# Patient Record
Sex: FEMALE | Race: WHITE | NOT HISPANIC OR LATINO | Employment: OTHER | URBAN - METROPOLITAN AREA
[De-identification: names, ages, dates, MRNs, and addresses within clinical notes are randomized per-mention and may not be internally consistent; named-entity substitution may affect disease eponyms.]

---

## 2022-07-08 ENCOUNTER — TRANSCRIBE ORDERS (OUTPATIENT)
Dept: ADMINISTRATIVE | Facility: HOSPITAL | Age: 87
End: 2022-07-08

## 2022-07-08 DIAGNOSIS — M25.559 CHRONIC HIP PAIN AFTER TOTAL REPLACEMENT OF HIP JOINT: Primary | ICD-10-CM

## 2022-07-08 DIAGNOSIS — Z96.649 CHRONIC HIP PAIN AFTER TOTAL REPLACEMENT OF HIP JOINT: Primary | ICD-10-CM

## 2022-07-08 DIAGNOSIS — G89.29 CHRONIC HIP PAIN AFTER TOTAL REPLACEMENT OF HIP JOINT: Primary | ICD-10-CM

## 2022-07-16 ENCOUNTER — HOSPITAL ENCOUNTER (OUTPATIENT)
Dept: BONE DENSITY | Facility: HOSPITAL | Age: 87
Discharge: HOME | End: 2022-07-16
Admitting: ORTHOPAEDIC SURGERY

## 2022-07-16 DIAGNOSIS — Z96.649 CHRONIC HIP PAIN AFTER TOTAL REPLACEMENT OF HIP JOINT: ICD-10-CM

## 2022-07-16 DIAGNOSIS — G89.29 CHRONIC HIP PAIN AFTER TOTAL REPLACEMENT OF HIP JOINT: ICD-10-CM

## 2022-07-16 DIAGNOSIS — M25.559 CHRONIC HIP PAIN AFTER TOTAL REPLACEMENT OF HIP JOINT: ICD-10-CM

## 2022-07-16 PROCEDURE — 77080 DXA BONE DENSITY AXIAL: CPT

## 2022-07-18 ENCOUNTER — TRANSCRIBE ORDERS (OUTPATIENT)
Dept: ADMINISTRATIVE | Facility: HOSPITAL | Age: 87
End: 2022-07-18

## 2022-07-18 ENCOUNTER — LAB (OUTPATIENT)
Dept: LAB | Facility: HOSPITAL | Age: 87
End: 2022-07-18

## 2022-07-18 DIAGNOSIS — Z78.0 POSTMENOPAUSAL STATUS (AGE-RELATED) (NATURAL): ICD-10-CM

## 2022-07-18 DIAGNOSIS — Z78.0 POSTMENOPAUSAL STATUS (AGE-RELATED) (NATURAL): Primary | ICD-10-CM

## 2022-07-18 DIAGNOSIS — E55.9 AVITAMINOSIS D: ICD-10-CM

## 2022-07-18 LAB
25(OH)D3 SERPL-MCNC: 36.6 NG/ML (ref 30–100)
ALBUMIN SERPL-MCNC: 4.6 G/DL (ref 3.5–5.2)
ALBUMIN/GLOB SERPL: 1.6 G/DL
ALP SERPL-CCNC: 74 U/L (ref 39–117)
ALT SERPL W P-5'-P-CCNC: 14 U/L (ref 1–33)
ANION GAP SERPL CALCULATED.3IONS-SCNC: 9.8 MMOL/L (ref 5–15)
AST SERPL-CCNC: 17 U/L (ref 1–32)
BILIRUB SERPL-MCNC: 0.3 MG/DL (ref 0–1.2)
BUN SERPL-MCNC: 21 MG/DL (ref 8–23)
BUN/CREAT SERPL: 26.3 (ref 7–25)
CALCIUM SPEC-SCNC: 9.5 MG/DL (ref 8.6–10.5)
CHLORIDE SERPL-SCNC: 97 MMOL/L (ref 98–107)
CO2 SERPL-SCNC: 29.2 MMOL/L (ref 22–29)
CREAT SERPL-MCNC: 0.8 MG/DL (ref 0.57–1)
EGFRCR SERPLBLD CKD-EPI 2021: 71 ML/MIN/1.73
GLOBULIN UR ELPH-MCNC: 2.9 GM/DL
GLUCOSE SERPL-MCNC: 58 MG/DL (ref 65–99)
POTASSIUM SERPL-SCNC: 4.2 MMOL/L (ref 3.5–5.2)
PROT SERPL-MCNC: 7.5 G/DL (ref 6–8.5)
SODIUM SERPL-SCNC: 136 MMOL/L (ref 136–145)

## 2022-07-18 PROCEDURE — 82306 VITAMIN D 25 HYDROXY: CPT

## 2022-07-18 PROCEDURE — 80053 COMPREHEN METABOLIC PANEL: CPT

## 2022-07-18 PROCEDURE — 36415 COLL VENOUS BLD VENIPUNCTURE: CPT

## 2022-08-17 ENCOUNTER — TRANSCRIBE ORDERS (OUTPATIENT)
Dept: ADMINISTRATIVE | Facility: HOSPITAL | Age: 87
End: 2022-08-17

## 2022-08-17 DIAGNOSIS — M81.0 SENILE OSTEOPOROSIS: Primary | ICD-10-CM

## 2022-08-24 ENCOUNTER — APPOINTMENT (OUTPATIENT)
Dept: ONCOLOGY | Facility: HOSPITAL | Age: 87
End: 2022-08-24

## 2022-08-29 PROBLEM — M81.0 OSTEOPOROSIS: Status: ACTIVE | Noted: 2022-08-29

## 2022-09-01 ENCOUNTER — INFUSION (OUTPATIENT)
Dept: ONCOLOGY | Facility: HOSPITAL | Age: 87
End: 2022-09-01

## 2022-09-01 ENCOUNTER — LAB (OUTPATIENT)
Dept: OTHER | Facility: HOSPITAL | Age: 87
End: 2022-09-01

## 2022-09-01 VITALS
RESPIRATION RATE: 18 BRPM | HEART RATE: 70 BPM | OXYGEN SATURATION: 100 % | BODY MASS INDEX: 19.47 KG/M2 | WEIGHT: 105.8 LBS | HEIGHT: 62 IN | TEMPERATURE: 96.6 F | DIASTOLIC BLOOD PRESSURE: 68 MMHG | SYSTOLIC BLOOD PRESSURE: 134 MMHG

## 2022-09-01 DIAGNOSIS — M81.0 OSTEOPOROSIS, UNSPECIFIED OSTEOPOROSIS TYPE, UNSPECIFIED PATHOLOGICAL FRACTURE PRESENCE: ICD-10-CM

## 2022-09-01 DIAGNOSIS — M81.0 OSTEOPOROSIS, UNSPECIFIED OSTEOPOROSIS TYPE, UNSPECIFIED PATHOLOGICAL FRACTURE PRESENCE: Primary | ICD-10-CM

## 2022-09-01 LAB
25(OH)D3 SERPL-MCNC: 35.2 NG/ML (ref 30–100)
ALBUMIN SERPL-MCNC: 4.1 G/DL (ref 3.5–5.2)
ALBUMIN/GLOB SERPL: 1.3 G/DL
ALP SERPL-CCNC: 69 U/L (ref 39–117)
ALT SERPL W P-5'-P-CCNC: 6 U/L (ref 1–33)
ANION GAP SERPL CALCULATED.3IONS-SCNC: 8.9 MMOL/L (ref 5–15)
AST SERPL-CCNC: 14 U/L (ref 1–32)
BILIRUB SERPL-MCNC: 0.2 MG/DL (ref 0–1.2)
BUN SERPL-MCNC: 18 MG/DL (ref 8–23)
BUN/CREAT SERPL: 19.4 (ref 7–25)
CALCIUM SPEC-SCNC: 9.6 MG/DL (ref 8.6–10.5)
CHLORIDE SERPL-SCNC: 101 MMOL/L (ref 98–107)
CO2 SERPL-SCNC: 29.1 MMOL/L (ref 22–29)
CREAT SERPL-MCNC: 0.93 MG/DL (ref 0.57–1)
EGFRCR SERPLBLD CKD-EPI 2021: 58.9 ML/MIN/1.73
GLOBULIN UR ELPH-MCNC: 3.2 GM/DL
GLUCOSE SERPL-MCNC: 81 MG/DL (ref 65–99)
POTASSIUM SERPL-SCNC: 4.2 MMOL/L (ref 3.5–5.2)
PROT SERPL-MCNC: 7.3 G/DL (ref 6–8.5)
SODIUM SERPL-SCNC: 139 MMOL/L (ref 136–145)

## 2022-09-01 PROCEDURE — 96372 THER/PROPH/DIAG INJ SC/IM: CPT

## 2022-09-01 PROCEDURE — 80053 COMPREHEN METABOLIC PANEL: CPT | Performed by: ORTHOPAEDIC SURGERY

## 2022-09-01 PROCEDURE — 82306 VITAMIN D 25 HYDROXY: CPT | Performed by: ORTHOPAEDIC SURGERY

## 2022-09-01 PROCEDURE — 25010000002 DENOSUMAB 60 MG/ML SOLUTION PREFILLED SYRINGE: Performed by: ORTHOPAEDIC SURGERY

## 2022-09-01 RX ADMIN — DENOSUMAB 60 MG: 60 INJECTION SUBCUTANEOUS at 15:11

## 2022-09-01 NOTE — NURSING NOTE
Arrived  for prolia injection. Indication and side effects reviewed. Denies recent dental work. Labs and medications verified. Prolia administered in right arm without incidence. Instructed to call prescribing MD for any concerns or questions and instructed on how to schedule future appts.  Pt vu and discharged in stable condition.

## 2022-09-12 ENCOUNTER — HOSPITAL ENCOUNTER (INPATIENT)
Facility: HOSPITAL | Age: 87
LOS: 5 days | Discharge: HOME HEALTH | End: 2022-09-18
Attending: EMERGENCY MEDICINE | Admitting: INTERNAL MEDICINE

## 2022-09-12 ENCOUNTER — APPOINTMENT (OUTPATIENT)
Dept: GENERAL RADIOLOGY | Facility: HOSPITAL | Age: 87
End: 2022-09-12

## 2022-09-12 DIAGNOSIS — I95.9 HYPOTENSION, UNSPECIFIED HYPOTENSION TYPE: ICD-10-CM

## 2022-09-12 DIAGNOSIS — E87.20 LACTIC ACIDOSIS: ICD-10-CM

## 2022-09-12 DIAGNOSIS — Z86.79 HISTORY OF HYPERTENSION: ICD-10-CM

## 2022-09-12 DIAGNOSIS — E44.0 MODERATE MALNUTRITION: ICD-10-CM

## 2022-09-12 DIAGNOSIS — R53.1 GENERALIZED WEAKNESS: ICD-10-CM

## 2022-09-12 DIAGNOSIS — J96.01 ACUTE RESPIRATORY FAILURE WITH HYPOXIA: Primary | ICD-10-CM

## 2022-09-12 DIAGNOSIS — T17.908A ASPIRATION INTO AIRWAY, INITIAL ENCOUNTER: ICD-10-CM

## 2022-09-12 LAB
BASOPHILS # BLD AUTO: 0.08 10*3/MM3 (ref 0–0.2)
BASOPHILS NFR BLD AUTO: 0.6 % (ref 0–1.5)
DEPRECATED RDW RBC AUTO: 43.4 FL (ref 37–54)
EOSINOPHIL # BLD AUTO: 0.24 10*3/MM3 (ref 0–0.4)
EOSINOPHIL NFR BLD AUTO: 1.8 % (ref 0.3–6.2)
ERYTHROCYTE [DISTWIDTH] IN BLOOD BY AUTOMATED COUNT: 13.3 % (ref 12.3–15.4)
HCT VFR BLD AUTO: 38.4 % (ref 34–46.6)
HGB BLD-MCNC: 12.3 G/DL (ref 12–15.9)
IMM GRANULOCYTES # BLD AUTO: 0.1 10*3/MM3 (ref 0–0.05)
IMM GRANULOCYTES NFR BLD AUTO: 0.8 % (ref 0–0.5)
LYMPHOCYTES # BLD AUTO: 4.35 10*3/MM3 (ref 0.7–3.1)
LYMPHOCYTES NFR BLD AUTO: 32.8 % (ref 19.6–45.3)
MCH RBC QN AUTO: 28 PG (ref 26.6–33)
MCHC RBC AUTO-ENTMCNC: 32 G/DL (ref 31.5–35.7)
MCV RBC AUTO: 87.3 FL (ref 79–97)
MONOCYTES # BLD AUTO: 1.71 10*3/MM3 (ref 0.1–0.9)
MONOCYTES NFR BLD AUTO: 12.9 % (ref 5–12)
NEUTROPHILS NFR BLD AUTO: 51.1 % (ref 42.7–76)
NEUTROPHILS NFR BLD AUTO: 6.77 10*3/MM3 (ref 1.7–7)
NRBC BLD AUTO-RTO: 0 /100 WBC (ref 0–0.2)
PLATELET # BLD AUTO: 362 10*3/MM3 (ref 140–450)
PMV BLD AUTO: 10 FL (ref 6–12)
RBC # BLD AUTO: 4.4 10*6/MM3 (ref 3.77–5.28)
WBC NRBC COR # BLD: 13.25 10*3/MM3 (ref 3.4–10.8)

## 2022-09-12 PROCEDURE — 0BH17EZ INSERTION OF ENDOTRACHEAL AIRWAY INTO TRACHEA, VIA NATURAL OR ARTIFICIAL OPENING: ICD-10-PCS | Performed by: EMERGENCY MEDICINE

## 2022-09-12 PROCEDURE — 99291 CRITICAL CARE FIRST HOUR: CPT

## 2022-09-12 PROCEDURE — 83735 ASSAY OF MAGNESIUM: CPT | Performed by: EMERGENCY MEDICINE

## 2022-09-12 PROCEDURE — 5A1935Z RESPIRATORY VENTILATION, LESS THAN 24 CONSECUTIVE HOURS: ICD-10-PCS | Performed by: INTERNAL MEDICINE

## 2022-09-12 PROCEDURE — 0202U NFCT DS 22 TRGT SARS-COV-2: CPT | Performed by: EMERGENCY MEDICINE

## 2022-09-12 PROCEDURE — 84484 ASSAY OF TROPONIN QUANT: CPT | Performed by: EMERGENCY MEDICINE

## 2022-09-12 PROCEDURE — 71045 X-RAY EXAM CHEST 1 VIEW: CPT

## 2022-09-12 PROCEDURE — 87040 BLOOD CULTURE FOR BACTERIA: CPT | Performed by: EMERGENCY MEDICINE

## 2022-09-12 PROCEDURE — 83605 ASSAY OF LACTIC ACID: CPT | Performed by: EMERGENCY MEDICINE

## 2022-09-12 PROCEDURE — 84145 PROCALCITONIN (PCT): CPT | Performed by: EMERGENCY MEDICINE

## 2022-09-12 PROCEDURE — 85025 COMPLETE CBC W/AUTO DIFF WBC: CPT | Performed by: EMERGENCY MEDICINE

## 2022-09-12 PROCEDURE — 80053 COMPREHEN METABOLIC PANEL: CPT | Performed by: EMERGENCY MEDICINE

## 2022-09-12 PROCEDURE — 31500 INSERT EMERGENCY AIRWAY: CPT

## 2022-09-12 RX ORDER — NOREPINEPHRINE BIT/0.9 % NACL 8 MG/250ML
INFUSION BOTTLE (ML) INTRAVENOUS
Status: COMPLETED
Start: 2022-09-12 | End: 2022-09-13

## 2022-09-12 RX ADMIN — SODIUM CHLORIDE 1000 ML: 9 INJECTION, SOLUTION INTRAVENOUS at 23:28

## 2022-09-13 ENCOUNTER — APPOINTMENT (OUTPATIENT)
Dept: CARDIOLOGY | Facility: HOSPITAL | Age: 87
End: 2022-09-13

## 2022-09-13 ENCOUNTER — APPOINTMENT (OUTPATIENT)
Dept: CT IMAGING | Facility: HOSPITAL | Age: 87
End: 2022-09-13

## 2022-09-13 ENCOUNTER — APPOINTMENT (OUTPATIENT)
Dept: GENERAL RADIOLOGY | Facility: HOSPITAL | Age: 87
End: 2022-09-13

## 2022-09-13 PROBLEM — J96.01 ACUTE RESPIRATORY FAILURE WITH HYPOXIA: Status: ACTIVE | Noted: 2022-09-13

## 2022-09-13 LAB
ALBUMIN SERPL-MCNC: 4.1 G/DL (ref 3.5–5.2)
ALBUMIN/GLOB SERPL: 1.4 G/DL
ALP SERPL-CCNC: 92 U/L (ref 39–117)
ALT SERPL W P-5'-P-CCNC: 9 U/L (ref 1–33)
ANION GAP SERPL CALCULATED.3IONS-SCNC: 18.1 MMOL/L (ref 5–15)
ARTERIAL PATENCY WRIST A: POSITIVE
ARTERIAL PATENCY WRIST A: POSITIVE
ASCENDING AORTA: 3 CM
AST SERPL-CCNC: 21 U/L (ref 1–32)
ATMOSPHERIC PRESS: 745.2 MMHG
ATMOSPHERIC PRESS: 750.9 MMHG
B PARAPERT DNA SPEC QL NAA+PROBE: NOT DETECTED
B PERT DNA SPEC QL NAA+PROBE: NOT DETECTED
BASE EXCESS BLDA CALC-SCNC: -1.1 MMOL/L (ref 0–2)
BASE EXCESS BLDA CALC-SCNC: -9.5 MMOL/L (ref 0–2)
BDY SITE: ABNORMAL
BDY SITE: ABNORMAL
BH CV ECHO MEAS - ACS: 1.23 CM
BH CV ECHO MEAS - AO MAX PG: 5.3 MMHG
BH CV ECHO MEAS - AO MEAN PG: 2.9 MMHG
BH CV ECHO MEAS - AO ROOT DIAM: 3.1 CM
BH CV ECHO MEAS - AO V2 MAX: 115.3 CM/SEC
BH CV ECHO MEAS - AO V2 VTI: 20.8 CM
BH CV ECHO MEAS - AVA(I,D): 1.77 CM2
BH CV ECHO MEAS - EDV(CUBED): 95.1 ML
BH CV ECHO MEAS - EDV(MOD-SP2): 47 ML
BH CV ECHO MEAS - EDV(MOD-SP4): 47 ML
BH CV ECHO MEAS - EF(MOD-BP): 55.1 %
BH CV ECHO MEAS - EF(MOD-SP2): 57.4 %
BH CV ECHO MEAS - EF(MOD-SP4): 55.3 %
BH CV ECHO MEAS - ESV(CUBED): 17.9 ML
BH CV ECHO MEAS - ESV(MOD-SP2): 20 ML
BH CV ECHO MEAS - ESV(MOD-SP4): 21 ML
BH CV ECHO MEAS - FS: 42.7 %
BH CV ECHO MEAS - IVS/LVPW: 1.02 CM
BH CV ECHO MEAS - IVSD: 0.9 CM
BH CV ECHO MEAS - LAT PEAK E' VEL: 8.6 CM/SEC
BH CV ECHO MEAS - LV DIASTOLIC VOL/BSA (35-75): 32.5 CM2
BH CV ECHO MEAS - LV MASS(C)D: 133.9 GRAMS
BH CV ECHO MEAS - LV MAX PG: 1.87 MMHG
BH CV ECHO MEAS - LV MEAN PG: 0.87 MMHG
BH CV ECHO MEAS - LV SYSTOLIC VOL/BSA (12-30): 14.5 CM2
BH CV ECHO MEAS - LV V1 MAX: 68.4 CM/SEC
BH CV ECHO MEAS - LV V1 VTI: 12.5 CM
BH CV ECHO MEAS - LVIDD: 4.6 CM
BH CV ECHO MEAS - LVIDS: 2.6 CM
BH CV ECHO MEAS - LVOT AREA: 2.9 CM2
BH CV ECHO MEAS - LVOT DIAM: 1.94 CM
BH CV ECHO MEAS - LVPWD: 0.88 CM
BH CV ECHO MEAS - MED PEAK E' VEL: 7.3 CM/SEC
BH CV ECHO MEAS - MV A DUR: 0.1 SEC
BH CV ECHO MEAS - MV A MAX VEL: 70 CM/SEC
BH CV ECHO MEAS - MV DEC TIME: 0.19 MSEC
BH CV ECHO MEAS - MV E MAX VEL: 104 CM/SEC
BH CV ECHO MEAS - MV E/A: 1.49
BH CV ECHO MEAS - PA ACC TIME: 0.12 SEC
BH CV ECHO MEAS - PA PR(ACCEL): 23.1 MMHG
BH CV ECHO MEAS - PA V2 MAX: 84.5 CM/SEC
BH CV ECHO MEAS - RAP SYSTOLE: 3 MMHG
BH CV ECHO MEAS - RV MAX PG: 1.18 MMHG
BH CV ECHO MEAS - RV V1 MAX: 54.4 CM/SEC
BH CV ECHO MEAS - RV V1 VTI: 10.5 CM
BH CV ECHO MEAS - RVSP: 64 MMHG
BH CV ECHO MEAS - SI(MOD-SP2): 18.6 ML/M2
BH CV ECHO MEAS - SI(MOD-SP4): 18 ML/M2
BH CV ECHO MEAS - SV(LVOT): 36.7 ML
BH CV ECHO MEAS - SV(MOD-SP2): 27 ML
BH CV ECHO MEAS - SV(MOD-SP4): 26 ML
BH CV ECHO MEAS - TAPSE (>1.6): 2.6 CM
BH CV ECHO MEAS - TR MAX PG: 61.1 MMHG
BH CV ECHO MEAS - TR MAX VEL: 390.9 CM/SEC
BH CV ECHO MEASUREMENTS AVERAGE E/E' RATIO: 13.08
BH CV XLRA - RV BASE: 2.9 CM
BH CV XLRA - RV LENGTH: 6.2 CM
BH CV XLRA - RV MID: 2.5 CM
BH CV XLRA - TDI S': 14.5 CM/SEC
BILIRUB SERPL-MCNC: 0.2 MG/DL (ref 0–1.2)
BUN SERPL-MCNC: 18 MG/DL (ref 8–23)
BUN/CREAT SERPL: 19.4 (ref 7–25)
C PNEUM DNA NPH QL NAA+NON-PROBE: NOT DETECTED
CALCIUM SPEC-SCNC: 8.9 MG/DL (ref 8.6–10.5)
CHLORIDE SERPL-SCNC: 104 MMOL/L (ref 98–107)
CO2 SERPL-SCNC: 16.9 MMOL/L (ref 22–29)
CREAT SERPL-MCNC: 0.93 MG/DL (ref 0.57–1)
D-LACTATE SERPL-SCNC: 1.6 MMOL/L (ref 0.5–2)
D-LACTATE SERPL-SCNC: 2.3 MMOL/L (ref 0.5–2)
D-LACTATE SERPL-SCNC: 2.6 MMOL/L (ref 0.5–2)
D-LACTATE SERPL-SCNC: 4.1 MMOL/L (ref 0.5–2)
D-LACTATE SERPL-SCNC: 7.5 MMOL/L (ref 0.5–2)
EGFRCR SERPLBLD CKD-EPI 2021: 58.9 ML/MIN/1.73
FLUAV SUBTYP SPEC NAA+PROBE: NOT DETECTED
FLUBV RNA ISLT QL NAA+PROBE: NOT DETECTED
GLOBULIN UR ELPH-MCNC: 3 GM/DL
GLUCOSE BLDC GLUCOMTR-MCNC: 101 MG/DL (ref 70–130)
GLUCOSE BLDC GLUCOMTR-MCNC: 106 MG/DL (ref 70–130)
GLUCOSE BLDC GLUCOMTR-MCNC: 123 MG/DL (ref 70–130)
GLUCOSE BLDC GLUCOMTR-MCNC: 86 MG/DL (ref 70–130)
GLUCOSE BLDC GLUCOMTR-MCNC: 92 MG/DL (ref 70–130)
GLUCOSE SERPL-MCNC: 261 MG/DL (ref 65–99)
HADV DNA SPEC NAA+PROBE: NOT DETECTED
HCO3 BLDA-SCNC: 18.3 MMOL/L (ref 22–28)
HCO3 BLDA-SCNC: 21.8 MMOL/L (ref 22–28)
HCOV 229E RNA SPEC QL NAA+PROBE: NOT DETECTED
HCOV HKU1 RNA SPEC QL NAA+PROBE: NOT DETECTED
HCOV NL63 RNA SPEC QL NAA+PROBE: NOT DETECTED
HCOV OC43 RNA SPEC QL NAA+PROBE: NOT DETECTED
HMPV RNA NPH QL NAA+NON-PROBE: NOT DETECTED
HPIV1 RNA ISLT QL NAA+PROBE: NOT DETECTED
HPIV2 RNA SPEC QL NAA+PROBE: NOT DETECTED
HPIV3 RNA NPH QL NAA+PROBE: NOT DETECTED
HPIV4 P GENE NPH QL NAA+PROBE: NOT DETECTED
INHALED O2 CONCENTRATION: 100 %
INHALED O2 CONCENTRATION: 100 %
LEFT ATRIUM VOLUME INDEX: 47.9 ML/M2
M PNEUMO IGG SER IA-ACNC: NOT DETECTED
MAGNESIUM SERPL-MCNC: 2.2 MG/DL (ref 1.6–2.4)
MAXIMAL PREDICTED HEART RATE: 131 BPM
MODALITY: ABNORMAL
MODALITY: ABNORMAL
O2 A-A PPRESDIFF RESPIRATORY: 0.1 MMHG
O2 A-A PPRESDIFF RESPIRATORY: 0.6 MMHG
PCO2 BLDA: 29.9 MM HG (ref 35–45)
PCO2 BLDA: 46.3 MM HG (ref 35–45)
PEEP RESPIRATORY: 5 CM[H2O]
PEEP RESPIRATORY: 5 CM[H2O]
PH BLDA: 7.21 PH UNITS (ref 7.35–7.45)
PH BLDA: 7.47 PH UNITS (ref 7.35–7.45)
PO2 BLDA: 450.4 MM HG (ref 80–100)
PO2 BLDA: 70.6 MM HG (ref 80–100)
POTASSIUM SERPL-SCNC: 4.9 MMOL/L (ref 3.5–5.2)
PROCALCITONIN SERPL-MCNC: 0.03 NG/ML (ref 0–0.25)
PROT SERPL-MCNC: 7.1 G/DL (ref 6–8.5)
QT INTERVAL: 461 MS
QT INTERVAL: 467 MS
RHINOVIRUS RNA SPEC NAA+PROBE: NOT DETECTED
RSV RNA NPH QL NAA+NON-PROBE: NOT DETECTED
SAO2 % BLDCOA: 100 % (ref 92–99)
SAO2 % BLDCOA: 89.8 % (ref 92–99)
SARS-COV-2 RNA NPH QL NAA+NON-PROBE: NOT DETECTED
SET MECH RESP RATE: 20
SET MECH RESP RATE: 24
SINUS: 2.8 CM
SODIUM SERPL-SCNC: 139 MMOL/L (ref 136–145)
STJ: 2.9 CM
STRESS TARGET HR: 111 BPM
TOTAL RATE: 20 BREATHS/MINUTE
TOTAL RATE: 24 BREATHS/MINUTE
TROPONIN T SERPL-MCNC: 0.03 NG/ML (ref 0–0.03)
TROPONIN T SERPL-MCNC: <0.01 NG/ML (ref 0–0.03)
VENTILATOR MODE: AC
VENTILATOR MODE: AC
VT ON VENT VENT: 389 ML
VT ON VENT VENT: 500 ML

## 2022-09-13 PROCEDURE — 94799 UNLISTED PULMONARY SVC/PX: CPT

## 2022-09-13 PROCEDURE — 93010 ELECTROCARDIOGRAM REPORT: CPT | Performed by: INTERNAL MEDICINE

## 2022-09-13 PROCEDURE — 94760 N-INVAS EAR/PLS OXIMETRY 1: CPT

## 2022-09-13 PROCEDURE — 93005 ELECTROCARDIOGRAM TRACING: CPT | Performed by: EMERGENCY MEDICINE

## 2022-09-13 PROCEDURE — 25010000002 PROPOFOL 10 MG/ML EMULSION

## 2022-09-13 PROCEDURE — 94002 VENT MGMT INPAT INIT DAY: CPT

## 2022-09-13 PROCEDURE — 25010000002 PIPERACILLIN SOD-TAZOBACTAM PER 1 G: Performed by: EMERGENCY MEDICINE

## 2022-09-13 PROCEDURE — 25010000002 PROPOFOL 10 MG/ML EMULSION: Performed by: EMERGENCY MEDICINE

## 2022-09-13 PROCEDURE — 82803 BLOOD GASES ANY COMBINATION: CPT

## 2022-09-13 PROCEDURE — 82962 GLUCOSE BLOOD TEST: CPT

## 2022-09-13 PROCEDURE — 71045 X-RAY EXAM CHEST 1 VIEW: CPT

## 2022-09-13 PROCEDURE — 25010000002 HEPARIN (PORCINE) PER 1000 UNITS: Performed by: INTERNAL MEDICINE

## 2022-09-13 PROCEDURE — 93306 TTE W/DOPPLER COMPLETE: CPT

## 2022-09-13 PROCEDURE — 94761 N-INVAS EAR/PLS OXIMETRY MLT: CPT

## 2022-09-13 PROCEDURE — 36600 WITHDRAWAL OF ARTERIAL BLOOD: CPT

## 2022-09-13 PROCEDURE — 84484 ASSAY OF TROPONIN QUANT: CPT | Performed by: INTERNAL MEDICINE

## 2022-09-13 PROCEDURE — 25010000002 FENTANYL CITRATE (PF) 50 MCG/ML SOLUTION

## 2022-09-13 PROCEDURE — 93306 TTE W/DOPPLER COMPLETE: CPT | Performed by: INTERNAL MEDICINE

## 2022-09-13 PROCEDURE — 92610 EVALUATE SWALLOWING FUNCTION: CPT

## 2022-09-13 PROCEDURE — 83605 ASSAY OF LACTIC ACID: CPT | Performed by: EMERGENCY MEDICINE

## 2022-09-13 PROCEDURE — 31500 INSERT EMERGENCY AIRWAY: CPT

## 2022-09-13 PROCEDURE — 25010000002 PIPERACILLIN SOD-TAZOBACTAM PER 1 G: Performed by: INTERNAL MEDICINE

## 2022-09-13 PROCEDURE — 99222 1ST HOSP IP/OBS MODERATE 55: CPT | Performed by: INTERNAL MEDICINE

## 2022-09-13 PROCEDURE — 74176 CT ABD & PELVIS W/O CONTRAST: CPT

## 2022-09-13 PROCEDURE — 93005 ELECTROCARDIOGRAM TRACING: CPT | Performed by: INTERNAL MEDICINE

## 2022-09-13 PROCEDURE — 25010000002 FENTANYL CITRATE (PF) 50 MCG/ML SOLUTION: Performed by: EMERGENCY MEDICINE

## 2022-09-13 RX ORDER — FENTANYL CITRATE 50 UG/ML
50 INJECTION, SOLUTION INTRAMUSCULAR; INTRAVENOUS ONCE
Status: COMPLETED | OUTPATIENT
Start: 2022-09-13 | End: 2022-09-13

## 2022-09-13 RX ORDER — SODIUM CHLORIDE 0.9 % (FLUSH) 0.9 %
10 SYRINGE (ML) INJECTION AS NEEDED
Status: DISCONTINUED | OUTPATIENT
Start: 2022-09-13 | End: 2022-09-18 | Stop reason: HOSPADM

## 2022-09-13 RX ORDER — IPRATROPIUM BROMIDE 42 UG/1
2 SPRAY, METERED NASAL 4 TIMES DAILY
COMMUNITY
End: 2022-10-07 | Stop reason: ALTCHOICE

## 2022-09-13 RX ORDER — ALUMINA, MAGNESIA, AND SIMETHICONE 2400; 2400; 240 MG/30ML; MG/30ML; MG/30ML
15 SUSPENSION ORAL EVERY 6 HOURS PRN
Status: DISCONTINUED | OUTPATIENT
Start: 2022-09-13 | End: 2022-09-18 | Stop reason: HOSPADM

## 2022-09-13 RX ORDER — HEPARIN SODIUM 5000 [USP'U]/ML
5000 INJECTION, SOLUTION INTRAVENOUS; SUBCUTANEOUS EVERY 8 HOURS SCHEDULED
Status: DISCONTINUED | OUTPATIENT
Start: 2022-09-13 | End: 2022-09-16

## 2022-09-13 RX ORDER — NICOTINE POLACRILEX 4 MG
15 LOZENGE BUCCAL
Status: DISCONTINUED | OUTPATIENT
Start: 2022-09-13 | End: 2022-09-18 | Stop reason: HOSPADM

## 2022-09-13 RX ORDER — PRAVASTATIN SODIUM 40 MG
80 TABLET ORAL DAILY
COMMUNITY
End: 2022-09-18 | Stop reason: HOSPADM

## 2022-09-13 RX ORDER — BUDESONIDE 3 MG/1
6 CAPSULE, COATED PELLETS ORAL EVERY MORNING
COMMUNITY
End: 2022-10-07 | Stop reason: ALTCHOICE

## 2022-09-13 RX ORDER — ONDANSETRON 4 MG/1
4 TABLET, FILM COATED ORAL EVERY 6 HOURS PRN
Status: DISCONTINUED | OUTPATIENT
Start: 2022-09-13 | End: 2022-09-18 | Stop reason: HOSPADM

## 2022-09-13 RX ORDER — ONDANSETRON 2 MG/ML
4 INJECTION INTRAMUSCULAR; INTRAVENOUS EVERY 6 HOURS PRN
Status: DISCONTINUED | OUTPATIENT
Start: 2022-09-13 | End: 2022-09-18 | Stop reason: HOSPADM

## 2022-09-13 RX ORDER — PROPOFOL 10 MG/ML
VIAL (ML) INTRAVENOUS
Status: COMPLETED | OUTPATIENT
Start: 2022-09-13 | End: 2022-09-13

## 2022-09-13 RX ORDER — LISINOPRIL 5 MG/1
5 TABLET ORAL DAILY
COMMUNITY
End: 2022-09-18 | Stop reason: HOSPADM

## 2022-09-13 RX ORDER — ACETAMINOPHEN 650 MG/1
650 SUPPOSITORY RECTAL EVERY 4 HOURS PRN
Status: DISCONTINUED | OUTPATIENT
Start: 2022-09-13 | End: 2022-09-18 | Stop reason: HOSPADM

## 2022-09-13 RX ORDER — NOREPINEPHRINE BIT/0.9 % NACL 8 MG/250ML
.02-.3 INFUSION BOTTLE (ML) INTRAVENOUS
Status: DISCONTINUED | OUTPATIENT
Start: 2022-09-13 | End: 2022-09-17

## 2022-09-13 RX ORDER — ALPRAZOLAM 0.5 MG/1
0.5 TABLET ORAL NIGHTLY PRN
Status: DISCONTINUED | OUTPATIENT
Start: 2022-09-13 | End: 2022-09-18 | Stop reason: HOSPADM

## 2022-09-13 RX ORDER — LOPERAMIDE HYDROCHLORIDE 2 MG/1
2 CAPSULE ORAL 4 TIMES DAILY PRN
COMMUNITY
End: 2022-10-07 | Stop reason: ALTCHOICE

## 2022-09-13 RX ORDER — DEXTROSE MONOHYDRATE 25 G/50ML
25 INJECTION, SOLUTION INTRAVENOUS
Status: DISCONTINUED | OUTPATIENT
Start: 2022-09-13 | End: 2022-09-18 | Stop reason: HOSPADM

## 2022-09-13 RX ORDER — SODIUM CHLORIDE 0.9 % (FLUSH) 0.9 %
10 SYRINGE (ML) INJECTION EVERY 12 HOURS SCHEDULED
Status: DISCONTINUED | OUTPATIENT
Start: 2022-09-13 | End: 2022-09-18 | Stop reason: HOSPADM

## 2022-09-13 RX ORDER — FAMOTIDINE 10 MG/ML
20 INJECTION, SOLUTION INTRAVENOUS DAILY
Status: DISCONTINUED | OUTPATIENT
Start: 2022-09-13 | End: 2022-09-14

## 2022-09-13 RX ORDER — PROPOFOL 10 MG/ML
VIAL (ML) INTRAVENOUS
Status: COMPLETED
Start: 2022-09-13 | End: 2022-09-13

## 2022-09-13 RX ORDER — IPRATROPIUM BROMIDE 21 UG/1
2 SPRAY, METERED NASAL 4 TIMES DAILY
Status: DISCONTINUED | OUTPATIENT
Start: 2022-09-13 | End: 2022-09-18 | Stop reason: HOSPADM

## 2022-09-13 RX ORDER — FENTANYL CITRATE 50 UG/ML
INJECTION, SOLUTION INTRAMUSCULAR; INTRAVENOUS
Status: COMPLETED
Start: 2022-09-13 | End: 2022-09-13

## 2022-09-13 RX ORDER — ESTRADIOL 0.1 MG/D
1 FILM, EXTENDED RELEASE TRANSDERMAL 2 TIMES WEEKLY
COMMUNITY
End: 2022-10-07 | Stop reason: ALTCHOICE

## 2022-09-13 RX ORDER — ETOMIDATE 2 MG/ML
INJECTION INTRAVENOUS
Status: COMPLETED | OUTPATIENT
Start: 2022-09-13 | End: 2022-09-13

## 2022-09-13 RX ORDER — SODIUM BICARBONATE IN D5W 150/1000ML
150 PLASTIC BAG, INJECTION (ML) INTRAVENOUS CONTINUOUS
Status: DISCONTINUED | OUTPATIENT
Start: 2022-09-13 | End: 2022-09-17

## 2022-09-13 RX ORDER — TRIAMCINOLONE ACETONIDE 1 MG/G
1 CREAM TOPICAL 2 TIMES DAILY
COMMUNITY
End: 2022-10-07 | Stop reason: ALTCHOICE

## 2022-09-13 RX ORDER — CYCLOSPORINE 0.5 MG/ML
1 EMULSION OPHTHALMIC 2 TIMES DAILY
COMMUNITY
End: 2022-10-07 | Stop reason: ALTCHOICE

## 2022-09-13 RX ORDER — ACETAMINOPHEN 325 MG/1
650 TABLET ORAL EVERY 4 HOURS PRN
Status: DISCONTINUED | OUTPATIENT
Start: 2022-09-13 | End: 2022-09-18 | Stop reason: HOSPADM

## 2022-09-13 RX ORDER — PANTOPRAZOLE SODIUM 40 MG/1
40 TABLET, DELAYED RELEASE ORAL DAILY
COMMUNITY
End: 2022-10-12

## 2022-09-13 RX ADMIN — FENTANYL CITRATE 50 MCG: 50 INJECTION, SOLUTION INTRAMUSCULAR; INTRAVENOUS at 00:37

## 2022-09-13 RX ADMIN — Medication 0.1 MCG/KG/MIN: at 00:20

## 2022-09-13 RX ADMIN — FENTANYL CITRATE 50 MCG: 50 INJECTION, SOLUTION INTRAMUSCULAR; INTRAVENOUS at 02:29

## 2022-09-13 RX ADMIN — Medication 10 ML: at 04:27

## 2022-09-13 RX ADMIN — PROPOFOL 5 MCG/KG/MIN: 10 INJECTION, EMULSION INTRAVENOUS at 00:23

## 2022-09-13 RX ADMIN — HEPARIN SODIUM 5000 UNITS: 5000 INJECTION INTRAVENOUS; SUBCUTANEOUS at 14:04

## 2022-09-13 RX ADMIN — HEPARIN SODIUM 5000 UNITS: 5000 INJECTION INTRAVENOUS; SUBCUTANEOUS at 06:47

## 2022-09-13 RX ADMIN — TAZOBACTAM SODIUM AND PIPERACILLIN SODIUM 3.38 G: 375; 3 INJECTION, SOLUTION INTRAVENOUS at 14:05

## 2022-09-13 RX ADMIN — Medication 10 ML: at 21:15

## 2022-09-13 RX ADMIN — HEPARIN SODIUM 5000 UNITS: 5000 INJECTION INTRAVENOUS; SUBCUTANEOUS at 21:15

## 2022-09-13 RX ADMIN — TAZOBACTAM SODIUM AND PIPERACILLIN SODIUM 3.38 G: 375; 3 INJECTION, SOLUTION INTRAVENOUS at 06:59

## 2022-09-13 RX ADMIN — Medication 10 ML: at 08:45

## 2022-09-13 RX ADMIN — ETOMIDATE 20 MG: 2 INJECTION, SOLUTION INTRAVENOUS at 00:09

## 2022-09-13 RX ADMIN — PROPOFOL 20 MG: 10 INJECTION, EMULSION INTRAVENOUS at 00:15

## 2022-09-13 RX ADMIN — IPRATROPIUM BROMIDE 2 SPRAY: 21 SPRAY, METERED NASAL at 18:29

## 2022-09-13 RX ADMIN — TAZOBACTAM SODIUM AND PIPERACILLIN SODIUM 3.38 G: 375; 3 INJECTION, SOLUTION INTRAVENOUS at 01:10

## 2022-09-13 RX ADMIN — ALPRAZOLAM 0.5 MG: 0.5 TABLET ORAL at 21:15

## 2022-09-13 RX ADMIN — FAMOTIDINE 20 MG: 10 INJECTION INTRAVENOUS at 08:45

## 2022-09-13 RX ADMIN — FENTANYL CITRATE 50 MCG: 50 INJECTION INTRAMUSCULAR; INTRAVENOUS at 00:37

## 2022-09-14 ENCOUNTER — APPOINTMENT (OUTPATIENT)
Dept: GENERAL RADIOLOGY | Facility: HOSPITAL | Age: 87
End: 2022-09-14

## 2022-09-14 LAB
ALBUMIN SERPL-MCNC: 3 G/DL (ref 3.5–5.2)
ALBUMIN/GLOB SERPL: 1.2 G/DL
ALP SERPL-CCNC: 57 U/L (ref 39–117)
ALT SERPL W P-5'-P-CCNC: 8 U/L (ref 1–33)
ANION GAP SERPL CALCULATED.3IONS-SCNC: 7.1 MMOL/L (ref 5–15)
AST SERPL-CCNC: 11 U/L (ref 1–32)
BASOPHILS # BLD AUTO: 0.03 10*3/MM3 (ref 0–0.2)
BASOPHILS NFR BLD AUTO: 0.4 % (ref 0–1.5)
BILIRUB SERPL-MCNC: 0.5 MG/DL (ref 0–1.2)
BUN SERPL-MCNC: 13 MG/DL (ref 8–23)
BUN/CREAT SERPL: 21.7 (ref 7–25)
CALCIUM SPEC-SCNC: 7.5 MG/DL (ref 8.6–10.5)
CHLORIDE SERPL-SCNC: 101 MMOL/L (ref 98–107)
CO2 SERPL-SCNC: 25.9 MMOL/L (ref 22–29)
CREAT SERPL-MCNC: 0.6 MG/DL (ref 0.57–1)
DEPRECATED RDW RBC AUTO: 45.1 FL (ref 37–54)
EGFRCR SERPLBLD CKD-EPI 2021: 85.9 ML/MIN/1.73
EOSINOPHIL # BLD AUTO: 0.06 10*3/MM3 (ref 0–0.4)
EOSINOPHIL NFR BLD AUTO: 0.8 % (ref 0.3–6.2)
ERYTHROCYTE [DISTWIDTH] IN BLOOD BY AUTOMATED COUNT: 13.9 % (ref 12.3–15.4)
GLOBULIN UR ELPH-MCNC: 2.5 GM/DL
GLUCOSE BLDC GLUCOMTR-MCNC: 107 MG/DL (ref 70–130)
GLUCOSE BLDC GLUCOMTR-MCNC: 109 MG/DL (ref 70–130)
GLUCOSE BLDC GLUCOMTR-MCNC: 116 MG/DL (ref 70–130)
GLUCOSE BLDC GLUCOMTR-MCNC: 91 MG/DL (ref 70–130)
GLUCOSE BLDC GLUCOMTR-MCNC: 95 MG/DL (ref 70–130)
GLUCOSE SERPL-MCNC: 97 MG/DL (ref 65–99)
HCT VFR BLD AUTO: 30.5 % (ref 34–46.6)
HGB BLD-MCNC: 9.8 G/DL (ref 12–15.9)
IMM GRANULOCYTES # BLD AUTO: 0.03 10*3/MM3 (ref 0–0.05)
IMM GRANULOCYTES NFR BLD AUTO: 0.4 % (ref 0–0.5)
LYMPHOCYTES # BLD AUTO: 1.13 10*3/MM3 (ref 0.7–3.1)
LYMPHOCYTES NFR BLD AUTO: 15.2 % (ref 19.6–45.3)
MCH RBC QN AUTO: 28.5 PG (ref 26.6–33)
MCHC RBC AUTO-ENTMCNC: 32.1 G/DL (ref 31.5–35.7)
MCV RBC AUTO: 88.7 FL (ref 79–97)
MONOCYTES # BLD AUTO: 0.76 10*3/MM3 (ref 0.1–0.9)
MONOCYTES NFR BLD AUTO: 10.2 % (ref 5–12)
NEUTROPHILS NFR BLD AUTO: 5.44 10*3/MM3 (ref 1.7–7)
NEUTROPHILS NFR BLD AUTO: 73 % (ref 42.7–76)
NRBC BLD AUTO-RTO: 0 /100 WBC (ref 0–0.2)
PLATELET # BLD AUTO: 212 10*3/MM3 (ref 140–450)
PMV BLD AUTO: 9.6 FL (ref 6–12)
POTASSIUM SERPL-SCNC: 3.5 MMOL/L (ref 3.5–5.2)
PROT SERPL-MCNC: 5.5 G/DL (ref 6–8.5)
QT INTERVAL: 313 MS
RBC # BLD AUTO: 3.44 10*6/MM3 (ref 3.77–5.28)
SODIUM SERPL-SCNC: 134 MMOL/L (ref 136–145)
WBC NRBC COR # BLD: 7.45 10*3/MM3 (ref 3.4–10.8)

## 2022-09-14 PROCEDURE — 80053 COMPREHEN METABOLIC PANEL: CPT | Performed by: INTERNAL MEDICINE

## 2022-09-14 PROCEDURE — 25010000002 HEPARIN (PORCINE) PER 1000 UNITS: Performed by: INTERNAL MEDICINE

## 2022-09-14 PROCEDURE — 99222 1ST HOSP IP/OBS MODERATE 55: CPT | Performed by: INTERNAL MEDICINE

## 2022-09-14 PROCEDURE — 25010000002 ONDANSETRON PER 1 MG: Performed by: INTERNAL MEDICINE

## 2022-09-14 PROCEDURE — 25010000002 PIPERACILLIN SOD-TAZOBACTAM PER 1 G: Performed by: INTERNAL MEDICINE

## 2022-09-14 PROCEDURE — 93010 ELECTROCARDIOGRAM REPORT: CPT | Performed by: INTERNAL MEDICINE

## 2022-09-14 PROCEDURE — 25010000002 DIGOXIN PER 500 MCG: Performed by: INTERNAL MEDICINE

## 2022-09-14 PROCEDURE — 93005 ELECTROCARDIOGRAM TRACING: CPT | Performed by: INTERNAL MEDICINE

## 2022-09-14 PROCEDURE — 74018 RADEX ABDOMEN 1 VIEW: CPT

## 2022-09-14 PROCEDURE — 85025 COMPLETE CBC W/AUTO DIFF WBC: CPT | Performed by: INTERNAL MEDICINE

## 2022-09-14 PROCEDURE — 82962 GLUCOSE BLOOD TEST: CPT

## 2022-09-14 PROCEDURE — 74230 X-RAY XM SWLNG FUNCJ C+: CPT

## 2022-09-14 PROCEDURE — 92611 MOTION FLUOROSCOPY/SWALLOW: CPT | Performed by: SPEECH-LANGUAGE PATHOLOGIST

## 2022-09-14 PROCEDURE — 74220 X-RAY XM ESOPHAGUS 1CNTRST: CPT

## 2022-09-14 PROCEDURE — 71045 X-RAY EXAM CHEST 1 VIEW: CPT

## 2022-09-14 PROCEDURE — 99232 SBSQ HOSP IP/OBS MODERATE 35: CPT | Performed by: INTERNAL MEDICINE

## 2022-09-14 RX ORDER — DIGOXIN 0.25 MG/ML
250 INJECTION INTRAMUSCULAR; INTRAVENOUS EVERY 6 HOURS
Status: COMPLETED | OUTPATIENT
Start: 2022-09-14 | End: 2022-09-14

## 2022-09-14 RX ORDER — PANTOPRAZOLE SODIUM 40 MG/10ML
40 INJECTION, POWDER, LYOPHILIZED, FOR SOLUTION INTRAVENOUS EVERY 12 HOURS SCHEDULED
Status: DISCONTINUED | OUTPATIENT
Start: 2022-09-14 | End: 2022-09-18 | Stop reason: HOSPADM

## 2022-09-14 RX ORDER — LEVOTHYROXINE SODIUM 0.07 MG/1
75 TABLET ORAL
Status: DISCONTINUED | OUTPATIENT
Start: 2022-09-15 | End: 2022-09-18 | Stop reason: HOSPADM

## 2022-09-14 RX ADMIN — ALPRAZOLAM 0.5 MG: 0.5 TABLET ORAL at 20:45

## 2022-09-14 RX ADMIN — BARIUM SULFATE 55 ML: 0.81 POWDER, FOR SUSPENSION ORAL at 15:58

## 2022-09-14 RX ADMIN — IPRATROPIUM BROMIDE 2 SPRAY: 21 SPRAY, METERED NASAL at 20:46

## 2022-09-14 RX ADMIN — BARIUM SULFATE 1 TEASPOON(S): 0.6 CREAM ORAL at 15:59

## 2022-09-14 RX ADMIN — PANTOPRAZOLE SODIUM 40 MG: 40 INJECTION, POWDER, FOR SOLUTION INTRAVENOUS at 14:31

## 2022-09-14 RX ADMIN — TAZOBACTAM SODIUM AND PIPERACILLIN SODIUM 3.38 G: 375; 3 INJECTION, SOLUTION INTRAVENOUS at 06:29

## 2022-09-14 RX ADMIN — BARIUM SULFATE 4 ML: 980 POWDER, FOR SUSPENSION ORAL at 15:59

## 2022-09-14 RX ADMIN — Medication 10 ML: at 20:47

## 2022-09-14 RX ADMIN — HEPARIN SODIUM 5000 UNITS: 5000 INJECTION INTRAVENOUS; SUBCUTANEOUS at 06:29

## 2022-09-14 RX ADMIN — BARIUM SULFATE 183 ML: 960 POWDER, FOR SUSPENSION ORAL at 16:13

## 2022-09-14 RX ADMIN — FAMOTIDINE 20 MG: 10 INJECTION INTRAVENOUS at 08:42

## 2022-09-14 RX ADMIN — HEPARIN SODIUM 5000 UNITS: 5000 INJECTION INTRAVENOUS; SUBCUTANEOUS at 23:33

## 2022-09-14 RX ADMIN — ONDANSETRON 4 MG: 2 INJECTION INTRAMUSCULAR; INTRAVENOUS at 20:45

## 2022-09-14 RX ADMIN — DIGOXIN 250 MCG: 0.25 INJECTION INTRAMUSCULAR; INTRAVENOUS at 23:33

## 2022-09-14 RX ADMIN — DIGOXIN 250 MCG: 0.25 INJECTION INTRAMUSCULAR; INTRAVENOUS at 17:16

## 2022-09-14 RX ADMIN — HEPARIN SODIUM 5000 UNITS: 5000 INJECTION INTRAVENOUS; SUBCUTANEOUS at 14:31

## 2022-09-14 RX ADMIN — Medication 10 ML: at 08:43

## 2022-09-14 RX ADMIN — TAZOBACTAM SODIUM AND PIPERACILLIN SODIUM 3.38 G: 375; 3 INJECTION, SOLUTION INTRAVENOUS at 00:54

## 2022-09-14 RX ADMIN — TAZOBACTAM SODIUM AND PIPERACILLIN SODIUM 3.38 G: 375; 3 INJECTION, SOLUTION INTRAVENOUS at 16:26

## 2022-09-15 ENCOUNTER — APPOINTMENT (OUTPATIENT)
Dept: GENERAL RADIOLOGY | Facility: HOSPITAL | Age: 87
End: 2022-09-15

## 2022-09-15 LAB
ALBUMIN SERPL-MCNC: 3.2 G/DL (ref 3.5–5.2)
ALBUMIN/GLOB SERPL: 1.2 G/DL
ALP SERPL-CCNC: 59 U/L (ref 39–117)
ALT SERPL W P-5'-P-CCNC: 8 U/L (ref 1–33)
ANION GAP SERPL CALCULATED.3IONS-SCNC: 7.8 MMOL/L (ref 5–15)
AST SERPL-CCNC: 6 U/L (ref 1–32)
BASOPHILS # BLD AUTO: 0.03 10*3/MM3 (ref 0–0.2)
BASOPHILS NFR BLD AUTO: 0.5 % (ref 0–1.5)
BILIRUB SERPL-MCNC: 0.4 MG/DL (ref 0–1.2)
BUN SERPL-MCNC: 11 MG/DL (ref 8–23)
BUN/CREAT SERPL: 17.2 (ref 7–25)
CALCIUM SPEC-SCNC: 8.6 MG/DL (ref 8.6–10.5)
CHLORIDE SERPL-SCNC: 104 MMOL/L (ref 98–107)
CO2 SERPL-SCNC: 27.2 MMOL/L (ref 22–29)
CREAT SERPL-MCNC: 0.64 MG/DL (ref 0.57–1)
DEPRECATED RDW RBC AUTO: 43.1 FL (ref 37–54)
EGFRCR SERPLBLD CKD-EPI 2021: 84.6 ML/MIN/1.73
EOSINOPHIL # BLD AUTO: 0.14 10*3/MM3 (ref 0–0.4)
EOSINOPHIL NFR BLD AUTO: 2.4 % (ref 0.3–6.2)
ERYTHROCYTE [DISTWIDTH] IN BLOOD BY AUTOMATED COUNT: 13.6 % (ref 12.3–15.4)
GLOBULIN UR ELPH-MCNC: 2.7 GM/DL
GLUCOSE BLDC GLUCOMTR-MCNC: 86 MG/DL (ref 70–130)
GLUCOSE BLDC GLUCOMTR-MCNC: 89 MG/DL (ref 70–130)
GLUCOSE BLDC GLUCOMTR-MCNC: 96 MG/DL (ref 70–130)
GLUCOSE BLDC GLUCOMTR-MCNC: 98 MG/DL (ref 70–130)
GLUCOSE SERPL-MCNC: 95 MG/DL (ref 65–99)
HCT VFR BLD AUTO: 30.4 % (ref 34–46.6)
HGB BLD-MCNC: 9.8 G/DL (ref 12–15.9)
IMM GRANULOCYTES # BLD AUTO: 0.04 10*3/MM3 (ref 0–0.05)
IMM GRANULOCYTES NFR BLD AUTO: 0.7 % (ref 0–0.5)
LYMPHOCYTES # BLD AUTO: 1 10*3/MM3 (ref 0.7–3.1)
LYMPHOCYTES NFR BLD AUTO: 16.9 % (ref 19.6–45.3)
MCH RBC QN AUTO: 28.3 PG (ref 26.6–33)
MCHC RBC AUTO-ENTMCNC: 32.2 G/DL (ref 31.5–35.7)
MCV RBC AUTO: 87.9 FL (ref 79–97)
MONOCYTES # BLD AUTO: 0.87 10*3/MM3 (ref 0.1–0.9)
MONOCYTES NFR BLD AUTO: 14.7 % (ref 5–12)
NEUTROPHILS NFR BLD AUTO: 3.83 10*3/MM3 (ref 1.7–7)
NEUTROPHILS NFR BLD AUTO: 64.8 % (ref 42.7–76)
NRBC BLD AUTO-RTO: 0 /100 WBC (ref 0–0.2)
PLATELET # BLD AUTO: 217 10*3/MM3 (ref 140–450)
PMV BLD AUTO: 9.5 FL (ref 6–12)
POTASSIUM SERPL-SCNC: 3.8 MMOL/L (ref 3.5–5.2)
PROT SERPL-MCNC: 5.9 G/DL (ref 6–8.5)
RBC # BLD AUTO: 3.46 10*6/MM3 (ref 3.77–5.28)
SODIUM SERPL-SCNC: 139 MMOL/L (ref 136–145)
WBC NRBC COR # BLD: 5.91 10*3/MM3 (ref 3.4–10.8)

## 2022-09-15 PROCEDURE — 25010000002 ONDANSETRON PER 1 MG: Performed by: INTERNAL MEDICINE

## 2022-09-15 PROCEDURE — 25010000002 DIGOXIN PER 500 MCG: Performed by: NURSE PRACTITIONER

## 2022-09-15 PROCEDURE — 71045 X-RAY EXAM CHEST 1 VIEW: CPT

## 2022-09-15 PROCEDURE — 99232 SBSQ HOSP IP/OBS MODERATE 35: CPT | Performed by: NURSE PRACTITIONER

## 2022-09-15 PROCEDURE — 82962 GLUCOSE BLOOD TEST: CPT

## 2022-09-15 PROCEDURE — 97110 THERAPEUTIC EXERCISES: CPT

## 2022-09-15 PROCEDURE — 80053 COMPREHEN METABOLIC PANEL: CPT | Performed by: INTERNAL MEDICINE

## 2022-09-15 PROCEDURE — 36415 COLL VENOUS BLD VENIPUNCTURE: CPT | Performed by: INTERNAL MEDICINE

## 2022-09-15 PROCEDURE — 25010000002 HEPARIN (PORCINE) PER 1000 UNITS: Performed by: INTERNAL MEDICINE

## 2022-09-15 PROCEDURE — 97161 PT EVAL LOW COMPLEX 20 MIN: CPT

## 2022-09-15 PROCEDURE — 85025 COMPLETE CBC W/AUTO DIFF WBC: CPT | Performed by: INTERNAL MEDICINE

## 2022-09-15 PROCEDURE — 25010000002 PIPERACILLIN SOD-TAZOBACTAM PER 1 G: Performed by: INTERNAL MEDICINE

## 2022-09-15 RX ORDER — CEFDINIR 300 MG/1
300 CAPSULE ORAL EVERY 12 HOURS SCHEDULED
Status: DISCONTINUED | OUTPATIENT
Start: 2022-09-15 | End: 2022-09-18 | Stop reason: HOSPADM

## 2022-09-15 RX ORDER — TORSEMIDE 10 MG/1
10 TABLET ORAL DAILY
Status: DISCONTINUED | OUTPATIENT
Start: 2022-09-15 | End: 2022-09-18 | Stop reason: HOSPADM

## 2022-09-15 RX ORDER — SUCRALFATE 1 G/1
1 TABLET ORAL
Status: DISCONTINUED | OUTPATIENT
Start: 2022-09-15 | End: 2022-09-18 | Stop reason: HOSPADM

## 2022-09-15 RX ORDER — DIGOXIN 0.25 MG/ML
250 INJECTION INTRAMUSCULAR; INTRAVENOUS ONCE
Status: COMPLETED | OUTPATIENT
Start: 2022-09-15 | End: 2022-09-15

## 2022-09-15 RX ADMIN — PANTOPRAZOLE SODIUM 40 MG: 40 INJECTION, POWDER, FOR SOLUTION INTRAVENOUS at 10:40

## 2022-09-15 RX ADMIN — CEFDINIR 300 MG: 300 CAPSULE ORAL at 21:08

## 2022-09-15 RX ADMIN — ONDANSETRON 4 MG: 2 INJECTION INTRAMUSCULAR; INTRAVENOUS at 05:56

## 2022-09-15 RX ADMIN — ALPRAZOLAM 0.5 MG: 0.5 TABLET ORAL at 23:42

## 2022-09-15 RX ADMIN — DIGOXIN 250 MCG: 0.25 INJECTION INTRAMUSCULAR; INTRAVENOUS at 14:09

## 2022-09-15 RX ADMIN — HEPARIN SODIUM 5000 UNITS: 5000 INJECTION INTRAVENOUS; SUBCUTANEOUS at 05:55

## 2022-09-15 RX ADMIN — LEVOTHYROXINE SODIUM 75 MCG: 0.07 TABLET ORAL at 05:55

## 2022-09-15 RX ADMIN — TAZOBACTAM SODIUM AND PIPERACILLIN SODIUM 3.38 G: 375; 3 INJECTION, SOLUTION INTRAVENOUS at 10:40

## 2022-09-15 RX ADMIN — TORSEMIDE 10 MG: 10 TABLET ORAL at 14:09

## 2022-09-15 RX ADMIN — DILTIAZEM HYDROCHLORIDE 30 MG: 30 TABLET, FILM COATED ORAL at 21:08

## 2022-09-15 RX ADMIN — Medication 10 ML: at 10:40

## 2022-09-15 RX ADMIN — SUCRALFATE 1 G: 1 TABLET ORAL at 17:29

## 2022-09-15 RX ADMIN — Medication 10 ML: at 21:08

## 2022-09-15 RX ADMIN — HEPARIN SODIUM 5000 UNITS: 5000 INJECTION INTRAVENOUS; SUBCUTANEOUS at 21:08

## 2022-09-15 RX ADMIN — PANTOPRAZOLE SODIUM 40 MG: 40 INJECTION, POWDER, FOR SOLUTION INTRAVENOUS at 21:08

## 2022-09-15 RX ADMIN — HEPARIN SODIUM 5000 UNITS: 5000 INJECTION INTRAVENOUS; SUBCUTANEOUS at 14:09

## 2022-09-15 RX ADMIN — DILTIAZEM HYDROCHLORIDE 30 MG: 30 TABLET, FILM COATED ORAL at 14:12

## 2022-09-15 RX ADMIN — TAZOBACTAM SODIUM AND PIPERACILLIN SODIUM 3.38 G: 375; 3 INJECTION, SOLUTION INTRAVENOUS at 02:01

## 2022-09-16 ENCOUNTER — APPOINTMENT (OUTPATIENT)
Dept: GENERAL RADIOLOGY | Facility: HOSPITAL | Age: 87
End: 2022-09-16

## 2022-09-16 LAB
ALBUMIN SERPL-MCNC: 3.8 G/DL (ref 3.5–5.2)
ALBUMIN/GLOB SERPL: 1.3 G/DL
ALP SERPL-CCNC: 64 U/L (ref 39–117)
ALT SERPL W P-5'-P-CCNC: 8 U/L (ref 1–33)
ANION GAP SERPL CALCULATED.3IONS-SCNC: 9.2 MMOL/L (ref 5–15)
AST SERPL-CCNC: 13 U/L (ref 1–32)
BASOPHILS # BLD AUTO: 0.05 10*3/MM3 (ref 0–0.2)
BASOPHILS NFR BLD AUTO: 0.8 % (ref 0–1.5)
BILIRUB SERPL-MCNC: 0.4 MG/DL (ref 0–1.2)
BUN SERPL-MCNC: 11 MG/DL (ref 8–23)
BUN/CREAT SERPL: 13.6 (ref 7–25)
CALCIUM SPEC-SCNC: 8.5 MG/DL (ref 8.6–10.5)
CHLORIDE SERPL-SCNC: 96 MMOL/L (ref 98–107)
CO2 SERPL-SCNC: 30.8 MMOL/L (ref 22–29)
CREAT SERPL-MCNC: 0.81 MG/DL (ref 0.57–1)
DEPRECATED RDW RBC AUTO: 41.8 FL (ref 37–54)
EGFRCR SERPLBLD CKD-EPI 2021: 69.5 ML/MIN/1.73
EOSINOPHIL # BLD AUTO: 0.21 10*3/MM3 (ref 0–0.4)
EOSINOPHIL NFR BLD AUTO: 3.6 % (ref 0.3–6.2)
ERYTHROCYTE [DISTWIDTH] IN BLOOD BY AUTOMATED COUNT: 13.4 % (ref 12.3–15.4)
GLOBULIN UR ELPH-MCNC: 3 GM/DL
GLUCOSE BLDC GLUCOMTR-MCNC: 117 MG/DL (ref 70–130)
GLUCOSE BLDC GLUCOMTR-MCNC: 88 MG/DL (ref 70–130)
GLUCOSE BLDC GLUCOMTR-MCNC: 89 MG/DL (ref 70–130)
GLUCOSE BLDC GLUCOMTR-MCNC: 95 MG/DL (ref 70–130)
GLUCOSE SERPL-MCNC: 88 MG/DL (ref 65–99)
HCT VFR BLD AUTO: 33.6 % (ref 34–46.6)
HGB BLD-MCNC: 11 G/DL (ref 12–15.9)
IMM GRANULOCYTES # BLD AUTO: 0.04 10*3/MM3 (ref 0–0.05)
IMM GRANULOCYTES NFR BLD AUTO: 0.7 % (ref 0–0.5)
LYMPHOCYTES # BLD AUTO: 1.17 10*3/MM3 (ref 0.7–3.1)
LYMPHOCYTES NFR BLD AUTO: 19.8 % (ref 19.6–45.3)
MCH RBC QN AUTO: 27.8 PG (ref 26.6–33)
MCHC RBC AUTO-ENTMCNC: 32.7 G/DL (ref 31.5–35.7)
MCV RBC AUTO: 84.8 FL (ref 79–97)
MONOCYTES # BLD AUTO: 0.84 10*3/MM3 (ref 0.1–0.9)
MONOCYTES NFR BLD AUTO: 14.2 % (ref 5–12)
NEUTROPHILS NFR BLD AUTO: 3.59 10*3/MM3 (ref 1.7–7)
NEUTROPHILS NFR BLD AUTO: 60.9 % (ref 42.7–76)
NRBC BLD AUTO-RTO: 0 /100 WBC (ref 0–0.2)
PLATELET # BLD AUTO: 301 10*3/MM3 (ref 140–450)
PMV BLD AUTO: 10 FL (ref 6–12)
POTASSIUM SERPL-SCNC: 3.1 MMOL/L (ref 3.5–5.2)
PROT SERPL-MCNC: 6.8 G/DL (ref 6–8.5)
RBC # BLD AUTO: 3.96 10*6/MM3 (ref 3.77–5.28)
SODIUM SERPL-SCNC: 136 MMOL/L (ref 136–145)
WBC NRBC COR # BLD: 5.9 10*3/MM3 (ref 3.4–10.8)

## 2022-09-16 PROCEDURE — 85025 COMPLETE CBC W/AUTO DIFF WBC: CPT | Performed by: INTERNAL MEDICINE

## 2022-09-16 PROCEDURE — 99232 SBSQ HOSP IP/OBS MODERATE 35: CPT | Performed by: NURSE PRACTITIONER

## 2022-09-16 PROCEDURE — 25010000002 HEPARIN (PORCINE) PER 1000 UNITS: Performed by: INTERNAL MEDICINE

## 2022-09-16 PROCEDURE — 99232 SBSQ HOSP IP/OBS MODERATE 35: CPT

## 2022-09-16 PROCEDURE — 80053 COMPREHEN METABOLIC PANEL: CPT | Performed by: INTERNAL MEDICINE

## 2022-09-16 PROCEDURE — 82962 GLUCOSE BLOOD TEST: CPT

## 2022-09-16 PROCEDURE — 71045 X-RAY EXAM CHEST 1 VIEW: CPT

## 2022-09-16 PROCEDURE — 25010000002 ONDANSETRON PER 1 MG: Performed by: INTERNAL MEDICINE

## 2022-09-16 RX ORDER — BISACODYL 10 MG
10 SUPPOSITORY, RECTAL RECTAL ONCE
Status: DISCONTINUED | OUTPATIENT
Start: 2022-09-16 | End: 2022-09-18 | Stop reason: HOSPADM

## 2022-09-16 RX ORDER — DIGOXIN 125 MCG
125 TABLET ORAL
Status: DISCONTINUED | OUTPATIENT
Start: 2022-09-16 | End: 2022-09-18 | Stop reason: HOSPADM

## 2022-09-16 RX ORDER — POTASSIUM CHLORIDE 750 MG/1
20 TABLET, FILM COATED, EXTENDED RELEASE ORAL DAILY
Status: DISCONTINUED | OUTPATIENT
Start: 2022-09-16 | End: 2022-09-18 | Stop reason: HOSPADM

## 2022-09-16 RX ADMIN — DILTIAZEM HYDROCHLORIDE 30 MG: 30 TABLET, FILM COATED ORAL at 06:34

## 2022-09-16 RX ADMIN — CEFDINIR 300 MG: 300 CAPSULE ORAL at 09:53

## 2022-09-16 RX ADMIN — MAGNESIUM HYDROXIDE 10 ML: 2400 SUSPENSION ORAL at 13:06

## 2022-09-16 RX ADMIN — APIXABAN 2.5 MG: 2.5 TABLET, FILM COATED ORAL at 12:07

## 2022-09-16 RX ADMIN — IPRATROPIUM BROMIDE 2 SPRAY: 21 SPRAY, METERED NASAL at 21:49

## 2022-09-16 RX ADMIN — DILTIAZEM HYDROCHLORIDE 30 MG: 30 TABLET, FILM COATED ORAL at 17:22

## 2022-09-16 RX ADMIN — PANTOPRAZOLE SODIUM 40 MG: 40 INJECTION, POWDER, FOR SOLUTION INTRAVENOUS at 20:09

## 2022-09-16 RX ADMIN — DIGOXIN 125 MCG: 125 TABLET ORAL at 13:06

## 2022-09-16 RX ADMIN — ONDANSETRON 4 MG: 2 INJECTION INTRAMUSCULAR; INTRAVENOUS at 10:30

## 2022-09-16 RX ADMIN — POTASSIUM CHLORIDE 20 MEQ: 750 TABLET, EXTENDED RELEASE ORAL at 12:07

## 2022-09-16 RX ADMIN — TORSEMIDE 10 MG: 10 TABLET ORAL at 09:53

## 2022-09-16 RX ADMIN — PANTOPRAZOLE SODIUM 40 MG: 40 INJECTION, POWDER, FOR SOLUTION INTRAVENOUS at 09:54

## 2022-09-16 RX ADMIN — IPRATROPIUM BROMIDE 2 SPRAY: 21 SPRAY, METERED NASAL at 13:06

## 2022-09-16 RX ADMIN — HEPARIN SODIUM 5000 UNITS: 5000 INJECTION INTRAVENOUS; SUBCUTANEOUS at 06:34

## 2022-09-16 RX ADMIN — Medication 10 ML: at 09:54

## 2022-09-16 RX ADMIN — APIXABAN 2.5 MG: 2.5 TABLET, FILM COATED ORAL at 20:10

## 2022-09-16 RX ADMIN — SUCRALFATE 1 G: 1 TABLET ORAL at 06:34

## 2022-09-16 RX ADMIN — CEFDINIR 300 MG: 300 CAPSULE ORAL at 20:10

## 2022-09-16 RX ADMIN — LEVOTHYROXINE SODIUM 75 MCG: 0.07 TABLET ORAL at 06:34

## 2022-09-16 RX ADMIN — Medication 10 ML: at 20:10

## 2022-09-16 RX ADMIN — IPRATROPIUM BROMIDE 2 SPRAY: 21 SPRAY, METERED NASAL at 17:43

## 2022-09-16 RX ADMIN — ALPRAZOLAM 0.5 MG: 0.5 TABLET ORAL at 23:20

## 2022-09-16 RX ADMIN — SERTRALINE HYDROCHLORIDE 50 MG: 50 TABLET, FILM COATED ORAL at 20:10

## 2022-09-16 RX ADMIN — SUCRALFATE 1 G: 1 TABLET ORAL at 17:22

## 2022-09-16 RX ADMIN — DILTIAZEM HYDROCHLORIDE 30 MG: 30 TABLET, FILM COATED ORAL at 12:07

## 2022-09-17 ENCOUNTER — APPOINTMENT (OUTPATIENT)
Dept: GENERAL RADIOLOGY | Facility: HOSPITAL | Age: 87
End: 2022-09-17

## 2022-09-17 PROBLEM — E44.0 MODERATE MALNUTRITION: Status: ACTIVE | Noted: 2022-09-17

## 2022-09-17 LAB
ALBUMIN SERPL-MCNC: 3.4 G/DL (ref 3.5–5.2)
ALBUMIN/GLOB SERPL: 1.5 G/DL
ALP SERPL-CCNC: 57 U/L (ref 39–117)
ALT SERPL W P-5'-P-CCNC: 7 U/L (ref 1–33)
ANION GAP SERPL CALCULATED.3IONS-SCNC: 8 MMOL/L (ref 5–15)
AST SERPL-CCNC: 14 U/L (ref 1–32)
BACTERIA SPEC AEROBE CULT: NORMAL
BACTERIA SPEC AEROBE CULT: NORMAL
BASOPHILS # BLD AUTO: 0.03 10*3/MM3 (ref 0–0.2)
BASOPHILS NFR BLD AUTO: 0.6 % (ref 0–1.5)
BILIRUB SERPL-MCNC: 0.3 MG/DL (ref 0–1.2)
BUN SERPL-MCNC: 9 MG/DL (ref 8–23)
BUN/CREAT SERPL: 10.8 (ref 7–25)
CALCIUM SPEC-SCNC: 8.3 MG/DL (ref 8.6–10.5)
CHLORIDE SERPL-SCNC: 97 MMOL/L (ref 98–107)
CO2 SERPL-SCNC: 29 MMOL/L (ref 22–29)
CREAT SERPL-MCNC: 0.83 MG/DL (ref 0.57–1)
DEPRECATED RDW RBC AUTO: 42.6 FL (ref 37–54)
EGFRCR SERPLBLD CKD-EPI 2021: 67.5 ML/MIN/1.73
EOSINOPHIL # BLD AUTO: 0.11 10*3/MM3 (ref 0–0.4)
EOSINOPHIL NFR BLD AUTO: 2.2 % (ref 0.3–6.2)
ERYTHROCYTE [DISTWIDTH] IN BLOOD BY AUTOMATED COUNT: 13.6 % (ref 12.3–15.4)
GLOBULIN UR ELPH-MCNC: 2.3 GM/DL
GLUCOSE BLDC GLUCOMTR-MCNC: 105 MG/DL (ref 70–130)
GLUCOSE BLDC GLUCOMTR-MCNC: 84 MG/DL (ref 70–130)
GLUCOSE SERPL-MCNC: 92 MG/DL (ref 65–99)
HCT VFR BLD AUTO: 32.6 % (ref 34–46.6)
HGB BLD-MCNC: 10.6 G/DL (ref 12–15.9)
IMM GRANULOCYTES # BLD AUTO: 0.04 10*3/MM3 (ref 0–0.05)
IMM GRANULOCYTES NFR BLD AUTO: 0.8 % (ref 0–0.5)
LYMPHOCYTES # BLD AUTO: 0.77 10*3/MM3 (ref 0.7–3.1)
LYMPHOCYTES NFR BLD AUTO: 15.2 % (ref 19.6–45.3)
MCH RBC QN AUTO: 28.3 PG (ref 26.6–33)
MCHC RBC AUTO-ENTMCNC: 32.5 G/DL (ref 31.5–35.7)
MCV RBC AUTO: 87.2 FL (ref 79–97)
MONOCYTES # BLD AUTO: 0.7 10*3/MM3 (ref 0.1–0.9)
MONOCYTES NFR BLD AUTO: 13.8 % (ref 5–12)
NEUTROPHILS NFR BLD AUTO: 3.41 10*3/MM3 (ref 1.7–7)
NEUTROPHILS NFR BLD AUTO: 67.4 % (ref 42.7–76)
NRBC BLD AUTO-RTO: 0 /100 WBC (ref 0–0.2)
PLATELET # BLD AUTO: 276 10*3/MM3 (ref 140–450)
PMV BLD AUTO: 9.7 FL (ref 6–12)
POTASSIUM SERPL-SCNC: 4 MMOL/L (ref 3.5–5.2)
PROT SERPL-MCNC: 5.7 G/DL (ref 6–8.5)
RBC # BLD AUTO: 3.74 10*6/MM3 (ref 3.77–5.28)
SODIUM SERPL-SCNC: 134 MMOL/L (ref 136–145)
WBC NRBC COR # BLD: 5.06 10*3/MM3 (ref 3.4–10.8)

## 2022-09-17 PROCEDURE — 80053 COMPREHEN METABOLIC PANEL: CPT | Performed by: INTERNAL MEDICINE

## 2022-09-17 PROCEDURE — 85025 COMPLETE CBC W/AUTO DIFF WBC: CPT | Performed by: INTERNAL MEDICINE

## 2022-09-17 PROCEDURE — 99232 SBSQ HOSP IP/OBS MODERATE 35: CPT | Performed by: INTERNAL MEDICINE

## 2022-09-17 PROCEDURE — 36415 COLL VENOUS BLD VENIPUNCTURE: CPT | Performed by: INTERNAL MEDICINE

## 2022-09-17 PROCEDURE — 71045 X-RAY EXAM CHEST 1 VIEW: CPT

## 2022-09-17 PROCEDURE — 25010000002 ONDANSETRON PER 1 MG: Performed by: INTERNAL MEDICINE

## 2022-09-17 PROCEDURE — 99233 SBSQ HOSP IP/OBS HIGH 50: CPT | Performed by: NURSE PRACTITIONER

## 2022-09-17 PROCEDURE — 25010000002 DIGOXIN PER 500 MCG: Performed by: NURSE PRACTITIONER

## 2022-09-17 PROCEDURE — 82962 GLUCOSE BLOOD TEST: CPT

## 2022-09-17 RX ORDER — DILTIAZEM HYDROCHLORIDE 120 MG/1
120 CAPSULE, COATED, EXTENDED RELEASE ORAL
Status: DISCONTINUED | OUTPATIENT
Start: 2022-09-17 | End: 2022-09-18 | Stop reason: HOSPADM

## 2022-09-17 RX ORDER — DIGOXIN 0.25 MG/ML
500 INJECTION INTRAMUSCULAR; INTRAVENOUS ONCE
Status: COMPLETED | OUTPATIENT
Start: 2022-09-17 | End: 2022-09-17

## 2022-09-17 RX ADMIN — METOPROLOL TARTRATE 25 MG: 25 TABLET, FILM COATED ORAL at 20:10

## 2022-09-17 RX ADMIN — PANTOPRAZOLE SODIUM 40 MG: 40 INJECTION, POWDER, FOR SOLUTION INTRAVENOUS at 09:39

## 2022-09-17 RX ADMIN — SUCRALFATE 1 G: 1 TABLET ORAL at 06:24

## 2022-09-17 RX ADMIN — IPRATROPIUM BROMIDE 2 SPRAY: 21 SPRAY, METERED NASAL at 12:00

## 2022-09-17 RX ADMIN — Medication 10 ML: at 20:10

## 2022-09-17 RX ADMIN — CEFDINIR 300 MG: 300 CAPSULE ORAL at 09:39

## 2022-09-17 RX ADMIN — IPRATROPIUM BROMIDE 2 SPRAY: 21 SPRAY, METERED NASAL at 17:57

## 2022-09-17 RX ADMIN — Medication 10 ML: at 09:40

## 2022-09-17 RX ADMIN — TORSEMIDE 10 MG: 10 TABLET ORAL at 09:39

## 2022-09-17 RX ADMIN — CEFDINIR 300 MG: 300 CAPSULE ORAL at 20:14

## 2022-09-17 RX ADMIN — METOPROLOL TARTRATE 5 MG: 1 INJECTION, SOLUTION INTRAVENOUS at 17:25

## 2022-09-17 RX ADMIN — IPRATROPIUM BROMIDE 2 SPRAY: 21 SPRAY, METERED NASAL at 08:00

## 2022-09-17 RX ADMIN — LEVOTHYROXINE SODIUM 75 MCG: 0.07 TABLET ORAL at 06:24

## 2022-09-17 RX ADMIN — IPRATROPIUM BROMIDE 2 SPRAY: 21 SPRAY, METERED NASAL at 20:12

## 2022-09-17 RX ADMIN — SUCRALFATE 1 G: 1 TABLET ORAL at 17:25

## 2022-09-17 RX ADMIN — ONDANSETRON 4 MG: 2 INJECTION INTRAMUSCULAR; INTRAVENOUS at 15:28

## 2022-09-17 RX ADMIN — ALPRAZOLAM 0.5 MG: 0.5 TABLET ORAL at 23:00

## 2022-09-17 RX ADMIN — DILTIAZEM HYDROCHLORIDE 30 MG: 30 TABLET, FILM COATED ORAL at 00:11

## 2022-09-17 RX ADMIN — DILTIAZEM HYDROCHLORIDE 120 MG: 120 CAPSULE, COATED, EXTENDED RELEASE ORAL at 13:01

## 2022-09-17 RX ADMIN — PANTOPRAZOLE SODIUM 40 MG: 40 INJECTION, POWDER, FOR SOLUTION INTRAVENOUS at 20:10

## 2022-09-17 RX ADMIN — DILTIAZEM HYDROCHLORIDE 30 MG: 30 TABLET, FILM COATED ORAL at 06:24

## 2022-09-17 RX ADMIN — DIGOXIN 500 MCG: 0.25 INJECTION INTRAMUSCULAR; INTRAVENOUS at 13:01

## 2022-09-17 RX ADMIN — POTASSIUM CHLORIDE 20 MEQ: 750 TABLET, EXTENDED RELEASE ORAL at 09:39

## 2022-09-17 RX ADMIN — APIXABAN 2.5 MG: 2.5 TABLET, FILM COATED ORAL at 20:09

## 2022-09-17 RX ADMIN — ALUMINA, MAGNESIA, AND SIMETHICONE 15 ML: 2400; 2400; 240 SUSPENSION ORAL at 15:39

## 2022-09-17 RX ADMIN — APIXABAN 2.5 MG: 2.5 TABLET, FILM COATED ORAL at 09:39

## 2022-09-18 ENCOUNTER — READMISSION MANAGEMENT (OUTPATIENT)
Dept: CALL CENTER | Facility: HOSPITAL | Age: 87
End: 2022-09-18

## 2022-09-18 ENCOUNTER — NURSE TRIAGE (OUTPATIENT)
Dept: CALL CENTER | Facility: HOSPITAL | Age: 87
End: 2022-09-18

## 2022-09-18 ENCOUNTER — APPOINTMENT (OUTPATIENT)
Dept: GENERAL RADIOLOGY | Facility: HOSPITAL | Age: 87
End: 2022-09-18

## 2022-09-18 VITALS
RESPIRATION RATE: 16 BRPM | SYSTOLIC BLOOD PRESSURE: 103 MMHG | DIASTOLIC BLOOD PRESSURE: 66 MMHG | OXYGEN SATURATION: 93 % | WEIGHT: 105 LBS | TEMPERATURE: 98.6 F | BODY MASS INDEX: 19.83 KG/M2 | HEIGHT: 61 IN | HEART RATE: 78 BPM

## 2022-09-18 LAB
ALBUMIN SERPL-MCNC: 3.3 G/DL (ref 3.5–5.2)
ALBUMIN/GLOB SERPL: 1.3 G/DL
ALP SERPL-CCNC: 60 U/L (ref 39–117)
ALT SERPL W P-5'-P-CCNC: 9 U/L (ref 1–33)
ANION GAP SERPL CALCULATED.3IONS-SCNC: 10 MMOL/L (ref 5–15)
AST SERPL-CCNC: 18 U/L (ref 1–32)
BASOPHILS # BLD AUTO: 0.04 10*3/MM3 (ref 0–0.2)
BASOPHILS NFR BLD AUTO: 0.8 % (ref 0–1.5)
BILIRUB SERPL-MCNC: 0.2 MG/DL (ref 0–1.2)
BUN SERPL-MCNC: 10 MG/DL (ref 8–23)
BUN/CREAT SERPL: 11.2 (ref 7–25)
CALCIUM SPEC-SCNC: 8.7 MG/DL (ref 8.6–10.5)
CHLORIDE SERPL-SCNC: 97 MMOL/L (ref 98–107)
CO2 SERPL-SCNC: 26 MMOL/L (ref 22–29)
CREAT SERPL-MCNC: 0.89 MG/DL (ref 0.57–1)
DEPRECATED RDW RBC AUTO: 42.1 FL (ref 37–54)
DIGOXIN SERPL-MCNC: 1.7 NG/ML (ref 0.6–1.2)
EGFRCR SERPLBLD CKD-EPI 2021: 62.1 ML/MIN/1.73
EOSINOPHIL # BLD AUTO: 0.2 10*3/MM3 (ref 0–0.4)
EOSINOPHIL NFR BLD AUTO: 4.1 % (ref 0.3–6.2)
ERYTHROCYTE [DISTWIDTH] IN BLOOD BY AUTOMATED COUNT: 13.3 % (ref 12.3–15.4)
GLOBULIN UR ELPH-MCNC: 2.6 GM/DL
GLUCOSE SERPL-MCNC: 97 MG/DL (ref 65–99)
HCT VFR BLD AUTO: 33.6 % (ref 34–46.6)
HGB BLD-MCNC: 11 G/DL (ref 12–15.9)
IMM GRANULOCYTES # BLD AUTO: 0.03 10*3/MM3 (ref 0–0.05)
IMM GRANULOCYTES NFR BLD AUTO: 0.6 % (ref 0–0.5)
LYMPHOCYTES # BLD AUTO: 0.95 10*3/MM3 (ref 0.7–3.1)
LYMPHOCYTES NFR BLD AUTO: 19.3 % (ref 19.6–45.3)
MCH RBC QN AUTO: 27.8 PG (ref 26.6–33)
MCHC RBC AUTO-ENTMCNC: 32.7 G/DL (ref 31.5–35.7)
MCV RBC AUTO: 84.8 FL (ref 79–97)
MONOCYTES # BLD AUTO: 0.87 10*3/MM3 (ref 0.1–0.9)
MONOCYTES NFR BLD AUTO: 17.6 % (ref 5–12)
NEUTROPHILS NFR BLD AUTO: 2.84 10*3/MM3 (ref 1.7–7)
NEUTROPHILS NFR BLD AUTO: 57.6 % (ref 42.7–76)
NRBC BLD AUTO-RTO: 0 /100 WBC (ref 0–0.2)
PLATELET # BLD AUTO: 263 10*3/MM3 (ref 140–450)
PMV BLD AUTO: 9.6 FL (ref 6–12)
POTASSIUM SERPL-SCNC: 4.8 MMOL/L (ref 3.5–5.2)
PROT SERPL-MCNC: 5.9 G/DL (ref 6–8.5)
RBC # BLD AUTO: 3.96 10*6/MM3 (ref 3.77–5.28)
SODIUM SERPL-SCNC: 133 MMOL/L (ref 136–145)
WBC NRBC COR # BLD: 4.93 10*3/MM3 (ref 3.4–10.8)

## 2022-09-18 PROCEDURE — 71045 X-RAY EXAM CHEST 1 VIEW: CPT

## 2022-09-18 PROCEDURE — 99232 SBSQ HOSP IP/OBS MODERATE 35: CPT | Performed by: INTERNAL MEDICINE

## 2022-09-18 PROCEDURE — 99232 SBSQ HOSP IP/OBS MODERATE 35: CPT | Performed by: NURSE PRACTITIONER

## 2022-09-18 PROCEDURE — 85025 COMPLETE CBC W/AUTO DIFF WBC: CPT | Performed by: INTERNAL MEDICINE

## 2022-09-18 PROCEDURE — 80162 ASSAY OF DIGOXIN TOTAL: CPT | Performed by: NURSE PRACTITIONER

## 2022-09-18 PROCEDURE — 80053 COMPREHEN METABOLIC PANEL: CPT | Performed by: INTERNAL MEDICINE

## 2022-09-18 RX ORDER — TORSEMIDE 10 MG/1
10 TABLET ORAL DAILY
Qty: 30 TABLET | Refills: 0 | Status: SHIPPED | OUTPATIENT
Start: 2022-09-19 | End: 2022-09-28

## 2022-09-18 RX ORDER — CEFDINIR 300 MG/1
300 CAPSULE ORAL EVERY 12 HOURS SCHEDULED
Qty: 4 CAPSULE | Refills: 0 | Status: SHIPPED | OUTPATIENT
Start: 2022-09-18 | End: 2022-09-20

## 2022-09-18 RX ORDER — POTASSIUM CHLORIDE 20 MEQ/1
20 TABLET, EXTENDED RELEASE ORAL DAILY
Qty: 30 TABLET | Refills: 0 | Status: SHIPPED | OUTPATIENT
Start: 2022-09-19 | End: 2022-09-28

## 2022-09-18 RX ORDER — SUCRALFATE 1 G/1
1 TABLET ORAL
Qty: 60 TABLET | Refills: 0 | Status: SHIPPED | OUTPATIENT
Start: 2022-09-18 | End: 2022-10-12

## 2022-09-18 RX ORDER — DIGOXIN 125 MCG
125 TABLET ORAL 3 TIMES WEEKLY
Qty: 14 TABLET | Refills: 0 | Status: SHIPPED | OUTPATIENT
Start: 2022-09-19 | End: 2022-10-06 | Stop reason: SDUPTHER

## 2022-09-18 RX ORDER — DILTIAZEM HYDROCHLORIDE 120 MG/1
120 CAPSULE, EXTENDED RELEASE ORAL DAILY
Qty: 30 CAPSULE | Refills: 0 | Status: SHIPPED | OUTPATIENT
Start: 2022-09-18 | End: 2022-10-06 | Stop reason: SDUPTHER

## 2022-09-18 RX ADMIN — APIXABAN 2.5 MG: 2.5 TABLET, FILM COATED ORAL at 09:06

## 2022-09-18 RX ADMIN — LEVOTHYROXINE SODIUM 75 MCG: 0.07 TABLET ORAL at 06:14

## 2022-09-18 RX ADMIN — Medication 10 ML: at 09:06

## 2022-09-18 RX ADMIN — IPRATROPIUM BROMIDE 2 SPRAY: 21 SPRAY, METERED NASAL at 11:06

## 2022-09-18 RX ADMIN — IPRATROPIUM BROMIDE 2 SPRAY: 21 SPRAY, METERED NASAL at 09:06

## 2022-09-18 RX ADMIN — METOPROLOL TARTRATE 25 MG: 25 TABLET, FILM COATED ORAL at 09:06

## 2022-09-18 RX ADMIN — PANTOPRAZOLE SODIUM 40 MG: 40 INJECTION, POWDER, FOR SOLUTION INTRAVENOUS at 09:06

## 2022-09-18 RX ADMIN — TORSEMIDE 10 MG: 10 TABLET ORAL at 09:06

## 2022-09-18 RX ADMIN — POTASSIUM CHLORIDE 20 MEQ: 750 TABLET, EXTENDED RELEASE ORAL at 09:06

## 2022-09-18 RX ADMIN — DILTIAZEM HYDROCHLORIDE 120 MG: 120 CAPSULE, COATED, EXTENDED RELEASE ORAL at 09:06

## 2022-09-18 RX ADMIN — CEFDINIR 300 MG: 300 CAPSULE ORAL at 09:06

## 2022-09-18 RX ADMIN — SUCRALFATE 1 G: 1 TABLET ORAL at 06:14

## 2022-09-18 NOTE — OUTREACH NOTE
Prep Survey    Flowsheet Row Responses   [facility] facility patient discharged from? [facility]   Is LACE score < 7 ? No   Emergency Room discharge w/ pulse ox? No   Eligibility [facility]   Date of Admission 09/12/22   Date of Discharge 09/18/22   Discharge Disposition Home-Health Care Svc   Discharge diagnosis Acute hypoxic resp failure, Aspiration PNA, septic shock, A-fib RVR, hyperglycemia   Does the patient have one of the following disease processes/diagnoses(primary or secondary)? Sepsis   Does the patient have Home health ordered? Yes   What is the [facility] health agency?  [facility]    Is there a DME ordered? No   Prep survey completed? Yes          MATT HIGGINBOTHAM - Registered Nurse

## 2022-09-18 NOTE — TELEPHONE ENCOUNTER
Reason for Disposition  • Caller has medicine question, adult has minor symptoms, caller declines triage, AND triager answers question    Additional Information  • Negative: [1] Intentional drug overdose AND [2] suicidal thoughts or ideas  • Negative: Drug overdose and triager unable to answer question  • Negative: Caller requesting information unrelated to medicine  • Negative: Caller requesting information about COVID-19 Vaccine  • Negative: Caller requesting information about Emergency Contraception  • Negative: Caller requesting information about Combined Birth Control Pills  • Negative: Caller requesting information about Progestin Birth Control Pills  • Negative: Caller requesting information about Post-Op pain or medicines  • Negative: Caller requesting a prescription antibiotic (such as Penicillin) for Strep throat and has a positive culture result  • Negative: Caller requesting a prescription anti-viral med (such as Tamiflu) and has influenza (flu)  symptoms  • Negative: Immunization reaction suspected  • Negative: Rash while taking a medicine or within 3 days of stopping it  • Negative: [1] Asthma and [2] having symptoms of asthma (cough, wheezing, etc.)  • Negative: [1] Symptom of illness (e.g., headache, abdominal pain, earache, vomiting) AND [2] more than mild  • Negative: Breastfeeding questions about mother's medicines and diet  • Negative: MORE THAN A DOUBLE DOSE of a prescription or over-the-counter (OTC) drug  • Negative: [1] DOUBLE DOSE (an extra dose or lesser amount) of prescription drug AND [2] any symptoms (e.g., dizziness, nausea, pain, sleepiness)  • Negative: [1] DOUBLE DOSE (an extra dose or lesser amount) of over-the-counter (OTC) drug AND [2] any symptoms (e.g., dizziness, nausea, pain, sleepiness)  • Negative: Took another person's prescription drug  • Negative: [1] DOUBLE DOSE (an extra dose or lesser amount) of prescription drug AND [2] NO symptoms (Exception: a double dose of  antibiotics)  • Negative: Diabetes drug error or overdose (e.g., took wrong type of insulin or took extra dose)  • Negative: [1] Prescription refill request for ESSENTIAL medicine (i.e., likelihood of harm to patient if not taken) AND [2] triager unable to refill per department policy  • Negative: [1] Prescription not at pharmacy AND [2] was prescribed by PCP recently (Exception: triager has access to EMR and prescription is recorded there. Go to Home Care and confirm for pharmacy.)  • Negative: [1] Pharmacy calling with prescription question AND [2] triager unable to answer question  • Negative: [1] Caller has URGENT medicine question about med that PCP or specialist prescribed AND [2] triager unable to answer question  • Negative: Medicine patch causing local rash or itching  • Negative: [1] Caller has medicine question about med NOT prescribed by PCP AND [2] triager unable to answer question (e.g., compatibility with other med, storage)  • Negative: Prescription request for new medicine (not a refill)  • Negative: Prescription refill request for a CONTROLLED substance (e.g., narcotics, ADHD medicines)  • Negative: [1] Prescription refill request for NON-ESSENTIAL medicine (i.e., no harm to patient if med not taken) AND [2] triager unable to refill per department policy  • Negative: [1] Caller has NON-URGENT medicine question about med that PCP prescribed AND [2] triager unable to answer question  • Negative: Caller wants to use a complementary or alternative medicine  • Negative: [1] Prescription prescribed recently is not at pharmacy AND [2] triager has access to patient's EMR AND [3] prescription is recorded in the EMR  • Negative: [1] DOUBLE DOSE (an extra dose or lesser amount) of over-the-counter (OTC) drug AND [2] NO symptoms  • Negative: [1] DOUBLE DOSE (an extra dose or lesser amount) of antibiotic drug AND [2] NO symptoms  • Negative: Caller has medicine question only, adult not sick, AND triager answers  "question    Answer Assessment - Initial Assessment Questions  1. NAME of MEDICATION: \"What medicine are you calling about?\"      Daughter has many questions regarding discharge medications.    2. QUESTION: \"What is your question?\" (e.g., medication refill, side effect)      On [facility] it states ask your doctor or nurse on how to obtain this medication.    3. PRESCRIBING HCP: \"Who prescribed it?\" Reason: if prescribed by specialist, call should be referred to that group.      Unknown    4. SYMPTOMS: \"Do you have any symptoms?\"      No   5. SEVERITY: If symptoms are present, ask \"Are they mild, moderate or severe?\"      No   6. PREGNANCY:  \"Is there any chance that you are pregnant?\" \"When was your last menstrual period?\"      No.    Protocols used: MEDICATION QUESTION CALL-ADULT-      "

## 2022-09-19 ENCOUNTER — TRANSITIONAL CARE MANAGEMENT TELEPHONE ENCOUNTER (OUTPATIENT)
Dept: CALL CENTER | Facility: HOSPITAL | Age: 87
End: 2022-09-19

## 2022-09-19 ENCOUNTER — TELEPHONE (OUTPATIENT)
Dept: CARDIOLOGY | Facility: CLINIC | Age: 87
End: 2022-09-19

## 2022-09-19 ENCOUNTER — TELEPHONE (OUTPATIENT)
Dept: FAMILY MEDICINE CLINIC | Facility: CLINIC | Age: 87
End: 2022-09-19

## 2022-09-19 NOTE — TELEPHONE ENCOUNTER
Patient's daughter called to inquire about Diltiazem 120 mg daily.  Patient was given a dose at 9:20 am when discharged from the hospital yesterday morning.  Daughter Nettie was confused and gave patient one tablet at 9:00 pm.  Upon realizing patient was not due for the next dose until 9:00 (am) today.  Nettie became very anxious and called some emergency nurse.  She said it was someone in our group she spoke with. This nurse informed her to give the next dose at noon today and then start back with (AM) dosing.   She wanted to be certain this is correct.  I inquired with Dr. Da Silva and she stated that would be correct or if she preferred she could give 9:00 am dose tonight and just give in (pm) thereafter.       Patient's BP and HR today at 3:00 pm was 136/69 and HR 94.      Patient's daughter asks if you will please call her tomorrow morning.  [phone]./ [facility]

## 2022-09-19 NOTE — TELEPHONE ENCOUNTER
Caller: MARY     [facility]    Best call back number: [phone]    What orders are you requesting (i.e. lab or imaging): HOME PHYSICAL THERAPY AND NURSING     In what timeframe would the patient need to come in: AS SOON AS POSSIBLE     Where will you receive your lab/imaging services:  AT HOME     Additional notes: NEEDING VERBAL OR WRITTEN ORDERS FOR PATIENT.

## 2022-09-19 NOTE — OUTREACH NOTE
Call Center TCM Note    Flowsheet Row Responses   [facility] patient discharged from? [place]   Does the patient have one of the following disease processes/diagnoses(primary or secondary)? Sepsis   TCM attempt successful? Yes   Call start time 1248   Call end time 1316   Discharge diagnosis Acute hypoxic resp failure, Aspiration PNA, septic shock, A-fib RVR, hyperglycemia   Meds reviewed with patient/caregiver? Yes   Is the patient having any side effects they believe may be caused by any medication additions or changes? No   Does the patient have all medications related to this admission filled (includes all antibiotics, inhalers, nebulizers,steroids,etc.) Yes   Is the patient taking all medications as directed (includes completed medication regime)? No   What is preventing the patient from taking all medications as directed? Other, Desires to consult PCP first   Nursing Interventions Notified provider   Medication comments States they have her off pravastatin per AVS and on atorvastatin which she was taken off.  Will message PCP office regarding this.  She states they are some medication listed that she doesn't take anymore but will need in the future and advised to discuss at follow up appt.  She gave diltiazem XR too early and contacted cardiology and stated it was ok and to give her the next dose at noon but she is concerned since it has not been 24 hours.  Advised to contact cardiology to confirm due to her concerns.     Comments Appt made with Nuria Aparicio for 9/26 @ 2:45.    What is the Home health agency?  [facility]   Has home health visited the patient within 72 hours of discharge? No   Home health interventions Called Home Health agency   Home health comments States they have her in the system and should be contacting her today or tomorrow per Giselle.  If she has not heard by late afternoon tomorrow, she can contact them at [phone].    Psychosocial issues? No   Did the patient  "receive a copy of their discharge instructions? Yes   Nursing interventions Reviewed instructions with patient   What is the patient's perception of their health status since discharge? Improving   Nursing interventions Nurse provided patient education   Nursing interventions Nurse provided reassurance to patient   Is patient/caregiver able to teach back steps to recovery at home? Set small, achievable goals for return to baseline health, Rest and regain strength, Eat a balanced diet, Make a list of questions for PCP appoinment   Is the patient/caregiver able to teach back signs and symptoms of worsening condition: Fever, Rapid heart rate (>90), Altered mental status(confusion/coma)   If the patient is a current smoker, are they able to teach back resources for cessation? Not a smoker   Is the patient/caregiver able to teach back the hierarchy of who to call/visit for symptoms/problems? PCP, Specialist, Home health nurse, Urgent Care, ED, 911 Yes   Additional teach back comments States she has been wanting to sleep a lot and appetite has not been good.  She is encouraged boost.  She monitors her bp, oxygen levels and hr.  All have been \"good\".     TCM call completed? Yes   Wrap up additional comments Daughter to make sure to bring all medications to go over with PCP at office visit.  Will message office regarding pt was not taking atorvastatin and pravastatin was discontinued and this is the med she was taking.          Danielle Bazan LPN    9/19/2022, 13:26 EDT      "

## 2022-09-20 NOTE — TELEPHONE ENCOUNTER
Pt notified of results and recommendations, and verbalized understanding.     Dora De La Cruz RN  [facility] Triage Department

## 2022-09-20 NOTE — TELEPHONE ENCOUNTER
She spoke with FELECIA Earl who was on call. Meliza and I actually discussed this in person yesterday. I agree with plan. Vitals stable so that is good.     Triage- please inform daughter for me. Let her know as long as SBP is 105-140 and pulse is most times 60-90s we are ok.

## 2022-09-21 NOTE — PROGRESS NOTES
Called daughter, states that she discussed with [facility] how her medications and that medications will be reviewed at office visit on 9/26.

## 2022-09-23 ENCOUNTER — TELEPHONE (OUTPATIENT)
Dept: FAMILY MEDICINE CLINIC | Facility: CLINIC | Age: 87
End: 2022-09-23

## 2022-09-23 NOTE — TELEPHONE ENCOUNTER
Caller: BIANCA ESPARZA     [facility] call back number: [phone]    What orders are you requesting (i.e. lab or imaging): PT 1 TIME 1  WEEK 2 TIMES A WEEK FOR 3 WEEKS AND THEN  ONE TIME A WEEK FOR 4 WEEKS     In what timeframe would the patient need to come in: ASAP

## 2022-09-26 ENCOUNTER — OFFICE VISIT (OUTPATIENT)
Dept: FAMILY MEDICINE CLINIC | Facility: CLINIC | Age: 87
End: 2022-09-26

## 2022-09-26 VITALS
WEIGHT: 101 LBS | BODY MASS INDEX: 19.07 KG/M2 | TEMPERATURE: 97.9 F | SYSTOLIC BLOOD PRESSURE: 112 MMHG | DIASTOLIC BLOOD PRESSURE: 68 MMHG | HEIGHT: 61 IN

## 2022-09-26 DIAGNOSIS — Z09 HOSPITAL DISCHARGE FOLLOW-UP: Primary | ICD-10-CM

## 2022-09-26 DIAGNOSIS — E44.0 MODERATE MALNUTRITION: ICD-10-CM

## 2022-09-26 DIAGNOSIS — J96.01 ACUTE RESPIRATORY FAILURE WITH HYPOXIA: ICD-10-CM

## 2022-09-26 PROCEDURE — 99495 TRANSJ CARE MGMT MOD F2F 14D: CPT | Performed by: NURSE PRACTITIONER

## 2022-09-26 PROCEDURE — 1111F DSCHRG MED/CURRENT MED MERGE: CPT | Performed by: NURSE PRACTITIONER

## 2022-09-26 NOTE — PROGRESS NOTES
Transitional Care Follow Up Visit  Subjective     Demetrajackeline Rush is a 89 y.o. female who presents for a transitional care management visit.    Within 48 business hours after discharge our office contacted her via telephone to coordinate her care and needs.      I reviewed and discussed the details of that call along with the discharge summary, hospital problems, inpatient lab results, inpatient diagnostic studies, and consultation reports with Demetra.     Current outpatient and discharge medications have been reconciled for the patient.  Reviewed by: FELECIA Marie      Date of TCM Phone Call 9/18/2022   [facility]   Date of Admission 9/12/2022   Date of Discharge 9/18/2022   Discharge Disposition [facility] [place]     Risk for Readmission (LACE) Score: 7 (9/18/2022  6:00 AM)      History of Present Illness   Course During Hospital Stay:  Patient is a 89-year-old female who presented to the hospital with acute hypoxemic respiratory failure secondary to aspiration pneumonia from ingesting pickle juice and choking. Respiratory failure required mechanical ventilation due to severe mitral valve regurgitation and esophageal dysmotility, to which GI and cardiology were consulted. Patient started on torsemide which she tolerated and was sent home on. She will follow-up with gastroenterology and cardiology in the next few weeks.  She was discharged to the care of her daughter who she has been living with. Since being discharged from the hospital, she has been eating chicken broth and beef broth, tolerating well.      The following portions of the patient's history were reviewed and updated as appropriate: allergies, current medications, past family history, past medical history, past social history, past surgical history and problem list.    Review of Systems   Constitutional: Negative for fatigue and fever.   HENT: Negative for sore throat.    Respiratory: Negative for cough, shortness of breath and  wheezing.    Cardiovascular: Negative for chest pain, palpitations and leg swelling.   Gastrointestinal: Negative for abdominal pain, constipation, diarrhea, nausea and vomiting.   Musculoskeletal: Negative for arthralgias, back pain, myalgias and neck pain.   Neurological: Negative for weakness.       Objective   Physical Exam  Vitals reviewed.   Constitutional:       General: She is awake. She is not in acute distress.     Appearance: Normal appearance.   Neck:      Thyroid: No thyroid mass or thyroid tenderness.      Vascular: No carotid bruit or JVD.   Cardiovascular:      Rate and Rhythm: Normal rate and regular rhythm.      Pulses: Normal pulses.           Radial pulses are 2+ on the right side and 2+ on the left side.        Dorsalis pedis pulses are 2+ on the right side and 2+ on the left side.        Posterior tibial pulses are 2+ on the right side and 2+ on the left side.      Heart sounds: Normal heart sounds.   Pulmonary:      Effort: Pulmonary effort is normal.      Breath sounds: Normal breath sounds.   Lymphadenopathy:      Cervical: No cervical adenopathy.   Skin:     General: Skin is warm and dry.      Capillary Refill: Capillary refill takes less than 2 seconds.   Neurological:      General: No focal deficit present.      Mental Status: She is alert.   Psychiatric:         Attention and Perception: Attention normal.         Mood and Affect: Mood normal.         Speech: Speech normal.         Behavior: Behavior is cooperative.         Assessment & Plan   Problems Addressed this Visit        Endocrine and Metabolic    Moderate malnutrition (HCC)    Relevant Orders    CBC & Differential    Comprehensive Metabolic Panel       Pulmonary and Pneumonias    Acute respiratory failure with hypoxia (HCC)    Relevant Orders    CBC & Differential    Comprehensive Metabolic Panel      Other Visit Diagnoses     Hospital discharge follow-up    -  Primary    Relevant Orders    CBC & Differential    Comprehensive  Metabolic Panel      Diagnoses       Codes Comments    Hospital discharge follow-up    -  Primary ICD-10-CM: Z09  ICD-9-CM: V67.59     Acute respiratory failure with hypoxia (HCC)     ICD-10-CM: J96.01  ICD-9-CM: 518.81     Moderate malnutrition (HCC)     ICD-10-CM: E44.0  ICD-9-CM: 263.0         Labs ordered to check for on status of respiratory failure and malnutrition.  Will notify patient of results.  Discussed with patient that she needs to attend follow-up appointments with cardiology and gastroenterology for further evaluation and treatment from hospital discharge.

## 2022-09-27 LAB
ALBUMIN SERPL-MCNC: 4.2 G/DL (ref 3.5–5.2)
ALBUMIN/GLOB SERPL: 1.9 G/DL
ALP SERPL-CCNC: 72 U/L (ref 39–117)
ALT SERPL-CCNC: 11 U/L (ref 1–33)
AST SERPL-CCNC: 18 U/L (ref 1–32)
BASOPHILS # BLD AUTO: 0.04 10*3/MM3 (ref 0–0.2)
BASOPHILS NFR BLD AUTO: 1.1 % (ref 0–1.5)
BILIRUB SERPL-MCNC: <0.2 MG/DL (ref 0–1.2)
BUN SERPL-MCNC: 19 MG/DL (ref 8–23)
BUN/CREAT SERPL: 10.9 (ref 7–25)
CALCIUM SERPL-MCNC: 8.7 MG/DL (ref 8.6–10.5)
CHLORIDE SERPL-SCNC: 101 MMOL/L (ref 98–107)
CO2 SERPL-SCNC: 28.2 MMOL/L (ref 22–29)
CREAT SERPL-MCNC: 1.75 MG/DL (ref 0.57–1)
EGFRCR SERPLBLD CKD-EPI 2021: 27.6 ML/MIN/1.73
EOSINOPHIL # BLD AUTO: 0.15 10*3/MM3 (ref 0–0.4)
EOSINOPHIL NFR BLD AUTO: 4 % (ref 0.3–6.2)
ERYTHROCYTE [DISTWIDTH] IN BLOOD BY AUTOMATED COUNT: 13.2 % (ref 12.3–15.4)
GLOBULIN SER CALC-MCNC: 2.2 GM/DL
GLUCOSE SERPL-MCNC: 77 MG/DL (ref 65–99)
HCT VFR BLD AUTO: 34.8 % (ref 34–46.6)
HGB BLD-MCNC: 11.2 G/DL (ref 12–15.9)
IMM GRANULOCYTES # BLD AUTO: 0.02 10*3/MM3 (ref 0–0.05)
IMM GRANULOCYTES NFR BLD AUTO: 0.5 % (ref 0–0.5)
LYMPHOCYTES # BLD AUTO: 0.99 10*3/MM3 (ref 0.7–3.1)
LYMPHOCYTES NFR BLD AUTO: 26.7 % (ref 19.6–45.3)
MCH RBC QN AUTO: 27.6 PG (ref 26.6–33)
MCHC RBC AUTO-ENTMCNC: 32.2 G/DL (ref 31.5–35.7)
MCV RBC AUTO: 85.7 FL (ref 79–97)
MONOCYTES # BLD AUTO: 0.58 10*3/MM3 (ref 0.1–0.9)
MONOCYTES NFR BLD AUTO: 15.6 % (ref 5–12)
NEUTROPHILS # BLD AUTO: 1.93 10*3/MM3 (ref 1.7–7)
NEUTROPHILS NFR BLD AUTO: 52.1 % (ref 42.7–76)
NRBC BLD AUTO-RTO: 0 /100 WBC (ref 0–0.2)
PLATELET # BLD AUTO: 299 10*3/MM3 (ref 140–450)
POTASSIUM SERPL-SCNC: 4.9 MMOL/L (ref 3.5–5.2)
PROT SERPL-MCNC: 6.4 G/DL (ref 6–8.5)
RBC # BLD AUTO: 4.06 10*6/MM3 (ref 3.77–5.28)
SODIUM SERPL-SCNC: 140 MMOL/L (ref 136–145)
WBC # BLD AUTO: 3.71 10*3/MM3 (ref 3.4–10.8)

## 2022-09-28 ENCOUNTER — TELEPHONE (OUTPATIENT)
Dept: FAMILY MEDICINE CLINIC | Facility: CLINIC | Age: 87
End: 2022-09-28

## 2022-09-28 ENCOUNTER — OFFICE VISIT (OUTPATIENT)
Dept: CARDIOLOGY | Facility: CLINIC | Age: 87
End: 2022-09-28

## 2022-09-28 VITALS
HEART RATE: 117 BPM | SYSTOLIC BLOOD PRESSURE: 112 MMHG | DIASTOLIC BLOOD PRESSURE: 68 MMHG | BODY MASS INDEX: 19.6 KG/M2 | HEIGHT: 61 IN | OXYGEN SATURATION: 71 % | WEIGHT: 103.8 LBS

## 2022-09-28 DIAGNOSIS — I27.20 PULMONARY HYPERTENSION: ICD-10-CM

## 2022-09-28 DIAGNOSIS — I48.19 ATRIAL FIBRILLATION, PERSISTENT: Primary | ICD-10-CM

## 2022-09-28 DIAGNOSIS — I34.0 NONRHEUMATIC MITRAL VALVE REGURGITATION: ICD-10-CM

## 2022-09-28 DIAGNOSIS — I70.0 AORTIC CALCIFICATION: ICD-10-CM

## 2022-09-28 PROCEDURE — 99214 OFFICE O/P EST MOD 30 MIN: CPT | Performed by: NURSE PRACTITIONER

## 2022-09-28 NOTE — PROGRESS NOTES
"Date of Office Visit: 22  Encounter Provider: FELECIA Cooley  Place of Service: [facility] CARDIOLOGY  Patient Name: Demetra Rush  :1933    Chief Complaint   Patient presents with   • Follow-up   :     HPI: Demetra Rush is a 89 y.o. female  with persistent atrial fibrillation, diastolic congestive heart failure, mitral regurgitation, pulmonary hypertension, esophageal dysmotility      She was seen inpatient by Dr. Ji. I will visit with her in follow up today.     In 2022 she presented with hypoxic respiratory failure secondary to aspiration pneumonia.  She required mechanical ventilation in the setting of severe mitral regurgitation and esophageal dysmotility.  Cardiology and gastroenterology followed.  It was hard to rate control patient so digoxin was added.  She was started on low-dose torsemide due to history of allergy to furosemide.  Gastroenterology recommended calcium channel blocker for esophageal dysmotility as well as follow-up in their office.  Her digoxin level after IV doses as well as oral digoxin was elevated at discharge so digoxin was decreased to 3 times a week.  She now presents for reassessment.  She had CBC and renal function checked yesterday with her PCP and her creatinine has declined.    Allergies   Allergen Reactions   • Metoprolol Dizziness     Very dizzy   • Amlodipine Swelling     In feet and legs.   • Azithromycin Hives   • Codeine Itching   • Furosemide Hives   • Paroxetine Diarrhea and Dizziness   • Sulfa Antibiotics Hives           Family and social history reviewed.     ROS  All other systems were reviewed and are negative          Objective:     Vitals:    22 1130   BP: 112/68   BP Location: Left arm   Patient Position: Sitting   Cuff Size: Pediatric   Pulse: 117   SpO2: (!) 71%   Weight: 47.1 kg (103 lb 12.8 oz)   Height: 154.9 cm (61\")     Body mass index is 19.61 kg/m².    PHYSICAL EXAM:  Pulmonary:      " Breath sounds: Examination of the right-lower field reveals decreased breath sounds. Decreased breath sounds present.   Cardiovascular:      Tachycardia present. Irregularly irregular rhythm.      Murmurs: There is a systolic murmur.         Procedures      Current Outpatient Medications   Medication Sig Dispense Refill   • ALPRAZolam (XANAX) 1 MG tablet Take 1 mg by mouth. One to two tablets at night     • apixaban (ELIQUIS) 2.5 MG tablet tablet Take 1 tablet by mouth Every 12 (Twelve) Hours. Indications: Atrial Fibrillation 60 tablet 0   • ascorbic acid (VITAMIN C) 1000 MG tablet Take 1,000 mg by mouth Daily.     • aspirin 81 MG tablet Take  by mouth Daily.     • atorvastatin (LIPITOR) 40 MG tablet 40 mg Every Night.     • B Complex Vitamins (VITAMIN B COMPLEX 100 IJ) Take 1 mL by mouth.     • Budesonide (ENTOCORT EC) 3 MG 24 hr capsule Take 6 mg by mouth Every Morning.     • Calcium Carbonate-Vitamin D3 600-400 MG-UNIT tablet Take  by mouth Daily.     • cycloSPORINE (RESTASIS) 0.05 % ophthalmic emulsion 1 drop 2 (Two) Times a Day.     • digoxin (LANOXIN) 125 MCG tablet Take 1 tablet by mouth 3 (Three) Times a Week - On Monday, Wednesday and friday night only 14 tablet 0   • dilTIAZem XR (DILACOR XR) 120 MG 24 hr capsule Take 1 capsule by mouth Daily. 30 capsule 0   • DOCOSAHEXAENOIC ACID PO Take  by mouth.     • estradiol (ESTRACE) 0.1 MG/GM vaginal cream estradiol 0.01% (0.1 mg/gram) vaginal cream   Insert by vaginal route.     • estradiol (VIVELLE-DOT) 0.1 MG/24HR patch Place 1 patch on the skin as directed by provider 2 (Two) Times a Week.     • hyoscyamine (LEVSIN) 0.125 MG SL tablet Place 1 tablet under the tongue Every 4 (Four) Hours As Needed (esophageal dysmotility). 100 tablet 0   • ipratropium (ATROVENT) 0.06 % nasal spray 2 sprays into the nostril(s) as directed by provider 4 (Four) Times a Day.     • levothyroxine (SYNTHROID, LEVOTHROID) 75 MCG tablet levothyroxine 75 mcg tablet     • loperamide  (IMODIUM) 2 MG capsule Take 2 mg by mouth 4 (Four) Times a Day As Needed for Diarrhea.     • metoprolol tartrate (LOPRESSOR) 25 MG tablet Take 1 tablet by mouth 2 (Two) Times a Day. 60 tablet 0   • pantoprazole (PROTONIX) 40 MG EC tablet Take 40 mg by mouth Daily.     • psyllium (METAMUCIL) 58.6 % packet Take 1 packet by mouth Daily.     • raNITIdine (ZANTAC) 150 MG tablet ranitidine 150 mg tablet     • sucralfate (CARAFATE) 1 g tablet Take 1 tablet by mouth 2 (Two) Times a Day Before Meals. 60 tablet 0   • tobramycin-dexamethasone (TOBRADEX) 0.3-0.1 % ophthalmic ointment Apply  to eye(s) as directed by provider.     • triamcinolone (KENALOG) 0.1 % cream Apply 1 application topically to the appropriate area as directed 2 (Two) Times a Day.       No current facility-administered medications for this visit.     Assessment:       Diagnosis Plan   1. Atrial fibrillation, persistent (HCC)     2. Nonrheumatic mitral valve regurgitation     3. Aortic calcification (HCC)          No orders of the defined types were placed in this encounter.        Plan:       1. 89 year old female with persistent atrial fibrillation -persistent atrial fibrillation.  Her heart rate is not ideally controlled with  oral digoxin 3 x weekly , long-acting diltiazem 120 mg daily and hort acting metoprolol tartrate 25 mg twice daily.  Her renal function 2 days ago had declined.  At this time she will continue digoxin, diltiazem and metoprolol as prescribed and I will stop torsemide and potassium.  I have advised that daily weights are performed at home and that I am call with 3 pound weight gain overnight otherwise I will plan to see patient back next week.  I have requested that they move digoxin to the nighttime and next week I will plan to obtain digoxin level as well as repeat BMP in the office     2.  Diastolic congestive heart failure secondary to atrial fibrillation and valvular heart disease.  As above her renal function has declined so I  will hold her torsemide and potassium for the next week     3. Moderate to severe mitral valve regurgitation - conservative therapy.      4. Pulmonary hypertension-breathing has overall improved since treatment of pneumonia  5. esophageal dysmotility -continue calcium channel blocker and recommendations per GI.  She has upcoming follow-up  6.  Aortic valve sclerosis  7. Hypothyroidism on replacement therapy     I called and discussed plan with her daughter Nettie.  We may need to investigate cardioversion  Call with questions or concerns.               It has been a pleasure to participate in this patient's care.      Thank you,  FELECIA Cooley      **I used Dragon to dictate this note:**

## 2022-09-28 NOTE — TELEPHONE ENCOUNTER
Caller: REG     Relationship:     [facility] call back number: [phone]    What orders are you requesting (i.e. lab or imaging): OT EVACUATION EXTENSION     In what timeframe would the patient need to come in: TODAY     Additional notes: PATIENT NURSE CALLED ON HER BEHALF TO REQUEST NEW ORDER IN TO CONTINUE OT EVALUATION FOR 1 TIME A WEEK ADDITIONAL 4 WEEKS

## 2022-09-29 DIAGNOSIS — R79.89 ELEVATED SERUM CREATININE: Primary | ICD-10-CM

## 2022-09-29 DIAGNOSIS — R94.4 DECREASED GFR: ICD-10-CM

## 2022-09-30 ENCOUNTER — TELEPHONE (OUTPATIENT)
Dept: FAMILY MEDICINE CLINIC | Facility: CLINIC | Age: 87
End: 2022-09-30

## 2022-09-30 NOTE — TELEPHONE ENCOUNTER
OK FOR HUB TO READ         Blood count has normalized, still slightly anemic, should take multivitamin with iron. Kidney function has changed since last being checked. Will recheck level in about 2 weeks. Make lab only appt for 2 weeks please.

## 2022-10-03 ENCOUNTER — TELEPHONE (OUTPATIENT)
Dept: CARDIOLOGY | Facility: CLINIC | Age: 87
End: 2022-10-03

## 2022-10-03 NOTE — TELEPHONE ENCOUNTER
Jyoti with [facility] called to report patient's BP is 90/53 prior to medication.  HR running 110 to 130's.  She asks if patient's medication should be adjusted. / [facility] Montes can be reached at [phone].

## 2022-10-03 NOTE — TELEPHONE ENCOUNTER
Called Nettie and reviewed Michelle's recommendations for Demetra Rush.  Nettie verbalized understanding with repeat back of medication instructions.    Thank you,  Yael Orourke RN  Triage Nurse [facility]

## 2022-10-03 NOTE — TELEPHONE ENCOUNTER
Called and spoke with Jyoti.  Relayed Michelle's instructions.  She verbalized understanding./ LITO

## 2022-10-03 NOTE — TELEPHONE ENCOUNTER
Patient's daughter called to report pt had a three lb weight gain overnight.  Patient is taking Digoxin 125 mcg 3 times weekly.  Please advise.       Nettie can be reached at [phone]

## 2022-10-03 NOTE — TELEPHONE ENCOUNTER
Triage- let Silva know I want patient to have one torsemide today. Do not take potassium with it today. I will need to recheck her kidney function this week when I see her

## 2022-10-06 RX ORDER — DIGOXIN 125 MCG
125 TABLET ORAL 3 TIMES WEEKLY
Qty: 30 TABLET | Refills: 1 | Status: SHIPPED | OUTPATIENT
Start: 2022-10-07 | End: 2022-10-14 | Stop reason: HOSPADM

## 2022-10-06 RX ORDER — TORSEMIDE 10 MG/1
10 TABLET ORAL DAILY
COMMUNITY
End: 2022-10-06 | Stop reason: SDUPTHER

## 2022-10-06 RX ORDER — DILTIAZEM HYDROCHLORIDE 120 MG/1
120 CAPSULE, EXTENDED RELEASE ORAL DAILY
Qty: 30 CAPSULE | Refills: 3 | Status: SHIPPED | OUTPATIENT
Start: 2022-10-06 | End: 2022-10-07 | Stop reason: ALTCHOICE

## 2022-10-06 RX ORDER — TORSEMIDE 10 MG/1
10 TABLET ORAL DAILY PRN
Qty: 30 TABLET | Refills: 1 | Status: SHIPPED | OUTPATIENT
Start: 2022-10-06 | End: 2022-10-14 | Stop reason: HOSPADM

## 2022-10-06 NOTE — TELEPHONE ENCOUNTER
Reyna, has questions regarding the Torsemide.  Does patient take it now or later.  Patient is scheduled for lab work and she would like to know if Torsemide will have any effect.  Patient has an UTI again. Needs refill on meds.  I will forward the Refills to you. / LITO Orellana can be reached at [phone]

## 2022-10-06 NOTE — TELEPHONE ENCOUNTER
Patient's daughter (Reyna) called again to report patient had a three pound weight gain overnight. Please advise./ LITO Sheffield can be reached at [phone]

## 2022-10-07 ENCOUNTER — OFFICE VISIT (OUTPATIENT)
Dept: CARDIOLOGY | Facility: CLINIC | Age: 87
End: 2022-10-07

## 2022-10-07 ENCOUNTER — HOSPITAL ENCOUNTER (OUTPATIENT)
Dept: CARDIOLOGY | Facility: HOSPITAL | Age: 87
Discharge: HOME | End: 2022-10-07
Admitting: NURSE PRACTITIONER

## 2022-10-07 VITALS
WEIGHT: 113 LBS | HEIGHT: 61 IN | DIASTOLIC BLOOD PRESSURE: 90 MMHG | OXYGEN SATURATION: 99 % | HEART RATE: 122 BPM | BODY MASS INDEX: 21.34 KG/M2 | SYSTOLIC BLOOD PRESSURE: 118 MMHG

## 2022-10-07 DIAGNOSIS — I48.91 UNCONTROLLED ATRIAL FIBRILLATION: ICD-10-CM

## 2022-10-07 DIAGNOSIS — R06.09 DOE (DYSPNEA ON EXERTION): ICD-10-CM

## 2022-10-07 DIAGNOSIS — I48.19 ATRIAL FIBRILLATION, PERSISTENT: Primary | ICD-10-CM

## 2022-10-07 DIAGNOSIS — I50.31 ACUTE DIASTOLIC CHF (CONGESTIVE HEART FAILURE): ICD-10-CM

## 2022-10-07 LAB
ANION GAP SERPL CALCULATED.3IONS-SCNC: 7.4 MMOL/L (ref 5–15)
BUN SERPL-MCNC: 11 MG/DL (ref 8–23)
BUN/CREAT SERPL: 14.3 (ref 7–25)
CALCIUM SPEC-SCNC: 8.1 MG/DL (ref 8.6–10.5)
CHLORIDE SERPL-SCNC: 104 MMOL/L (ref 98–107)
CO2 SERPL-SCNC: 25.6 MMOL/L (ref 22–29)
CREAT SERPL-MCNC: 0.77 MG/DL (ref 0.57–1)
DIGOXIN SERPL-MCNC: 0.7 NG/ML (ref 0.6–1.2)
EGFRCR SERPLBLD CKD-EPI 2021: 73.8 ML/MIN/1.73
GLUCOSE SERPL-MCNC: 89 MG/DL (ref 65–99)
NT-PROBNP SERPL-MCNC: 2760 PG/ML (ref 0–1800)
POTASSIUM SERPL-SCNC: 4.5 MMOL/L (ref 3.5–5.2)
SODIUM SERPL-SCNC: 137 MMOL/L (ref 136–145)

## 2022-10-07 PROCEDURE — 83880 ASSAY OF NATRIURETIC PEPTIDE: CPT | Performed by: NURSE PRACTITIONER

## 2022-10-07 PROCEDURE — 80048 BASIC METABOLIC PNL TOTAL CA: CPT | Performed by: NURSE PRACTITIONER

## 2022-10-07 PROCEDURE — 80162 ASSAY OF DIGOXIN TOTAL: CPT | Performed by: NURSE PRACTITIONER

## 2022-10-07 PROCEDURE — 99214 OFFICE O/P EST MOD 30 MIN: CPT | Performed by: NURSE PRACTITIONER

## 2022-10-07 PROCEDURE — 36415 COLL VENOUS BLD VENIPUNCTURE: CPT

## 2022-10-07 RX ORDER — BUMETANIDE 0.25 MG/ML
2 INJECTION INTRAMUSCULAR; INTRAVENOUS ONCE
Status: COMPLETED | OUTPATIENT
Start: 2022-10-07 | End: 2022-10-07

## 2022-10-07 RX ORDER — AMIODARONE HYDROCHLORIDE 200 MG/1
TABLET ORAL
Qty: 90 TABLET | Refills: 0 | Status: SHIPPED | OUTPATIENT
Start: 2022-10-07 | End: 2022-10-21 | Stop reason: HOSPADM

## 2022-10-07 RX ORDER — DILTIAZEM HYDROCHLORIDE 120 MG/1
120 TABLET, FILM COATED ORAL DAILY
COMMUNITY
End: 2022-10-14 | Stop reason: HOSPADM

## 2022-10-07 RX ADMIN — BUMETANIDE 2 MG: 0.25 INJECTION, SOLUTION INTRAMUSCULAR; INTRAVENOUS at 13:23

## 2022-10-07 NOTE — TELEPHONE ENCOUNTER
I saw her today. Gave IV bumex in clinic and wrote out the plan.     She said today her UTI symptoms improved with drinking cranberry juice. So no antibiotic was given

## 2022-10-07 NOTE — PROGRESS NOTES
Date of Office Visit: 10/07/22  Encounter Provider: FELECIA Cooley  Place of Service: [facility] CARDIOLOGY  Patient Name: Demetra Rush  :1933    Chief Complaint   Patient presents with   • Follow-up   :     HPI: Demetra Rush is a 89 y.o. female  with persistent atrial fibrillation, diastolic congestive heart failure, mitral regurgitation, pulmonary hypertension, esophageal dysmotility        She was seen inpatient by Dr. Ji. I will visit with her in follow up today.      In 2022 she presented with hypoxic respiratory failure secondary to aspiration pneumonia.  She required mechanical ventilation in the setting of severe mitral regurgitation and esophageal dysmotility.  Cardiology and gastroenterology followed.  It was hard to rate control patient so digoxin was added.  She was started on low-dose torsemide due to history of allergy to furosemide.  Gastroenterology recommended calcium channel blocker for esophageal dysmotility as well as follow-up in their office.  Her digoxin level after IV doses as well as oral digoxin was elevated at discharge so digoxin was decreased to 3 times a week.  After 1 week her renal function declined so I stopped torsemide and potassium.      She presents today for reassessment.  Since her last visit she was advised to take 1 additional torsemide dose.  The message from yesterday never got to her so she did not take that dose.  Heart rate has remained above goal anywhere between 100-130 and blood pressure has been soft upper 80s-110s systolic.  She has some nausea and was having some UTI symptoms.  She drank some cranberry juice and now her UTI symptoms seem better today.  Still having some intermittent nausea.  She has increased shortness of breath on exertion and she has unfortunately developed some lower extremity edema.  She continues to deny chest pain.  She is accompanied by her daughter today.        Allergies  "  Allergen Reactions   • Metoprolol Dizziness     Very dizzy   • Amlodipine Swelling     In feet and legs.   • Azithromycin Hives   • Codeine Itching   • Furosemide Hives   • Paroxetine Diarrhea and Dizziness   • Sulfa Antibiotics Hives           Family and social history reviewed.     ROS  All other systems were reviewed and are negative          Objective:     Vitals:    10/07/22 1135   BP: 118/90   Pulse: (!) 122   SpO2: 99%   Weight: 51.3 kg (113 lb)   Height: 154.9 cm (61\")     Body mass index is 21.35 kg/m².    PHYSICAL EXAM:  Pulmonary:      Breath sounds: Examination of the right-lower field reveals decreased breath sounds. Examination of the left-lower field reveals decreased breath sounds. Decreased breath sounds present.   Cardiovascular:      Tachycardia present. Irregularly irregular rhythm.   Edema:     Peripheral edema present.     Pretibial: bilateral 1+ edema of the pretibial area.     Ankle: bilateral 1+ edema of the ankle.        Procedures      Current Outpatient Medications   Medication Sig Dispense Refill   • ALPRAZolam (XANAX) 1 MG tablet Take 0.5 mg by mouth. One to two tablets at night     • apixaban (ELIQUIS) 2.5 MG tablet tablet Take 1 tablet by mouth Every 12 (Twelve) Hours. Indications: Atrial Fibrillation 60 tablet 5   • ascorbic acid (VITAMIN C) 1000 MG tablet Take 1,000 mg by mouth Daily.     • aspirin 81 MG tablet Take  by mouth Daily.     • Calcium Carbonate-Vitamin D3 600-400 MG-UNIT tablet Take  by mouth Daily.     • digoxin (LANOXIN) 125 MCG tablet Take 1 tablet by mouth 3 (Three) Times a Week - On Monday, Wednesday and friday night only 30 tablet 1   • dilTIAZem (CARDIZEM) 120 MG tablet Take 120 mg by mouth Daily.     • levothyroxine (SYNTHROID, LEVOTHROID) 75 MCG tablet levothyroxine 75 mcg tablet     • metoprolol tartrate (LOPRESSOR) 25 MG tablet Take 1 tablet by mouth 2 (Two) Times a Day. 60 tablet 3   • pantoprazole (PROTONIX) 40 MG EC tablet Take 40 mg by mouth Daily.     • " sucralfate (CARAFATE) 1 g tablet Take 1 tablet by mouth 2 (Two) Times a Day Before Meals. 60 tablet 0   • amiodarone (PACERONE) 200 MG tablet Take one tablet every 12 hours for 6 doses and then just one tablet daily 90 tablet 0   • torsemide (DEMADEX) 10 MG tablet Take 1 tablet by mouth Daily As Needed (swelling or weight gain of 3 lbs overnight or 5 lb in a week). 30 tablet 1     No current facility-administered medications for this visit.     Assessment:       Diagnosis Plan   1. Atrial fibrillation, persistent (HCC)     2. Acute diastolic CHF (congestive heart failure) (HCC)  bumetanide (BUMEX) injection 2 mg    Cardioversion External in Cardiology Department    Basic Metabolic Panel    proBNP   3. KIRK (dyspnea on exertion)  bumetanide (BUMEX) injection 2 mg    Basic Metabolic Panel    proBNP   4. Uncontrolled atrial fibrillation (HCC)  Cardioversion External in Cardiology Department    Digoxin Level        Orders Placed This Encounter   Procedures   • Basic Metabolic Panel     Standing Status:   Future     Number of Occurrences:   1     Standing Expiration Date:   10/7/2023     Order Specific Question:   Release to patient     Answer:   Routine Release   • proBNP     Order Specific Question:   Release to patient     Answer:   Routine Release   • Digoxin Level     Standing Status:   Future     Number of Occurrences:   1     Standing Expiration Date:   10/7/2023     Order Specific Question:   Release to patient     Answer:   Routine Release   • Cardioversion External in Cardiology Department     Standing Status:   Future     Standing Expiration Date:   10/7/2023     Order Specific Question:   What type of sedation does the patient require?     Answer:   Moderate Sedation     Order Specific Question:   At which hospital location will this be performed?     Answer:   [place]     Order Specific Question:   Reason for Exam:     Answer:   uncontrolled a fib, low BP     Order Specific Question:   Release to patient      Answer:   Routine Release         Plan:       1. 89 year old female with persistent atrial fibrillation -persistent atrial fibrillation.  Her heart rate is not ideally controlled with  oral digoxin 3 x weekly , long-acting diltiazem 120 mg daily and short acting metoprolol tartrate 25 mg twice daily.    Her renal function is better.  I gave 2 mg dose of IV Bumex today.  She will get back on torsemide 10 mg every other day with potassium replacement.  Digoxin level today is normal.  I will continue current rate control regimen as listed above.  She has not missed any doses of Eliquis.  I will start amiodarone 200 mg twice daily x3 days and decrease to 200 mg daily.  I placed an order for external cardioversion and will try to get this arranged next week with Dr. Ji      2.  Diastolic congestive heart failure secondary to atrial fibrillation and valvular heart disease.  Since her renal function has improved I will put her back on torsemide but every other day at this time to be cautious     3. Moderate to severe mitral valve regurgitation - conservative therapy is desired by patient.      4. Pulmonary hypertension-breathing initially improved but now little worse which I believe is just fluid  5. esophageal dysmotility -continue calcium channel blocker and recommendations per GI.    She now has some nausea which could be vascular congestion from increase fluid retention.  She has upcoming follow-up with GI  6.  Aortic valve sclerosis                  It has been a pleasure to participate in this patient's care.      Thank you,  FELECIA Cooley      **I used Dragon to dictate this note:**

## 2022-10-11 ENCOUNTER — TELEPHONE (OUTPATIENT)
Dept: CARDIOLOGY | Facility: CLINIC | Age: 87
End: 2022-10-11

## 2022-10-11 NOTE — TELEPHONE ENCOUNTER
Thank you. Have her take a 20 mg dose of torsemide ( two 10 mg tablets now)  today with one potassium. Then make torsemide 10 mg daily as opposed to every other day.

## 2022-10-11 NOTE — TELEPHONE ENCOUNTER
Called and spoke with daughter, Reyna.  Upon further conversation, the patient did actually take a torsemide and potassium this AM.  I ran this by AL in person in office.  She wants the patient to take an extra torsemide now, and then tomorrow she will begin daily dosing for torsemide 10 mg.  She has cardioversion scheduled for Friday.  Daughter Reyna verbalizes understanding and agrees with the plan.    Johana Gonzales RN  [facility] Triage  10/11/22 13:28 EDT

## 2022-10-11 NOTE — TELEPHONE ENCOUNTER
Al you saw this patient on last Friday,    Zulma  nurse is calling on behalf of the patient.  She is with the patient for a visit today.  She is calling to inform us the patients HR is irregular and elevated at .  Patient is also having an increase of SOA with activity.  She notes bilateral 2+ edema in feet/ankles and some into the calf, which is worse that last Friday when she saw the patient.  Daughter is very involved and helpful.  Weight yesterday 106.8, and today 110.9.   She was having dizziness and lightheadedness over the weekend.    VS: 108/60, HR , O2 97%    Patient is on amiodarone 200 mg daily (per your instructions took BID x 3 days, but today started daily dosing), torsemide 10 mg QOD (last dose last night), digoxin, metoprolol, and diltiazem, eliquis.    Looks like she is scheduled for cardioversion on Friday.    Patient received IV bumex Friday which she reports helped.  Looks like there have been difficulties controlling her HR per your last office note AL, and that may not be helping with CHF and fluid retention.    Do you have recommendations for the patient's HR and weight gain/SOA?    Thanks!    Johana Gonzales RN  [facility] Triage  10/11/22 12:51 EDT     DaughterReyna # [phone]

## 2022-10-12 RX ORDER — LEVOTHYROXINE SODIUM 0.07 MG/1
TABLET ORAL
Qty: 30 TABLET | Refills: 0 | Status: SHIPPED | OUTPATIENT
Start: 2022-10-12 | End: 2022-11-02 | Stop reason: SDUPTHER

## 2022-10-12 RX ORDER — SUCRALFATE 1 G/1
TABLET ORAL
Qty: 60 TABLET | Refills: 0 | Status: SHIPPED | OUTPATIENT
Start: 2022-10-12 | End: 2022-10-31 | Stop reason: SDUPTHER

## 2022-10-12 RX ORDER — PANTOPRAZOLE SODIUM 40 MG/1
TABLET, DELAYED RELEASE ORAL
Qty: 30 TABLET | Refills: 0 | Status: SHIPPED | OUTPATIENT
Start: 2022-10-12 | End: 2022-11-28

## 2022-10-14 ENCOUNTER — TELEPHONE (OUTPATIENT)
Dept: CARDIOLOGY | Facility: CLINIC | Age: 87
End: 2022-10-14

## 2022-10-14 ENCOUNTER — APPOINTMENT (OUTPATIENT)
Dept: POSTOP/PACU | Facility: HOSPITAL | Age: 87
End: 2022-10-14

## 2022-10-14 ENCOUNTER — HOSPITAL ENCOUNTER (OUTPATIENT)
Dept: POSTOP/PACU | Facility: HOSPITAL | Age: 87
Discharge: HOME | End: 2022-10-14

## 2022-10-14 ENCOUNTER — ANESTHESIA EVENT (OUTPATIENT)
Dept: POSTOP/PACU | Facility: HOSPITAL | Age: 87
End: 2022-10-14

## 2022-10-14 ENCOUNTER — ANESTHESIA (OUTPATIENT)
Dept: POSTOP/PACU | Facility: HOSPITAL | Age: 87
End: 2022-10-14

## 2022-10-14 VITALS — OXYGEN SATURATION: 100 % | SYSTOLIC BLOOD PRESSURE: 91 MMHG | HEART RATE: 78 BPM | DIASTOLIC BLOOD PRESSURE: 58 MMHG

## 2022-10-14 VITALS
TEMPERATURE: 97.7 F | DIASTOLIC BLOOD PRESSURE: 67 MMHG | RESPIRATION RATE: 16 BRPM | HEART RATE: 72 BPM | OXYGEN SATURATION: 93 % | SYSTOLIC BLOOD PRESSURE: 121 MMHG

## 2022-10-14 DIAGNOSIS — I50.31 ACUTE DIASTOLIC CHF (CONGESTIVE HEART FAILURE): ICD-10-CM

## 2022-10-14 DIAGNOSIS — I48.91 UNCONTROLLED ATRIAL FIBRILLATION: ICD-10-CM

## 2022-10-14 LAB
ANION GAP SERPL CALCULATED.3IONS-SCNC: 10.4 MMOL/L (ref 5–15)
BUN SERPL-MCNC: 15 MG/DL (ref 8–23)
BUN/CREAT SERPL: 16.3 (ref 7–25)
CALCIUM SPEC-SCNC: 8.3 MG/DL (ref 8.6–10.5)
CHLORIDE SERPL-SCNC: 102 MMOL/L (ref 98–107)
CO2 SERPL-SCNC: 26.6 MMOL/L (ref 22–29)
CREAT SERPL-MCNC: 0.92 MG/DL (ref 0.57–1)
EGFRCR SERPLBLD CKD-EPI 2021: 59.6 ML/MIN/1.73
GLUCOSE SERPL-MCNC: 82 MG/DL (ref 65–99)
POTASSIUM SERPL-SCNC: 4.3 MMOL/L (ref 3.5–5.2)
QT INTERVAL: 381 MS
QT INTERVAL: 400 MS
SODIUM SERPL-SCNC: 139 MMOL/L (ref 136–145)

## 2022-10-14 PROCEDURE — 25010000002 PHENYLEPHRINE 10 MG/ML SOLUTION

## 2022-10-14 PROCEDURE — 92960 CARDIOVERSION ELECTRIC EXT: CPT

## 2022-10-14 PROCEDURE — 25010000002 PROPOFOL 10 MG/ML EMULSION

## 2022-10-14 PROCEDURE — 94640 AIRWAY INHALATION TREATMENT: CPT

## 2022-10-14 PROCEDURE — 93005 ELECTROCARDIOGRAM TRACING: CPT | Performed by: INTERNAL MEDICINE

## 2022-10-14 PROCEDURE — 80048 BASIC METABOLIC PNL TOTAL CA: CPT | Performed by: INTERNAL MEDICINE

## 2022-10-14 PROCEDURE — 92960 CARDIOVERSION ELECTRIC EXT: CPT | Performed by: INTERNAL MEDICINE

## 2022-10-14 RX ORDER — FLUMAZENIL 0.1 MG/ML
0.2 INJECTION INTRAVENOUS AS NEEDED
Status: DISCONTINUED | OUTPATIENT
Start: 2022-10-14 | End: 2022-10-15 | Stop reason: HOSPADM

## 2022-10-14 RX ORDER — HYDRALAZINE HYDROCHLORIDE 20 MG/ML
5 INJECTION INTRAMUSCULAR; INTRAVENOUS
Status: DISCONTINUED | OUTPATIENT
Start: 2022-10-14 | End: 2022-10-15 | Stop reason: HOSPADM

## 2022-10-14 RX ORDER — LIDOCAINE HYDROCHLORIDE 20 MG/ML
INJECTION, SOLUTION EPIDURAL; INFILTRATION; INTRACAUDAL; PERINEURAL AS NEEDED
Status: DISCONTINUED | OUTPATIENT
Start: 2022-10-14 | End: 2022-10-14 | Stop reason: SURG

## 2022-10-14 RX ORDER — EPHEDRINE SULFATE 50 MG/ML
5 INJECTION, SOLUTION INTRAVENOUS ONCE AS NEEDED
Status: DISCONTINUED | OUTPATIENT
Start: 2022-10-14 | End: 2022-10-15 | Stop reason: HOSPADM

## 2022-10-14 RX ORDER — ONDANSETRON 2 MG/ML
4 INJECTION INTRAMUSCULAR; INTRAVENOUS ONCE AS NEEDED
Status: DISCONTINUED | OUTPATIENT
Start: 2022-10-14 | End: 2022-10-15 | Stop reason: HOSPADM

## 2022-10-14 RX ORDER — PROPOFOL 10 MG/ML
VIAL (ML) INTRAVENOUS AS NEEDED
Status: DISCONTINUED | OUTPATIENT
Start: 2022-10-14 | End: 2022-10-14 | Stop reason: SURG

## 2022-10-14 RX ORDER — PROMETHAZINE HYDROCHLORIDE 25 MG/1
25 TABLET ORAL ONCE AS NEEDED
Status: DISCONTINUED | OUTPATIENT
Start: 2022-10-14 | End: 2022-10-15 | Stop reason: HOSPADM

## 2022-10-14 RX ORDER — ALBUTEROL SULFATE 2.5 MG/3ML
2.5 SOLUTION RESPIRATORY (INHALATION) ONCE
Status: COMPLETED | OUTPATIENT
Start: 2022-10-14 | End: 2022-10-14

## 2022-10-14 RX ORDER — SODIUM CHLORIDE, SODIUM LACTATE, POTASSIUM CHLORIDE, CALCIUM CHLORIDE 600; 310; 30; 20 MG/100ML; MG/100ML; MG/100ML; MG/100ML
INJECTION, SOLUTION INTRAVENOUS CONTINUOUS PRN
Status: DISCONTINUED | OUTPATIENT
Start: 2022-10-14 | End: 2022-10-14 | Stop reason: SURG

## 2022-10-14 RX ORDER — PROMETHAZINE HYDROCHLORIDE 25 MG/1
25 SUPPOSITORY RECTAL ONCE AS NEEDED
Status: DISCONTINUED | OUTPATIENT
Start: 2022-10-14 | End: 2022-10-15 | Stop reason: HOSPADM

## 2022-10-14 RX ORDER — DIPHENHYDRAMINE HCL 25 MG
25 CAPSULE ORAL
Status: DISCONTINUED | OUTPATIENT
Start: 2022-10-14 | End: 2022-10-15 | Stop reason: HOSPADM

## 2022-10-14 RX ORDER — NALOXONE HCL 0.4 MG/ML
0.2 VIAL (ML) INJECTION AS NEEDED
Status: DISCONTINUED | OUTPATIENT
Start: 2022-10-14 | End: 2022-10-15 | Stop reason: HOSPADM

## 2022-10-14 RX ORDER — LABETALOL HYDROCHLORIDE 5 MG/ML
5 INJECTION, SOLUTION INTRAVENOUS
Status: DISCONTINUED | OUTPATIENT
Start: 2022-10-14 | End: 2022-10-15 | Stop reason: HOSPADM

## 2022-10-14 RX ORDER — PHENYLEPHRINE HYDROCHLORIDE 10 MG/ML
INJECTION INTRAVENOUS AS NEEDED
Status: DISCONTINUED | OUTPATIENT
Start: 2022-10-14 | End: 2022-10-14 | Stop reason: SURG

## 2022-10-14 RX ORDER — DIPHENHYDRAMINE HYDROCHLORIDE 50 MG/ML
12.5 INJECTION INTRAMUSCULAR; INTRAVENOUS
Status: DISCONTINUED | OUTPATIENT
Start: 2022-10-14 | End: 2022-10-15 | Stop reason: HOSPADM

## 2022-10-14 RX ADMIN — PHENYLEPHRINE HYDROCHLORIDE 200 MCG: 10 INJECTION, SOLUTION INTRAVENOUS at 07:41

## 2022-10-14 RX ADMIN — LIDOCAINE HYDROCHLORIDE 60 MG: 20 INJECTION, SOLUTION EPIDURAL; INFILTRATION; INTRACAUDAL; PERINEURAL at 07:31

## 2022-10-14 RX ADMIN — PHENYLEPHRINE HYDROCHLORIDE 100 MCG: 10 INJECTION, SOLUTION INTRAVENOUS at 07:57

## 2022-10-14 RX ADMIN — PHENYLEPHRINE HYDROCHLORIDE 200 MCG: 10 INJECTION, SOLUTION INTRAVENOUS at 07:36

## 2022-10-14 RX ADMIN — PROPOFOL 40 MG: 10 INJECTION, EMULSION INTRAVENOUS at 07:32

## 2022-10-14 RX ADMIN — PHENYLEPHRINE HYDROCHLORIDE 100 MCG: 10 INJECTION, SOLUTION INTRAVENOUS at 07:44

## 2022-10-14 RX ADMIN — PHENYLEPHRINE HYDROCHLORIDE 200 MCG: 10 INJECTION, SOLUTION INTRAVENOUS at 07:38

## 2022-10-14 RX ADMIN — SODIUM CHLORIDE, POTASSIUM CHLORIDE, SODIUM LACTATE AND CALCIUM CHLORIDE: 600; 310; 30; 20 INJECTION, SOLUTION INTRAVENOUS at 07:27

## 2022-10-14 RX ADMIN — ALBUTEROL SULFATE 2.5 MG: 2.5 SOLUTION RESPIRATORY (INHALATION) at 09:58

## 2022-10-14 RX ADMIN — PHENYLEPHRINE HYDROCHLORIDE 100 MCG: 10 INJECTION, SOLUTION INTRAVENOUS at 08:00

## 2022-10-14 RX ADMIN — PROPOFOL 50 MG: 10 INJECTION, EMULSION INTRAVENOUS at 07:31

## 2022-10-14 RX ADMIN — PHENYLEPHRINE HYDROCHLORIDE 100 MCG: 10 INJECTION, SOLUTION INTRAVENOUS at 07:47

## 2022-10-14 NOTE — ANESTHESIA PREPROCEDURE EVALUATION
Anesthesia Evaluation     Patient summary reviewed and Nursing notes reviewed   NPO Solid Status: > 8 hours  NPO Liquid Status: > 2 hours           Airway   Mallampati: II  TM distance: >3 FB  Neck ROM: full  Dental - normal exam     Pulmonary - negative pulmonary ROS and normal exam    breath sounds clear to auscultation  Cardiovascular - normal exam    Rhythm: regular  Rate: normal    (+) hypertension, hyperlipidemia,   (-) angina, orthopnea, PND, KIRK      Neuro/Psych- negative ROS  GI/Hepatic/Renal/Endo    (+)  GERD,  thyroid problem     Musculoskeletal (-) negative ROS    Abdominal    Substance History - negative use     OB/GYN negative ob/gyn ROS         Other - negative ROS                       Anesthesia Plan    ASA 2     MAC       Anesthetic plan, risks, benefits, and alternatives have been provided, discussed and informed consent has been obtained with: patient.        CODE STATUS:

## 2022-10-14 NOTE — ANESTHESIA POSTPROCEDURE EVALUATION
Patient: Demetra Rush    Procedure Summary     Date: 10/14/22 Room / Location: [facility] PACU    Anesthesia Start: 0727 Anesthesia Stop: 0808    Procedure: CARDIOVERSION EXTERNAL IN CARDIOLOGY DEPARTMENT Diagnosis:       Acute diastolic CHF (congestive heart failure) (HCC)      Uncontrolled atrial fibrillation (HCC)      (uncontrolled a fib, low BP)    Scheduled Providers: Eduardo Kirkland MD Provider: Rashad Herman MD    Anesthesia Type: MAC ASA Status: 2          Anesthesia Type: MAC    Vitals  Vitals Value Taken Time   /65 10/14/22 0851   Temp 36.5 °C (97.7 °F) 10/14/22 0835   Pulse 74 10/14/22 0904   Resp 16 10/14/22 0850   SpO2 95 % 10/14/22 0904   Vitals shown include unvalidated device data.        Post Anesthesia Care and Evaluation    Patient location during evaluation: bedside  Patient participation: complete - patient participated  Level of consciousness: awake  Pain management: adequate    Airway patency: patent  Anesthetic complications: No anesthetic complications    Cardiovascular status: acceptable  Respiratory status: acceptable  Hydration status: acceptable

## 2022-10-14 NOTE — PERIOPERATIVE NURSING NOTE
Pt with some residual hypotension post cardioversion. Asymptomatic. Monitored in PACU for a little longer. BP's with consistent map > 65. Last  73. Discussed with Dr. Herman and ok to move pt back to Phase II

## 2022-10-14 NOTE — TELEPHONE ENCOUNTER
I spoke with Nettie, Patient was instructed to stop Torsemide therefore she should not take Potassium per Michelle's instructions.  Nettie verbalized understanding./ LITO

## 2022-10-14 NOTE — TELEPHONE ENCOUNTER
It appears Dr. mcintosh stopped torsemide after cardioversion today so if daughter confirms that then she should NOT take potassium either

## 2022-10-14 NOTE — TELEPHONE ENCOUNTER
Patient's daughter Reyna called to ask patient should be taking Potassium. She states all other medications were addressed as take or stop except for Potassium.  Please advise.  / LITO Orellana can be reached at ([phone]

## 2022-10-14 NOTE — TELEPHONE ENCOUNTER
EVAN d/c nurse called and needed a 1 week follow up with AL for post cardioversion. Pt. Was scheduled on a same day slot at 3:00 on 10/17/22. Please advise if that appointment is ok. 11/7/22 will be the next available.    ThanksCindy

## 2022-10-14 NOTE — PERIOPERATIVE NURSING NOTE
Patient was admitted to room 24 and blood pressure was 82/62 with a MAP of 70.  Pt states she feels her breathing changed on the way over to phase 2.  Notified Reyna RN nurse manager and stated patient needs to return back to PACU.

## 2022-10-14 NOTE — DISCHARGE INSTRUCTIONS
Electrical Cardioversion  Electrical cardioversion is the delivery of a jolt of electricity to restore a normal rhythm to the heart. A rhythm that is too fast or is not regular keeps the heart from pumping well. In this procedure, sticky patches or metal paddles are placed on the chest to deliver electricity to the heart from a device.    What can I expect after the procedure?  Your blood pressure, heart rate, breathing rate, and blood oxygen level will be monitored until you leave the hospital or clinic.  Your heart rhythm will be watched to make sure it does not change.  You may have some redness on the skin where the shocks were given.  Follow these instructions at home:  Do not drive for 24 hours if you were given a sedative during your procedure.  Take over-the-counter and prescription medicines only as told by your health care provider.  Ask your health care provider how to check your pulse. Check it often.  Rest for 48 hours after the procedure or as told by your health care provider.  Avoid or limit your caffeine use as told by your health care provider.  Keep all follow-up visits as told by your health care provider. This is important.  Contact a health care provider if:  You feel like your heart is beating too quickly or your pulse is not regular.  You have a serious muscle cramp that does not go away.  Get help right away if:  You have discomfort in your chest.  You are dizzy or you feel faint.  You have trouble breathing or you are short of breath.  Your speech is slurred.  You have trouble moving an arm or leg on one side of your body.  Your fingers or toes turn cold or blue.  Summary  Electrical cardioversion is the delivery of a jolt of electricity to restore a normal rhythm to the heart.  This procedure may be done right away in an emergency or may be a scheduled procedure if the condition is not an emergency.  Generally, this is a safe procedure.  After the procedure, check your pulse often as told  by your health care provider.

## 2022-10-17 ENCOUNTER — HOSPITAL ENCOUNTER (OUTPATIENT)
Dept: CARDIOLOGY | Facility: HOSPITAL | Age: 87
Discharge: HOME | End: 2022-10-17
Admitting: NURSE PRACTITIONER

## 2022-10-17 ENCOUNTER — OFFICE VISIT (OUTPATIENT)
Dept: CARDIOLOGY | Facility: CLINIC | Age: 87
End: 2022-10-17

## 2022-10-17 VITALS
DIASTOLIC BLOOD PRESSURE: 80 MMHG | WEIGHT: 121.2 LBS | SYSTOLIC BLOOD PRESSURE: 130 MMHG | BODY MASS INDEX: 22.88 KG/M2 | HEART RATE: 108 BPM | HEIGHT: 61 IN

## 2022-10-17 DIAGNOSIS — I50.31 ACUTE DIASTOLIC CHF (CONGESTIVE HEART FAILURE): Primary | ICD-10-CM

## 2022-10-17 DIAGNOSIS — I48.91 UNCONTROLLED ATRIAL FIBRILLATION: ICD-10-CM

## 2022-10-17 PROCEDURE — 99215 OFFICE O/P EST HI 40 MIN: CPT | Performed by: NURSE PRACTITIONER

## 2022-10-17 PROCEDURE — 96374 THER/PROPH/DIAG INJ IV PUSH: CPT

## 2022-10-17 PROCEDURE — 36415 COLL VENOUS BLD VENIPUNCTURE: CPT

## 2022-10-17 PROCEDURE — 93000 ELECTROCARDIOGRAM COMPLETE: CPT | Performed by: NURSE PRACTITIONER

## 2022-10-17 RX ORDER — BUMETANIDE 0.25 MG/ML
2 INJECTION INTRAMUSCULAR; INTRAVENOUS ONCE
Status: DISCONTINUED | OUTPATIENT
Start: 2022-10-17 | End: 2022-10-17 | Stop reason: HOSPADM

## 2022-10-17 RX ADMIN — BUMETANIDE 2 MG: 0.25 INJECTION, SOLUTION INTRAMUSCULAR; INTRAVENOUS at 16:09

## 2022-10-17 NOTE — PROGRESS NOTES
Date of Office Visit: 10/17/22  Encounter Provider: FELECIA Cooley  Place of Service: [facility] CARDIOLOGY  Patient Name: Demetra Rush  :1933    Chief Complaint   Patient presents with   • Shortness of Breath   • Edema   • Dizziness   • Follow-up   :     HPI: Demetra Rush is a 89 y.o. female  with persistent atrial fibrillation, diastolic congestive heart failure, mitral regurgitation, pulmonary hypertension, esophageal dysmotility        She was seen inpatient by Dr. Ji. I will visit with her in follow up today.      In 2022 she presented with hypoxic respiratory failure secondary to aspiration pneumonia.  She required mechanical ventilation in the setting of severe mitral regurgitation and esophageal dysmotility.  Cardiology and gastroenterology followed.  It was hard to rate control patient so digoxin was added.  She was started on low-dose torsemide due to history of allergy to furosemide.  Gastroenterology recommended calcium channel blocker for esophageal dysmotility as well as follow-up in their office.  Her digoxin level after IV doses as well as oral digoxin was elevated at discharge so digoxin was decreased to 3 times a week.  After 1 week her renal function declined so I stopped torsemide and potassium.    Her renal function improved so she was placed back on torsemide.  She was started on amiodarone and had cardioversion this past Friday which was successful however over the weekend she developed increased shortness of breath, increased lower extremity swelling and weight gain.  She presents today accompanied by her daughter.  She is wearing compression stockings.  At night, she feels smothered and it takes a while to get comfortable the last 2 days.  Her vital signs have been stable.  After the cardioversion 10/14/2022 torsemide, potassium, digoxin and diltiazem were stopped.                 Allergies   Allergen Reactions   • Amlodipine  "Swelling     In feet and legs.   • Codeine Itching   • Sulfa Antibiotics Hives           Family and social history reviewed.     ROS  All other systems were reviewed and are negative          Objective:     Vitals:    10/17/22 1507   BP: 130/80   BP Location: Right arm   Patient Position: Sitting   Cuff Size: Adult   Pulse: 108   Weight: 55 kg (121 lb 3.2 oz)   Height: 154.9 cm (61\")     Body mass index is 22.9 kg/m².    PHYSICAL EXAM:  Pulmonary:      Breath sounds: Examination of the right-middle field reveals decreased breath sounds. Examination of the left-middle field reveals decreased breath sounds. Examination of the right-lower field reveals decreased breath sounds. Examination of the left-lower field reveals decreased breath sounds. Decreased breath sounds present.   Cardiovascular:      Tachycardia present. Irregularly irregular rhythm.   Edema:     Pretibial: bilateral 1+ edema of the pretibial area.     Ankle: bilateral 1+ edema of the ankle.          ECG 12 Lead    Date/Time: 10/17/2022 4:22 PM  Performed by: Michelle Pitts APRN  Authorized by: Michelle Pitts APRN   Comparison: compared with previous ECG   Similar to previous ECG  Rhythm: atrial fibrillation  Rate: tachycardic    Clinical impression: abnormal EKG  Comments: A fib is back. Qt mildly long              Current Outpatient Medications   Medication Sig Dispense Refill   • ALPRAZolam (XANAX) 1 MG tablet Take 0.5 mg by mouth. One to two tablets at night     • amiodarone (PACERONE) 200 MG tablet Take one tablet every 12 hours for 6 doses and then just one tablet daily 90 tablet 0   • apixaban (ELIQUIS) 2.5 MG tablet tablet Take 1 tablet by mouth Every 12 (Twelve) Hours. Indications: Atrial Fibrillation 60 tablet 5   • levothyroxine (SYNTHROID, LEVOTHROID) 75 MCG tablet TAKE ONE TABLET DAILY EXCEPT SUNDAY 30 tablet 0   • metoprolol tartrate (LOPRESSOR) 25 MG tablet Take 1 tablet by mouth 2 (Two) Times a Day. 60 tablet 3   • pantoprazole " (PROTONIX) 40 MG EC tablet TAKE ONE TABLET DAILY 30 tablet 0   • sucralfate (CARAFATE) 1 g tablet TAKE ONE TABLET TWICE A DAY MORNING AND EVENING 60 tablet 0     No current facility-administered medications for this visit.     Assessment:       Diagnosis Plan   1. Acute diastolic CHF (congestive heart failure) (HCC)  bumetanide (BUMEX) injection 2 mg      2. Uncontrolled atrial fibrillation (HCC)  bumetanide (BUMEX) injection 2 mg           Orders Placed This Encounter   Procedures   • ECG 12 Lead     This order was created via procedure documentation     Order Specific Question:   Release to patient     Answer:   Routine Release         Plan:       1. 89 year old female with persistent atrial fibrillation -persistent atrial fibrillation.  Her heart rate has been difficult to control.  Prior to cardioversion she was on digoxin 3 times a week, long-acting diltiazem and metoprolol.    -Despite cardioversion just 3 days ago she is back in atrial fibrillation.  She has gained 8 pounds in the last 10 days as well as increased shortness of breath and increased lower extremity edema.  I will give her 1 dose of 2 mg IV Bumex in clinic today.  I discussed with Dr. Ji who agrees to plan direct admission.  Patient will benefit from IV diureses around-the-clock, follow-up renal function, and EP consultation for AV node ablation with pacemaker.  She has decreased lung sounds bilateral concerning for pleural effusion.  After she is admitted, she can have chest x-ray inpatient. She was advised to continue home medications as listed.   - message has been sent to hospital schedulers to request a bed   2.  Diastolic congestive heart failure secondary to atrial fibrillation and valvular heart disease.  -As above     3. Moderate to severe mitral valve regurgitation - conservative therapy is desired by patient.  After we control her atrial fibrillation we will see if she continues to have volume issues.  She would be a candidate  for possible mitral clip     4. Pulmonary hypertension-breathing initially improved but now worse, as above  5. esophageal dysmotility -continue calcium channel blocker and recommendations per GI.    She has intermittent nausea which could be vascular congestion from increase fluid retention.  She has upcoming follow-up with GI  6.  Aortic valve sclerosis                     It has been a pleasure to participate in this patient's care.      Thank you,  FELECIA Cooley      **I used Dragon to dictate this note:**

## 2022-10-18 ENCOUNTER — TELEPHONE (OUTPATIENT)
Dept: GASTROENTEROLOGY | Facility: CLINIC | Age: 87
End: 2022-10-18

## 2022-10-18 ENCOUNTER — HOSPITAL ENCOUNTER (OUTPATIENT)
Facility: HOSPITAL | Age: 87
Discharge: HOME | End: 2022-10-21
Attending: INTERNAL MEDICINE | Admitting: INTERNAL MEDICINE

## 2022-10-18 ENCOUNTER — APPOINTMENT (OUTPATIENT)
Dept: GENERAL RADIOLOGY | Facility: HOSPITAL | Age: 87
End: 2022-10-18

## 2022-10-18 DIAGNOSIS — I48.19 PERSISTENT ATRIAL FIBRILLATION: Primary | ICD-10-CM

## 2022-10-18 DIAGNOSIS — I48.91 UNCONTROLLED ATRIAL FIBRILLATION: ICD-10-CM

## 2022-10-18 DIAGNOSIS — I50.31 ACUTE DIASTOLIC CHF (CONGESTIVE HEART FAILURE): ICD-10-CM

## 2022-10-18 LAB
ANION GAP SERPL CALCULATED.3IONS-SCNC: 8.2 MMOL/L (ref 5–15)
BASOPHILS # BLD AUTO: 0.03 10*3/MM3 (ref 0–0.2)
BASOPHILS NFR BLD AUTO: 0.6 % (ref 0–1.5)
BUN SERPL-MCNC: 15 MG/DL (ref 8–23)
BUN/CREAT SERPL: 15.5 (ref 7–25)
CALCIUM SPEC-SCNC: 8.7 MG/DL (ref 8.6–10.5)
CHLORIDE SERPL-SCNC: 101 MMOL/L (ref 98–107)
CO2 SERPL-SCNC: 27.8 MMOL/L (ref 22–29)
CREAT SERPL-MCNC: 0.97 MG/DL (ref 0.57–1)
DEPRECATED RDW RBC AUTO: 41.4 FL (ref 37–54)
EGFRCR SERPLBLD CKD-EPI 2021: 56 ML/MIN/1.73
EOSINOPHIL # BLD AUTO: 0.1 10*3/MM3 (ref 0–0.4)
EOSINOPHIL NFR BLD AUTO: 2.1 % (ref 0.3–6.2)
ERYTHROCYTE [DISTWIDTH] IN BLOOD BY AUTOMATED COUNT: 13.3 % (ref 12.3–15.4)
GLUCOSE SERPL-MCNC: 87 MG/DL (ref 65–99)
HCT VFR BLD AUTO: 34.4 % (ref 34–46.6)
HGB BLD-MCNC: 10.6 G/DL (ref 12–15.9)
IMM GRANULOCYTES # BLD AUTO: 0.02 10*3/MM3 (ref 0–0.05)
IMM GRANULOCYTES NFR BLD AUTO: 0.4 % (ref 0–0.5)
LYMPHOCYTES # BLD AUTO: 0.86 10*3/MM3 (ref 0.7–3.1)
LYMPHOCYTES NFR BLD AUTO: 18.3 % (ref 19.6–45.3)
MAGNESIUM SERPL-MCNC: 2.1 MG/DL (ref 1.6–2.4)
MCH RBC QN AUTO: 26.5 PG (ref 26.6–33)
MCHC RBC AUTO-ENTMCNC: 30.8 G/DL (ref 31.5–35.7)
MCV RBC AUTO: 86 FL (ref 79–97)
MONOCYTES # BLD AUTO: 0.59 10*3/MM3 (ref 0.1–0.9)
MONOCYTES NFR BLD AUTO: 12.6 % (ref 5–12)
NEUTROPHILS NFR BLD AUTO: 3.1 10*3/MM3 (ref 1.7–7)
NEUTROPHILS NFR BLD AUTO: 66 % (ref 42.7–76)
NRBC BLD AUTO-RTO: 0 /100 WBC (ref 0–0.2)
PLATELET # BLD AUTO: 240 10*3/MM3 (ref 140–450)
PMV BLD AUTO: 9.1 FL (ref 6–12)
POTASSIUM SERPL-SCNC: 4 MMOL/L (ref 3.5–5.2)
RBC # BLD AUTO: 4 10*6/MM3 (ref 3.77–5.28)
SODIUM SERPL-SCNC: 137 MMOL/L (ref 136–145)
WBC NRBC COR # BLD: 4.7 10*3/MM3 (ref 3.4–10.8)

## 2022-10-18 PROCEDURE — 83735 ASSAY OF MAGNESIUM: CPT | Performed by: INTERNAL MEDICINE

## 2022-10-18 PROCEDURE — 71046 X-RAY EXAM CHEST 2 VIEWS: CPT

## 2022-10-18 PROCEDURE — 96374 THER/PROPH/DIAG INJ IV PUSH: CPT

## 2022-10-18 PROCEDURE — G0378 HOSPITAL OBSERVATION PER HR: HCPCS

## 2022-10-18 PROCEDURE — 80048 BASIC METABOLIC PNL TOTAL CA: CPT | Performed by: INTERNAL MEDICINE

## 2022-10-18 PROCEDURE — 85025 COMPLETE CBC W/AUTO DIFF WBC: CPT | Performed by: INTERNAL MEDICINE

## 2022-10-18 RX ORDER — SODIUM CHLORIDE 0.9 % (FLUSH) 0.9 %
10 SYRINGE (ML) INJECTION EVERY 12 HOURS SCHEDULED
Status: DISCONTINUED | OUTPATIENT
Start: 2022-10-18 | End: 2022-10-21 | Stop reason: HOSPADM

## 2022-10-18 RX ORDER — NITROGLYCERIN 0.4 MG/1
0.4 TABLET SUBLINGUAL
Status: DISCONTINUED | OUTPATIENT
Start: 2022-10-18 | End: 2022-10-21 | Stop reason: HOSPADM

## 2022-10-18 RX ORDER — ALPRAZOLAM 0.5 MG/1
0.5 TABLET ORAL NIGHTLY PRN
Status: DISCONTINUED | OUTPATIENT
Start: 2022-10-18 | End: 2022-10-21 | Stop reason: HOSPADM

## 2022-10-18 RX ORDER — PANTOPRAZOLE SODIUM 40 MG/1
40 TABLET, DELAYED RELEASE ORAL DAILY
Status: DISCONTINUED | OUTPATIENT
Start: 2022-10-18 | End: 2022-10-20

## 2022-10-18 RX ORDER — BUMETANIDE 0.25 MG/ML
2 INJECTION INTRAMUSCULAR; INTRAVENOUS EVERY 12 HOURS
Status: DISCONTINUED | OUTPATIENT
Start: 2022-10-18 | End: 2022-10-20

## 2022-10-18 RX ORDER — LEVOTHYROXINE SODIUM 0.07 MG/1
75 TABLET ORAL
Status: DISCONTINUED | OUTPATIENT
Start: 2022-10-19 | End: 2022-10-21 | Stop reason: HOSPADM

## 2022-10-18 RX ORDER — SODIUM CHLORIDE 0.9 % (FLUSH) 0.9 %
10 SYRINGE (ML) INJECTION AS NEEDED
Status: DISCONTINUED | OUTPATIENT
Start: 2022-10-18 | End: 2022-10-21 | Stop reason: HOSPADM

## 2022-10-18 RX ORDER — HYDROCODONE BITARTRATE AND ACETAMINOPHEN 5; 325 MG/1; MG/1
1 TABLET ORAL EVERY 4 HOURS PRN
Status: DISCONTINUED | OUTPATIENT
Start: 2022-10-18 | End: 2022-10-21 | Stop reason: HOSPADM

## 2022-10-18 RX ORDER — AMIODARONE HYDROCHLORIDE 200 MG/1
200 TABLET ORAL
Status: DISCONTINUED | OUTPATIENT
Start: 2022-10-18 | End: 2022-10-19

## 2022-10-18 RX ORDER — SUCRALFATE 1 G/1
1 TABLET ORAL 2 TIMES DAILY
Status: DISCONTINUED | OUTPATIENT
Start: 2022-10-18 | End: 2022-10-21 | Stop reason: HOSPADM

## 2022-10-18 RX ADMIN — ALPRAZOLAM 0.5 MG: 0.5 TABLET ORAL at 22:45

## 2022-10-18 RX ADMIN — BUMETANIDE 2 MG: 0.25 INJECTION INTRAMUSCULAR; INTRAVENOUS at 17:28

## 2022-10-18 RX ADMIN — METOPROLOL TARTRATE 25 MG: 25 TABLET, FILM COATED ORAL at 21:23

## 2022-10-18 RX ADMIN — APIXABAN 2.5 MG: 2.5 TABLET, FILM COATED ORAL at 21:24

## 2022-10-18 RX ADMIN — Medication 10 ML: at 21:24

## 2022-10-18 RX ADMIN — HYDROCODONE BITARTRATE AND ACETAMINOPHEN 1 TABLET: 5; 325 TABLET ORAL at 23:46

## 2022-10-18 RX ADMIN — SUCRALFATE 1 G: 1 TABLET ORAL at 21:23

## 2022-10-18 RX ADMIN — PANTOPRAZOLE SODIUM 40 MG: 40 TABLET, DELAYED RELEASE ORAL at 17:29

## 2022-10-18 NOTE — TELEPHONE ENCOUNTER
Caller: KRIS VASQUEZ    Relationship: Emergency Contact    Best call back number: [phone]    What is the best time to reach you: ANYTIME    Who are you requesting to speak with (clinical staff, provider,  specific staff member): CLINICAL STAFF       What was the call regarding: PT IS HAVING STILL HAVING GASTRO ISSUES BUT IS GOING TO BE ADMITTED FOR A PACE MAKER. PT HAD TO CANCEL APPT TOMORROW 10/19 BUT PT WANTS TO KNOW IF  WILL SEE HER WHEN HES DOING ROUNDS.

## 2022-10-19 PROBLEM — I48.91 ATRIAL FIBRILLATION: Status: ACTIVE | Noted: 2022-10-19

## 2022-10-19 PROBLEM — I27.20 PULMONARY HYPERTENSION: Status: ACTIVE | Noted: 2022-10-19

## 2022-10-19 PROBLEM — J96.01 ACUTE RESPIRATORY FAILURE WITH HYPOXIA: Status: RESOLVED | Noted: 2022-09-13 | Resolved: 2022-10-19

## 2022-10-19 PROBLEM — I10 HYPERTENSION: Status: ACTIVE | Noted: 2022-10-19

## 2022-10-19 PROBLEM — E44.0 MODERATE MALNUTRITION: Status: RESOLVED | Noted: 2022-09-17 | Resolved: 2022-10-19

## 2022-10-19 PROBLEM — I34.0 MITRAL REGURGITATION: Status: ACTIVE | Noted: 2022-10-19

## 2022-10-19 LAB
ANION GAP SERPL CALCULATED.3IONS-SCNC: 7.5 MMOL/L (ref 5–15)
BASOPHILS # BLD AUTO: 0.05 10*3/MM3 (ref 0–0.2)
BASOPHILS NFR BLD AUTO: 1 % (ref 0–1.5)
BUN SERPL-MCNC: 17 MG/DL (ref 8–23)
BUN/CREAT SERPL: 19.3 (ref 7–25)
CALCIUM SPEC-SCNC: 8.5 MG/DL (ref 8.6–10.5)
CHLORIDE SERPL-SCNC: 98 MMOL/L (ref 98–107)
CO2 SERPL-SCNC: 32.5 MMOL/L (ref 22–29)
CREAT SERPL-MCNC: 0.88 MG/DL (ref 0.57–1)
DEPRECATED RDW RBC AUTO: 42.4 FL (ref 37–54)
EGFRCR SERPLBLD CKD-EPI 2021: 62.9 ML/MIN/1.73
EOSINOPHIL # BLD AUTO: 0.18 10*3/MM3 (ref 0–0.4)
EOSINOPHIL NFR BLD AUTO: 3.5 % (ref 0.3–6.2)
ERYTHROCYTE [DISTWIDTH] IN BLOOD BY AUTOMATED COUNT: 13.6 % (ref 12.3–15.4)
GLUCOSE SERPL-MCNC: 86 MG/DL (ref 65–99)
HCT VFR BLD AUTO: 32.3 % (ref 34–46.6)
HGB BLD-MCNC: 10.3 G/DL (ref 12–15.9)
IMM GRANULOCYTES # BLD AUTO: 0.03 10*3/MM3 (ref 0–0.05)
IMM GRANULOCYTES NFR BLD AUTO: 0.6 % (ref 0–0.5)
LYMPHOCYTES # BLD AUTO: 1.5 10*3/MM3 (ref 0.7–3.1)
LYMPHOCYTES NFR BLD AUTO: 29.4 % (ref 19.6–45.3)
MAGNESIUM SERPL-MCNC: 2 MG/DL (ref 1.6–2.4)
MCH RBC QN AUTO: 27.2 PG (ref 26.6–33)
MCHC RBC AUTO-ENTMCNC: 31.9 G/DL (ref 31.5–35.7)
MCV RBC AUTO: 85.4 FL (ref 79–97)
MONOCYTES # BLD AUTO: 0.74 10*3/MM3 (ref 0.1–0.9)
MONOCYTES NFR BLD AUTO: 14.5 % (ref 5–12)
NEUTROPHILS NFR BLD AUTO: 2.6 10*3/MM3 (ref 1.7–7)
NEUTROPHILS NFR BLD AUTO: 51 % (ref 42.7–76)
NRBC BLD AUTO-RTO: 0 /100 WBC (ref 0–0.2)
PLATELET # BLD AUTO: 219 10*3/MM3 (ref 140–450)
PMV BLD AUTO: 9.2 FL (ref 6–12)
POTASSIUM SERPL-SCNC: 4.1 MMOL/L (ref 3.5–5.2)
RBC # BLD AUTO: 3.78 10*6/MM3 (ref 3.77–5.28)
SODIUM SERPL-SCNC: 138 MMOL/L (ref 136–145)
WBC NRBC COR # BLD: 5.1 10*3/MM3 (ref 3.4–10.8)

## 2022-10-19 PROCEDURE — 85025 COMPLETE CBC W/AUTO DIFF WBC: CPT | Performed by: INTERNAL MEDICINE

## 2022-10-19 PROCEDURE — 83735 ASSAY OF MAGNESIUM: CPT | Performed by: INTERNAL MEDICINE

## 2022-10-19 PROCEDURE — 96376 TX/PRO/DX INJ SAME DRUG ADON: CPT

## 2022-10-19 PROCEDURE — 99220 PR INITIAL OBSERVATION CARE/DAY 70 MINUTES: CPT | Performed by: INTERNAL MEDICINE

## 2022-10-19 PROCEDURE — 97162 PT EVAL MOD COMPLEX 30 MIN: CPT

## 2022-10-19 PROCEDURE — G0378 HOSPITAL OBSERVATION PER HR: HCPCS

## 2022-10-19 PROCEDURE — 99215 OFFICE O/P EST HI 40 MIN: CPT | Performed by: NURSE PRACTITIONER

## 2022-10-19 PROCEDURE — 97110 THERAPEUTIC EXERCISES: CPT

## 2022-10-19 PROCEDURE — 80048 BASIC METABOLIC PNL TOTAL CA: CPT | Performed by: INTERNAL MEDICINE

## 2022-10-19 RX ADMIN — Medication 10 ML: at 21:15

## 2022-10-19 RX ADMIN — SUCRALFATE 1 G: 1 TABLET ORAL at 09:55

## 2022-10-19 RX ADMIN — DILTIAZEM HYDROCHLORIDE 30 MG: 30 TABLET, FILM COATED ORAL at 23:02

## 2022-10-19 RX ADMIN — LEVOTHYROXINE SODIUM 75 MCG: 0.07 TABLET ORAL at 07:02

## 2022-10-19 RX ADMIN — DILTIAZEM HYDROCHLORIDE 30 MG: 30 TABLET, FILM COATED ORAL at 13:24

## 2022-10-19 RX ADMIN — BUMETANIDE 2 MG: 0.25 INJECTION INTRAMUSCULAR; INTRAVENOUS at 04:33

## 2022-10-19 RX ADMIN — METOPROLOL TARTRATE 25 MG: 25 TABLET, FILM COATED ORAL at 21:12

## 2022-10-19 RX ADMIN — AMIODARONE HYDROCHLORIDE 200 MG: 200 TABLET ORAL at 09:55

## 2022-10-19 RX ADMIN — Medication 10 ML: at 09:56

## 2022-10-19 RX ADMIN — BUMETANIDE 2 MG: 0.25 INJECTION INTRAMUSCULAR; INTRAVENOUS at 18:18

## 2022-10-19 RX ADMIN — PANTOPRAZOLE SODIUM 40 MG: 40 TABLET, DELAYED RELEASE ORAL at 09:55

## 2022-10-19 RX ADMIN — APIXABAN 2.5 MG: 2.5 TABLET, FILM COATED ORAL at 09:55

## 2022-10-19 RX ADMIN — SUCRALFATE 1 G: 1 TABLET ORAL at 23:10

## 2022-10-19 RX ADMIN — METOPROLOL TARTRATE 25 MG: 25 TABLET, FILM COATED ORAL at 09:55

## 2022-10-19 RX ADMIN — ALPRAZOLAM 0.5 MG: 0.5 TABLET ORAL at 23:07

## 2022-10-19 NOTE — PLAN OF CARE
Goal Outcome Evaluation:  Plan of Care Reviewed With: patient, daughter  Diuretic in Use: bumex given IV  Response to Diuretics (Output greater than intake): good output  Daily Weight (up or down): just admitted as direct admit  O2 Requirements: pt on room air-sats upper 90's  Functional Status (Activity level, tolerance and respiratory symptoms): up to BSC  Discharge Plans:  plan to go back home after discharge

## 2022-10-19 NOTE — H&P
Date of Hospital Visit: 10/19/22  Encounter Provider: Brennan Nelson MD  Place of Service: [facility] CARDIOLOGY  Patient Name: Demetra Rush  :1933  Referral Provider: Albino Ji Jr., MD    Chief complaint CHF    History of Pesent Illness    Ms Demetra Rush is a 89 year old woman who follows with Dr Ji.    In 2022, she presented with hypoxic respiratory failure secondary to aspiration pneumonia (from esophageal dysmotility). She required mechanical ventilation and was in atrial fibrillation. Her rate was difficult to control and she developed heart failure. She was diuresed. An echo revealed normal LVSF, severe LA dilation, severe MR, and severe PHTN.     She was on torsemide, metoprolol, digoxin, and diltiazem (the CCB was more for her esophageal issues than cardiac issues). She became dig-toxic so the dose was decreased to 3x/week. She then developed NATE so digoxin was stopped and her diuretic was decreased.    She was started on amiodarone, and underwent CV on . It was initially successful. However, she was markedly hypotensive post-prodedure, so torsemide and diltiazem were discontinued. She was seen in the office 3 days later and was back in AF with a rapid rate, and was reporting weight gain, dyspnea, orthopnea, and abdominal distention. Direct admission was planned and she got a bed last night. She has received 2 doses of IV bumetanide (2mg each) with excellent diuresis. She does feel better although not at baseline. She is on room air.    Dr Ji has desired an EP evaluation for consideration of AVJ ablation/PPM.     Past Medical History:   Diagnosis Date   • Atrial fibrillation (HCC)    • Chronic diastolic (congestive) heart failure (HCC)    • GERD (gastroesophageal reflux disease)    • Hyperlipidemia    • Hypertension    • Hypothyroidism    • Mitral regurgitation    • Osteoporosis 2022   • Pulmonary hypertension (HCC)    •  Stroke (HCC)        Past Surgical History:   Procedure Laterality Date   • ADENOIDECTOMY     • BLADDER SURGERY     • CHOLECYSTECTOMY     • HYSTERECTOMY         Prior to Admission medications    Medication Sig Start Date End Date Taking? Authorizing Provider   ALPRAZolam (XANAX) 1 MG tablet Take 0.5 mg by mouth. One to two tablets at night   Yes Emergency, Nurse Blaise, RN   amiodarone (PACERONE) 200 MG tablet Take one tablet every 12 hours for 6 doses and then just one tablet daily  Patient taking differently: Take 1 tablet by mouth Daily. 10/7/22  Yes Michelle Pitts APRN   apixaban (ELIQUIS) 2.5 MG tablet tablet Take 1 tablet by mouth Every 12 (Twelve) Hours. Indications: Atrial Fibrillation 10/6/22  Yes Michelle Pitts APRN   levothyroxine (SYNTHROID, LEVOTHROID) 75 MCG tablet TAKE ONE TABLET DAILY EXCEPT ELTON 10/12/22  Yes Melissa Bolivar MD   pantoprazole (PROTONIX) 40 MG EC tablet TAKE ONE TABLET DAILY 10/12/22  Yes Melissa Bolivar MD   sucralfate (CARAFATE) 1 g tablet TAKE ONE TABLET TWICE A DAY MORNING AND EVENING 10/12/22  Yes Melissa Bolivar MD   metoprolol tartrate (LOPRESSOR) 25 MG tablet Take 1 tablet by mouth 2 (Two) Times a Day. 10/15/22   Eduardo Kirkland MD       Social History     Socioeconomic History   • Marital status:    Tobacco Use   • Smoking status: Never   • Smokeless tobacco: Never   Vaping Use   • Vaping Use: Never used   Substance and Sexual Activity   • Alcohol use: Yes     Alcohol/week: 1.0 standard drink     Types: 1 Glasses of wine per week     Comment: glass maybe once a month   • Drug use: Never       Family History   Problem Relation Age of Onset   • Heart disease Mother    • Other Mother         fluid in lungs   • Heart disease Father    • Heart attack Father    • Cancer Sister    • Tongue cancer Sister        Review of Systems   Constitutional: Positive for appetite change and fatigue.   Respiratory: Positive for shortness of breath.    Cardiovascular:  "Positive for leg swelling.   Gastrointestinal: Positive for abdominal distention and nausea.   All other systems reviewed and are negative.       Objective:     Vitals:    10/18/22 1953 10/18/22 2320 10/19/22 0433 10/19/22 0822   BP: 120/77 121/96 119/68 131/87   BP Location: Right arm Right arm  Right arm   Patient Position: Lying Lying  Lying   Pulse: 96 92 110 106   Resp: 18 17  20   Temp: 97.6 °F (36.4 °C) 98 °F (36.7 °C)  98.2 °F (36.8 °C)   TempSrc: Oral Oral  Oral   SpO2: 95% 96%  93%   Weight:       Height:         Body mass index is 21.48 kg/m².  Flowsheet Rows    Flowsheet Row First Filed Value   Admission Height 154.9 cm (60.98\") Documented at 10/18/2022 1540   Admission Weight 55 kg (121 lb 4.1 oz) Documented at 10/18/2022 1540          Physical Exam  Vitals reviewed.   Constitutional:       Appearance: She is well-developed.      Comments: Frail appearing but awake/alert, NAD   HENT:      Head: Normocephalic.      Nose: Nose normal.   Eyes:      Conjunctiva/sclera: Conjunctivae normal.   Neck:      Vascular: JVD present.   Cardiovascular:      Rate and Rhythm: Tachycardia present. Rhythm irregular.      Pulses: Normal pulses.      Heart sounds: Murmur heard.    Systolic murmur is present with a grade of 2/6.  Pulmonary:      Effort: Pulmonary effort is normal.      Breath sounds: Examination of the right-lower field reveals decreased breath sounds. Examination of the left-lower field reveals rales. Decreased breath sounds and rales present.   Chest:      Comments: Hardened breast implants present bilaterally  Abdominal:      Palpations: Abdomen is soft.      Tenderness: There is no abdominal tenderness.   Musculoskeletal:         General: Swelling (1+) present. Normal range of motion.      Cervical back: Normal range of motion.   Skin:     General: Skin is warm and dry.      Findings: No erythema.   Neurological:      General: No focal deficit present.      Mental Status: She is alert.      Cranial " Nerves: No cranial nerve deficit.   Psychiatric:         Mood and Affect: Mood normal.         Behavior: Behavior normal.         Thought Content: Thought content normal.                 Lab Review:                Results from last 7 days   Lab Units 10/19/22  0407   SODIUM mmol/L 138   POTASSIUM mmol/L 4.1   CHLORIDE mmol/L 98   CO2 mmol/L 32.5*   BUN mg/dL 17   CREATININE mg/dL 0.88   GLUCOSE mg/dL 86   CALCIUM mg/dL 8.5*         Results from last 7 days   Lab Units 10/19/22  0407   WBC 10*3/mm3 5.10   HEMOGLOBIN g/dL 10.3*   HEMATOCRIT % 32.3*   PLATELETS 10*3/mm3 219             Results from last 7 days   Lab Units 10/19/22  0407   MAGNESIUM mg/dL 2.0                             I personally viewed and interpreted the patient's EKG/Telemetry data    Assessment/Plan:     1. Acute on chronic dCHF  2. Persistent atrial fibrillation  3. Severe MR  4. Severe PHTN  5. Bilateral pleural effusions  6. HTN  7. Low body weight and advanced age    She is responding nicely to IV diuresis and this will be continued as long as her renal function and bicarb remained stable.  I do not feel that rhythm control will be a successful strategy given the severity of her left atrial dilation.  When she was previously on digoxin, she developed digoxin toxicity.  She is also had fluctuations in her creatinine, which combined with her advanced age and low body weight, make this a poor option.  She is on metoprolol and was on diltiazem (more so for esophageal dysmotility), but the latter was stopped as she was hypotensive the other day after her cardioversion.  Her blood pressure is completely stable here, so I do think that we should add back the calcium channel blocker for both rate control and esophageal dysmotility.  I am going to discontinue amiodarone.    She has an extremely stiff ventricle and if we cannot get her rate under better control, she is going to have a hard time staying at the congestive heart failure.  I have consulted  electrophysiology as per Dr. Ji's wishes.

## 2022-10-19 NOTE — PLAN OF CARE
Goal Outcome Evaluation:  Plan of Care Reviewed With: patient        Progress: no change  Outcome Evaluation: Pt direct admit from home lives with dtr. Pt is here to be diuresed. Pt up to bsc with stand by. Pt takes xanax for sleep home dose was cut in half. Pt rested till around 4. Pt  c/o headache, only had norco for pain. CTM, safety maintained.  Diuretic in Use: Bumex  Response to Diuretics (Output greater than intake): Greater   Daily Weight (up or down): Down  O2 Requirements: 2L while sleeping  Functional Status (Activity level, tolerance and respiratory symptoms): tolerated   Discharge Plans:  home with dtr.

## 2022-10-19 NOTE — THERAPY EVALUATION
Patient Name: Demetra Rush  : 1933    MRN: [id number]                              Today's Date: 10/19/2022       Admit Date: 10/18/2022    Visit Dx:     ICD-10-CM ICD-9-CM   1. Acute diastolic CHF (congestive heart failure) (HCC)  I50.31 428.31     428.0   2. Uncontrolled atrial fibrillation (HCC)  I48.91 427.31     Patient Active Problem List   Diagnosis   • Osteoporosis   • Acute diastolic CHF (congestive heart failure) (HCC)   • Pulmonary hypertension (HCC)   • Mitral regurgitation   • Hypertension   • Atrial fibrillation (HCC)     Past Medical History:   Diagnosis Date   • Atrial fibrillation (HCC)    • Chronic diastolic (congestive) heart failure (HCC)    • GERD (gastroesophageal reflux disease)    • Hyperlipidemia    • Hypertension    • Hypothyroidism    • Mitral regurgitation    • Osteoporosis 2022   • Pulmonary hypertension (HCC)    • Stroke (HCC)      Past Surgical History:   Procedure Laterality Date   • ADENOIDECTOMY     • BLADDER SURGERY     • CHOLECYSTECTOMY     • HYSTERECTOMY        General Information     Row Name 10/19/22 1211          Physical Therapy Time and Intention    Document Type evaluation  -MS     Mode of Treatment physical therapy  -MS     Row Name 10/19/22 1211          General Information    Patient Profile Reviewed yes  -MS     Prior Level of Function independent:;all household mobility  use rollator, access to RW  -MS     Existing Precautions/Restrictions fall  -MS     Barriers to Rehab none identified  -MS     Row Name 10/19/22 1211          Living Environment    People in Home child(raphael), adult  -MS     Row Name 10/19/22 1211          Home Main Entrance    Number of Stairs, Main Entrance three  -MS     Row Name 10/19/22 1211          Cognition    Orientation Status (Cognition) oriented x 4  -MS     Row Name 10/19/22 1211          Safety Issues, Functional Mobility    Safety Issues Affecting Function (Mobility) judgment;positioning of assistive device;sequencing  abilities  -MS     Impairments Affecting Function (Mobility) strength;endurance/activity tolerance  -MS     Comment, Safety Issues/Impairments (Mobility) Gait belt and non skid socks donned.  -MS           User Key  (r) = Recorded By, (t) = Taken By, (c) = Cosigned By    Initials Name Provider Type    Jyoti Rodriguez, PT Physical Therapist               Mobility     Row Name 10/19/22 1212          Bed Mobility    Bed Mobility supine-sit  -MS     Supine-Sit Leeper (Bed Mobility) independent  -MS     Row Name 10/19/22 1212          Sit-Stand Transfer    Sit-Stand Leeper (Transfers) standby assist;verbal cues  -MS     Assistive Device (Sit-Stand Transfers) walker, front-wheeled  -MS     Row Name 10/19/22 1212          Gait/Stairs (Locomotion)    Leeper Level (Gait) standby assist;contact guard;verbal cues  -MS     Assistive Device (Gait) walker, front-wheeled  -MS     Distance in Feet (Gait) 100'  -MS     Deviations/Abnormal Patterns (Gait) bartolo decreased;gait speed decreased  -MS     Bilateral Gait Deviations forward flexed posture  -MS     Comment, (Gait/Stairs) No overt LOB or veering noted. Fatigue limiting.  -MS           User Key  (r) = Recorded By, (t) = Taken By, (c) = Cosigned By    Initials Name Provider Type    Jyoti Rodriguez PT Physical Therapist               Obj/Interventions     Row Name 10/19/22 1213          Range of Motion Comprehensive    General Range of Motion no range of motion deficits identified  -MS     Row Name 10/19/22 1213          Strength Comprehensive (MMT)    Comment, General Manual Muscle Testing (MMT) Assessment generalized weakness, grossly 3+/5  -MS     Row Name 10/19/22 1213          Balance    Balance Assessment sitting static balance;standing static balance  -MS     Static Sitting Balance supervision  -MS     Position, Sitting Balance sitting edge of bed  -MS     Static Standing Balance standby assist  -MS     Position/Device Used, Standing  Balance supported;walker, rolling  -MS     Row Name 10/19/22 1213          Sensory Assessment (Somatosensory)    Sensory Assessment (Somatosensory) sensation intact  -MS           User Key  (r) = Recorded By, (t) = Taken By, (c) = Cosigned By    Initials Name Provider Type    Jyoti Rodriguez, PT Physical Therapist               Goals/Plan     Row Name 10/19/22 1215          Transfer Goal 1 (PT)    Activity/Assistive Device (Transfer Goal 1, PT) transfers, all  -MS     Concho Level/Cues Needed (Transfer Goal 1, PT) supervision required  -MS     Time Frame (Transfer Goal 1, PT) 1 week  -MS     Row Name 10/19/22 1215          Gait Training Goal 1 (PT)    Activity/Assistive Device (Gait Training Goal 1, PT) gait (walking locomotion);walker, rolling  -MS     Concho Level (Gait Training Goal 1, PT) supervision required  -MS     Distance (Gait Training Goal 1, PT) 200'  -MS     Row Name 10/19/22 1215          Therapy Assessment/Plan (PT)    Planned Therapy Interventions (PT) balance training;bed mobility training;gait training;home exercise program;patient/family education;postural re-education;ROM (range of motion);stair training;strengthening;transfer training  -MS           User Key  (r) = Recorded By, (t) = Taken By, (c) = Cosigned By    Initials Name Provider Type    Jyoti Rodriguez, PT Physical Therapist               Clinical Impression     Row Name 10/19/22 1213          Pain    Pretreatment Pain Rating 0/10 - no pain  -MS     Posttreatment Pain Rating 0/10 - no pain  -MS     Row Name 10/19/22 1213          Therapy Assessment/Plan (PT)    Rehab Potential (PT) good, to achieve stated therapy goals  -MS     Criteria for Skilled Interventions Met (PT) yes;meets criteria  -MS     Therapy Frequency (PT) 3 times/wk  -MS     Row Name 10/19/22 1213          Vital Signs    O2 Delivery Pre Treatment room air  -MS     Row Name 10/19/22 1213          Positioning and Restraints    Pre-Treatment Position  in bed  -MS     Post Treatment Position chair  -MS     In Chair reclined;call light within reach;encouraged to call for assist;exit alarm on  -MS           User Key  (r) = Recorded By, (t) = Taken By, (c) = Cosigned By    Initials Name Provider Type    Jyoti Rodriguez PT Physical Therapist               Outcome Measures     Row Name 10/19/22 1215          How much help from another person do you currently need...    Turning from your back to your side while in flat bed without using bedrails? 4  -MS     Moving from lying on back to sitting on the side of a flat bed without bedrails? 4  -MS     Moving to and from a bed to a chair (including a wheelchair)? 3  -MS     Standing up from a chair using your arms (e.g., wheelchair, bedside chair)? 3  -MS     Climbing 3-5 steps with a railing? 3  -MS     To walk in hospital room? 3  -MS     AM-PAC 6 Clicks Score (PT) 20  -MS     Highest level of mobility 6 --> Walked 10 steps or more  -MS     Row Name 10/19/22 1215          Functional Assessment    Outcome Measure Options AM-PAC 6 Clicks Basic Mobility (PT)  -MS           User Key  (r) = Recorded By, (t) = Taken By, (c) = Cosigned By    Initials Name Provider Type    Jyoti Rodriguez PT Physical Therapist                             Physical Therapy Education     Title: PT OT SLP Therapies (Done)     Topic: Physical Therapy (Done)     Point: Mobility training (Done)     Learning Progress Summary           Patient Acceptance, E,TB, VU,NR by MS at 10/19/2022 1216                   Point: Home exercise program (Done)     Learning Progress Summary           Patient Acceptance, E,TB, VU,NR by MS at 10/19/2022 1216                   Point: Body mechanics (Done)     Learning Progress Summary           Patient Acceptance, E,TB, VU,NR by MS at 10/19/2022 1216                   Point: Precautions (Done)     Learning Progress Summary           Patient Acceptance, E,TB, VU,NR by MS at 10/19/2022 1216                                User Key     Initials Effective Dates Name Provider Type Discipline    MS 06/16/21 -  Jyoti Rivera, PT Physical Therapist PT              PT Recommendation and Plan  Planned Therapy Interventions (PT): balance training, bed mobility training, gait training, home exercise program, patient/family education, postural re-education, ROM (range of motion), stair training, strengthening, transfer training        Time Calculation:    PT Charges     Row Name 10/19/22 1211             Time Calculation    Start Time 1111  -MS      Stop Time 1125  -MS      Time Calculation (min) 14 min  -MS      PT Received On 10/19/22  -MS      PT - Next Appointment 10/21/22  -MS      PT Goal Re-Cert Due Date 10/26/22  -MS         Time Calculation- PT    Total Timed Code Minutes- PT 12 minute(s)  -MS            User Key  (r) = Recorded By, (t) = Taken By, (c) = Cosigned By    Initials Name Provider Type    MS Rivera, Jyoti SOLER, PT Physical Therapist              Therapy Charges for Today     Code Description Service Date Service Provider Modifiers Qty    47566417701 HC PT EVAL MOD COMPLEXITY 3 10/19/2022 Jyoti Rivera, PT GP 1    27722754054 HC PT THER PROC EA 15 MIN 10/19/2022 Jyoti Rivera, PT GP 1          PT G-Codes  Outcome Measure Options: AM-PAC 6 Clicks Basic Mobility (PT)  AM-PAC 6 Clicks Score (PT): 20    Jyoti Rivera PT  10/19/2022

## 2022-10-19 NOTE — PLAN OF CARE
Goal Outcome Evaluation:     Patient is a pleasant 89 y.o. female admitted to [facility] for acute diastolic CHF on 10/18/2022. Patient is independent at baseline and lives at home with daughter- use of RW. Today, patient performed bed mobility with ind, required SBA for transfers, and ambulated 100' SBA-CGA with a RW. Mild strength and endurance deficits noted. Patient may benefit from skilled PT services acutely to address functional deficits as well as improve level of independence prior to discharge. Anticipate returning home upon DC.

## 2022-10-19 NOTE — CONSULTS
Electrophysiology Hospital Consult            Patient Name: Demetra Rush  Age/Sex: 89 y.o. female  : 1933  MRN: [id number]    Date of Admission: 10/18/2022  Date of Encounter Visit: 10/19/22  Encounter Provider: FELECIA Stone  Referring Provider: Albino Ji Jr., MD  Place of Service: [facility] CARDIOLOGY  Patient Care Team:  Melissa Bolivar MD as PCP - General (Family Medicine)  Ari Hall MD as Referring Physician (Orthopedic Surgery)      Subjective:   EP Consultation for: Afib w/RVR, difficult to control w/meds, diastolic HF, consideration of pacemaker and AV node ablation    Chief Complaint: Dyspnea and edema    History of Present Illness:  Demetra Rush is a 89 y.o. female who was initially seen by Dr. Ji when she was admitted with acute hypoxic respiratory failure secondary to aspiration pneumonia on 2022.  She was found to be in A. fib with RVR at that time, she had an echocardiogram which revealed normal LV systolic function, severely dilated left atrium, severe MR and severe pulmonary hypertension.    She did require intubation at that time but was weaned quickly, diuresed and ever since then they have been trying multiple things to try and control her A. Fib but have not had much success.    She was even placed on amiodarone and underwent cardioversion on 10/14, which was successful however she came into the office on 10/17 and was back in A. fib with symptoms, heart failure.     She was directly admitted for further evaluation and management and EP has been asked to see to consider her for pacemaker and AV node ablation given the difficulty to try and control her A. fib with RVR and recurrent problems with heart failure.    This afternoon she does feel better and her breathing has improved she is on room air however she is not back to baseline.    Her daughter was at the bedside, she did just recently moved back to [place] from  [place] where she living with her other daughter.    They both say it has been a real struggle since she has been back.    Has been on apixaban 2.5 mg twice daily for anticoagulation.  She is currently getting IV Bumex and on diltiazem and metoprolol for heart rate control.  Laying in the bed she runs around 90s to low 100s however I am sure if we got her up walking she would be more tachycardic.    Past Medical History:  Past Medical History:   Diagnosis Date   • Atrial fibrillation (HCC)    • Chronic diastolic (congestive) heart failure (HCC)    • GERD (gastroesophageal reflux disease)    • Hyperlipidemia    • Hypertension    • Hypothyroidism    • Mitral regurgitation    • Osteoporosis 08/29/2022   • Pulmonary hypertension (HCC)    • Stroke (HCC)        Past Surgical History:   Procedure Laterality Date   • ADENOIDECTOMY     • BLADDER SURGERY     • CHOLECYSTECTOMY     • HYSTERECTOMY         Home Medications:   Facility-Administered Medications Prior to Admission   Medication Dose Route Frequency Provider Last Rate Last Admin   • [COMPLETED] bumetanide (BUMEX) injection 2 mg  2 mg Intravenous Once Michelle Pitts APRN   2 mg at 10/17/22 1609     Medications Prior to Admission   Medication Sig Dispense Refill Last Dose   • ALPRAZolam (XANAX) 1 MG tablet Take 0.5 mg by mouth. One to two tablets at night   10/17/2022   • amiodarone (PACERONE) 200 MG tablet Take one tablet every 12 hours for 6 doses and then just one tablet daily (Patient taking differently: Take 1 tablet by mouth Daily.) 90 tablet 0 10/18/2022   • apixaban (ELIQUIS) 2.5 MG tablet tablet Take 1 tablet by mouth Every 12 (Twelve) Hours. Indications: Atrial Fibrillation 60 tablet 5 10/18/2022   • levothyroxine (SYNTHROID, LEVOTHROID) 75 MCG tablet TAKE ONE TABLET DAILY EXCEPT SUNDAY 30 tablet 0 10/18/2022   • pantoprazole (PROTONIX) 40 MG EC tablet TAKE ONE TABLET DAILY 30 tablet 0 10/18/2022   • sucralfate (CARAFATE) 1 g tablet TAKE ONE TABLET  TWICE A DAY MORNING AND EVENING 60 tablet 0 10/18/2022   • metoprolol tartrate (LOPRESSOR) 25 MG tablet Take 1 tablet by mouth 2 (Two) Times a Day. 60 tablet 3        Allergies:  Allergies   Allergen Reactions   • Amlodipine Swelling     In feet and legs.   • Codeine Itching   • Sulfa Antibiotics Hives       Past Social History:  Social History     Socioeconomic History   • Marital status:    Tobacco Use   • Smoking status: Never   • Smokeless tobacco: Never   Vaping Use   • Vaping Use: Never used   Substance and Sexual Activity   • Alcohol use: Yes     Alcohol/week: 1.0 standard drink     Types: 1 Glasses of wine per week     Comment: glass maybe once a month   • Drug use: Never       Past Family History:  Family History   Problem Relation Age of Onset   • Heart disease Mother    • Other Mother         fluid in lungs   • Heart disease Father    • Heart attack Father    • Cancer Sister    • Tongue cancer Sister        Review of Systems: All systems reviewed. Pertinent positives identified in HPI. All other systems are negative.     14 point ROS was performed and is negative except as outlined in HPI.     Objective:     Objective:  Vital Signs (last 24 hours)       10/18 0700  10/19 0659 10/19 0700  10/19 1504   Most Recent      Temp (°F) 97.6 -  98    97.7 -  98.2     97.7 (36.5) 10/19 1255    Heart Rate 91 -  110    104 -  106     104 10/19 1255    Resp 17 -  20    16 -  20     16 10/19 1255    /68 -  128/76    126/90 -  131/87     126/90 10/19 1255    SpO2 (%) 94 -  99    93 -  96     96 10/19 1255        Temp:  [97.6 °F (36.4 °C)-98.2 °F (36.8 °C)] 97.7 °F (36.5 °C)  Heart Rate:  [] 104  Resp:  [16-20] 16  BP: (119-131)/(68-96) 126/90  Body mass index is 21.48 kg/m².        Physical Exam:     General Appearance: No acute distress, thin, frail elderly female.  Eyes: Conjunctiva and lids: No erythema, swelling, or discharge. Sclera non-icteric.   HENT: Atraumatic, normocephalic. External eyes,  ears, and nose normal.   Respiratory: Respiration rhythm and depth normal.   Decreased breath sounds bibasilar, left greater than right.  Cardiovascular:  Heart Rate and Rhythm: Irregularly, irregular, tachycardic.  Heart Sounds: S1 and S2.   2/6 systolic murmur.  Arterial Pulses: Posterior tibialis and dorsalis pedis pulses normal.   Lower Extremities: Lower extremity edema noted.  Gastrointestinal:  Abdomen soft, non-distended, non-tender.  Musculoskeletal: Normal movement of extremities  Skin: Warm and dry.   Psychiatric: Patient alert and oriented to person, place, and time. Speech and behavior appropriate. Normal mood and affect.    Labs:   Lab Review:     Results from last 7 days   Lab Units 10/19/22  0407 10/18/22  1640 10/14/22  0618   SODIUM mmol/L 138 137 139   POTASSIUM mmol/L 4.1 4.0 4.3   CHLORIDE mmol/L 98 101 102   CO2 mmol/L 32.5* 27.8 26.6   BUN mg/dL 17 15 15   CREATININE mg/dL 0.88 0.97 0.92   GLUCOSE mg/dL 86 87 82   CALCIUM mg/dL 8.5* 8.7 8.3*         Results from last 7 days   Lab Units 10/19/22  0407   WBC 10*3/mm3 5.10   HEMOGLOBIN g/dL 10.3*   HEMATOCRIT % 32.3*   PLATELETS 10*3/mm3 219             Results from last 7 days   Lab Units 10/19/22  0407   MAGNESIUM mg/dL 2.0           Imagin2022 Echo:    Interpretation Summary    • The study is technically difficult for diagnosis. The quality of the study is limited due to breast implants.  • Calculated left ventricular EF = 55.1% Estimated left ventricular EF was in agreement with the calculated left ventricular EF. Left ventricular systolic function is normal.  • Left ventricular diastolic function was indeterminate.  • The right ventricular cavity is mild to moderately dilated.  • Left atrial volume is moderately increased.  • The right atrial cavity is moderately dilated.  • There is calcification of the aortic valve.  • Aortic valve area is 1.77 cm2.  • Aortic valve maximum pressure gradient is 5.3 mmHg. Aortic valve mean  pressure gradient is 2.9 mmHg.  • Severe mitral valve regurgitation is present.  • Moderate tricuspid valve regurgitation is present.  • Estimated right ventricular systolic pressure from tricuspid regurgitation is markedly elevated (>55 mmHg).  • There is a moderate sized left pleural effusion.         EKG:               I personally viewed and interpreted the patient's EKG/Telemetry tracings.    Assessment:       Acute diastolic CHF (congestive heart failure) (HCC)    Pulmonary hypertension (HCC)    Mitral regurgitation    Hypertension    Atrial fibrillation (HCC)        Plan:     Dr. Gaston and I saw patient this afternoon, she has A. fib with RVR and there have been many attempts to try and control it with medications which have not been very successful.  He had a cardioversion on amiodarone which only lasted a couple of days and she returned to the office with symptoms and diastolic heart failure.    We discussed potential treatment options, we could try adjusting meds for a few more weeks however this is unlikely to work.  We discussed staged pacemaker implantation and AV node ablation.  We discussed the risk and benefits and her and her daughter are agreeable and would like to proceed with this option.    She did let us know that she is a Gnosticism and would not want any blood products given.    I am going to go ahead and stop her apixaban for now, we will plan for pacemaker tomorrow and then hopefully get her tuned up enough to go home over the next couple of days and then bring her back in about a week for an AV node ablation as long as her pacemaker incision is healing well.    Thank you for allowing me to participate in the care of Demetra Rush. Feel free to contact me directly with any further questions or concerns.    FELECIA Stone  [facility]  10/19/22  15:04 EDT

## 2022-10-20 ENCOUNTER — TELEPHONE (OUTPATIENT)
Dept: FAMILY MEDICINE CLINIC | Facility: CLINIC | Age: 87
End: 2022-10-20

## 2022-10-20 LAB
ANION GAP SERPL CALCULATED.3IONS-SCNC: 13.1 MMOL/L (ref 5–15)
BUN SERPL-MCNC: 15 MG/DL (ref 8–23)
BUN/CREAT SERPL: 16.7 (ref 7–25)
CALCIUM SPEC-SCNC: 8.6 MG/DL (ref 8.6–10.5)
CHLORIDE SERPL-SCNC: 94 MMOL/L (ref 98–107)
CO2 SERPL-SCNC: 32.9 MMOL/L (ref 22–29)
CREAT SERPL-MCNC: 0.9 MG/DL (ref 0.57–1)
EGFRCR SERPLBLD CKD-EPI 2021: 61.2 ML/MIN/1.73
GLUCOSE SERPL-MCNC: 84 MG/DL (ref 65–99)
MAGNESIUM SERPL-MCNC: 2 MG/DL (ref 1.6–2.4)
POTASSIUM SERPL-SCNC: 3.2 MMOL/L (ref 3.5–5.2)
SODIUM SERPL-SCNC: 140 MMOL/L (ref 136–145)

## 2022-10-20 PROCEDURE — 25010000002 MIDAZOLAM PER 1 MG: Performed by: INTERNAL MEDICINE

## 2022-10-20 PROCEDURE — A9270 NON-COVERED ITEM OR SERVICE: HCPCS | Performed by: NURSE PRACTITIONER

## 2022-10-20 PROCEDURE — 99152 MOD SED SAME PHYS/QHP 5/>YRS: CPT | Performed by: INTERNAL MEDICINE

## 2022-10-20 PROCEDURE — 63710000001 SUCRALFATE 1 G TABLET: Performed by: NURSE PRACTITIONER

## 2022-10-20 PROCEDURE — 33207 INSERT HEART PM VENTRICULAR: CPT | Performed by: INTERNAL MEDICINE

## 2022-10-20 PROCEDURE — C1785 PMKR, DUAL, RATE-RESP: HCPCS

## 2022-10-20 PROCEDURE — C1887 CATHETER, GUIDING: HCPCS | Performed by: INTERNAL MEDICINE

## 2022-10-20 PROCEDURE — 63710000001 METOPROLOL TARTRATE 25 MG TABLET: Performed by: NURSE PRACTITIONER

## 2022-10-20 PROCEDURE — A9270 NON-COVERED ITEM OR SERVICE: HCPCS | Performed by: INTERNAL MEDICINE

## 2022-10-20 PROCEDURE — G0378 HOSPITAL OBSERVATION PER HR: HCPCS

## 2022-10-20 PROCEDURE — 83735 ASSAY OF MAGNESIUM: CPT | Performed by: INTERNAL MEDICINE

## 2022-10-20 PROCEDURE — 99024 POSTOP FOLLOW-UP VISIT: CPT | Performed by: INTERNAL MEDICINE

## 2022-10-20 PROCEDURE — C1898 LEAD, PMKR, OTHER THAN TRANS: HCPCS | Performed by: INTERNAL MEDICINE

## 2022-10-20 PROCEDURE — 63710000001 HYDROCODONE-ACETAMINOPHEN 5-325 MG TABLET: Performed by: NURSE PRACTITIONER

## 2022-10-20 PROCEDURE — C1894 INTRO/SHEATH, NON-LASER: HCPCS | Performed by: INTERNAL MEDICINE

## 2022-10-20 PROCEDURE — 25010000002 FENTANYL CITRATE (PF) 50 MCG/ML SOLUTION: Performed by: INTERNAL MEDICINE

## 2022-10-20 PROCEDURE — 99153 MOD SED SAME PHYS/QHP EA: CPT | Performed by: INTERNAL MEDICINE

## 2022-10-20 PROCEDURE — 63710000001 DILTIAZEM 30 MG TABLET: Performed by: INTERNAL MEDICINE

## 2022-10-20 PROCEDURE — 63710000001 ALPRAZOLAM 0.5 MG TABLET: Performed by: NURSE PRACTITIONER

## 2022-10-20 PROCEDURE — 96376 TX/PRO/DX INJ SAME DRUG ADON: CPT

## 2022-10-20 PROCEDURE — 25010000002 VANCOMYCIN 750 MG RECONSTITUTED SOLUTION: Performed by: NURSE PRACTITIONER

## 2022-10-20 PROCEDURE — 80048 BASIC METABOLIC PNL TOTAL CA: CPT | Performed by: INTERNAL MEDICINE

## 2022-10-20 PROCEDURE — C1786 PMKR, SINGLE, RATE-RESP: HCPCS

## 2022-10-20 DEVICE — IMPLANTABLE DEVICE: Type: IMPLANTABLE DEVICE | Status: FUNCTIONAL

## 2022-10-20 RX ORDER — LIDOCAINE HYDROCHLORIDE AND EPINEPHRINE 10; 10 MG/ML; UG/ML
INJECTION, SOLUTION INFILTRATION; PERINEURAL
Status: DISCONTINUED | OUTPATIENT
Start: 2022-10-20 | End: 2022-10-20 | Stop reason: HOSPADM

## 2022-10-20 RX ORDER — TORSEMIDE 20 MG/1
20 TABLET ORAL DAILY
Status: DISCONTINUED | OUTPATIENT
Start: 2022-10-21 | End: 2022-10-21 | Stop reason: HOSPADM

## 2022-10-20 RX ORDER — ACETAMINOPHEN 650 MG/1
650 SUPPOSITORY RECTAL EVERY 4 HOURS PRN
Status: DISCONTINUED | OUTPATIENT
Start: 2022-10-20 | End: 2022-10-21 | Stop reason: HOSPADM

## 2022-10-20 RX ORDER — SODIUM CHLORIDE 0.9 % (FLUSH) 0.9 %
10 SYRINGE (ML) INJECTION EVERY 12 HOURS SCHEDULED
Status: DISCONTINUED | OUTPATIENT
Start: 2022-10-20 | End: 2022-10-21 | Stop reason: HOSPADM

## 2022-10-20 RX ORDER — PANTOPRAZOLE SODIUM 40 MG/1
40 TABLET, DELAYED RELEASE ORAL DAILY
Status: DISCONTINUED | OUTPATIENT
Start: 2022-10-21 | End: 2022-10-21 | Stop reason: HOSPADM

## 2022-10-20 RX ORDER — HYDROCODONE BITARTRATE AND ACETAMINOPHEN 5; 325 MG/1; MG/1
1 TABLET ORAL EVERY 4 HOURS PRN
Status: DISCONTINUED | OUTPATIENT
Start: 2022-10-20 | End: 2022-10-21 | Stop reason: HOSPADM

## 2022-10-20 RX ORDER — ACETAMINOPHEN 325 MG/1
650 TABLET ORAL EVERY 4 HOURS PRN
Status: DISCONTINUED | OUTPATIENT
Start: 2022-10-20 | End: 2022-10-21 | Stop reason: HOSPADM

## 2022-10-20 RX ORDER — FENTANYL CITRATE 50 UG/ML
INJECTION, SOLUTION INTRAMUSCULAR; INTRAVENOUS
Status: DISCONTINUED | OUTPATIENT
Start: 2022-10-20 | End: 2022-10-20 | Stop reason: HOSPADM

## 2022-10-20 RX ORDER — POTASSIUM CHLORIDE 750 MG/1
20 TABLET, FILM COATED, EXTENDED RELEASE ORAL DAILY
Status: DISCONTINUED | OUTPATIENT
Start: 2022-10-20 | End: 2022-10-20

## 2022-10-20 RX ORDER — SODIUM CHLORIDE 9 MG/ML
INJECTION, SOLUTION INTRAVENOUS
Status: COMPLETED | OUTPATIENT
Start: 2022-10-20 | End: 2022-10-20

## 2022-10-20 RX ORDER — SODIUM CHLORIDE 0.9 % (FLUSH) 0.9 %
10 SYRINGE (ML) INJECTION AS NEEDED
Status: DISCONTINUED | OUTPATIENT
Start: 2022-10-20 | End: 2022-10-21 | Stop reason: HOSPADM

## 2022-10-20 RX ORDER — MIDAZOLAM HYDROCHLORIDE 1 MG/ML
INJECTION INTRAMUSCULAR; INTRAVENOUS
Status: DISCONTINUED | OUTPATIENT
Start: 2022-10-20 | End: 2022-10-20 | Stop reason: HOSPADM

## 2022-10-20 RX ORDER — METOPROLOL TARTRATE 5 MG/5ML
INJECTION INTRAVENOUS
Status: DISCONTINUED | OUTPATIENT
Start: 2022-10-20 | End: 2022-10-20 | Stop reason: HOSPADM

## 2022-10-20 RX ADMIN — ALPRAZOLAM 0.5 MG: 0.5 TABLET ORAL at 22:49

## 2022-10-20 RX ADMIN — METOPROLOL TARTRATE 25 MG: 25 TABLET, FILM COATED ORAL at 08:37

## 2022-10-20 RX ADMIN — DILTIAZEM HYDROCHLORIDE 30 MG: 30 TABLET, FILM COATED ORAL at 05:40

## 2022-10-20 RX ADMIN — DILTIAZEM HYDROCHLORIDE 45 MG: 30 TABLET, FILM COATED ORAL at 22:38

## 2022-10-20 RX ADMIN — HYDROCODONE BITARTRATE AND ACETAMINOPHEN 1 TABLET: 5; 325 TABLET ORAL at 22:53

## 2022-10-20 RX ADMIN — SUCRALFATE 1 G: 1 TABLET ORAL at 08:37

## 2022-10-20 RX ADMIN — POTASSIUM CHLORIDE 20 MEQ: 750 TABLET, EXTENDED RELEASE ORAL at 08:37

## 2022-10-20 RX ADMIN — LEVOTHYROXINE SODIUM 75 MCG: 0.07 TABLET ORAL at 05:40

## 2022-10-20 RX ADMIN — PANTOPRAZOLE SODIUM 40 MG: 40 TABLET, DELAYED RELEASE ORAL at 08:37

## 2022-10-20 RX ADMIN — BUMETANIDE 2 MG: 0.25 INJECTION INTRAMUSCULAR; INTRAVENOUS at 04:43

## 2022-10-20 RX ADMIN — HYDROCODONE BITARTRATE AND ACETAMINOPHEN 1 TABLET: 5; 325 TABLET ORAL at 14:01

## 2022-10-20 RX ADMIN — Medication 10 ML: at 22:45

## 2022-10-20 RX ADMIN — SUCRALFATE 1 G: 1 TABLET ORAL at 22:38

## 2022-10-20 RX ADMIN — Medication 10 ML: at 08:44

## 2022-10-20 RX ADMIN — HYDROCODONE BITARTRATE AND ACETAMINOPHEN 1 TABLET: 5; 325 TABLET ORAL at 17:56

## 2022-10-20 RX ADMIN — Medication 10 ML: at 22:44

## 2022-10-20 RX ADMIN — DILTIAZEM HYDROCHLORIDE 45 MG: 30 TABLET, FILM COATED ORAL at 14:03

## 2022-10-20 RX ADMIN — SODIUM CHLORIDE 750 MG: 900 INJECTION, SOLUTION INTRAVENOUS at 08:51

## 2022-10-20 RX ADMIN — METOPROLOL TARTRATE 25 MG: 25 TABLET, FILM COATED ORAL at 22:44

## 2022-10-20 NOTE — PROGRESS NOTES
"   LOS: 1 day   Patient Care Team:  Melissa Bolivar MD as PCP - General (Family Medicine)  Ari Hall MD as Referring Physician (Orthopedic Surgery)    Chief Complaint: AF/CHF     Interval History: Excellent UOP. Feels much better, less orthopnea/SOA. I saw her after device implant and she was doing well.       Objective   Vital Signs  Temp:  [97.2 °F (36.2 °C)-97.5 °F (36.4 °C)] 97.3 °F (36.3 °C)  Heart Rate:  [] 102  Resp:  [12-18] 16  BP: (114-138)/(72-93) 123/72    Intake/Output Summary (Last 24 hours) at 10/20/2022 1347  Last data filed at 10/20/2022 1023  Gross per 24 hour   Intake 560 ml   Output 2750 ml   Net -2190 ml       Last Weight and Admission Weight        10/20/22  0439   Weight: 48.1 kg (106 lb 1.6 oz) (skipped a day)     Flowsheet Rows    Flowsheet Row First Filed Value   Admission Height 154.9 cm (60.98\") Documented at 10/18/2022 1540   Admission Weight 55 kg (121 lb 4.1 oz) Documented at 10/18/2022 1540                  Physical Exam  Vitals reviewed.   Constitutional:       Comments: Very frail, NAD   HENT:      Head: Normocephalic.      Nose: Nose normal.      Mouth/Throat:      Pharynx: Oropharynx is clear.   Eyes:      Conjunctiva/sclera: Conjunctivae normal.   Neck:      Vascular: No JVD.   Cardiovascular:      Rate and Rhythm: Normal rate. Rhythm irregular.      Pulses: Normal pulses.      Heart sounds: Murmur heard.    Systolic murmur is present with a grade of 2/6.  Pulmonary:      Effort: Pulmonary effort is normal.      Breath sounds: Normal breath sounds.   Abdominal:      Palpations: Abdomen is soft.      Tenderness: There is no abdominal tenderness.   Musculoskeletal:         General: No swelling. Normal range of motion.      Cervical back: Normal range of motion.   Skin:     General: Skin is warm and dry.      Findings: No erythema.   Neurological:      General: No focal deficit present.      Mental Status: She is alert.      Cranial Nerves: No cranial nerve deficit. "   Psychiatric:         Mood and Affect: Mood normal.         Behavior: Behavior normal.         Thought Content: Thought content normal.         Results Review:      Results from last 7 days   Lab Units 10/20/22  0353 10/19/22  0407 10/18/22  1640   SODIUM mmol/L 140 138 137   POTASSIUM mmol/L 3.2* 4.1 4.0   CHLORIDE mmol/L 94* 98 101   CO2 mmol/L 32.9* 32.5* 27.8   BUN mg/dL 15 17 15   CREATININE mg/dL 0.90 0.88 0.97   GLUCOSE mg/dL 84 86 87   CALCIUM mg/dL 8.6 8.5* 8.7         Results from last 7 days   Lab Units 10/19/22  0407 10/18/22  1640   WBC 10*3/mm3 5.10 4.70   HEMOGLOBIN g/dL 10.3* 10.6*   HEMATOCRIT % 32.3* 34.4   PLATELETS 10*3/mm3 219 240             Results from last 7 days   Lab Units 10/20/22  0353   MAGNESIUM mg/dL 2.0           I reviewed the patient's new clinical results.  I personally viewed and interpreted the patient's EKG/Telemetry data        Medication Review:   [MAR Hold] bumetanide, 2 mg, Intravenous, Q12H  dilTIAZem, 30 mg, Oral, Q8H  [MAR Hold] levothyroxine, 75 mcg, Oral, Q AM  metoprolol tartrate, 25 mg, Oral, BID  [MAR Hold] pantoprazole, 40 mg, Oral, Daily  [MAR Hold] sodium chloride, 10 mL, Intravenous, Q12H  [MAR Hold] sucralfate, 1 g, Oral, BID             Assessment & Plan     1. Acute on chronic dCHF -- due to rapid AF    2. Chronic atrial fibrillation -- rates difficult to control due to med intolerances and low BP    3. Severe PHTN    4. Severe MR -- not a candidate for intervention    *Excellent diuresis -- switch to oral diuretics    *S/P PPM, plan for staged AVJ ablation in 10-14 days    *Resume apixaban in AM if site looks okay, d/w Dr Gaston    *Increase diltiazem slightly, continue metoprolol; amiodarone stopped as no indication for rhythm control    *Hopefully home tomorrow    Brennan Nelson MD  10/20/22  13:47 EDT

## 2022-10-20 NOTE — PLAN OF CARE
Goal Outcome Evaluation:  Plan of Care Reviewed With: patient, daughter        Progress: improving  Outcome Evaluation: Pt with large output with diuretics. lost 2 pounds overnight. No falls or injury. Pt able to use bsc and walk to br without symptoms. tolerated room air.

## 2022-10-20 NOTE — PLAN OF CARE
Goal Outcome Evaluation:  Plan of Care Reviewed With: patient        Progress: improving  Outcome Evaluation: Patient will be going for a PM today. Consent signed and blood refusal signed too d/t Jehovah witness. Pt has been up to bsc after Bumex 2g. Pt is putting a lot of urine out. Per notes patient will likely be coming back after pacemaker heals for a ablation. Changed to cardizem yesterday. HR has been anywhere from 90's to 130's. this shift, Afib. CTM, safety maintained.  Diuretic in Use: bumex IV  Response to Diuretics (Output greater than intake): greater  Daily Weight (up or down): down 7lbs  O2 Requirements: RM this shift  Functional Status (Activity level, tolerance and respiratory symptoms):   Discharge Plans: Home with dgt

## 2022-10-20 NOTE — DISCHARGE PLACEMENT REQUEST
"Lidia Rush (89 y.o. Female)     Date of Birth   07/22/1933    Social Security Number       Address   [address] [place], [place] [address]    Home Phone   [phone]    MRN   [id number]       [employer]   Non-Zoroastrianism    Marital Status                               Admission Date   10/18/22    Admission Type   Urgent    Admitting Provider   Albino Ji Jr., MD    Attending Provider   Albino Ji Jr., MD    Department, Room/Bed   [address], [address]1       Discharge Date       Discharge Disposition       Discharge Destination                               Attending Provider: Albino Ji Jr., MD    Allergies: Amlodipine, Codeine, Sulfa Antibiotics    Isolation: None   Infection: None   Code Status: Prior    Ht: 154.9 cm (60.98\")   Wt: 48.1 kg (106 lb 1.6 oz)    Admission Cmt: None   Principal Problem: Acute diastolic CHF (congestive heart failure) (HCC) [I50.31]                 Active Insurance as of 10/18/2022     Primary Coverage     Payor Plan Insurance Group Employer/Plan Group    HUMANA MEDICARE REPLACEMENT HUMANA MEDICARE REPLACEMENT [id number]     Payor Plan Address Payor Plan Phone Number Payor Plan Fax Number Effective Dates    [address] [address] [phone]  1/1/2019 - None Entered    [facility] [address]       Subscriber Name Subscriber Birth Date Member ID       LIDIA RUSH 7/22/1933 [id number]                 Emergency Contacts      (Rel.) Home Phone Work Phone Mobile Phone    PEDRO KRIS (Daughter) -- -- [phone]          "

## 2022-10-20 NOTE — CASE MANAGEMENT/SOCIAL WORK
Discharge Planning Assessment  [facility]     Patient Name: Demetra Rush  MRN: [id number]  Today's Date: 10/20/2022    Admit Date: 10/18/2022    Plan: Return home with [facility] home health and family assist   Discharge Needs Assessment     Row Name 10/20/22 1844       Living Environment    People in Home child(raphael), adult    Name(s) of People in Home Daughter bronson, son in law, and grandchild    Current Living Arrangements home    Primary Care Provided by self    Provides Primary Care For no one    Family Caregiver if Needed child(raphael), adult    Family Caregiver Names bronson and son in law    Quality of Family Relationships helpful;involved;supportive    Able to Return to Prior Arrangements yes       Resource/Environmental Concerns    Resource/Environmental Concerns none       Transition Planning    Patient/Family Anticipates Transition to home with help/services;home with family    Patient/Family Anticipated Services at Transition home health care    Transportation Anticipated family or friend will provide       Discharge Needs Assessment    Equipment Currently Used at Home cane, straight;walker, rolling;wheelchair;rollator    Concerns to be Addressed denies needs/concerns at this time;no discharge needs identified    Anticipated Changes Related to Illness none    Equipment Needed After Discharge none    Discharge Facility/Level of Care Needs home with home health               Discharge Plan     Row Name 10/20/22 3473       Plan    Plan Return home with [facility] home health and family assist    Patient/Family in Agreement with Plan yes    Plan Comments CCP met with patient and granddaughter at bedside. Introduced self and explained role. Patient lives with her daughter Bronson, son in law, and grandchild. Patient is somewhat independent with ADLs but Bronson and her son in law assist with ADLs as needed and provide transportation to appointments as patient is no longer driving. Patient fills prescriptions at [facility] and sees Dr. Bolivar for PCP. Patient states she has an updated living will that names Reyna as ALEYDA and was encouraged to bring a copy to the hospital to place on file. For DME patient has a walker, rollator, cane, wheelchair, and raised toilet seat. She is current with [facility] home health and has been to SNF in [place] in the past. At discharge she plans to return home with family support and [facility] home [facility]. Her family is able to provide transportation home and she denies any discharge planning needs. Meliza REEVES RN/CCP              Continued Care and Services - Admitted Since 10/18/2022     Home Medical Care     Service Provider Request Status Selected Services Address Phone Fax Patient Preferred    [facility] - Request Sent N/A [id number], [place] [id number] [phone] --            Selected Continued Care - Prior Encounters Includes continued care and service providers with selected services from prior encounters from 7/20/2022 to 10/20/2022    Discharged on 9/18/2022 Admission date: 9/12/2022 - Discharge disposition: Home-Health Care [facility]    Home Medical Care     Service Provider Selected Services Address Phone Fax Patient Preferred    [facility] - [facility] [id number], [place] [id number] [phone] --                    Expected Discharge Date and Time     Expected Discharge Date Expected Discharge Time    Oct 22, 2022          Demographic Summary     Row Name 10/20/22 1843       General Information    Admission Type observation    Arrived From home    Required Notices Provided Observation Status Notice    Referral Source admission list    Reason for Consult discharge planning       Contact Information    Permission Granted to Share Info With family/designee               Functional Status     Row Name 10/20/22 1843       Functional Status     Usual Activity Tolerance good    Current Activity Tolerance moderate       Functional Status, IADL    Medications assistive person    Meal Preparation assistive person    Housekeeping assistive person    Laundry assistive person    Shopping assistive person               Psychosocial    No documentation.                Abuse/Neglect    No documentation.                Legal    No documentation.                Substance Abuse    No documentation.                Patient Forms    No documentation.                   Molly Borden

## 2022-10-20 NOTE — PLAN OF CARE
Goal Outcome Evaluation:           Progress: improving   Pm placed today. Doing well. Possible d/c tomorrow.

## 2022-10-20 NOTE — TELEPHONE ENCOUNTER
Caller: DELMY GIPSON    Relationship: Other    Best call back number:    [phone]       What was the call regarding:     PATIENT IS IN THE HOSPITAL AND REFUSED THERAPY FOR THE WEEK      Do you require a callback: NO

## 2022-10-21 ENCOUNTER — READMISSION MANAGEMENT (OUTPATIENT)
Dept: CALL CENTER | Facility: HOSPITAL | Age: 87
End: 2022-10-21

## 2022-10-21 VITALS
HEIGHT: 61 IN | HEART RATE: 103 BPM | TEMPERATURE: 97.7 F | DIASTOLIC BLOOD PRESSURE: 74 MMHG | SYSTOLIC BLOOD PRESSURE: 120 MMHG | OXYGEN SATURATION: 97 % | WEIGHT: 105.8 LBS | BODY MASS INDEX: 19.98 KG/M2 | RESPIRATION RATE: 18 BRPM

## 2022-10-21 LAB — QT INTERVAL: 370 MS

## 2022-10-21 PROCEDURE — A9270 NON-COVERED ITEM OR SERVICE: HCPCS | Performed by: NURSE PRACTITIONER

## 2022-10-21 PROCEDURE — 93010 ELECTROCARDIOGRAM REPORT: CPT | Performed by: INTERNAL MEDICINE

## 2022-10-21 PROCEDURE — 63710000001 DILTIAZEM 30 MG TABLET: Performed by: INTERNAL MEDICINE

## 2022-10-21 PROCEDURE — 63710000001 LEVOTHYROXINE 75 MCG TABLET: Performed by: NURSE PRACTITIONER

## 2022-10-21 PROCEDURE — 63710000001 PANTOPRAZOLE 40 MG TABLET DELAYED-RELEASE: Performed by: NURSE PRACTITIONER

## 2022-10-21 PROCEDURE — 99024 POSTOP FOLLOW-UP VISIT: CPT | Performed by: INTERNAL MEDICINE

## 2022-10-21 PROCEDURE — 63710000001 TORSEMIDE 20 MG TABLET: Performed by: INTERNAL MEDICINE

## 2022-10-21 PROCEDURE — 63710000001 SUCRALFATE 1 G TABLET: Performed by: NURSE PRACTITIONER

## 2022-10-21 PROCEDURE — 93005 ELECTROCARDIOGRAM TRACING: CPT | Performed by: NURSE PRACTITIONER

## 2022-10-21 PROCEDURE — A9270 NON-COVERED ITEM OR SERVICE: HCPCS | Performed by: INTERNAL MEDICINE

## 2022-10-21 PROCEDURE — G0378 HOSPITAL OBSERVATION PER HR: HCPCS

## 2022-10-21 PROCEDURE — 63710000001 METOPROLOL TARTRATE 25 MG TABLET: Performed by: NURSE PRACTITIONER

## 2022-10-21 RX ORDER — TORSEMIDE 20 MG/1
20 TABLET ORAL DAILY
Qty: 60 TABLET | Refills: 3 | Status: SHIPPED | OUTPATIENT
Start: 2022-10-22 | End: 2022-10-31 | Stop reason: SDUPTHER

## 2022-10-21 RX ORDER — DILTIAZEM HYDROCHLORIDE 60 MG/1
60 TABLET, FILM COATED ORAL 2 TIMES DAILY
Qty: 60 TABLET | Refills: 3 | Status: SHIPPED | OUTPATIENT
Start: 2022-10-21 | End: 2022-10-31

## 2022-10-21 RX ORDER — POTASSIUM CHLORIDE 1500 MG/1
20 TABLET, FILM COATED, EXTENDED RELEASE ORAL DAILY
Qty: 60 TABLET | Refills: 2 | Status: SHIPPED | OUTPATIENT
Start: 2022-10-21

## 2022-10-21 RX ADMIN — Medication 10 ML: at 08:28

## 2022-10-21 RX ADMIN — LEVOTHYROXINE SODIUM 75 MCG: 0.07 TABLET ORAL at 06:46

## 2022-10-21 RX ADMIN — TORSEMIDE 20 MG: 20 TABLET ORAL at 08:27

## 2022-10-21 RX ADMIN — METOPROLOL TARTRATE 25 MG: 25 TABLET, FILM COATED ORAL at 08:27

## 2022-10-21 RX ADMIN — Medication 10 ML: at 08:27

## 2022-10-21 RX ADMIN — DILTIAZEM HYDROCHLORIDE 45 MG: 30 TABLET, FILM COATED ORAL at 06:46

## 2022-10-21 RX ADMIN — SUCRALFATE 1 G: 1 TABLET ORAL at 08:27

## 2022-10-21 RX ADMIN — PANTOPRAZOLE SODIUM 40 MG: 40 TABLET, DELAYED RELEASE ORAL at 08:27

## 2022-10-21 NOTE — CASE MANAGEMENT/SOCIAL WORK
Case Management Discharge Note      Final Note: home with [facility]          Selected Continued Care - Discharged on 10/21/2022 Admission date: 10/18/2022 - Discharge disposition: Home or Self Care    Destination    No services have been selected for the patient.              Durable Medical Equipment    No services have been selected for the patient.              Dialysis/Infusion    No services have been selected for the patient.              Home Medical Care     Service Provider Selected Services Address Phone Fax Patient Preferred    [facility] - [facility] [address], [place] [id number] [phone] --          Therapy    No services have been selected for the patient.              Community Resources    No services have been selected for the patient.              Community & DME    No services have been selected for the patient.                Selected Continued Care - Prior Encounters Includes continued care and service providers with selected services from prior encounters from 7/20/2022 to 10/21/2022    Discharged on 9/18/2022 Admission date: 9/12/2022 - Discharge [place]: [place] [facility]    [place] Medical Care     Service Provider Selected Services Address Phone Fax Patient Preferred    [facility] - [facility] [address], [place] [id number] [phone] --                    Transportation Services  Private: Car    Final Discharge Disposition Code: 06 - home with home health care

## 2022-10-21 NOTE — PLAN OF CARE
Goal Outcome Evaluation:  Plan of Care Reviewed With: patient        Progress: improving  Outcome Evaluation: Patient had PM place doing well overnight. Vpaced. Afib cont. CTM, safety maintained.  Diuretic in Use: bumex oral  Response to Diuretics (Output greater than intake): great out  Daily Weight (up or down):down   O2 Requirements: room air  Functional Status (Activity level, tolerance and respiratory symptoms): no limit  Discharge Plans:  home today with h/h and dgt

## 2022-10-21 NOTE — PLAN OF CARE
Problem: Adult Inpatient Plan of Care  Goal: Plan of Care Review  10/21/2022 0650 by Aarti Camejo, [place]  Outcome: Ongoing, Progressing  Flowsheets  Taken 10/21/2022 0650  Progress: improving  Outcome Evaluation: Patient had PM placed and did well overnight V paced. Afib cont. Rested well. CTM, safety maintained.  Taken 10/20/2022 0620  Plan of Care Reviewed With: patient  10/21/2022 0650 by Aarti Camejo, [place]  Outcome: Ongoing, Progressing  Flowsheets (Taken 10/21/2022 0650)  Progress: improving  Outcome Evaluation: Patient had PM placed and did well overnight V paced. Afib cont. Rested well. CTM, safety maintained.   Goal Outcome Evaluation:  Plan of Care Reviewed With: patient        Progress: improving  Outcome Evaluation: Patient had PM placed and did well overnight V paced. Afib cont. Rested well. CTM, safety maintained.  Diuretic in Use:   Response to Diuretics (Output greater than intake):   Daily Weight (up or down):   O2 Requirements:   Functional Status (Activity level, tolerance and respiratory symptoms):   Discharge Plans:

## 2022-10-21 NOTE — PLAN OF CARE
Problem: Adult Inpatient Plan of Care  Goal: Plan of Care Review  Outcome: Met  Goal: Patient-Specific Goal (Individualized)  Outcome: Met  Goal: Absence of Hospital-Acquired Illness or Injury  Outcome: Met  Intervention: Identify and Manage Fall Risk  Description: Perform standard risk assessment on admission using a validated tool or comprehensive approach appropriate to the patient; reassess fall risk frequently, with change in status or transfer to another level of care.  Communicate fall injury risk to interprofessional healthcare team.  Determine need for increased observation, equipment and environmental modification, such as low bed, signage and supportive, nonskid footwear.  Adjust safety measures to individual developmental age, stage and identified risk factors.  Reinforce the importance of safety and physical activity with patient and family.  Perform regular intentional rounding to assess need for position change, pain assessment and personal needs, including assistance with toileting.  Intervention: Prevent Skin Injury  Description: Perform a screening for skin injury risk, such as pressure or moisture associated skin damage on admission and at regular intervals throughout hospital stay.  Keep all areas of skin (especially folds) clean and dry.  Maintain adequate skin hydration.  Relieve and redistribute pressure and protect bony prominences; implement measures based on patient-specific risk factors.  Match turning and repositioning schedule to clinical condition.  Encourage weight shift frequently; assist with reposition if unable to complete independently.  Float heels off bed; avoid pressure on the Achilles tendon.  Keep skin free from extended contact with medical devices.  Encourage functional activity and mobility, as early as tolerated.  Use aids (e.g., slide boards, mechanical lift) during transfer.  Intervention: Prevent Infection  Description: Maintain skin and mucous membrane integrity;  promote hand, oral and pulmonary hygiene.  Optimize fluid balance, nutrition, sleep and glycemic control to maximize infection resistance.  Identify potential sources of infection early to prevent or mitigate progression of infection (e.g., wound, lines, devices).  Evaluate ongoing need for invasive devices; remove promptly when no longer indicated.  Goal: Optimal Comfort and Wellbeing  Outcome: Met  Intervention: Provide Person-Centered Care  Description: Use a family-focused approach to care.  Develop trust and rapport by proactively providing information, encouraging questions, addressing concerns and offering reassurance.  Acknowledge emotional response to hospitalization.  Recognize and utilize personal coping strategies.  Honor spiritual and cultural preferences.  Goal: Readiness for Transition of Care  Outcome: Met   Goal Outcome Evaluation:

## 2022-10-21 NOTE — PROGRESS NOTES
Normal device testing and function.   Incision is clean,dry, and intact. Localized bruising around the incision but no signs of hematoma or infection.       Okay to resume apixaban tonight per Dr. Gaston. Okay for discharge from our standpoint.        Patient will have incision check next Friday, will schedule AV node ablation pending incision check.

## 2022-10-21 NOTE — PLAN OF CARE
Goal Outcome Evaluation:  Plan of Care Reviewed With: patient        Progress: improving  Outcome Evaluation: Patient will be going for a PM today. Consent signed and blood refusal signed too d/t Jehovah witness. Pt has been up to bsc after Bumex 2g. Pt is putting a lot of urine out. Per notes patient will likely be coming back after pacemaker heals for a ablation. Changed to cardizem yesterday. HR has been anywhere from 90's to 130's. this shift, Afib. CTM, safety maintained.

## 2022-10-21 NOTE — DISCHARGE SUMMARY
Date of Discharge:  10/21/2022  Date of Admit: 10/18/2022    Discharge Diagnosis:      1. Acute diastolic CHF (congestive heart failure) (HCC)  2. Severe PHTN  3. Severe MR  4. Chronic atrial fibrillation  5. HTN    Hospital Course:     Ms Rush was admitted from the office due to progressive HF symptoms. She also has persistent atrial fibrillation that was difficult to rate control due to low blood pressure and medication intolerances.     She was diuresed. She was seen by EP who placed a PPM and will schedule an AVJ ablation in the near future.    At the time of discharge, she is on room air, has no orthopnea, and no dyspnea. She does still have 1+ pedal edema.     Physical Exam  Vitals reviewed.   Constitutional:       Appearance: She is well-developed.      Comments: Very frail   HENT:      Head: Normocephalic.      Nose: Nose normal.      Mouth/Throat:      Pharynx: Oropharynx is clear.   Eyes:      Conjunctiva/sclera: Conjunctivae normal.   Neck:      Vascular: No JVD.   Cardiovascular:      Rate and Rhythm: Normal rate and regular rhythm.      Pulses: Normal pulses.      Heart sounds: Murmur heard.    Systolic murmur is present with a grade of 2/6.     Comments: PM site looks great, mild bruising  Pulmonary:      Effort: Pulmonary effort is normal.      Breath sounds: Normal breath sounds.   Abdominal:      Palpations: Abdomen is soft.      Tenderness: There is no abdominal tenderness.   Musculoskeletal:         General: No swelling. Normal range of motion.      Cervical back: Normal range of motion.      Right lower le+ Edema present.      Left lower le+ Edema present.   Skin:     General: Skin is warm and dry.      Findings: No erythema.   Neurological:      General: No focal deficit present.      Mental Status: She is alert and oriented to person, place, and time.      Cranial Nerves: No cranial nerve deficit.   Psychiatric:         Mood and Affect: Mood normal.         Behavior: Behavior  normal.         Thought Content: Thought content normal.           Condition on Discharge: stable    Discharge Medications     Discharge Medications      New Medications      Instructions Start Date   dilTIAZem 60 MG tablet  Commonly known as: Cardizem   60 mg, Oral, 2 Times Daily      potassium chloride ER 20 MEQ tablet controlled-release ER tablet  Commonly known as: K-TAB   20 mEq, Oral, Daily      torsemide 20 MG tablet  Commonly known as: DEMADEX   20 mg, Oral, Daily   Start Date: October 22, 2022        Continue These Medications      Instructions Start Date   ALPRAZolam 1 MG tablet  Commonly known as: XANAX   0.5 mg, Oral, One to two tablets at night      apixaban 2.5 MG tablet tablet  Commonly known as: ELIQUIS   2.5 mg, Oral, Every 12 Hours Scheduled      levothyroxine 75 MCG tablet  Commonly known as: SYNTHROID, LEVOTHROID   TAKE ONE TABLET DAILY EXCEPT SUNDAY      metoprolol tartrate 25 MG tablet  Commonly known as: LOPRESSOR   25 mg, Oral, 2 Times Daily      pantoprazole 40 MG EC tablet  Commonly known as: PROTONIX   TAKE ONE TABLET DAILY      sucralfate 1 g tablet  Commonly known as: CARAFATE   TAKE ONE TABLET TWICE A DAY MORNING AND EVENING         Stop These Medications    amiodarone 200 MG tablet  Commonly known as: PACERONE            Discharge Diet: low sodium    Activity at Discharge: as tolerated with usual PM precautions    Discharge disposition: home    Follow-up Appointments  Future Appointments   Date Time Provider Department Center   10/31/2022 11:30 AM Melissa Bolivar MD [id number]   3/8/2023  2:30 PM REFERRED INJECTION CHAIR [facility]     Additional Instructions for the Follow-ups that You Need to Schedule     Discharge Follow-up with Specified Provider: [facility]LE[facility] Co[facility]; 1 Month   As directed      To: FELECIA Cooley    Follow Up: 1 Month         Discharge Follow-up with Specified Provider: [facility]; 1 Week   As directed      To: [facility]    Follow  Up: 1 Week    Follow Up Details: office will arrange and call with appointment time               Brennan Nelson MD  10/21/22  11:56 EDT    Time: Discharge 45 min             (2) spontaneous and intermittent (24 hrs old) (2) spontaneous and intermittent (24 hrs old)

## 2022-10-22 NOTE — OUTREACH NOTE
Prep Survey    Flowsheet Row Responses   [facility] facility patient discharged from? [facility]   Is LACE score < 7 ? No   Emergency Room discharge w/ pulse ox? No   Eligibility [facility]   Date of Admission 10/18/22   Date of Discharge 10/21/22   Discharge Disposition Home or Self Care   Discharge diagnosis CHF   Does the patient have one of the following disease processes/diagnoses(primary or secondary)? CHF   Does the patient have Home health ordered? Yes   What is the Home health agency?  [facility]    Is there a DME ordered? No   Prep survey completed? Yes          UDAY SOLER - Registered Nurse

## 2022-10-24 ENCOUNTER — TRANSITIONAL CARE MANAGEMENT TELEPHONE ENCOUNTER (OUTPATIENT)
Dept: CALL CENTER | Facility: HOSPITAL | Age: 87
End: 2022-10-24

## 2022-10-24 NOTE — OUTREACH NOTE
Call Center TCM Note    Flowsheet Row Responses   [facility] patient discharged from? [place]   Does the patient have one of the following disease processes/diagnoses(primary or secondary)? CHF   TCM attempt successful? Yes   Call start time 0901   Call end time 0902   Discharge diagnosis CHF   Is patient permission given to speak with other caregiver? Yes   List who call center can speak with Reyna    Person spoke with today (if not patient) and relationship Reyna    Meds reviewed with patient/caregiver? Yes   Is the patient having any side effects they believe may be caused by any medication additions or changes? No   Does the patient have all medications ordered at discharge? Yes   Is the patient taking all medications as directed (includes completed medication regime)? Yes   Comments Hosp dc fu apt on 10/31/22   Does the patient have an appointment with their PCP within 7 days of discharge? Yes   What is the Home health agency?  [facility]    Home health comments Reyna states [employer] has reached out to them    Pulse Ox monitoring Intermittent   Pulse Ox device source Patient   O2 Sat comments Reyna hasn't checked today but last check she was in the 90's on RA    O2 Sat: education provided Sat levels, Monitoring frequency, When to seek care   Psychosocial issues? No   Did the patient receive a copy of their discharge instructions? Yes   Nursing interventions Reviewed instructions with patient   What is the patient's perception of their health status since discharge? Improving   Nursing interventions Nurse provided patient education   Is the patient able to teach back signs and symptoms of worsening condition? (i.e. weight gain, shortness of air, etc.) Yes   If the patient is a current smoker, are they able to teach back resources for cessation? Not a smoker   Is the patient/caregiver able to teach back the hierarchy of who to call/visit for symptoms/problems? PCP, Specialist, Home health nurse, Urgent Care, ED,  911 Yes   Notified Case Management Education issues   Is the patient able to teach back Heart Failure Zones? Yes   CHF Zone this Call Green Zone   Green Zone Patient reports doing well, No new or worsening shortness of breath, Physical activity level is normal for you, No chest pain, No new swelling -  feet, ankles and legs look normal for you   Green Zone Interventions Daily weight check, Meds as directed, Follow up visits planned   TCM call completed? Yes   Call end time 0902           Zulma Garcia RN    10/24/2022, 09:05 EDT

## 2022-10-26 ENCOUNTER — TELEPHONE (OUTPATIENT)
Dept: FAMILY MEDICINE CLINIC | Facility: CLINIC | Age: 87
End: 2022-10-26

## 2022-10-26 NOTE — TELEPHONE ENCOUNTER
Gave Verbal orders and sending message to Amandaazeb for speech Evaluation for swallowing difficulty.

## 2022-10-26 NOTE — TELEPHONE ENCOUNTER
Caller: DAVID    Relationship to patient: [facility]     Best call back number: [phone]    Patient is needing: IS NEEDING A VERBAL ORDER TO CONTINUE OCCUPATIONAL THERAPY FOR 1 TIME A WEEK FOR 3 ADDITIONAL WEEKS AND ALSO A VERBAL ORDER TO DO A SPEECH EVALUATION FOR SWALLOWING DIFFICULTY

## 2022-10-27 ENCOUNTER — CLINICAL SUPPORT NO REQUIREMENTS (OUTPATIENT)
Dept: CARDIOLOGY | Facility: CLINIC | Age: 87
End: 2022-10-27

## 2022-10-27 DIAGNOSIS — I50.31 ACUTE DIASTOLIC CHF (CONGESTIVE HEART FAILURE): Primary | ICD-10-CM

## 2022-10-27 DIAGNOSIS — I48.19 PERSISTENT ATRIAL FIBRILLATION: ICD-10-CM

## 2022-10-27 DIAGNOSIS — I48.11 LONGSTANDING PERSISTENT ATRIAL FIBRILLATION: Primary | ICD-10-CM

## 2022-10-27 PROCEDURE — 93279 PRGRMG DEV EVAL PM/LDLS PM: CPT | Performed by: INTERNAL MEDICINE

## 2022-10-28 DIAGNOSIS — R13.10 DYSPHAGIA, UNSPECIFIED TYPE: Primary | ICD-10-CM

## 2022-10-31 ENCOUNTER — OFFICE VISIT (OUTPATIENT)
Dept: FAMILY MEDICINE CLINIC | Facility: CLINIC | Age: 87
End: 2022-10-31

## 2022-10-31 ENCOUNTER — READMISSION MANAGEMENT (OUTPATIENT)
Dept: CALL CENTER | Facility: HOSPITAL | Age: 87
End: 2022-10-31

## 2022-10-31 VITALS
WEIGHT: 108.6 LBS | OXYGEN SATURATION: 99 % | DIASTOLIC BLOOD PRESSURE: 60 MMHG | BODY MASS INDEX: 20.5 KG/M2 | SYSTOLIC BLOOD PRESSURE: 110 MMHG | HEIGHT: 61 IN | HEART RATE: 58 BPM | TEMPERATURE: 97.8 F

## 2022-10-31 DIAGNOSIS — I27.20 PULMONARY HYPERTENSION: ICD-10-CM

## 2022-10-31 DIAGNOSIS — N30.00 ACUTE CYSTITIS WITHOUT HEMATURIA: Primary | ICD-10-CM

## 2022-10-31 DIAGNOSIS — I48.11 LONGSTANDING PERSISTENT ATRIAL FIBRILLATION: ICD-10-CM

## 2022-10-31 DIAGNOSIS — F51.01 PRIMARY INSOMNIA: ICD-10-CM

## 2022-10-31 DIAGNOSIS — Z23 IMMUNIZATION DUE: ICD-10-CM

## 2022-10-31 DIAGNOSIS — I10 PRIMARY HYPERTENSION: ICD-10-CM

## 2022-10-31 DIAGNOSIS — E03.9 ACQUIRED HYPOTHYROIDISM: ICD-10-CM

## 2022-10-31 DIAGNOSIS — F41.9 ANXIETY: ICD-10-CM

## 2022-10-31 DIAGNOSIS — M81.0 OSTEOPOROSIS, UNSPECIFIED OSTEOPOROSIS TYPE, UNSPECIFIED PATHOLOGICAL FRACTURE PRESENCE: ICD-10-CM

## 2022-10-31 DIAGNOSIS — I50.32 CHRONIC DIASTOLIC (CONGESTIVE) HEART FAILURE: ICD-10-CM

## 2022-10-31 DIAGNOSIS — Z79.899 HIGH RISK MEDICATION USE: ICD-10-CM

## 2022-10-31 PROCEDURE — 99214 OFFICE O/P EST MOD 30 MIN: CPT | Performed by: FAMILY MEDICINE

## 2022-10-31 PROCEDURE — 91312 COVID-19 (PFIZER) BIVALENT BOOSTER 12+YRS: CPT | Performed by: FAMILY MEDICINE

## 2022-10-31 PROCEDURE — G0008 ADMIN INFLUENZA VIRUS VAC: HCPCS | Performed by: FAMILY MEDICINE

## 2022-10-31 PROCEDURE — 90662 IIV NO PRSV INCREASED AG IM: CPT | Performed by: FAMILY MEDICINE

## 2022-10-31 PROCEDURE — 0124A PR ADM SARSCOV2 30MCG/0.3ML BST: CPT | Performed by: FAMILY MEDICINE

## 2022-10-31 RX ORDER — SUCRALFATE 1 G/1
1 TABLET ORAL 2 TIMES DAILY
Qty: 60 TABLET | Refills: 0 | Status: SHIPPED | OUTPATIENT
Start: 2022-10-31 | End: 2022-11-28

## 2022-10-31 RX ORDER — POTASSIUM CHLORIDE 20 MEQ/1
20 TABLET, EXTENDED RELEASE ORAL DAILY
Status: ON HOLD | COMMUNITY
Start: 2022-10-21 | End: 2022-11-03

## 2022-10-31 RX ORDER — DILTIAZEM HYDROCHLORIDE 120 MG/1
CAPSULE, EXTENDED RELEASE ORAL
COMMUNITY
Start: 2022-10-07 | End: 2022-11-08 | Stop reason: HOSPADM

## 2022-10-31 RX ORDER — ALPRAZOLAM 1 MG/1
1 TABLET ORAL NIGHTLY PRN
Qty: 90 TABLET | Refills: 0 | Status: CANCELLED | OUTPATIENT
Start: 2022-10-31

## 2022-10-31 RX ORDER — TORSEMIDE 10 MG/1
10 TABLET ORAL DAILY
Start: 2022-10-31 | End: 2022-11-17

## 2022-10-31 RX ORDER — CIPROFLOXACIN 250 MG/1
250 TABLET, FILM COATED ORAL 2 TIMES DAILY
Qty: 10 TABLET | Refills: 0 | Status: SHIPPED | OUTPATIENT
Start: 2022-10-31 | End: 2022-11-08 | Stop reason: HOSPADM

## 2022-10-31 RX ORDER — ALPRAZOLAM 1 MG/1
1 TABLET ORAL NIGHTLY PRN
Qty: 90 TABLET | Refills: 0 | Status: SHIPPED | OUTPATIENT
Start: 2022-10-31 | End: 2022-11-01 | Stop reason: SDUPTHER

## 2022-10-31 NOTE — PROGRESS NOTES
Subjective   Demetra Rush is a 89 y.o. female.     Chief Complaint   Patient presents with   • Establish Care     Broke hip in march    • Immunizations     Flu and covid booster   • Urinary Tract Infection     Cramping, burning        History of Present Illness   Has already established care here.    Concern for uti, having burning, u/a positive, for a few days. Does ok with cipro.     htn- doing well on meds    Hypothyroidism- due labs, taking meds daily except sundays.     gerd- Has been having issues and following with GI. Taking her meds.     chf/afib- following with cardiology, does have some fatigue and has to take breaks when doing chores and sit down. Is on a blood thinner and rate controlled.     H/o stroke- This was in 1987. No residual. Was thought to be due to ocp.     pulm hypertension- is not following with anyone for this and is having more fatigue lately.       Insomnia- has had for years and is stable on xanax. Has tried to come off this and can't. Understands risk and benefits. It is a quality of life issue for her. Only sleeps 3 hours at night without this.         The following portions of the patient's history were reviewed and updated as appropriate: allergies, current medications, past family history, past medical history, past social history, past surgical history and problem list.    Past Medical History:   Diagnosis Date   • Atrial fibrillation (HCC)    • Chronic diastolic (congestive) heart failure (HCC)    • GERD (gastroesophageal reflux disease)    • Hyperlipidemia    • Hypertension    • Hypothyroidism    • Mitral regurgitation    • Osteoporosis 08/29/2022   • Pulmonary hypertension (HCC)    • Stroke (HCC)        Past Surgical History:   Procedure Laterality Date   • ADENOIDECTOMY     • BLADDER SURGERY     • CARDIAC ELECTROPHYSIOLOGY PROCEDURE N/A 10/20/2022    Procedure: Pacemaker SC new--[employer];  Surgeon: Albino Gaston MD;  Location: [facility] INVASIVE LOCATION;  Service:  "Cardiology;  Laterality: N/A;   • CHOLECYSTECTOMY     • HYSTERECTOMY         Family History   Problem Relation Age of Onset   • Heart disease Mother    • Other Mother         fluid in lungs   • Heart disease Father    • Heart attack Father    • Cancer Sister    • Tongue cancer Sister        Social History     Socioeconomic History   • Marital status:    Tobacco Use   • Smoking status: Never   • Smokeless tobacco: Never   Vaping Use   • Vaping Use: Never used   Substance and Sexual Activity   • Alcohol use: Yes     Alcohol/week: 1.0 standard drink     Types: 1 Glasses of wine per week     Comment: glass maybe once a month   • Drug use: Never       Review of Systems   Constitutional: Negative for fever.   Respiratory: Negative for shortness of breath.        Objective   Visit Vitals  /60 (BP Location: Left arm, Patient Position: Sitting)   Pulse 58   Temp 97.8 °F (36.6 °C)   Ht 154.9 cm (60.98\")   Wt 49.3 kg (108 lb 9.6 oz)   SpO2 99%   BMI 20.53 kg/m²     Body mass index is 20.53 kg/m².  Physical Exam  Constitutional:       Appearance: Normal appearance. She is well-developed.   Cardiovascular:      Rate and Rhythm: Normal rate and regular rhythm.      Heart sounds: Normal heart sounds.   Pulmonary:      Effort: Pulmonary effort is normal.      Breath sounds: Normal breath sounds.   Musculoskeletal:         General: No swelling. Normal range of motion.   Skin:     General: Skin is warm and dry.      Findings: No rash.   Neurological:      General: No focal deficit present.      Mental Status: She is alert and oriented to person, place, and time.   Psychiatric:         Mood and Affect: Mood normal.         Behavior: Behavior normal.           Assessment & Plan   Diagnoses and all orders for this visit:    1. Acute cystitis without hematuria (Primary)  -     ciprofloxacin (Cipro) 250 MG tablet; Take 1 tablet by mouth 2 (Two) Times a Day.  Dispense: 10 tablet; Refill: 0    2. Immunization due  -     " COVID-19 Bivalent Booster (Pfizer) 12+yrs  -     Fluzone High-Dose 65+yrs    3. Primary hypertension  -     Comprehensive Metabolic Panel  -     Lipid Panel    4. Longstanding persistent atrial fibrillation (HCC)    5. Chronic diastolic (congestive) heart failure (HCC)    6. Osteoporosis, unspecified osteoporosis type, unspecified pathological fracture presence    7. Pulmonary hypertension (HCC)  -     torsemide (DEMADEX) 10 MG tablet; Take 1 tablet by mouth Daily.  -     Ambulatory Referral to Pulmonology    8. Primary insomnia  -     ALPRAZolam (XANAX) 1 MG tablet; Take 1 tablet by mouth At Night As Needed for Anxiety. One to two tablets at night  Dispense: 90 tablet; Refill: 0    9. High risk medication use  -     Compliance Drug Analysis, Ur - Urine, Clean Catch    10. Acquired hypothyroidism  -     TSH Rfx On Abnormal To Free T4    11. Anxiety    Other orders  -     sucralfate (CARAFATE) 1 g tablet; Take 1 tablet by mouth 2 (Two) Times a Day.  Dispense: 60 tablet; Refill: 0          Brendan run and reviewed. Discussed risks including but not limites to fatigue, falls, constipation, somnolence, dependence and addiction. Also discussed alternatives etc. Urine tox ordered. Contract.     Cont meds, will get into pulm, f/u in 6 months. F/U if worse or no better

## 2022-10-31 NOTE — OUTREACH NOTE
CHF Week 2 Survey    Flowsheet Row Responses   [facility] patient discharged from? [place]   Does the patient have one of the following disease processes/diagnoses(primary or secondary)? CHF   Week 2 attempt successful? Yes   Call start time 1311   Call end time 1313   Discharge diagnosis CHF   Person spoke with today (if not patient) and relationship Reyna    Is the patient taking all medications as directed (includes completed medication regime)? Yes   Does the patient have a primary care provider?  Yes   Has the patient kept scheduled appointments due by today? Yes   Pulse Ox monitoring Intermittent   Pulse Ox device source Patient   Psychosocial issues? No   What is the patient's perception of their health status since discharge? Improving   Is the patient able to teach back signs and symptoms of worsening condition? (i.e. weight gain, shortness of air, etc.) Yes   If the patient is a current smoker, are they able to teach back resources for cessation? Not a smoker   Is the patient/caregiver able to teach back the hierarchy of who to call/visit for symptoms/problems? PCP, Specialist, Home health nurse, Urgent Care, ED, 911 Yes   Is the patient able to teach back Heart Failure Zones? Yes   CHF Zone this Call Green Zone   Green Zone Patient reports doing well, No new or worsening shortness of breath, No new swelling -  feet, ankles and legs look normal for you, No chest pain   Green Zone Interventions Daily weight check, Meds as directed, Follow up visits planned, Low sodium diet   CHF Week 2 call completed? Yes   Wrap up additional comments States she is doing well and will have ablation done on Nov 3.  Had follow up with PCP and cardiology   Call end time 1313          CORAL AREVALO - Licensed Nurse

## 2022-11-01 ENCOUNTER — TELEPHONE (OUTPATIENT)
Dept: FAMILY MEDICINE CLINIC | Facility: CLINIC | Age: 87
End: 2022-11-01

## 2022-11-01 ENCOUNTER — TELEPHONE (OUTPATIENT)
Dept: CARDIOLOGY | Facility: CLINIC | Age: 87
End: 2022-11-01

## 2022-11-01 DIAGNOSIS — F51.01 PRIMARY INSOMNIA: ICD-10-CM

## 2022-11-01 LAB
ALBUMIN SERPL-MCNC: 4.8 G/DL (ref 3.5–5.2)
ALBUMIN/GLOB SERPL: 1.9 G/DL
ALP SERPL-CCNC: 85 U/L (ref 39–117)
ALT SERPL-CCNC: 15 U/L (ref 1–33)
AST SERPL-CCNC: 21 U/L (ref 1–32)
BILIRUB SERPL-MCNC: 0.4 MG/DL (ref 0–1.2)
BUN SERPL-MCNC: 24 MG/DL (ref 8–23)
BUN/CREAT SERPL: 21.6 (ref 7–25)
CALCIUM SERPL-MCNC: 9.1 MG/DL (ref 8.6–10.5)
CHLORIDE SERPL-SCNC: 97 MMOL/L (ref 98–107)
CHOLEST SERPL-MCNC: 212 MG/DL (ref 0–200)
CO2 SERPL-SCNC: 30.5 MMOL/L (ref 22–29)
CREAT SERPL-MCNC: 1.11 MG/DL (ref 0.57–1)
EGFRCR SERPLBLD CKD-EPI 2021: 47.6 ML/MIN/1.73
GLOBULIN SER CALC-MCNC: 2.5 GM/DL
GLUCOSE SERPL-MCNC: 97 MG/DL (ref 65–99)
HDLC SERPL-MCNC: 86 MG/DL (ref 40–60)
LDLC SERPL CALC-MCNC: 111 MG/DL (ref 0–100)
POTASSIUM SERPL-SCNC: 4.4 MMOL/L (ref 3.5–5.2)
PROT SERPL-MCNC: 7.3 G/DL (ref 6–8.5)
SODIUM SERPL-SCNC: 137 MMOL/L (ref 136–145)
T4 FREE SERPL-MCNC: 1.6 NG/DL (ref 0.93–1.7)
TRIGL SERPL-MCNC: 83 MG/DL (ref 0–150)
TSH SERPL DL<=0.005 MIU/L-ACNC: 7.59 UIU/ML (ref 0.27–4.2)
UNABLE TO VOID: NORMAL
VLDLC SERPL CALC-MCNC: 15 MG/DL (ref 5–40)

## 2022-11-01 RX ORDER — ALPRAZOLAM 1 MG/1
1 TABLET ORAL NIGHTLY PRN
Qty: 90 TABLET | Refills: 0 | Status: SHIPPED | OUTPATIENT
Start: 2022-11-01 | End: 2022-11-14 | Stop reason: SDUPTHER

## 2022-11-01 RX ORDER — ALPRAZOLAM 1 MG/1
1 TABLET ORAL NIGHTLY PRN
Qty: 90 TABLET | Refills: 0 | Status: CANCELLED | OUTPATIENT
Start: 2022-11-01

## 2022-11-01 NOTE — TELEPHONE ENCOUNTER
Caller: Demetra Rush    Relationship: Self    Best call back number:[phone]    Requested Prescriptions:   Requested Prescriptions     Pending Prescriptions Disp Refills   • ALPRAZolam (XANAX) 1 MG tablet 90 tablet 0     Sig: Take 1 tablet by mouth At Night As Needed for Anxiety. One to two tablets at night        Pharmacy where request should be sent: [address] [place] - [phone]  - [phone] FX     Additional details provided by patient:PATIENT IS COMPLETELY OUT OF MEDICATION     Does the patient have less than a 3 day supply:  [x] Yes  [] No    Jaison Costa Rep   11/01/22 09:58 EDT

## 2022-11-01 NOTE — TELEPHONE ENCOUNTER
Nothing further to add from cardiac standpoint. I doubt that temp is correct and since patient feels fine no need to have her come out to have it checked. Proceed with ablation as scheduled.  Please ensure she has follow up with me within 10 days of ablation.

## 2022-11-01 NOTE — TELEPHONE ENCOUNTER
Zulma ([facility], [phone]) called to provide update on Demetra Rush.      Vitals:  Temp 95.1 (oral, checked multiple times)  /66 (manual, faint on auscultation)  HR     Patient reports intermittent lightheadedness/faint feeling.  Per Zulma, patient feels ok currently.  Zulma also stated patient's hands are freezing but mention that's not unusual for her.    Patient is currently being treated for UTI with cipro.  Patient received COVID and flu shot two days ago.  Patient is scheduled for an ablation on 11/3.  Patient had pacemaker placed 10/20.    LOV: 10/17 with Michelle Maya is asking if anything further needs to be done for patient and expressed concern about her temp and elevated HR.    Please let me know how you would like to proceed.    Thank you,  Yael Orourke, RN  Triage Nurse [place]

## 2022-11-01 NOTE — TELEPHONE ENCOUNTER
Can you please resend     ALPRAZolam (XANAX) 1 MG tablet    [facility] - [facility], [place] - [address] [place] - [phone]  - [phone] FX    Was originally sent to [facility] pharmacy

## 2022-11-02 RX ORDER — LEVOTHYROXINE SODIUM 0.07 MG/1
75 TABLET ORAL DAILY
Qty: 90 TABLET | Refills: 1 | Status: SHIPPED | OUTPATIENT
Start: 2022-11-02 | End: 2022-11-15 | Stop reason: SDUPTHER

## 2022-11-02 NOTE — TELEPHONE ENCOUNTER
Reviewed recommendations with Demetra Orellana Victor Manuel's daughter.  Reyna verbalized understanding of the recommendations.  Scheduled follow-up appointment on 11/9 with Michelle Pitts.    Thank you,  Yael Orourke RN  Triage Nurse [facility]

## 2022-11-03 ENCOUNTER — APPOINTMENT (OUTPATIENT)
Dept: CT IMAGING | Facility: HOSPITAL | Age: 87
End: 2022-11-03

## 2022-11-03 ENCOUNTER — APPOINTMENT (OUTPATIENT)
Dept: GENERAL RADIOLOGY | Facility: HOSPITAL | Age: 87
End: 2022-11-03

## 2022-11-03 ENCOUNTER — APPOINTMENT (OUTPATIENT)
Dept: CARDIOLOGY | Facility: HOSPITAL | Age: 87
End: 2022-11-03

## 2022-11-03 ENCOUNTER — HOSPITAL ENCOUNTER (INPATIENT)
Facility: HOSPITAL | Age: 87
LOS: 5 days | Discharge: HOME HEALTH | End: 2022-11-08
Attending: INTERNAL MEDICINE | Admitting: INTERNAL MEDICINE

## 2022-11-03 DIAGNOSIS — I50.31 ACUTE DIASTOLIC CHF (CONGESTIVE HEART FAILURE): ICD-10-CM

## 2022-11-03 DIAGNOSIS — I48.19 PERSISTENT ATRIAL FIBRILLATION: ICD-10-CM

## 2022-11-03 LAB
ANION GAP SERPL CALCULATED.3IONS-SCNC: 9.5 MMOL/L (ref 5–15)
AORTIC DIMENSIONLESS INDEX: 0.5 (DI)
ARTERIAL PATENCY WRIST A: ABNORMAL
ARTERIAL PATENCY WRIST A: POSITIVE
ARTERIAL PATENCY WRIST A: POSITIVE
ATMOSPHERIC PRESS: 753.5 MMHG
ATMOSPHERIC PRESS: 754.3 MMHG
ATMOSPHERIC PRESS: 754.5 MMHG
BASE EXCESS BLDA CALC-SCNC: -1.5 MMOL/L (ref 0–2)
BASE EXCESS BLDA CALC-SCNC: -4.8 MMOL/L (ref 0–2)
BASE EXCESS BLDA CALC-SCNC: -5.8 MMOL/L (ref 0–2)
BASOPHILS # BLD AUTO: 0.04 10*3/MM3 (ref 0–0.2)
BASOPHILS NFR BLD AUTO: 0.7 % (ref 0–1.5)
BDY SITE: ABNORMAL
BH CV ECHO MEAS - AO MAX PG: 3.5 MMHG
BH CV ECHO MEAS - AO MEAN PG: 1.87 MMHG
BH CV ECHO MEAS - AO V2 MAX: 93.7 CM/SEC
BH CV ECHO MEAS - AO V2 VTI: 14.2 CM
BH CV ECHO MEAS - EDV(MOD-SP2): 68 ML
BH CV ECHO MEAS - EDV(MOD-SP4): 66 ML
BH CV ECHO MEAS - EF(MOD-BP): 65.5 %
BH CV ECHO MEAS - EF(MOD-SP2): 66.2 %
BH CV ECHO MEAS - EF(MOD-SP4): 66.7 %
BH CV ECHO MEAS - ESV(MOD-SP2): 23 ML
BH CV ECHO MEAS - ESV(MOD-SP4): 22 ML
BH CV ECHO MEAS - LAT PEAK E' VEL: 9.9 CM/SEC
BH CV ECHO MEAS - LV DIASTOLIC VOL/BSA (35-75): 45.8 CM2
BH CV ECHO MEAS - LV MAX PG: 1.15 MMHG
BH CV ECHO MEAS - LV MEAN PG: 0.59 MMHG
BH CV ECHO MEAS - LV SYSTOLIC VOL/BSA (12-30): 15.3 CM2
BH CV ECHO MEAS - LV V1 MAX: 53.6 CM/SEC
BH CV ECHO MEAS - LV V1 VTI: 9 CM
BH CV ECHO MEAS - MED PEAK E' VEL: 6.4 CM/SEC
BH CV ECHO MEAS - MR MAX PG: 39.7 MMHG
BH CV ECHO MEAS - MR MAX VEL: 314.9 CM/SEC
BH CV ECHO MEAS - MR MEAN PG: 20.2 MMHG
BH CV ECHO MEAS - MR MEAN VEL: 211.7 CM/SEC
BH CV ECHO MEAS - MR VTI: 68.2 CM
BH CV ECHO MEAS - MV DEC SLOPE: 785.3 CM/SEC2
BH CV ECHO MEAS - MV DEC TIME: 0.14 MSEC
BH CV ECHO MEAS - MV E MAX VEL: 117 CM/SEC
BH CV ECHO MEAS - MV MAX PG: 12.1 MMHG
BH CV ECHO MEAS - MV MEAN PG: 3.7 MMHG
BH CV ECHO MEAS - MV P1/2T: 65.3 MSEC
BH CV ECHO MEAS - MV V2 VTI: 31 CM
BH CV ECHO MEAS - MVA(P1/2T): 3.4 CM2
BH CV ECHO MEAS - RAP SYSTOLE: 3 MMHG
BH CV ECHO MEAS - RVSP: 48.3 MMHG
BH CV ECHO MEAS - SI(MOD-SP2): 31.2 ML/M2
BH CV ECHO MEAS - SI(MOD-SP4): 30.5 ML/M2
BH CV ECHO MEAS - SV(MOD-SP2): 45 ML
BH CV ECHO MEAS - SV(MOD-SP4): 44 ML
BH CV ECHO MEAS - TR MAX PG: 45.3 MMHG
BH CV ECHO MEAS - TR MAX VEL: 336.6 CM/SEC
BH CV ECHO MEASUREMENTS AVERAGE E/E' RATIO: 14.36
BILIRUB UR QL STRIP: NEGATIVE
BUN SERPL-MCNC: 19 MG/DL (ref 8–23)
BUN/CREAT SERPL: 16.7 (ref 7–25)
CALCIUM SPEC-SCNC: 7.3 MG/DL (ref 8.6–10.5)
CHLORIDE SERPL-SCNC: 105 MMOL/L (ref 98–107)
CLARITY UR: CLEAR
CO2 SERPL-SCNC: 25.5 MMOL/L (ref 22–29)
COLOR UR: YELLOW
CREAT SERPL-MCNC: 1.14 MG/DL (ref 0.57–1)
D-LACTATE SERPL-SCNC: 1.6 MMOL/L (ref 0.5–2)
DEPRECATED RDW RBC AUTO: 39.9 FL (ref 37–54)
EGFRCR SERPLBLD CKD-EPI 2021: 46.1 ML/MIN/1.73
EOSINOPHIL # BLD AUTO: 0.11 10*3/MM3 (ref 0–0.4)
EOSINOPHIL NFR BLD AUTO: 2 % (ref 0.3–6.2)
ERYTHROCYTE [DISTWIDTH] IN BLOOD BY AUTOMATED COUNT: 13.2 % (ref 12.3–15.4)
GAS FLOW AIRWAY: 12 LPM
GAS FLOW AIRWAY: 4 LPM
GLUCOSE BLDC GLUCOMTR-MCNC: 163 MG/DL (ref 70–130)
GLUCOSE SERPL-MCNC: 217 MG/DL (ref 65–99)
GLUCOSE UR STRIP-MCNC: NEGATIVE MG/DL
HCO3 BLDA-SCNC: 20.5 MMOL/L (ref 22–28)
HCO3 BLDA-SCNC: 21.9 MMOL/L (ref 22–28)
HCO3 BLDA-SCNC: 25.1 MMOL/L (ref 22–28)
HCT VFR BLD AUTO: 30.3 % (ref 34–46.6)
HGB BLD-MCNC: 9.4 G/DL (ref 12–15.9)
HGB UR QL STRIP.AUTO: NEGATIVE
IMM GRANULOCYTES # BLD AUTO: 0.03 10*3/MM3 (ref 0–0.05)
IMM GRANULOCYTES NFR BLD AUTO: 0.6 % (ref 0–0.5)
INHALED O2 CONCENTRATION: 50 %
KETONES UR QL STRIP: NEGATIVE
LEFT ATRIUM VOLUME INDEX: 47.6 ML/M2
LEUKOCYTE ESTERASE UR QL STRIP.AUTO: NEGATIVE
LYMPHOCYTES # BLD AUTO: 2.02 10*3/MM3 (ref 0.7–3.1)
LYMPHOCYTES NFR BLD AUTO: 37.6 % (ref 19.6–45.3)
MAXIMAL PREDICTED HEART RATE: 131 BPM
MCH RBC QN AUTO: 25.7 PG (ref 26.6–33)
MCHC RBC AUTO-ENTMCNC: 31 G/DL (ref 31.5–35.7)
MCV RBC AUTO: 82.8 FL (ref 79–97)
MODALITY: ABNORMAL
MONOCYTES # BLD AUTO: 0.69 10*3/MM3 (ref 0.1–0.9)
MONOCYTES NFR BLD AUTO: 12.8 % (ref 5–12)
NEUTROPHILS NFR BLD AUTO: 2.48 10*3/MM3 (ref 1.7–7)
NEUTROPHILS NFR BLD AUTO: 46.3 % (ref 42.7–76)
NITRITE UR QL STRIP: NEGATIVE
NRBC BLD AUTO-RTO: 0 /100 WBC (ref 0–0.2)
O2 A-A PPRESDIFF RESPIRATORY: 0.4 MMHG
PCO2 BLDA: 37.8 MM HG (ref 35–45)
PCO2 BLDA: 49.2 MM HG (ref 35–45)
PCO2 BLDA: 51.5 MM HG (ref 35–45)
PH BLDA: 7.24 PH UNITS (ref 7.35–7.45)
PH BLDA: 7.32 PH UNITS (ref 7.35–7.45)
PH BLDA: 7.34 PH UNITS (ref 7.35–7.45)
PH UR STRIP.AUTO: 7 [PH] (ref 5–8)
PLATELET # BLD AUTO: 244 10*3/MM3 (ref 140–450)
PMV BLD AUTO: 10 FL (ref 6–12)
PO2 BLDA: 138.6 MM HG (ref 80–100)
PO2 BLDA: 57 MM HG (ref 80–100)
PO2 BLDA: 58.2 MM HG (ref 80–100)
POTASSIUM SERPL-SCNC: 4 MMOL/L (ref 3.5–5.2)
PROCALCITONIN SERPL-MCNC: 0.27 NG/ML (ref 0–0.25)
PROT UR QL STRIP: NEGATIVE
QT INTERVAL: 394 MS
RBC # BLD AUTO: 3.66 10*6/MM3 (ref 3.77–5.28)
SAO2 % BLDCOA: 84.2 % (ref 92–99)
SAO2 % BLDCOA: 87.7 % (ref 92–99)
SAO2 % BLDCOA: 98.9 % (ref 92–99)
SET MECH RESP RATE: 18
SODIUM SERPL-SCNC: 140 MMOL/L (ref 136–145)
SP GR UR STRIP: 1.01 (ref 1–1.03)
STRESS TARGET HR: 111 BPM
TOTAL RATE: 19 BREATHS/MINUTE
TOTAL RATE: 24 BREATHS/MINUTE
TOTAL RATE: 30 BREATHS/MINUTE
UROBILINOGEN UR QL STRIP: NORMAL
WBC NRBC COR # BLD: 5.37 10*3/MM3 (ref 3.4–10.8)

## 2022-11-03 PROCEDURE — 87040 BLOOD CULTURE FOR BACTERIA: CPT | Performed by: INTERNAL MEDICINE

## 2022-11-03 PROCEDURE — 36600 WITHDRAWAL OF ARTERIAL BLOOD: CPT

## 2022-11-03 PROCEDURE — C1732 CATH, EP, DIAG/ABL, 3D/VECT: HCPCS | Performed by: INTERNAL MEDICINE

## 2022-11-03 PROCEDURE — 99152 MOD SED SAME PHYS/QHP 5/>YRS: CPT | Performed by: INTERNAL MEDICINE

## 2022-11-03 PROCEDURE — 84145 PROCALCITONIN (PCT): CPT | Performed by: INTERNAL MEDICINE

## 2022-11-03 PROCEDURE — 93306 TTE W/DOPPLER COMPLETE: CPT | Performed by: INTERNAL MEDICINE

## 2022-11-03 PROCEDURE — C1893 INTRO/SHEATH, FIXED,NON-PEEL: HCPCS | Performed by: INTERNAL MEDICINE

## 2022-11-03 PROCEDURE — 85025 COMPLETE CBC W/AUTO DIFF WBC: CPT | Performed by: INTERNAL MEDICINE

## 2022-11-03 PROCEDURE — 80048 BASIC METABOLIC PNL TOTAL CA: CPT | Performed by: INTERNAL MEDICINE

## 2022-11-03 PROCEDURE — 94799 UNLISTED PULMONARY SVC/PX: CPT

## 2022-11-03 PROCEDURE — 25010000002 ATROPINE SULFATE

## 2022-11-03 PROCEDURE — 93005 ELECTROCARDIOGRAM TRACING: CPT | Performed by: INTERNAL MEDICINE

## 2022-11-03 PROCEDURE — 82803 BLOOD GASES ANY COMBINATION: CPT

## 2022-11-03 PROCEDURE — 25010000002 ONDANSETRON PER 1 MG: Performed by: INTERNAL MEDICINE

## 2022-11-03 PROCEDURE — 71045 X-RAY EXAM CHEST 1 VIEW: CPT

## 2022-11-03 PROCEDURE — 02K83ZZ MAP CONDUCTION MECHANISM, PERCUTANEOUS APPROACH: ICD-10-PCS | Performed by: INTERNAL MEDICINE

## 2022-11-03 PROCEDURE — 25010000002 EPINEPHRINE 1 MG/ML SOLUTION: Performed by: NURSE PRACTITIONER

## 2022-11-03 PROCEDURE — 25010000002 HYDROCORTISONE SOD SUC (PF) 100 MG RECONSTITUTED SOLUTION: Performed by: INTERNAL MEDICINE

## 2022-11-03 PROCEDURE — 25010000002 MIDAZOLAM PER 1 MG: Performed by: INTERNAL MEDICINE

## 2022-11-03 PROCEDURE — 93650 ICAR CATH ABLTJ AV NODE FUNC: CPT | Performed by: INTERNAL MEDICINE

## 2022-11-03 PROCEDURE — 25010000002 HYDROCORTISONE SOD SUC (PF) 100 MG RECONSTITUTED SOLUTION: Performed by: NURSE PRACTITIONER

## 2022-11-03 PROCEDURE — 74176 CT ABD & PELVIS W/O CONTRAST: CPT

## 2022-11-03 PROCEDURE — 82962 GLUCOSE BLOOD TEST: CPT

## 2022-11-03 PROCEDURE — 99153 MOD SED SAME PHYS/QHP EA: CPT | Performed by: INTERNAL MEDICINE

## 2022-11-03 PROCEDURE — 25010000002 PHENYLEPHRINE 10 MG/ML SOLUTION: Performed by: NURSE PRACTITIONER

## 2022-11-03 PROCEDURE — 25010000002 FENTANYL CITRATE (PF) 50 MCG/ML SOLUTION: Performed by: INTERNAL MEDICINE

## 2022-11-03 PROCEDURE — 93306 TTE W/DOPPLER COMPLETE: CPT

## 2022-11-03 PROCEDURE — 25010000002 EPINEPHRINE 1 MG/10ML SOLUTION PREFILLED SYRINGE: Performed by: INTERNAL MEDICINE

## 2022-11-03 PROCEDURE — 25010000002 PHENYLEPHRINE 10 MG/ML SOLUTION: Performed by: INTERNAL MEDICINE

## 2022-11-03 PROCEDURE — 81003 URINALYSIS AUTO W/O SCOPE: CPT | Performed by: INTERNAL MEDICINE

## 2022-11-03 PROCEDURE — 93286 PERI-PX EVAL PM/LDLS PM IP: CPT | Performed by: INTERNAL MEDICINE

## 2022-11-03 PROCEDURE — 83605 ASSAY OF LACTIC ACID: CPT | Performed by: INTERNAL MEDICINE

## 2022-11-03 PROCEDURE — 02583ZZ DESTRUCTION OF CONDUCTION MECHANISM, PERCUTANEOUS APPROACH: ICD-10-PCS | Performed by: INTERNAL MEDICINE

## 2022-11-03 PROCEDURE — 25010000002 CEFTRIAXONE PER 250 MG: Performed by: INTERNAL MEDICINE

## 2022-11-03 PROCEDURE — 94660 CPAP INITIATION&MGMT: CPT

## 2022-11-03 RX ORDER — MIDAZOLAM HYDROCHLORIDE 1 MG/ML
INJECTION INTRAMUSCULAR; INTRAVENOUS
Status: DISCONTINUED | OUTPATIENT
Start: 2022-11-03 | End: 2022-11-03 | Stop reason: HOSPADM

## 2022-11-03 RX ORDER — BISACODYL 5 MG/1
5 TABLET, DELAYED RELEASE ORAL DAILY PRN
Status: DISCONTINUED | OUTPATIENT
Start: 2022-11-03 | End: 2022-11-08 | Stop reason: HOSPADM

## 2022-11-03 RX ORDER — SODIUM CHLORIDE 9 MG/ML
INJECTION, SOLUTION INTRAVENOUS
Status: DISCONTINUED | OUTPATIENT
Start: 2022-11-03 | End: 2022-11-03

## 2022-11-03 RX ORDER — NOREPINEPHRINE BIT/0.9 % NACL 8 MG/250ML
.02-.3 INFUSION BOTTLE (ML) INTRAVENOUS
Status: DISCONTINUED | OUTPATIENT
Start: 2022-11-03 | End: 2022-11-04

## 2022-11-03 RX ORDER — BISACODYL 10 MG
10 SUPPOSITORY, RECTAL RECTAL DAILY PRN
Status: DISCONTINUED | OUTPATIENT
Start: 2022-11-03 | End: 2022-11-08 | Stop reason: HOSPADM

## 2022-11-03 RX ORDER — SODIUM CHLORIDE 0.9 % (FLUSH) 0.9 %
10 SYRINGE (ML) INJECTION EVERY 12 HOURS SCHEDULED
Status: DISCONTINUED | OUTPATIENT
Start: 2022-11-03 | End: 2022-11-03 | Stop reason: HOSPADM

## 2022-11-03 RX ORDER — ONDANSETRON 2 MG/ML
INJECTION INTRAMUSCULAR; INTRAVENOUS
Status: DISCONTINUED | OUTPATIENT
Start: 2022-11-03 | End: 2022-11-03 | Stop reason: HOSPADM

## 2022-11-03 RX ORDER — AMOXICILLIN 250 MG
2 CAPSULE ORAL 2 TIMES DAILY
Status: DISCONTINUED | OUTPATIENT
Start: 2022-11-03 | End: 2022-11-08 | Stop reason: HOSPADM

## 2022-11-03 RX ORDER — EPINEPHRINE 0.1 MG/ML
SYRINGE (ML) INJECTION
Status: DISCONTINUED | OUTPATIENT
Start: 2022-11-03 | End: 2022-11-08 | Stop reason: HOSPADM

## 2022-11-03 RX ORDER — PHENYLEPHRINE HYDROCHLORIDE 10 MG/ML
INJECTION INTRAVENOUS
Status: DISCONTINUED | OUTPATIENT
Start: 2022-11-03 | End: 2022-11-03 | Stop reason: HOSPADM

## 2022-11-03 RX ORDER — FENTANYL CITRATE 50 UG/ML
INJECTION, SOLUTION INTRAMUSCULAR; INTRAVENOUS
Status: DISCONTINUED | OUTPATIENT
Start: 2022-11-03 | End: 2022-11-08 | Stop reason: HOSPADM

## 2022-11-03 RX ORDER — LIDOCAINE HYDROCHLORIDE AND EPINEPHRINE 10; 10 MG/ML; UG/ML
INJECTION, SOLUTION INFILTRATION; PERINEURAL
Status: DISCONTINUED | OUTPATIENT
Start: 2022-11-03 | End: 2022-11-03 | Stop reason: HOSPADM

## 2022-11-03 RX ORDER — FENTANYL CITRATE 50 UG/ML
INJECTION, SOLUTION INTRAMUSCULAR; INTRAVENOUS
Status: DISCONTINUED | OUTPATIENT
Start: 2022-11-03 | End: 2022-11-03 | Stop reason: HOSPADM

## 2022-11-03 RX ORDER — SODIUM CHLORIDE 0.9 % (FLUSH) 0.9 %
10 SYRINGE (ML) INJECTION AS NEEDED
Status: DISCONTINUED | OUTPATIENT
Start: 2022-11-03 | End: 2022-11-08 | Stop reason: HOSPADM

## 2022-11-03 RX ORDER — SODIUM CHLORIDE 0.9 % (FLUSH) 0.9 %
10 SYRINGE (ML) INJECTION EVERY 12 HOURS SCHEDULED
Status: DISCONTINUED | OUTPATIENT
Start: 2022-11-03 | End: 2022-11-08 | Stop reason: HOSPADM

## 2022-11-03 RX ORDER — SODIUM CHLORIDE 9 MG/ML
75 INJECTION, SOLUTION INTRAVENOUS CONTINUOUS
Status: DISCONTINUED | OUTPATIENT
Start: 2022-11-03 | End: 2022-11-05

## 2022-11-03 RX ORDER — SODIUM CHLORIDE 0.9 % (FLUSH) 0.9 %
10 SYRINGE (ML) INJECTION AS NEEDED
Status: DISCONTINUED | OUTPATIENT
Start: 2022-11-03 | End: 2022-11-03 | Stop reason: HOSPADM

## 2022-11-03 RX ORDER — ACETAMINOPHEN 325 MG/1
650 TABLET ORAL EVERY 4 HOURS PRN
Status: DISCONTINUED | OUTPATIENT
Start: 2022-11-03 | End: 2022-11-08 | Stop reason: HOSPADM

## 2022-11-03 RX ORDER — BUMETANIDE 0.25 MG/ML
1 INJECTION INTRAMUSCULAR; INTRAVENOUS ONCE
Status: COMPLETED | OUTPATIENT
Start: 2022-11-03 | End: 2022-11-03

## 2022-11-03 RX ORDER — ACETAMINOPHEN 650 MG/1
650 SUPPOSITORY RECTAL EVERY 4 HOURS PRN
Status: DISCONTINUED | OUTPATIENT
Start: 2022-11-03 | End: 2022-11-08 | Stop reason: HOSPADM

## 2022-11-03 RX ORDER — LEVOTHYROXINE SODIUM 0.07 MG/1
75 TABLET ORAL DAILY
Status: DISCONTINUED | OUTPATIENT
Start: 2022-11-03 | End: 2022-11-08 | Stop reason: HOSPADM

## 2022-11-03 RX ORDER — ONDANSETRON 2 MG/ML
4 INJECTION INTRAMUSCULAR; INTRAVENOUS EVERY 6 HOURS PRN
Status: DISCONTINUED | OUTPATIENT
Start: 2022-11-03 | End: 2022-11-08 | Stop reason: HOSPADM

## 2022-11-03 RX ORDER — MIDAZOLAM HYDROCHLORIDE 1 MG/ML
INJECTION INTRAMUSCULAR; INTRAVENOUS
Status: DISCONTINUED | OUTPATIENT
Start: 2022-11-03 | End: 2022-11-08 | Stop reason: HOSPADM

## 2022-11-03 RX ORDER — PHENYLEPHRINE HCL IN 0.9% NACL 0.5 MG/5ML
.5-3 SYRINGE (ML) INTRAVENOUS
Status: DISCONTINUED | OUTPATIENT
Start: 2022-11-03 | End: 2022-11-04

## 2022-11-03 RX ORDER — POLYETHYLENE GLYCOL 3350 17 G/17G
17 POWDER, FOR SOLUTION ORAL DAILY PRN
Status: DISCONTINUED | OUTPATIENT
Start: 2022-11-03 | End: 2022-11-08 | Stop reason: HOSPADM

## 2022-11-03 RX ORDER — EPINEPHRINE 0.1 MG/ML
SYRINGE (ML) INJECTION
Status: DISCONTINUED | OUTPATIENT
Start: 2022-11-03 | End: 2022-11-03 | Stop reason: HOSPADM

## 2022-11-03 RX ADMIN — ATROPINE SULFATE 1 MG: 0.1 INJECTION INTRAVENOUS at 15:35

## 2022-11-03 RX ADMIN — CEFTRIAXONE SODIUM 1 G: 1 INJECTION, POWDER, FOR SOLUTION INTRAMUSCULAR; INTRAVENOUS at 16:40

## 2022-11-03 RX ADMIN — EPINEPHRINE 0.1 MG: 0.1 INJECTION INTRACARDIAC; INTRAVENOUS at 14:50

## 2022-11-03 RX ADMIN — HYDROCORTISONE SODIUM SUCCINATE 300 MG: 100 INJECTION, POWDER, FOR SOLUTION INTRAMUSCULAR; INTRAVENOUS at 16:31

## 2022-11-03 RX ADMIN — EPINEPHRINE 0.1 MG: 0.1 INJECTION INTRACARDIAC; INTRAVENOUS at 15:27

## 2022-11-03 RX ADMIN — Medication 0.1 MCG/KG/MIN: at 15:55

## 2022-11-03 RX ADMIN — EPINEPHRINE 0.1 MG: 0.1 INJECTION INTRACARDIAC; INTRAVENOUS at 15:15

## 2022-11-03 RX ADMIN — Medication 0.1 MCG/KG/MIN: at 15:48

## 2022-11-03 RX ADMIN — BUMETANIDE 1 MG: 0.25 INJECTION, SOLUTION INTRAMUSCULAR; INTRAVENOUS at 20:21

## 2022-11-03 RX ADMIN — SODIUM CHLORIDE 75 ML/HR: 9 INJECTION, SOLUTION INTRAVENOUS at 10:03

## 2022-11-03 RX ADMIN — Medication 10 ML: at 21:46

## 2022-11-03 RX ADMIN — EPINEPHRINE 0.1 MG: 0.1 INJECTION INTRACARDIAC; INTRAVENOUS at 15:02

## 2022-11-03 RX ADMIN — EPINEPHRINE 0.1 MG: 0.1 INJECTION INTRACARDIAC; INTRAVENOUS at 15:08

## 2022-11-03 RX ADMIN — PHENYLEPHRINE HYDROCHLORIDE 3 MCG/KG/MIN: 50 INJECTION INTRAVENOUS at 15:36

## 2022-11-03 RX ADMIN — HYDROCORTISONE SODIUM SUCCINATE 300 MG: 100 INJECTION, POWDER, FOR SOLUTION INTRAMUSCULAR; INTRAVENOUS at 23:28

## 2022-11-03 NOTE — PLAN OF CARE
Problem: Adult Inpatient Plan of Care  Goal: Plan of Care Review  Outcome: Ongoing, Progressing  Goal: Patient-Specific Goal (Individualized)  Outcome: Ongoing, Progressing  Goal: Absence of Hospital-Acquired Illness or Injury  Outcome: Ongoing, Progressing  Intervention: Identify and Manage Fall Risk  Recent Flowsheet Documentation  Taken 11/3/2022 1800 by Fawn Reddy RN  Safety Promotion/Fall Prevention:   safety round/check completed   clutter free environment maintained  Taken 11/3/2022 1700 by Fawn Reddy RN  Safety Promotion/Fall Prevention:   safety round/check completed   clutter free environment maintained  Taken 11/3/2022 1600 by Fawn Reddy RN  Safety Promotion/Fall Prevention:   safety round/check completed   clutter free environment maintained  Taken 11/3/2022 1500 by Fawn Reddy RN  Safety Promotion/Fall Prevention:   safety round/check completed   clutter free environment maintained  Intervention: Prevent Skin Injury  Recent Flowsheet Documentation  Taken 11/3/2022 1800 by Fawn Reddy RN  Body Position:   tilted   left  Taken 11/3/2022 1500 by Fawn Reddy RN  Body Position: supine, legs elevated  Skin Protection:   skin-to-device areas padded   tubing/devices free from skin contact  Intervention: Prevent and Manage VTE (Venous Thromboembolism) Risk  Recent Flowsheet Documentation  Taken 11/3/2022 1500 by Fawn Reddy RN  Activity Management: bedrest  Range of Motion: ROM (range of motion) performed  Intervention: Prevent Infection  Recent Flowsheet Documentation  Taken 11/3/2022 1800 by Fawn Reddy RN  Infection Prevention: single patient room provided  Taken 11/3/2022 1700 by Fawn Reddy RN  Infection Prevention: single patient room provided  Taken 11/3/2022 1600 by Fawn Reddy RN  Infection Prevention: single patient room provided  Taken 11/3/2022 1500 by Fawn Reddy RN  Infection Prevention: single patient room provided  Goal: Optimal Comfort and  Wellbeing  Outcome: Ongoing, Progressing  Intervention: Provide Person-Centered Care  Recent Flowsheet Documentation  Taken 11/3/2022 1500 by Fawn Reddy RN  Trust Relationship/Rapport:   care explained   thoughts/feelings acknowledged  Goal: Readiness for Transition of Care  Outcome: Ongoing, Progressing  Intervention: Mutually Develop Transition Plan  Recent Flowsheet Documentation  Taken 11/3/2022 1844 by Fawn Reddy, RN  Equipment Currently Used at Home:   cane, quad tip   walker, johnny  Taken 11/3/2022 1500 by Fawn Rdedy, [place]  Transportation Anticipated: family or friend will provide  Transportation Concerns: none  Patient/Family Anticipated Services at Transition: none  Patient/Family Anticipates Transition to: home with family   Goal Outcome Evaluation:  Pt transferred from cathlab around 1400 via bed. On 6L O2. A & O x1 follows command. Currently on epi drip. Orozco in place. Femoral sheath in place. On Bipap now sating 95-96%. Blood culture and urine culture drawn today. Echo done. Vital stable. Will keep monitoring.

## 2022-11-03 NOTE — CONSULTS
Patient Care Team:  Melissa Bolivar MD as PCP - General (Family Medicine)  Ari Hall MD as Referring Physician (Orthopedic Surgery)      Subjective     Patient is a 89 y.o. female.  Whom Dr. Gaston personally brought up from the Cath Lab with hypotension/shock.  This david lady has had A. fib with rapid ventricular response.  She had maker placed 2 weeks ago and had AV node ablation today.  She has known chronic diastolic heart failure with severe mitral regurgitation, hypertension and hyperlipidemia and moderate pulmonary hypertension history of a prior stroke.  She was and pretty good state of health.  She had had a recent urinary tract infection and was on Cipro.  Patient is chronically anticoagulated with Eliquis.  She took her dose last night.  Did not take a dose this morning.  No history of DVT.  Post procedure she developed hypotension.  Dr. Gaston and did an extensive work-up prior to bringing her to the ICU including an echocardiogram that showed a little bit of RV dysfunction and pulmonary hypertension.  Normal to hyperdynamic LV.  There was no pericardial effusion no evidence of cardiac perforation.  I did a CT scan of the abdomen and pelvis.  There was no evidence of bleeding around the right groin catheter site or retroperitoneally.  The CT abdomen was not very remarkable other than some small bilateral pleural effusions.  Patient was hypotensive.  They had given her a total of a liter and a half of fluid/crystalloid.  Blood pressures were still in the 70s.  She received several pushes of Huey-Synephrine and epinephrine.  She was  started in the unit here on a Huey-Synephrine drip.  We will try traded that up with no real response, norepinephrine was added we did not get much response with the first initial dosing or 2 went ahead and changed epinephrine in case she was having some kind of allergic reaction did not get a whole lot of response to this may be minimal on maximum  dose epinephrine and moderate dose of Levophed is well as the Huey-Synephrine.  Went ahead and gave her hydrocortisone and it was after this that her blood pressure started improving.  She does not take steroids.  Has not been on steroids.  Has no history of any adrenal insufficiency.  She actually had a repeat echocardiogram up here in the unit and several cardiologist have reviewed that and they see nothing new to explain her hypotension.  She was getting hypoxic through this and sounding wet x-ray showed a little pulmonary vascular congestion and her CT did show some small pleural effusions.  She got quite a bit of fluid between medicines and the boluses.  We turned up her O2.  Her blood gas showed she is retaining CO2 a little hypoxic.  I put her on a noninvasive ventilator.  Those have improved.  Patient is awake and alert through all of this.  Also jarad cultures blood and urine and started antibiotics with her recent reported UTI cover potential sepsis      Review of Systems:        History  Past Medical History:   Diagnosis Date   • Atrial fibrillation (HCC)    • Chronic diastolic (congestive) heart failure (HCC)    • GERD (gastroesophageal reflux disease)    • Hyperlipidemia    • Hypertension    • Hypothyroidism    • Mitral regurgitation    • Osteoporosis 08/29/2022   • Pulmonary hypertension (HCC)    • Stroke (HCC)    • UTI (urinary tract infection)      Past Surgical History:   Procedure Laterality Date   • ADENOIDECTOMY     • BLADDER SURGERY     • CARDIAC ELECTROPHYSIOLOGY PROCEDURE N/A 10/20/2022    Procedure: Pacemaker SC new--[employer];  Surgeon: Albino Gaston MD;  Location: [facility] INVASIVE LOCATION;  Service: Cardiology;  Laterality: N/A;   • CHOLECYSTECTOMY     • HYSTERECTOMY       Social History     Socioeconomic History   • Marital status:    Tobacco Use   • Smoking status: Never   • Smokeless tobacco: Never   Vaping Use   • Vaping Use: Never used   Substance and Sexual Activity   •  Alcohol use: Yes     Alcohol/week: 1.0 standard drink     Types: 1 Glasses of wine per week     Comment: glass maybe once a month   • Drug use: Never   • Sexual activity: Defer     Family History   Problem Relation Age of Onset   • Heart disease Mother    • Other Mother         fluid in lungs   • Heart disease Father    • Heart attack Father    • Cancer Sister    • Tongue cancer Sister          Allergies:  Amlodipine, Codeine, and Sulfa antibiotics    Medications:  Prior to Admission medications    Medication Sig Start Date End Date Taking? Authorizing Provider   ALPRAZolam (XANAX) 1 MG tablet Take 1 tablet by mouth At Night As Needed for Anxiety. One to two tablets at night 11/1/22  Yes Josie Ramsay MD   apixaban (ELIQUIS) 2.5 MG tablet tablet Take 1 tablet by mouth Every 12 (Twelve) Hours. Indications: Atrial Fibrillation 10/6/22  Yes Michelle Pitts APRN   ciprofloxacin (Cipro) 250 MG tablet Take 1 tablet by mouth 2 (Two) Times a Day. 10/31/22  Yes Melissa Bolivar MD   dilTIAZem (TIAZAC) 120 MG 24 hr capsule  10/7/22  Yes ProviderDonna MD   levothyroxine (SYNTHROID, LEVOTHROID) 75 MCG tablet Take 1 tablet by mouth Daily. 11/2/22  Yes Melissa Bolivar MD   metoprolol tartrate (LOPRESSOR) 25 MG tablet Take 1 tablet by mouth 2 (Two) Times a Day. 10/15/22  Yes Eduardo Kirkland MD   pantoprazole (PROTONIX) 40 MG EC tablet TAKE ONE TABLET DAILY 10/12/22  Yes Melissa Bolivar MD   potassium chloride (K-TAB) 20 MEQ tablet controlled-release ER tablet Take 1 tablet by mouth Daily. 10/21/22  Yes Brennan Nelson MD   sucralfate (CARAFATE) 1 g tablet Take 1 tablet by mouth 2 (Two) Times a Day. 10/31/22  Yes Melissa Bolivar MD   torsemide (DEMADEX) 10 MG tablet Take 1 tablet by mouth Daily. 10/31/22  Yes Melissa Bolivar MD   potassium chloride (K-DUR,KLOR-CON) 20 MEQ CR tablet Take 1 tablet by mouth Daily. 10/21/22 11/3/22 Yes ProviderDonna MD     cefTRIAXone, 1 g, Intravenous,  "Q24H  Hydrocortisone Sod Suc (PF), 300 mg, Intravenous, Q6H  levothyroxine, 75 mcg, Oral, Daily      EPINEPHrine, 0.02-0.3 mcg/kg/min, Last Rate: 0.08 mcg/kg/min (11/03/22 1720)  norepinephrine, 0.02-0.3 mcg/kg/min, Last Rate: Stopped (11/03/22 1705)  phenylephrine, 0.5-3 mcg/kg/min, Last Rate: Stopped (11/03/22 1630)  sodium chloride, 75 mL/hr, Last Rate: 75 mL/hr (11/03/22 1003)        Objective     Vital Signs  Vital Sign Min/Max for last 24 hours  Temp  Min: 93.6 °F (34.2 °C)  Max: 97.7 °F (36.5 °C)   BP  Min: 77/52  Max: 155/80   Pulse  Min: 89  Max: 114   Resp  Min: 8  Max: 19   SpO2  Min: 56 %  Max: 100 %   Flow (L/min)  Min: 8  Max: 8   Weight  Min: 48.1 kg (106 lb)  Max: 48.1 kg (106 lb)       Intake/Output Summary (Last 24 hours) at 11/3/2022 1950  Last data filed at 11/3/2022 1800  Gross per 24 hour   Intake 1500 ml   Output 250 ml   Net 1250 ml     No intake/output data recorded.  Last Weight and Admission Weight        11/03/22  1603   Weight: 48.1 kg (106 lb)     Flowsheet Rows    Flowsheet Row First Filed Value   Admission Height 154.9 cm (61\") Documented at 11/03/2022 1003   Admission Weight 48.1 kg (106 lb) Documented at 11/03/2022 1003          Body mass index is 20.03 kg/m².           Physical Exam:  General Appearance: Elderly white female she came up she was a little ashen appearing hypotensive.  She was still awake and able to talk to us although she is not saying a whole lot.  Her core temperature bladder probe was placed to confirm was 34.6 did place her on a warming blanket  Eyes: Conjunctiva are clear and anicteric pupils are equal and reactive  ENT: Mucous membranes are moist no exudates  Neck: Jugular venous distention lying flat to the angle of the jaw  Lungs: Initially her lungs were pretty clear but as the time went on she started having rales bilaterally  Cardiac: Regular rhythm 90, paced  Abdomen: Soft nontender no palpable hepatosplenomegaly, no masses  : We did place a Orozco " catheter to get about 250 cc of clear urine out  Musculoskeletal: Her extremities were very cool.  She does have a right femoral venous sheath in place  Skin: Dry, cool  Neuro: Patient is awake she is following simple commands  Extremities/P Vascular: She had palpable radial and dorsalis pedis pulses particularly on the pressors was hard to get the distal pulses in the lower extremities.  Initially the radial pulses are always present  MSE: A little anxious      Labs:  Results from last 7 days   Lab Units 11/03/22  1430 10/31/22  1249   GLUCOSE mg/dL 217* 97   SODIUM mmol/L 140 137   POTASSIUM mmol/L 4.0 4.4   TOTAL CO2 mmol/L  --  30.5*   CO2 mmol/L 25.5  --    CHLORIDE mmol/L 105 97*   ANION GAP mmol/L 9.5  --    CREATININE mg/dL 1.14* 1.11*   BUN mg/dL 19 24*   BUN / CREAT RATIO  16.7 21.6   CALCIUM mg/dL 7.3* 9.1   ALK PHOS U/L  --  85   ALT (SGPT) U/L  --  15   AST (SGOT) U/L  --  21   BILIRUBIN mg/dL  --  0.4   ALBUMIN g/dL  --  4.80   A/G RATIO g/dL  --  1.9     Estimated Creatinine Clearance: 25.4 mL/min (A) (by C-G formula based on SCr of 1.14 mg/dL (H)).      Results from last 7 days   Lab Units 11/03/22  1430   WBC 10*3/mm3 5.37   RBC 10*6/mm3 3.66*   HEMOGLOBIN g/dL 9.4*   HEMATOCRIT % 30.3*   MCV fL 82.8   MCH pg 25.7*   MCHC g/dL 31.0*   RDW % 13.2   RDW-SD fl 39.9   MPV fL 10.0   PLATELETS 10*3/mm3 244   NEUTROPHIL % % 46.3   LYMPHOCYTE % % 37.6   MONOCYTES % % 12.8*   EOSINOPHIL % % 2.0   BASOPHIL % % 0.7   IMM GRAN % % 0.6*   NEUTROS ABS 10*3/mm3 2.48   LYMPHS ABS 10*3/mm3 2.02   MONOS ABS 10*3/mm3 0.69   EOS ABS 10*3/mm3 0.11   BASOS ABS 10*3/mm3 0.04   IMMATURE GRANS (ABS) 10*3/mm3 0.03   NRBC /100 WBC 0.0     Results from last 7 days   Lab Units 11/03/22  1912   PH, ARTERIAL pH units 7.315*   PO2 ART mm Hg 138.6*   PCO2, ARTERIAL mm Hg 49.2*   HCO3 ART mmol/L 25.1             Results from last 7 days   Lab Units 10/31/22  1249   TSH uIU/mL 7.590*   FREE T4 ng/dL 1.60     Results from last 7 days    Lab Units 11/03/22  1628   LACTATE mmol/L 1.6         Microbiology Results (last 10 days)     ** No results found for the last 240 hours. **            Diagnostics:  Adult Transthoracic Echo Complete W/ Cont if Necessary Per Protocol    Result Date: 11/3/2022  •  Left ventricular systolic function is normal. Calculated left ventricular EF = 65.5% •  Mildly reduced right ventricular systolic function noted. •  The right ventricular cavity is moderately dilated. •  Left atrial volume is severely increased. •  The right atrial cavity is moderately  dilated. •  Severe mitral valve regurgitation is present. •  Moderate to severe tricuspid valve regurgitation is present. •  Calculated right ventricular systolic pressure from tricuspid regurgitation is 48 mmHg. •  Moderate pulmonary hypertension is present.     CT Abdomen Pelvis Without Contrast    Result Date: 11/3/2022  CT ABDOMEN AND PELVIS WITHOUT IV CONTRAST  HISTORY: 89-year-old female with hypotension at time of cardiac catheterization. Evaluate for retroperitoneal bleed.  TECHNIQUE: Radiation dose reduction techniques were utilized, including automated exposure control and exposure modulation based on body size. 3 mm images were obtained through the abdomen and pelvis without the administration of IV contrast. Compared with previous noncontrasted CT 09/13/2022.  FINDINGS: There is a right common femoral vein catheter in place which extends to the intrahepatic IVC. There is a tiny amount of fluid at the subcutaneous tissues of the right groin. There is no fluid collection. There is no retroperitoneal hemorrhage. There is a tiny sliver of perihepatic and perisplenic fluid. There are no fluid collections. There is no new abnormality at the noncontrasted liver, spleen, pancreas, adrenals, or kidneys. There is no acute bowel abnormality. Extensive streak artifact is noted from the left hip arthroplasty hardware. At the visualized lower chest, there is significantly  improved aeration with decrease in the perihilar airspace consolidations at the lower lobes, right middle lobe, and lingula. There are are small bilateral pleural effusions which are slightly smaller than previously.      There is no retroperitoneal hemorrhage or hematoma. There is expected postsurgical change at the right groin.      Cardioversion External in Cardiology Department    Result Date: 10/14/2022  •  Post cardioversion the patient displayed a sinus rhythm. •  The cardioversion was successful.     XR Chest 2 View    Result Date: 10/18/2022  XR CHEST 2 VW-  HISTORY: Female who is 89 years-old,  pleural effusion  TECHNIQUE: Frontal and lateral views of the chest  COMPARISON: 9/18/2022  FINDINGS: Heart size is normal. Aorta is calcified. Pulmonary vasculature is unremarkable. Mild right, mild to moderate left pleural effusions are present interval worsening. Mild likely atelectasis at the bases. No pneumothorax. No acute osseous process.      Mild right and mild to moderate left pleural effusions with mild likely atelectasis at the bases.  This report was finalized on 10/18/2022 9:01 PM by Dr. Rashad Mccartney M.D.      XR Chest 1 View    Result Date: 11/3/2022  XR CHEST 1 VW-  Clinical: Dyspnea  COMPARISON examination 10/18/2022  FINDINGS: There is transvenous pacemaker now in position. Lead superimposes projected area of the right ventricle. No pneumothorax. There is cardiac enlargement. Diffuse increase in the overall interstitial pattern with vascular congestion. There is a left-sided pleural effusion. Bilateral breast implants in position. Mediastinum and helene are stable. The remainder is unremarkable.  This report was finalized on 11/3/2022 3:40 PM by Dr. Paul Mckeon M.D.      EP/CRM Study    Result Date: 10/21/2022  Successful lbb area pacer     Results for orders placed during the hospital encounter of 11/03/22    Adult Transthoracic Echo Complete W/ Cont if Necessary Per  Protocol    Interpretation Summary  •  Left ventricular systolic function is normal. Calculated left ventricular EF = 65.5%  •  Mildly reduced right ventricular systolic function noted.  •  The right ventricular cavity is moderately dilated.  •  Left atrial volume is severely increased.  •  The right atrial cavity is moderately  dilated.  •  Severe mitral valve regurgitation is present.  •  Moderate to severe tricuspid valve regurgitation is present.  •  Calculated right ventricular systolic pressure from tricuspid regurgitation is 48 mmHg.  •  Moderate pulmonary hypertension is present.          Assessment & Plan     1. Shock etiology unclear.  We see no evidence of hemorrhage.  No tamponade.  No acute cardiac decompensation.  I do not think she is septic.  Could not entirely rule that out.  We got her on empiric antibiotics and have cultures pending.  Could she be adrenally insufficient.  I do not know why and why what a cure acutely with this minor procedure when she had her pacemaker placed 2 weeks ago without problems.  She did seem to get a whole lot better after steroids.  I gave her a big dose.  We will get a watch and see what she does tomorrow.  We will get a continue to try and wean off the vasopressors tonight  2. Acute hypoxemic and hypercapnic respiratory failure think this is due to to volume overload, CHF.  She is better with noninvasive ventilator.  She does not like it.  If we can diurese her little bit.  I think we can probably get her off.  3. Acute on chronic CHF diastolic  4. Severe mitral regurgitation and moderate to severe tricuspid regurgitation  5. 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  We did entertain the possibility of a pulmonary embolus.  Her RV pressures.  Did not look any worse may be better than previously RV looks the same and she had been on Eliquis seem to be unlikely.  6. Persistent A. fib status post pacemaker placement approximately 2  weeks ago and ablation today  7. Anemia mild hemoglobin was 9.4 after the fluids today.  She was 10.3 a couple of weeks ago.  Again we looked extensively for evidence of bleeding and did not see any we will monitor her hemoglobin  8. Renal insufficiency her creatinine was 0.7 a couple of weeks ago on the 31st it was 1.11 and today they is 1.14 not sure why the elevation would expect we may be a little worse after this hypotension.  We will have to follow.          Aaron Figueroa Jr, MD  11/03/22  19:50 EDT    Time: I have spent 2 hours and 55 minutes in critical care time with the patient today

## 2022-11-03 NOTE — INTERVAL H&P NOTE
H&P reviewed. The patient was examined and there are no changes to the H&P.  Today we will do AV node ablation as was the plan with the pacemaker 2 weeks ago.  Her pacemaker site is well-healed.

## 2022-11-04 ENCOUNTER — APPOINTMENT (OUTPATIENT)
Dept: GENERAL RADIOLOGY | Facility: HOSPITAL | Age: 87
End: 2022-11-04

## 2022-11-04 LAB
ANION GAP SERPL CALCULATED.3IONS-SCNC: 14.4 MMOL/L (ref 5–15)
BUN SERPL-MCNC: 23 MG/DL (ref 8–23)
BUN/CREAT SERPL: 18.3 (ref 7–25)
CALCIUM SPEC-SCNC: 7.4 MG/DL (ref 8.6–10.5)
CHLORIDE SERPL-SCNC: 104 MMOL/L (ref 98–107)
CO2 SERPL-SCNC: 23.6 MMOL/L (ref 22–29)
CREAT SERPL-MCNC: 1.26 MG/DL (ref 0.57–1)
D-LACTATE SERPL-SCNC: 3.1 MMOL/L (ref 0.5–2)
D-LACTATE SERPL-SCNC: 3.1 MMOL/L (ref 0.5–2)
D-LACTATE SERPL-SCNC: 3.2 MMOL/L (ref 0.5–2)
D-LACTATE SERPL-SCNC: 3.2 MMOL/L (ref 0.5–2)
D-LACTATE SERPL-SCNC: 3.4 MMOL/L (ref 0.5–2)
D-LACTATE SERPL-SCNC: 6.9 MMOL/L (ref 0.5–2)
DEPRECATED RDW RBC AUTO: 39.8 FL (ref 37–54)
EGFRCR SERPLBLD CKD-EPI 2021: 40.9 ML/MIN/1.73
ERYTHROCYTE [DISTWIDTH] IN BLOOD BY AUTOMATED COUNT: 13.5 % (ref 12.3–15.4)
GLUCOSE BLDC GLUCOMTR-MCNC: 122 MG/DL (ref 70–130)
GLUCOSE BLDC GLUCOMTR-MCNC: 185 MG/DL (ref 70–130)
GLUCOSE BLDC GLUCOMTR-MCNC: 191 MG/DL (ref 70–130)
GLUCOSE BLDC GLUCOMTR-MCNC: 197 MG/DL (ref 70–130)
GLUCOSE SERPL-MCNC: 172 MG/DL (ref 65–99)
HCT VFR BLD AUTO: 29.7 % (ref 34–46.6)
HGB BLD-MCNC: 9.7 G/DL (ref 12–15.9)
MCH RBC QN AUTO: 26.6 PG (ref 26.6–33)
MCHC RBC AUTO-ENTMCNC: 32.7 G/DL (ref 31.5–35.7)
MCV RBC AUTO: 81.6 FL (ref 79–97)
PLATELET # BLD AUTO: 297 10*3/MM3 (ref 140–450)
PMV BLD AUTO: 10.3 FL (ref 6–12)
POTASSIUM SERPL-SCNC: 3.4 MMOL/L (ref 3.5–5.2)
POTASSIUM SERPL-SCNC: 4.2 MMOL/L (ref 3.5–5.2)
RBC # BLD AUTO: 3.64 10*6/MM3 (ref 3.77–5.28)
SODIUM SERPL-SCNC: 142 MMOL/L (ref 136–145)
WBC NRBC COR # BLD: 8.7 10*3/MM3 (ref 3.4–10.8)

## 2022-11-04 PROCEDURE — 71045 X-RAY EXAM CHEST 1 VIEW: CPT

## 2022-11-04 PROCEDURE — 84132 ASSAY OF SERUM POTASSIUM: CPT | Performed by: INTERNAL MEDICINE

## 2022-11-04 PROCEDURE — 94799 UNLISTED PULMONARY SVC/PX: CPT

## 2022-11-04 PROCEDURE — 93005 ELECTROCARDIOGRAM TRACING: CPT | Performed by: NURSE PRACTITIONER

## 2022-11-04 PROCEDURE — 80048 BASIC METABOLIC PNL TOTAL CA: CPT | Performed by: INTERNAL MEDICINE

## 2022-11-04 PROCEDURE — 83605 ASSAY OF LACTIC ACID: CPT | Performed by: INTERNAL MEDICINE

## 2022-11-04 PROCEDURE — 93010 ELECTROCARDIOGRAM REPORT: CPT | Performed by: INTERNAL MEDICINE

## 2022-11-04 PROCEDURE — 25010000002 CEFTRIAXONE PER 250 MG: Performed by: NURSE PRACTITIONER

## 2022-11-04 PROCEDURE — 99232 SBSQ HOSP IP/OBS MODERATE 35: CPT | Performed by: NURSE PRACTITIONER

## 2022-11-04 PROCEDURE — 85027 COMPLETE CBC AUTOMATED: CPT | Performed by: INTERNAL MEDICINE

## 2022-11-04 PROCEDURE — 25010000002 HYDROCORTISONE SOD SUC (PF) 100 MG RECONSTITUTED SOLUTION: Performed by: NURSE PRACTITIONER

## 2022-11-04 PROCEDURE — 82962 GLUCOSE BLOOD TEST: CPT

## 2022-11-04 PROCEDURE — 94660 CPAP INITIATION&MGMT: CPT

## 2022-11-04 RX ORDER — POTASSIUM CHLORIDE 750 MG/1
40 TABLET, FILM COATED, EXTENDED RELEASE ORAL AS NEEDED
Status: DISCONTINUED | OUTPATIENT
Start: 2022-11-04 | End: 2022-11-08 | Stop reason: HOSPADM

## 2022-11-04 RX ORDER — POTASSIUM CHLORIDE 7.45 MG/ML
10 INJECTION INTRAVENOUS
Status: DISCONTINUED | OUTPATIENT
Start: 2022-11-04 | End: 2022-11-08 | Stop reason: HOSPADM

## 2022-11-04 RX ORDER — ALPRAZOLAM 0.5 MG/1
1 TABLET ORAL NIGHTLY PRN
Status: DISCONTINUED | OUTPATIENT
Start: 2022-11-04 | End: 2022-11-08 | Stop reason: HOSPADM

## 2022-11-04 RX ORDER — PANTOPRAZOLE SODIUM 40 MG/1
40 TABLET, DELAYED RELEASE ORAL
Status: DISCONTINUED | OUTPATIENT
Start: 2022-11-04 | End: 2022-11-08 | Stop reason: HOSPADM

## 2022-11-04 RX ORDER — POTASSIUM CHLORIDE 1.5 G/1.77G
40 POWDER, FOR SOLUTION ORAL AS NEEDED
Status: DISCONTINUED | OUTPATIENT
Start: 2022-11-04 | End: 2022-11-08 | Stop reason: HOSPADM

## 2022-11-04 RX ORDER — MAGNESIUM SULFATE HEPTAHYDRATE 40 MG/ML
4 INJECTION, SOLUTION INTRAVENOUS AS NEEDED
Status: DISCONTINUED | OUTPATIENT
Start: 2022-11-04 | End: 2022-11-08 | Stop reason: HOSPADM

## 2022-11-04 RX ORDER — MAGNESIUM SULFATE HEPTAHYDRATE 40 MG/ML
2 INJECTION, SOLUTION INTRAVENOUS AS NEEDED
Status: DISCONTINUED | OUTPATIENT
Start: 2022-11-04 | End: 2022-11-08 | Stop reason: HOSPADM

## 2022-11-04 RX ORDER — CALCIUM CARBONATE 200(500)MG
2 TABLET,CHEWABLE ORAL 3 TIMES DAILY PRN
Status: DISCONTINUED | OUTPATIENT
Start: 2022-11-04 | End: 2022-11-08 | Stop reason: HOSPADM

## 2022-11-04 RX ADMIN — DOCUSATE SODIUM 50MG AND SENNOSIDES 8.6MG 2 TABLET: 8.6; 5 TABLET, FILM COATED ORAL at 09:00

## 2022-11-04 RX ADMIN — CEFTRIAXONE SODIUM 1 G: 1 INJECTION, POWDER, FOR SOLUTION INTRAMUSCULAR; INTRAVENOUS at 15:55

## 2022-11-04 RX ADMIN — LEVOTHYROXINE SODIUM 75 MCG: 0.07 TABLET ORAL at 09:00

## 2022-11-04 RX ADMIN — PANTOPRAZOLE SODIUM 40 MG: 40 TABLET, DELAYED RELEASE ORAL at 14:57

## 2022-11-04 RX ADMIN — Medication 10 ML: at 20:53

## 2022-11-04 RX ADMIN — POTASSIUM CHLORIDE 40 MEQ: 750 TABLET, EXTENDED RELEASE ORAL at 09:00

## 2022-11-04 RX ADMIN — ACETAMINOPHEN 650 MG: 325 TABLET, FILM COATED ORAL at 11:06

## 2022-11-04 RX ADMIN — ANTACID TABLETS 2 TABLET: 500 TABLET, CHEWABLE ORAL at 23:10

## 2022-11-04 RX ADMIN — APIXABAN 2.5 MG: 2.5 TABLET, FILM COATED ORAL at 20:53

## 2022-11-04 RX ADMIN — Medication 10 ML: at 09:00

## 2022-11-04 RX ADMIN — HYDROCORTISONE SODIUM SUCCINATE 300 MG: 100 INJECTION, POWDER, FOR SOLUTION INTRAMUSCULAR; INTRAVENOUS at 05:17

## 2022-11-04 RX ADMIN — ALPRAZOLAM 1 MG: 0.5 TABLET ORAL at 23:10

## 2022-11-04 NOTE — PROGRESS NOTES
LOS: 1 day   Patient Care Team:  Melissa Bolivar MD as PCP - General (Family Medicine)  Ari Hall MD as Referring Physician (Orthopedic Surgery)    Subjective     Patient continues to improve I came in earlier she was still on 0.4 of epinephrine.  Blood pressure was great.  We turned that off.  Its been off now for an hour or 2 and her blood pressure is doing very well.  They have been weaning her oxygen.  She is on 4 L.  We turned that down to 2 L.  She still sat 97-98 we can take her off her oxygen.  She is hungry and wants to eat.    Review of Systems:          Objective     Vital Signs  Vital Sign Min/Max for last 24 hours  Temp  Min: 87.8 °F (31 °C)  Max: 99 °F (37.2 °C)   BP  Min: 77/52  Max: 155/80   Pulse  Min: 89  Max: 114   Resp  Min: 8  Max: 19   SpO2  Min: 56 %  Max: 100 %   Flow (L/min)  Min: 8  Max: 8   Weight  Min: 48.1 kg (106 lb)  Max: 55.6 kg (122 lb 9.2 oz)        Ventilator/Non-Invasive Ventilation Settings (From admission, onward)     Start     Ordered    11/03/22 1709  NIPPV (CPAP or BIPAP)  Until Discontinued        Question Answer Comment   Indication: Acute Respiratory Failure    Type: BIPAP    IPAP 18    EPAP 8    Breath Rate 18        11/03/22 1709                             Body mass index is 23.16 kg/m².  I/O last 3 completed shifts:  In: 1500 [I.V.:1500]  Out: 250 [Urine:250]  I/O this shift:  In: 121.6 [I.V.:121.6]  Out: 925 [Urine:925]        Physical Exam:  General Appearance: Well-developed older white female resting in bed she does not look in any acute distress and she looks like a totally different person today  Eyes: Conjunctival are clear and anicteric, pupils equal  ENT: Mucous membranes are moist no erythema no exudate  Neck: No jugular venous tension, no adenopathy  Lungs: Clear nonlabored symmetric expansion no wheezes rales or rhonchi  Cardiac: Regular rate rhythm no murmur no gallop  Abdomen: Soft nontender no palpable hepatosplenomegaly or  masses  :Orozco catheter dependent drainage  Musculoskeletal: Grossly normal right femoral central venous catheter sheath site looks good  Skin: Warm and dry no jaundice no petechiae  Neuro: She is alert and oriented she is cooperative she is following commands  Extremities/P Vascular: No clubbing no cyanosis no edema, palpable radial and dorsalis pedis pulses  MSE: Seems to be in pretty good spirits       Labs:  Results from last 7 days   Lab Units 11/04/22 0255 11/03/22  1430 10/31/22  1249   GLUCOSE mg/dL 172* 217* 97   SODIUM mmol/L 142 140 137   POTASSIUM mmol/L 3.4* 4.0 4.4   TOTAL CO2 mmol/L  --   --  30.5*   CO2 mmol/L 23.6 25.5  --    CHLORIDE mmol/L 104 105 97*   ANION GAP mmol/L 14.4 9.5  --    CREATININE mg/dL 1.26* 1.14* 1.11*   BUN mg/dL 23 19 24*   BUN / CREAT RATIO  18.3 16.7 21.6   CALCIUM mg/dL 7.4* 7.3* 9.1   ALK PHOS U/L  --   --  85   ALT (SGPT) U/L  --   --  15   AST (SGOT) U/L  --   --  21   BILIRUBIN mg/dL  --   --  0.4   ALBUMIN g/dL  --   --  4.80   A/G RATIO g/dL  --   --  1.9     Estimated Creatinine Clearance: 26.6 mL/min (A) (by C-G formula based on SCr of 1.26 mg/dL (H)).      Results from last 7 days   Lab Units 11/04/22 0255 11/03/22  1430   WBC 10*3/mm3 8.70 5.37   RBC 10*6/mm3 3.64* 3.66*   HEMOGLOBIN g/dL 9.7* 9.4*   HEMATOCRIT % 29.7* 30.3*   MCV fL 81.6 82.8   MCH pg 26.6 25.7*   MCHC g/dL 32.7 31.0*   RDW % 13.5 13.2   RDW-SD fl 39.8 39.9   MPV fL 10.3 10.0   PLATELETS 10*3/mm3 297 244   NEUTROPHIL % %  --  46.3   LYMPHOCYTE % %  --  37.6   MONOCYTES % %  --  12.8*   EOSINOPHIL % %  --  2.0   BASOPHIL % %  --  0.7   IMM GRAN % %  --  0.6*   NEUTROS ABS 10*3/mm3  --  2.48   LYMPHS ABS 10*3/mm3  --  2.02   MONOS ABS 10*3/mm3  --  0.69   EOS ABS 10*3/mm3  --  0.11   BASOS ABS 10*3/mm3  --  0.04   IMMATURE GRANS (ABS) 10*3/mm3  --  0.03   NRBC /100 WBC  --  0.0     Results from last 7 days   Lab Units 11/03/22 1912   PH, ARTERIAL pH units 7.315*   PO2 ART mm Hg 138.6*   PCO2,  ARTERIAL mm Hg 49.2*   HCO3 ART mmol/L 25.1             Results from last 7 days   Lab Units 10/31/22  1249   TSH uIU/mL 7.590*   FREE T4 ng/dL 1.60     Results from last 7 days   Lab Units 11/04/22  0255 11/03/22  2145 11/03/22  1628   LACTATE mmol/L 3.4*  --  1.6   PROCALCITONIN ng/mL  --  0.27*  --          Microbiology Results (last 10 days)     ** No results found for the last 240 hours. **              cefTRIAXone, 1 g, Intravenous, Q24H  levothyroxine, 75 mcg, Oral, Daily  senna-docusate sodium, 2 tablet, Oral, BID  sodium chloride, 10 mL, Intravenous, Q12H      EPINEPHrine, 0.02-0.3 mcg/kg/min, Last Rate: 0.04 mcg/kg/min (11/03/22 8429)  sodium chloride, 75 mL/hr, Last Rate: 75 mL/hr (11/03/22 1003)        Diagnostics:  Adult Transthoracic Echo Complete W/ Cont if Necessary Per Protocol    Result Date: 11/3/2022  •  Left ventricular systolic function is normal. Calculated left ventricular EF = 65.5% •  Mildly reduced right ventricular systolic function noted. •  The right ventricular cavity is moderately dilated. •  Left atrial volume is severely increased. •  The right atrial cavity is moderately  dilated. •  Severe mitral valve regurgitation is present. •  Moderate to severe tricuspid valve regurgitation is present. •  Calculated right ventricular systolic pressure from tricuspid regurgitation is 48 mmHg. •  Moderate pulmonary hypertension is present.     CT Abdomen Pelvis Without Contrast    Result Date: 11/3/2022  CT ABDOMEN AND PELVIS WITHOUT IV CONTRAST  HISTORY: 89-year-old female with hypotension at time of cardiac catheterization. Evaluate for retroperitoneal bleed.  TECHNIQUE: Radiation dose reduction techniques were utilized, including automated exposure control and exposure modulation based on body size. 3 mm images were obtained through the abdomen and pelvis without the administration of IV contrast. Compared with previous noncontrasted CT 09/13/2022.  FINDINGS: There is a right common femoral  vein catheter in place which extends to the intrahepatic IVC. There is a tiny amount of fluid at the subcutaneous tissues of the right groin. There is no fluid collection. There is no retroperitoneal hemorrhage. There is a tiny sliver of perihepatic and perisplenic fluid. There are no fluid collections. There is no new abnormality at the noncontrasted liver, spleen, pancreas, adrenals, or kidneys. There is no acute bowel abnormality. Extensive streak artifact is noted from the left hip arthroplasty hardware. At the visualized lower chest, there is significantly improved aeration with decrease in the perihilar airspace consolidations at the lower lobes, right middle lobe, and lingula. There are are small bilateral pleural effusions which are slightly smaller than previously.      There is no retroperitoneal hemorrhage or hematoma. There is expected postsurgical change at the right groin.      Cardioversion External in Cardiology Department    Result Date: 10/14/2022  •  Post cardioversion the patient displayed a sinus rhythm. •  The cardioversion was successful.     XR Chest 2 View    Result Date: 10/18/2022  XR CHEST 2 VW-  HISTORY: Female who is 89 years-old,  pleural effusion  TECHNIQUE: Frontal and lateral views of the chest  COMPARISON: 9/18/2022  FINDINGS: Heart size is normal. Aorta is calcified. Pulmonary vasculature is unremarkable. Mild right, mild to moderate left pleural effusions are present interval worsening. Mild likely atelectasis at the bases. No pneumothorax. No acute osseous process.      Mild right and mild to moderate left pleural effusions with mild likely atelectasis at the bases.  This report was finalized on 10/18/2022 9:01 PM by Dr. Rashad Mccartney M.D.      XR Chest 1 View    Result Date: 11/4/2022  XR CHEST 1 VW-clinical: Follow-up respiratory failure  COMPARISON examination 11/3/2022  FINDINGS: Cardiac enlargement similar to the previous examination. Transvenous pacemaker in place.  Femoral venous catheter also in position as before. No pulmonary edema, pleural effusion or acute airspace disease has developed.  CONCLUSION: Stable chest  This report was finalized on 11/4/2022 6:22 AM by Dr. Paul Mckeon M.D.      XR Chest 1 View    Result Date: 11/3/2022  XR CHEST 1 VW-  Clinical: Dyspnea  COMPARISON examination 10/18/2022  FINDINGS: There is transvenous pacemaker now in position. Lead superimposes projected area of the right ventricle. No pneumothorax. There is cardiac enlargement. Diffuse increase in the overall interstitial pattern with vascular congestion. There is a left-sided pleural effusion. Bilateral breast implants in position. Mediastinum and helene are stable. The remainder is unremarkable.  This report was finalized on 11/3/2022 3:40 PM by Dr. Paul Mckeon M.D.      EP/CRM Study    Result Date: 10/21/2022  Successful lbb area pacer     Results for orders placed during the hospital encounter of 11/03/22    Adult Transthoracic Echo Complete W/ Cont if Necessary Per Protocol    Interpretation Summary  •  Left ventricular systolic function is normal. Calculated left ventricular EF = 65.5%  •  Mildly reduced right ventricular systolic function noted.  •  The right ventricular cavity is moderately dilated.  •  Left atrial volume is severely increased.  •  The right atrial cavity is moderately  dilated.  •  Severe mitral valve regurgitation is present.  •  Moderate to severe tricuspid valve regurgitation is present.  •  Calculated right ventricular systolic pressure from tricuspid regurgitation is 48 mmHg.  •  Moderate pulmonary hypertension is present.      Chest x-ray reviewed and do not see significant infiltrates or edema, no effusions    Active Hospital Problems    Diagnosis  POA   • **Acute diastolic CHF (congestive heart failure) (HCC) [I50.31]  Unknown   • Atrial fibrillation (HCC) [I48.91]  Unknown      Resolved Hospital Problems   No resolved problems to display.          Assessment & Plan     1. Shock etiology unclear.  We see no evidence of hemorrhage.  No tamponade.  No acute cardiac decompensation.  I do not think she is septic.  Could not entirely rule that out.  We got her on empiric antibiotics and have cultures thus far negative.  She had a trivially elevated procalcitonin with renal insufficiency.  I am not sure that means anything.  Her urinalysis was clear.  Could she be adrenally insufficient.  I do not know why and why  acutely with this minor procedure when she had her pacemaker placed 2 weeks ago without problems would develop this.  She did seem to get a whole lot better after steroids.  She is now off of pressors and doing well.  We will see how she does today.  At this point in time I do not plan on giving her any further steroids.  2. Acute hypoxemic and hypercapnic respiratory failure think this is due to to volume overload, CHF.    Much improved.  Chest is clear now both on exam and radiograph and her oxygenation is back to normal  3. Acute on chronic CHF diastolic better  4. Severe mitral regurgitation and moderate to severe tricuspid regurgitation  5. 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  We did entertain the possibility of a pulmonary embolus.  Her RV pressures.  Did not look any worse may be better than previously RV looks the same and she had been on Eliquis seem to be unlikely.  6. Persistent A. fib status post pacemaker placement approximately 2 weeks ago and ablation today.  Probably restart Eliquis later today if okay with Dr. Gaston  7. Anemia mild hemoglobin was 9.4 after the fluids today.  She was 10.3 a couple of weeks ago.  Again we looked extensively for evidence of bleeding this morning.  Hemoglobin is stable at 9.7  8. Renal insufficiency her creatinine was 0.7 a couple of weeks ago on the 31st it was 1.11 and today they is 1.26 not sure why the elevation prior to her procedure yesterday and I  am amazed that the creatinine only went up as minimally as it did given the amount of vasopressors.  She required to maintain blood pressure.  9. Lactic acidosis.  She does have a mild lactic acid .  We will follow-up  10. Question infection.  I have not really seen a source.  She had a trivially elevated procalcitonin.  Again with the renal dysfunction.  I am not sure that means anything, normal white count.  I will give a day for the cultures.  We will recheck a procalcitonin in the morning.  Hopefully then can safely discontinue antibiotics  11. Hyperglycemia mild.  This is secondary to the stress dose steroids.  She got high-dose.  We will just monitor    Dr. Gaston before he left yesterday ask us to get that femoral sheath out as soon as possible this morning since she is off vasopressors,  I think we can safely do that.    Plan for disposition: Patient has been turning around rapidly if she is stable off of these medications later this afternoon.  She could transfer out of the ICU    Aaron Figueroa Jr, MD  11/04/22  06:44 EDT    Time: Critical care time 36 minutes between my multiple visits already this morning

## 2022-11-04 NOTE — PROGRESS NOTES
Electrophysiology Follow-Up Note      Patient Name: Demetra Rush  Age/Sex: 89 y.o. female  : 1933  MRN: [id number]      Day of Service: 22       Chief Complaint/Follow-up: AF w/RVR, s/p AV node ablation 11/3, complicated by prolonged hypotension    Interval History: No acute events overnight, some mild confusion overnight, off bipap on 1 liter oxygen NC and off pressors, looks much better, she is hungry.      Temp:  [87.8 °F (31 °C)-99 °F (37.2 °C)] 98.2 °F (36.8 °C)  Heart Rate:  [] 90  Resp:  [8-19] 16  BP: ()/() 112/60     PHYSICAL EXAM:    General Appearance: No acute distress, thin, frail, elderly female  Eyes: Conjunctiva and lids: No erythema, swelling, or discharge. Sclera non-icteric.   HENT: Atraumatic, normocephalic. External eyes, ears, and nose normal.   Respiratory: No signs of respiratory distress. Respiration rhythm and depth normal.   Cardiovascular:  Heart Rate and Rhythm: Normal, Heart Sounds: S1 and S2.   Arterial Pulses: Posterior tibialis and dorsalis pedis pulses normal.   Lower Extremities: No edema noted.  Gastrointestinal:  Abdomen flat, non-tender.   Musculoskeletal: Normal movement of extremities  Skin: Warm and dry.   Psychiatric: Patient alert and cooperative       ECG/TELE:           Results from last 7 days   Lab Units 22  1430 22  1430 10/31/22  1249   SODIUM mmol/L 142  --  140 137   POTASSIUM mmol/L 3.4*  --  4.0 4.4   CHLORIDE mmol/L 104  --  105 97*   TOTAL CO2 mmol/L  --   --   --  30.5*   CO2 mmol/L 23.6  --  25.5  --    BUN mg/dL 23  --  19 24*   CREATININE mg/dL 1.26*  --  1.14* 1.11*   GLUCOSE mg/dL 172*   < > 217* 97   CALCIUM mg/dL 7.4*  --  7.3* 9.1    < > = values in this interval not displayed.     Results from last 7 days   Lab Units 22  02522  1430   WBC 10*3/mm3 8.70 5.37   HEMOGLOBIN g/dL 9.7* 9.4*   HEMATOCRIT % 29.7* 30.3*   PLATELETS 10*3/mm3 297 244             Results from  last 7 days   Lab Units 10/31/22  1249   TSH uIU/mL 7.590*           Current Medications:   Scheduled Meds:apixaban, 2.5 mg, Oral, Q12H  cefTRIAXone, 1 g, Intravenous, Q24H  levothyroxine, 75 mcg, Oral, Daily  senna-docusate sodium, 2 tablet, Oral, BID  sodium chloride, 10 mL, Intravenous, Q12H            Acute diastolic CHF (congestive heart failure) (HCC)    Atrial fibrillation (HCC)       Plan:     ---Perm afib w/RVR, s/p staged PPM 10/20/2022, AV node ablation 11/3---procedure complicated by acute hypotension and shock of unclear etiology    No evidence of hemorrhage, pericardial effusion/tamponade---does not appear to be septic but could not completely rule out so intensivist treating with empiric antibiotics.    No RV strain, she took last dose of apixaban night prior to procedure yesterday, so unlikely PE.     Yesterday she required 3 pressors, hypotension unresponsive to 1.5 L of IVF's, 1 mg of atropine in case she was persistently vagal--gave steroids and she seem to get better--?adrenal crisis/insufficiency    She did get volume overloaded and developed acute hypoxic/hypercapnic resp failure---mostly like secondary to volume overload, was on bipap for a short time--responded well to dose of IV Bumex.     She is much improved this morning, Dr. Gaston pulled right groin venous sheath without issue, tolerated will. Discussed with Dr. Figueroa, will plan on restarting AC later today, get her up moving and monitor, probably transfer out to floor tomorrow if continues to do well.     Appreciate Dr. Figueroa's assistance.     Mary Beth Teran, APRN  11/04/22  08:57 EDT

## 2022-11-04 NOTE — PLAN OF CARE
Problem: Adult Inpatient Plan of Care  Goal: Plan of Care Review  Outcome: Ongoing, Progressing  Goal: Patient-Specific Goal (Individualized)  Outcome: Ongoing, Progressing  Goal: Absence of Hospital-Acquired Illness or Injury  Outcome: Ongoing, Progressing  Intervention: Identify and Manage Fall Risk  Recent Flowsheet Documentation  Taken 11/4/2022 1900 by Fawn Reddy RN  Safety Promotion/Fall Prevention:   safety round/check completed   clutter free environment maintained  Taken 11/4/2022 1800 by Fawn Reddy RN  Safety Promotion/Fall Prevention:   safety round/check completed   clutter free environment maintained  Taken 11/4/2022 1700 by Fawn Reddy RN  Safety Promotion/Fall Prevention:   safety round/check completed   clutter free environment maintained  Taken 11/4/2022 1600 by Fawn Reddy RN  Safety Promotion/Fall Prevention:   safety round/check completed   clutter free environment maintained  Taken 11/4/2022 1500 by Fawn Reddy RN  Safety Promotion/Fall Prevention:   safety round/check completed   clutter free environment maintained  Taken 11/4/2022 1415 by Fawn Reddy RN  Safety Promotion/Fall Prevention:   safety round/check completed   clutter free environment maintained  Taken 11/4/2022 1300 by Fawn Reddy RN  Safety Promotion/Fall Prevention:   safety round/check completed   clutter free environment maintained  Taken 11/4/2022 1200 by Fawn Reddy RN  Safety Promotion/Fall Prevention:   safety round/check completed   clutter free environment maintained  Taken 11/4/2022 1100 by Fawn Reddy RN  Safety Promotion/Fall Prevention:   safety round/check completed   clutter free environment maintained  Taken 11/4/2022 1000 by Fawn Reddy RN  Safety Promotion/Fall Prevention:   safety round/check completed   clutter free environment maintained  Taken 11/4/2022 0815 by Fawn Reddy RN  Safety Promotion/Fall Prevention:   safety round/check completed   clutter free environment  maintained  Intervention: Prevent Skin Injury  Recent Flowsheet Documentation  Taken 11/4/2022 1800 by Fawn Reddy RN  Body Position:   position changed independently   tilted   right  Taken 11/4/2022 1600 by Fawn Reddy RN  Body Position:   tilted   right  Skin Protection:   skin-to-device areas padded   tubing/devices free from skin contact  Taken 11/4/2022 1200 by Fawn Reddy RN  Body Position: position changed independently  Taken 11/4/2022 1000 by Fawn Reddy RN  Body Position:   tilted   left  Taken 11/4/2022 0815 by Fawn Reddy RN  Body Position:   tilted   right  Intervention: Prevent and Manage VTE (Venous Thromboembolism) Risk  Recent Flowsheet Documentation  Taken 11/4/2022 1800 by Fawn Reddy RN  Activity Management: back to bed  Taken 11/4/2022 1600 by Fawn Reddy RN  Range of Motion: ROM (range of motion) performed  Taken 11/4/2022 1200 by Fawn Reddy RN  Activity Management: up in chair  Range of Motion: ROM (range of motion) performed  Taken 11/4/2022 0815 by Fawn Reddy RN  Activity Management: activity adjusted per tolerance  VTE Prevention/Management:   left   sequential compression devices on  Range of Motion: ROM (range of motion) performed  Intervention: Prevent Infection  Recent Flowsheet Documentation  Taken 11/4/2022 0815 by Fawn Reddy RN  Infection Prevention: single patient room provided  Goal: Optimal Comfort and Wellbeing  Outcome: Ongoing, Progressing  Intervention: Provide Person-Centered Care  Recent Flowsheet Documentation  Taken 11/4/2022 1600 by Fawn Reddy RN  Trust Relationship/Rapport:   care explained   thoughts/feelings acknowledged  Taken 11/4/2022 1200 by Fawn Reddy RN  Trust Relationship/Rapport:   care explained   thoughts/feelings acknowledged  Taken 11/4/2022 0815 by Fawn Reddy RN  Trust Relationship/Rapport:   care explained   thoughts/feelings acknowledged  Goal: Readiness for Transition of Care  Outcome: Ongoing, Progressing    Goal Outcome Evaluation:  PT remain in ccu on RA. A & O follows simple command. Confuse at times. D/C berg. External catheter in place. Regular diet. Vital stable. Will keep monitoring.

## 2022-11-04 NOTE — PROGRESS NOTES
Discharge Planning Assessment  Psychiatric     Patient Name: Demetra Rush  MRN: [id number]  Today's Date: 11/4/2022    Admit Date: 11/3/2022    Plan: Home with family and [facility]..........Barby CHOWDHURY   Discharge Needs Assessment     Row Name 11/04/22 1340       Living Environment    People in Home child(raphael), adult    Name(s) of People in Home Lives with daughter Nettie, son-in-law, and grandson    Current Living Arrangements home    Primary Care Provided by child(raphael)    Provides Primary Care For no one    Family Caregiver if Needed child(raphael), adult    Quality of Family Relationships helpful;supportive;involved    Able to Return to Prior Arrangements yes       Resource/Environmental Concerns    Resource/Environmental Concerns none    Transportation Concerns none       Transition Planning    Patient/Family Anticipates Transition to home with family    Patient/Family Anticipated Services at Transition home health care    Transportation Anticipated family or friend will provide       Discharge Needs Assessment    Equipment Currently Used at Home wheelchair;walker, rolling;cane, quad    Concerns to be Addressed discharge planning    Anticipated Changes Related to Illness none    Provided Post Acute Provider List? Yes    Post Acute Provider List Home Health;Nursing Home    Delivered To Patient    Method of Delivery In person    Patient's Choice of Community Agency(s) [facility]               Discharge Plan     Row Name 11/04/22 2386       Plan    Plan Home with family and [facility]..........Barby CHOWDHURY    Patient/Family in Agreement with Plan yes    Plan Comments Met with patient at bedside, introduced self and role. Pt. lives with daughter, son-in-law, and grandson and they assist as needed with care/transport/ADL's. Pt. states she was living in [place] with eldest daughter until May of this year when she moved back to [place] with Reyna and her family. Pt. has a rollator walker,  cane, and WC at home. Son-in-law does not work so he is home with patient should she need anything and drives her to [employer], cooks meals, and does laundry. Daughter Reyna is very knowledgeable about patient's care and health hx and accompanies her to [facility] gar[facility]., takes care of medications, and any other needs. PCP is Dr. Bolivar, uses [facility] through [place] and has used [facility] in the past and wishes to use them again if home health needed. Pt. denies hx with RENEE. Plans to return home at [place] with family and HH (if needed), referral to [facility] in [facility] and call to [facility]. Provided with road to recovery, HH/RENEE list, and CCP contact card. S/W dtr Reyna in [facility] and provided with CCP contact information should needs arise. Will follow..........Barby CHOWDHURY              Continued Care and Services - Admitted Since 11/3/2022     Home Medical Care     Service Provider Request Status Selected Services Address Phone Fax Patient Preferred    [facility] Pending - Request Sent N/A [address] [facility] [id number] [phone] --                             Functional Status     Row Name 11/04/22 1339       Functional Status    Usual Activity Tolerance moderate    Current Activity Tolerance fair       Assessment of Health Literacy    How often do you have someone help you read hospital materials? Often    How often do you have problems learning about your medical condition because of difficulty understanding written information? Occasionally    How often do you have a problem understanding what is told to you about your medical condition? Occasionally    How confident are you filling out medical forms by yourself? Somewhat    Health Literacy Moderate       Functional Status, IADL    Medications assistive person    Meal Preparation assistive person    Housekeeping assistive person    Laundry assistive person    Shopping assistive person                     Yael Hassan, RN

## 2022-11-04 NOTE — PLAN OF CARE
Goal Outcome Evaluation:      Patient is alert and oriented, follows commands.  Patient is able to verbalize wants and needs to staff when not on Bipap.  Patient asks appropriate questions and is cooperative with care.    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  Patient is compliant with nurse checking sheath site for bleeding,

## 2022-11-05 LAB
ANION GAP SERPL CALCULATED.3IONS-SCNC: 9.3 MMOL/L (ref 5–15)
BUN SERPL-MCNC: 34 MG/DL (ref 8–23)
BUN/CREAT SERPL: 32.7 (ref 7–25)
CALCIUM SPEC-SCNC: 8.1 MG/DL (ref 8.6–10.5)
CHLORIDE SERPL-SCNC: 103 MMOL/L (ref 98–107)
CO2 SERPL-SCNC: 25.7 MMOL/L (ref 22–29)
CREAT SERPL-MCNC: 1.04 MG/DL (ref 0.57–1)
D-LACTATE SERPL-SCNC: 2 MMOL/L (ref 0.5–2)
DEPRECATED RDW RBC AUTO: 39.8 FL (ref 37–54)
EGFRCR SERPLBLD CKD-EPI 2021: 51.5 ML/MIN/1.73
ERYTHROCYTE [DISTWIDTH] IN BLOOD BY AUTOMATED COUNT: 13.6 % (ref 12.3–15.4)
GLUCOSE BLDC GLUCOMTR-MCNC: 102 MG/DL (ref 70–130)
GLUCOSE BLDC GLUCOMTR-MCNC: 96 MG/DL (ref 70–130)
GLUCOSE SERPL-MCNC: 101 MG/DL (ref 65–99)
HCT VFR BLD AUTO: 32.1 % (ref 34–46.6)
HGB BLD-MCNC: 10.4 G/DL (ref 12–15.9)
MCH RBC QN AUTO: 26.1 PG (ref 26.6–33)
MCHC RBC AUTO-ENTMCNC: 32.4 G/DL (ref 31.5–35.7)
MCV RBC AUTO: 80.5 FL (ref 79–97)
PLATELET # BLD AUTO: 278 10*3/MM3 (ref 140–450)
PMV BLD AUTO: 10.6 FL (ref 6–12)
POTASSIUM SERPL-SCNC: 4 MMOL/L (ref 3.5–5.2)
PROCALCITONIN SERPL-MCNC: 0.58 NG/ML (ref 0–0.25)
QT INTERVAL: 449 MS
RBC # BLD AUTO: 3.99 10*6/MM3 (ref 3.77–5.28)
SODIUM SERPL-SCNC: 138 MMOL/L (ref 136–145)
WBC NRBC COR # BLD: 12.24 10*3/MM3 (ref 3.4–10.8)

## 2022-11-05 PROCEDURE — 83605 ASSAY OF LACTIC ACID: CPT | Performed by: INTERNAL MEDICINE

## 2022-11-05 PROCEDURE — 97530 THERAPEUTIC ACTIVITIES: CPT

## 2022-11-05 PROCEDURE — 85027 COMPLETE CBC AUTOMATED: CPT | Performed by: INTERNAL MEDICINE

## 2022-11-05 PROCEDURE — 94799 UNLISTED PULMONARY SVC/PX: CPT

## 2022-11-05 PROCEDURE — 97161 PT EVAL LOW COMPLEX 20 MIN: CPT

## 2022-11-05 PROCEDURE — 94660 CPAP INITIATION&MGMT: CPT

## 2022-11-05 PROCEDURE — 94761 N-INVAS EAR/PLS OXIMETRY MLT: CPT

## 2022-11-05 PROCEDURE — 99232 SBSQ HOSP IP/OBS MODERATE 35: CPT | Performed by: INTERNAL MEDICINE

## 2022-11-05 PROCEDURE — 82962 GLUCOSE BLOOD TEST: CPT

## 2022-11-05 PROCEDURE — 80048 BASIC METABOLIC PNL TOTAL CA: CPT | Performed by: INTERNAL MEDICINE

## 2022-11-05 PROCEDURE — 25010000002 CEFTRIAXONE PER 250 MG: Performed by: INTERNAL MEDICINE

## 2022-11-05 PROCEDURE — 84145 PROCALCITONIN (PCT): CPT | Performed by: INTERNAL MEDICINE

## 2022-11-05 PROCEDURE — 25010000002 ONDANSETRON PER 1 MG: Performed by: INTERNAL MEDICINE

## 2022-11-05 RX ORDER — BUMETANIDE 0.25 MG/ML
0.5 INJECTION INTRAMUSCULAR; INTRAVENOUS ONCE
Status: COMPLETED | OUTPATIENT
Start: 2022-11-05 | End: 2022-11-05

## 2022-11-05 RX ORDER — BISACODYL 5 MG/1
10 TABLET, DELAYED RELEASE ORAL 2 TIMES DAILY PRN
Status: DISCONTINUED | OUTPATIENT
Start: 2022-11-05 | End: 2022-11-08 | Stop reason: HOSPADM

## 2022-11-05 RX ORDER — BISACODYL 10 MG
10 SUPPOSITORY, RECTAL RECTAL 2 TIMES DAILY PRN
Status: DISCONTINUED | OUTPATIENT
Start: 2022-11-05 | End: 2022-11-08 | Stop reason: HOSPADM

## 2022-11-05 RX ADMIN — ALPRAZOLAM 1 MG: 0.5 TABLET ORAL at 23:03

## 2022-11-05 RX ADMIN — Medication 10 ML: at 20:13

## 2022-11-05 RX ADMIN — CEFTRIAXONE SODIUM 1 G: 1 INJECTION, POWDER, FOR SOLUTION INTRAMUSCULAR; INTRAVENOUS at 16:55

## 2022-11-05 RX ADMIN — BUMETANIDE 0.5 MG: 0.25 INJECTION, SOLUTION INTRAMUSCULAR; INTRAVENOUS at 09:02

## 2022-11-05 RX ADMIN — LEVOTHYROXINE SODIUM 75 MCG: 0.07 TABLET ORAL at 08:57

## 2022-11-05 RX ADMIN — Medication 10 ML: at 08:58

## 2022-11-05 RX ADMIN — PANTOPRAZOLE SODIUM 40 MG: 40 TABLET, DELAYED RELEASE ORAL at 05:31

## 2022-11-05 RX ADMIN — DOCUSATE SODIUM 50MG AND SENNOSIDES 8.6MG 2 TABLET: 8.6; 5 TABLET, FILM COATED ORAL at 20:13

## 2022-11-05 RX ADMIN — APIXABAN 2.5 MG: 2.5 TABLET, FILM COATED ORAL at 23:03

## 2022-11-05 RX ADMIN — ONDANSETRON 4 MG: 2 INJECTION INTRAMUSCULAR; INTRAVENOUS at 08:58

## 2022-11-05 RX ADMIN — APIXABAN 2.5 MG: 2.5 TABLET, FILM COATED ORAL at 08:57

## 2022-11-05 RX ADMIN — DOCUSATE SODIUM 50MG AND SENNOSIDES 8.6MG 2 TABLET: 8.6; 5 TABLET, FILM COATED ORAL at 12:10

## 2022-11-05 RX ADMIN — BISACODYL 10 MG: 10 SUPPOSITORY RECTAL at 20:13

## 2022-11-05 NOTE — PROGRESS NOTES
Electrophysiology Follow-Up Note      Patient Name: Demetra Rush  Age/Sex: 89 y.o. female  : 1933  MRN: [id number]      Day of Service: 22       Chief Complaint/Follow-up: AF w/RVR, s/p AV node ablation 11/3, complicated by prolonged hypotension    Interval History: Covering for Dr Gaston. Denies CP, still gets SOB, having recurrent nausea      Temp:  [98.2 °F (36.8 °C)-99.9 °F (37.7 °C)] 98.2 °F (36.8 °C)  Heart Rate:  [80-90] 80  Resp:  [16-25] 18  BP: ()/(56-90) 143/71     PHYSICAL EXAM:    General Appearance: No acute distress, thin, frail, elderly female  Eyes: Conjunctiva and lids: No erythema, swelling, or discharge. Sclera non-icteric.   HENT: Atraumatic, normocephalic. External eyes, ears, and nose normal.   Respiratory: No signs of respiratory distress. Respiration rhythm and depth normal.   Cardiovascular:  Heart Rate and Rhythm: Normal, Heart Sounds: S1 and S2.   Arterial Pulses: Posterior tibialis and dorsalis pedis pulses normal.   Lower Extremities: No edema noted.  Gastrointestinal:  Abdomen flat, non-tender.   Musculoskeletal: Normal movement of extremities  Skin: Warm and dry.   Psychiatric: Patient alert and cooperative       ECG/TELE:           Results from last 7 days   Lab Units 22  0548 22  1626 22  0255 22  1430   SODIUM mmol/L 138  --  142 140   POTASSIUM mmol/L 4.0 4.2 3.4* 4.0   CHLORIDE mmol/L 103  --  104 105   CO2 mmol/L 25.7  --  23.6 25.5   BUN mg/dL 34*  --  23 19   CREATININE mg/dL 1.04*  --  1.26* 1.14*   GLUCOSE mg/dL 101*  --  172* 217*   CALCIUM mg/dL 8.1*  --  7.4* 7.3*     Results from last 7 days   Lab Units 22  0544 22  0255 22  1430   WBC 10*3/mm3 12.24* 8.70 5.37   HEMOGLOBIN g/dL 10.4* 9.7* 9.4*   HEMATOCRIT % 32.1* 29.7* 30.3*   PLATELETS 10*3/mm3 278 297 244             Results from last 7 days   Lab Units 10/31/22  1249   TSH uIU/mL 7.590*     Results for orders placed during the hospital  encounter of 11/03/22    Adult Transthoracic Echo Complete W/ Cont if Necessary Per Protocol    Interpretation Summary  •  Left ventricular systolic function is normal. Calculated left ventricular EF = 65.5%  •  Mildly reduced right ventricular systolic function noted.  •  The right ventricular cavity is moderately dilated.  •  Left atrial volume is severely increased.  •  The right atrial cavity is moderately  dilated.  •  Severe mitral valve regurgitation is present.  •  Moderate to severe tricuspid valve regurgitation is present.  •  Calculated right ventricular systolic pressure from tricuspid regurgitation is 48 mmHg.  •  Moderate pulmonary hypertension is present.          Current Medications:   Scheduled Meds:apixaban, 2.5 mg, Oral, Q12H  cefTRIAXone, 1 g, Intravenous, Q24H  levothyroxine, 75 mcg, Oral, Daily  pantoprazole, 40 mg, Oral, Q AM  senna-docusate sodium, 2 tablet, Oral, BID  sodium chloride, 10 mL, Intravenous, Q12H            Acute diastolic CHF (congestive heart failure) (HCC)    Atrial fibrillation (HCC)       Plan:     1.  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 No RV strain, she took last dose of apixaban night prior to procedure yesterday, so unlikely PE. Thursday she required 3 pressors, hypotension unresponsive to 1.5 L of IVF's, 1 mg of atropine in case she was persistently vagal--gave steroids and she seem to get better--?adrenal crisis/insufficiency. She did get volume overloaded and developed acute hypoxic/hypercapnic resp failure---mostly like secondary to volume overload, was on bipap for a short time--responded well to dose of IV Bumex.     2. SOB/hypoxia, acute hypoxic and hypercapnic respiratory  failure-currently on RA but O2 sat 88-90% while sitting in chair and talking with me, does feel SOB. Some borderline hypotension overnight but BP improved this AM, will give another dose of IV bumex    3. Severe MR-if blood pressure allows could consider afterload reduction    4.  Moderate pulm HTN    5.  Acute renal failure, improving    6.  Lactic acidosis    Potential transfer out of the ICU today, will await evaluation and disposition decision from intensivist, appreciate their help with this patient    Sourav Bradshaw III, MD  11/05/22  08:39 EDT

## 2022-11-05 NOTE — PLAN OF CARE
Goal Outcome Evaluation:  Plan of Care Reviewed With: patient, daughter           Outcome Evaluation: Patient is an 89 y.o female who presents to [facility] CCU with acute diastolic CHF. Patient AOx4 UIC upon arrival. Patient reports she lives at home with her daughter, son in law, and grandson. Patient reports there are 4 BARRY home. Patient is independent with ADLs and uses a rollator for ambulation at baseline. Per daughter patient is current with HHPT. Today patient performed STS from chair with CGA. Patient ambulated 50ft with rwx and CGA-Zac. Gait slow and mildly unsteady with VCs to keep walker with patient as she turns to transfer to chair or commode. Patient completed all bathroom needs with SBA-CGA. Patient UIC at end of session. Pattient demonstrates decreased strength and activity endurance. Patient would continue to benefit from skilled PT intervention to address deficits in functional mobility. PT recommends home with assist and HHPT at d/c. Will continue to monitor.

## 2022-11-05 NOTE — THERAPY EVALUATION
Patient Name: Demetra Rush  : 1933    MRN: [id number]                              Today's Date: 2022       Admit Date: 11/3/2022    Visit Dx:     ICD-10-CM ICD-9-CM   1. Acute diastolic CHF (congestive heart failure) (HCC)  I50.31 428.31     428.0   2. Persistent atrial fibrillation (HCC)  I48.19 427.31     Patient Active Problem List   Diagnosis   • Osteoporosis   • Acute diastolic CHF (congestive heart failure) (HCC)   • Pulmonary hypertension (HCC)   • Mitral regurgitation   • Primary hypertension   • Atrial fibrillation (HCC)   • Chronic diastolic (congestive) heart failure (HCC)   • Primary insomnia   • Acquired hypothyroidism     Past Medical History:   Diagnosis Date   • Atrial fibrillation (HCC)    • Chronic diastolic (congestive) heart failure (HCC)    • GERD (gastroesophageal reflux disease)    • Hyperlipidemia    • Hypertension    • Hypothyroidism    • Mitral regurgitation    • Osteoporosis 2022   • Pulmonary hypertension (HCC)    • Stroke (HCC)    • UTI (urinary tract infection)      Past Surgical History:   Procedure Laterality Date   • ADENOIDECTOMY     • BLADDER SURGERY     • CARDIAC ELECTROPHYSIOLOGY PROCEDURE N/A 10/20/2022    Procedure: Pacemaker SC new--Medtronic;  Surgeon: Albino Gaston MD;  Location:  [facility] CATH INVASIVE LOCATION;  Service: Cardiology;  Laterality: N/A;   • CARDIAC ELECTROPHYSIOLOGY PROCEDURE N/A 11/3/2022    Procedure: AV node ablation---has Medtronic pacemaker;  Surgeon: Albino Gaston MD;  Location:  [facility] CATH INVASIVE LOCATION;  Service: Cardiovascular;  Laterality: N/A;   • CHOLECYSTECTOMY     • HYSTERECTOMY        General Information     Row Name 22 151          Physical Therapy Time and Intention    Document Type evaluation  -     Mode of Treatment physical therapy;individual therapy  -     Row Name 22 151          General Information    Patient Profile Reviewed yes  -SM     Prior Level of Function independent:  -      Existing Precautions/Restrictions fall  -     Barriers to Rehab medically complex  -     Row Name 11/05/22 1512          Living Environment    People in Home child(raphael), adult  -     Row Name 11/05/22 1512          Home Main Entrance    Number of Stairs, Main Entrance four  -     Row Name 11/05/22 1512          Cognition    Orientation Status (Cognition) oriented x 4  -     Row Name 11/05/22 1512          Safety Issues, Functional Mobility    Impairments Affecting Function (Mobility) strength;endurance/activity tolerance;shortness of breath  -           User Key  (r) = Recorded By, (t) = Taken By, (c) = Cosigned By    Initials Name Provider Type     Azalea Kim PT Physical Therapist               Mobility     Row Name 11/05/22 1513          Bed Mobility    Comment, (Bed Mobility) Patient UIC upon arrival and at end of session  -     Row Name 11/05/22 1513          Bed-Chair Transfer    Bed-Chair [place] (Transfers) not tested  -[facility] Name 11/05/22 1513          Sit-Stand Transfer    Sit-Stand [place] (Transfers) contact guard  -     Assistive Device (Sit-Stand Transfers) walker, front-wheeled  -[facility] Name 11/05/22 1513          Gait/Stairs (Locomotion)    [place] Level (Gait) contact guard;minimum assist (75% patient effort);verbal cues  -     Assistive Device (Gait) walker, front-wheeled  -     Distance in Feet (Gait) 50ft  -     Deviations/Abnormal Patterns (Gait) bartolo decreased;festinating/shuffling;gait speed decreased  -     Bilateral Gait Deviations heel strike decreased  -     [place] Level (Stairs) not tested  -     Comment, (Gait/Stairs) Gait slow and mildly unsteady with VCs to keep walker with patient as she turns to transfer to chair or commode.  -           User Key  (r) = Recorded By, (t) = Taken By, (c) = Cosigned By    Initials Name Provider Type     Azalea Kim PT Physical Therapist               Obj/Interventions      Row Name 11/05/22 1514          Range of Motion Comprehensive    General Range of Motion no range of motion deficits identified  -     Row Name 11/05/22 1514          Strength Comprehensive (MMT)    General Manual Muscle Testing (MMT) Assessment lower extremity strength deficits identified  -     Comment, General Manual Muscle Testing (MMT) Assessment Generalized LE weakness  -     Row Name 11/05/22 1514          Motor Skills    Motor Skills functional endurance  -     Functional Endurance fair  -     Row Name 11/05/22 1514          Balance    Balance Assessment sitting static balance;sitting dynamic balance;sit to stand dynamic balance;standing static balance;standing dynamic balance  -     Static Sitting Balance modified independence  -     Dynamic Sitting Balance supervision  -     Position, Sitting Balance sitting in chair  -     Sit to Stand Dynamic Balance contact guard  -     Static Standing Balance contact guard  -     Dynamic Standing Balance contact guard;minimal assist;verbal cues  -     Position/Device Used, Standing Balance supported;walker, front-wheeled  -     Balance Interventions sitting;standing;sit to stand;supported;static;dynamic  -           User Key  (r) = Recorded By, (t) = Taken By, (c) = Cosigned By    Initials Name Provider Type     Azalea Kim, PT Physical Therapist               Goals/Plan     Row Name 11/05/22 1519          Bed Mobility Goal 1 (PT)    Activity/Assistive Device (Bed Mobility Goal 1, PT) bed mobility activities, all  -     [place] Level/Cues Needed (Bed Mobility Goal 1, PT) modified independence  -     Time Frame (Bed Mobility Goal 1, PT) 1 week  -     Row Name 11/05/22 1519          Transfer Goal 1 (PT)    Activity/Assistive Device (Transfer Goal 1, PT) bed-to-chair/chair-to-bed;sit-to-stand/stand-to-sit  -     [place] Level/Cues Needed (Transfer Goal 1, PT) supervision required  -     Time Frame (Transfer Goal 1,  PT) 1 week  -     Row Name 11/05/22 1519          Gait Training Goal 1 (PT)    Activity/Assistive Device (Gait Training Goal 1, PT) gait (walking locomotion);walker, rolling  -     [place] Level (Gait Training Goal 1, PT) standby assist  -SM     Distance (Gait Training Goal 1, PT) 100ft  -SM     Time Frame (Gait Training Goal 1, PT) 1 week  -           User Key  (r) = Recorded By, (t) = Taken By, (c) = Cosigned By    Initials Name Provider Type     Azalea Kim, PT Physical Therapist               Clinical Impression     Row Name 11/05/22 1516          Pain    Pretreatment Pain Rating 0/10 - no pain  -SM     Posttreatment Pain Rating 0/10 - no pain  -SM     Row Name 11/05/22 1518          Plan of Care Review    Plan of Care Reviewed With patient;daughter  -     Outcome Evaluation Patient is an 89 y.o female who presents to [facility] CCU with acute diastolic CHF. Patient AOx4 UIC upon arrival. Patient reports she lives at home with her daughter, son in law, and grandson. Patient reports there are 4 BARRY home. Patient is independent with ADLs and uses a rollator for ambulation at baseline. Per daughter patient is current with HHPT. Today patient performed STS from chair with CGA. Patient ambulated 50ft with rwx and CGA-Zac. Gait slow and mildly unsteady with VCs to keep walker with patient as she turns to transfer to chair or commode. Patient completed all bathroom needs with SBA-CGA. Patient UIC at end of session. Pattient demonstrates decreased strength and activity endurance. Patient would continue to benefit from skilled PT intervention to address deficits in functional mobility. PT recommends home with assist and HHPT at d/c. Will continue to monitor.  -     Row Name 11/05/22 1510          Therapy Assessment/Plan (PT)    Rehab Potential (PT) good, to achieve stated therapy goals  -     Criteria for Skilled Interventions Met (PT) yes  -     Therapy Frequency (PT) 6 times/wk  -     Row Name  11/05/22 1515          Vital Signs    O2 Delivery Pre Treatment room air  -SM     O2 Delivery Intra Treatment room air  -SM     O2 Delivery Post Treatment room air  -SM     Pre Patient Position Sitting  -SM     Intra Patient Position Standing  -SM     Post Patient Position Sitting  -SM     Row Name 11/05/22 1515          Positioning and Restraints    Pre-Treatment Position sitting in chair/recliner  -SM     Post Treatment Position chair  -SM     In Chair notified nsg;reclined;call light within reach;encouraged to call for assist;with family/caregiver  no chair alarm upon arrival  -           User Key  (r) = Recorded By, (t) = Taken By, (c) = Cosigned By    Initials Name Provider Type    Azalea Rincon PT Physical Therapist               Outcome Measures     Row Name 11/05/22 1519          How much help from another person do you currently need...    Turning from your back to your side while in flat bed without using bedrails? 3  -SM     Moving from lying on back to sitting on the side of a flat bed without bedrails? 3  -SM     Moving to and from a bed to a chair (including a wheelchair)? 3  -SM     Standing up from a chair using your arms (e.g., wheelchair, bedside chair)? 3  -SM     Climbing 3-5 steps with a railing? 3  -SM     To walk in hospital room? 3  -SM     AM-PAC 6 Clicks Score (PT) 18  -SM     Highest level of mobility 6 --> Walked 10 steps or more  -     Row Name 11/05/22 1519          Functional Assessment    Outcome Measure Options AM-PAC 6 Clicks Basic Mobility (PT)  -           User Key  (r) = Recorded By, (t) = Taken By, (c) = Cosigned By    Initials Name Provider Type    Azalea Rincon PT Physical Therapist                             Physical Therapy Education     Title: PT OT SLP Therapies (In Progress)     Topic: Physical Therapy (In Progress)     Point: Mobility training (Done)     Learning Progress Summary           Patient Acceptance, E, VU by HEMA at 11/5/2022 1519                    Point: Home exercise program (Not Started)     Learner Progress:  Not documented in this visit.          Point: Body mechanics (Done)     Learning Progress Summary           Patient Acceptance, E, VU by  at 11/5/2022 1519                   Point: Precautions (Done)     Learning Progress Summary           Patient Acceptance, E, VU by  at 11/5/2022 1519                               User Key     Initials Effective Dates Name Provider Type Discipline     05/02/22 -  Azalea Kim, PT Physical Therapist PT              PT Recommendation and Plan     Plan of Care Reviewed With: patient, daughter  Outcome Evaluation: Patient is an 89 y.o female who presents to [facility] CCU with acute diastolic CHF. Patient AOx4 UIC upon arrival. Patient reports she lives at home with her daughter, son in law, and grandson. Patient reports there are 4 BARRY home. Patient is independent with ADLs and uses a rollator for ambulation at baseline. Per daughter patient is current with HHPT. Today patient performed STS from chair with CGA. Patient ambulated 50ft with rwx and CGA-Zac. Gait slow and mildly unsteady with VCs to keep walker with patient as she turns to transfer to chair or commode. Patient completed all bathroom needs with SBA-CGA. Patient UIC at end of session. Pattient demonstrates decreased strength and activity endurance. Patient would continue to benefit from skilled PT intervention to address deficits in functional mobility. PT recommends home with assist and HHPT at d/c. Will continue to monitor.     Time Calculation:    PT Charges     Row Name 11/05/22 1520             Time Calculation    Start Time 1351  -      Stop Time 1410  -      Time Calculation (min) 19 min  -      PT Received On 11/05/22  -      PT - Next Appointment 11/07/22  -      PT Goal Re-Cert Due Date 11/12/22  -         Time Calculation- PT    Total Timed Code Minutes- PT 10 minute(s)  -         Timed Charges    60545 - PT  Therapeutic Activity Minutes 10  -SM         Total Minutes    Timed Charges Total Minutes 10  -SM       Total Minutes 10  -SM            User Key  (r) = Recorded By, (t) = Taken By, (c) = Cosigned By    Initials Name Provider Type    Azalea Rincon PT Physical Therapist              Therapy Charges for Today     Code Description Service Date Service Provider Modifiers Qty    [id number]  PT THERAPEUTIC ACT EA 15 MIN 11/5/2022 Azalea Kim, PT GP 1    [id number]  PT EVAL LOW COMPLEXITY 3 11/5/2022 Azalea Kim, PT GP 1          PT G-Codes  Outcome Measure Options: AM-PAC 6 Clicks Basic Mobility (PT)  AM-PAC 6 Clicks Score (PT): 18  Patient was intermittently wearing a face mask during this therapy encounter. Therapist used appropriate personal protective equipment including mask and gloves.  Mask used was standard procedure mask. Appropriate PPE was worn during the entire therapy session. Hand hygiene was completed before and after therapy session. Patient is not in enhanced droplet precautions.     Azalea Kim PT  11/5/2022

## 2022-11-05 NOTE — PROGRESS NOTES
"  PROGRESS NOTE  Patient Name: Demetra Rush  Age/Sex: 89 y.o. female  : 1933  MRN: [id number]    Date of Admission: 11/3/2022  Date of Encounter Visit: 22   LOS: 2 days   Patient Care Team:  Melissa Bolivar MD as PCP - General (Family Medicine)  Ari Hall MD as Referring Physician (Orthopedic Surgery)    Chief Complaint: A. fib with RVR, hypertension    Hospital course: Patient is doing better, afebrile, on room air, was ambulating with physical therapy, heart rate is paced at 80 bpm, she is on diuretics with good urine output.  She is denying any nausea or vomiting, she had constipation and had 1 small bowel movement earlier today.        REVIEW OF SYSTEMS:   CONSTITUTIONAL: no fever or chills  CARDIOVASCULAR: No chest pain, chest pressure or chest discomfort. No palpitations or edema.   RESPIRATORY: No shortness of breath, cough or sputum.   GASTROINTESTINAL: No anorexia, nausea, vomiting or diarrhea. No abdominal pain or blood.   HEMATOLOGIC: No bleeding or bruising.     Ventilator/Non-Invasive Ventilation Settings (From admission, onward)     Start     Ordered                         Vital Signs  Temp:  [97.6 °F (36.4 °C)-98.7 °F (37.1 °C)] 97.6 °F (36.4 °C)  Heart Rate:  [80] 80  Resp:  [16-25] 18  BP: ()/(55-90) 110/55  SpO2:  [83 %-100 %] 94 %  on  Flow (L/min):  [2-4] 2 Device (Oxygen Therapy): room air    Intake/Output Summary (Last 24 hours) at 2022 1426  Last data filed at 2022 1600  Gross per 24 hour   Intake --   Output 200 ml   Net -200 ml     Flowsheet Rows    Flowsheet Row First Filed Value   Admission Height 154.9 cm (61\") Documented at 2022 1003   Admission Weight 48.1 kg (106 lb) Documented at 2022 1003        Body mass index is 23.16 kg/m².      22  1003 22  1603 22  0518   Weight: 48.1 kg (106 lb) 48.1 kg (106 lb) 55.6 kg (122 lb 9.2 oz)       Physical Exam:  GEN:  No acute distress, alert, cooperative, well developed, on " room air  EYES:   Sclerae clear. No icterus. PERRL. Normal EOM  ENT:   External ears/nose normal, no oral lesions, no thrush, mucous membranes moist  NECK:  Supple, midline trachea, no JVD  LUNGS: Normal chest on inspection,  diminished breath sounds bilaterally, she has very faint crackles over the bases posteriorly. Respirations regular, even and unlabored.   CV: She has a paced rhythm, should the monitor shows atrial flutter with a paced ventricle, soft systolic murmur. Normal S1/S2. No  gallops, or rubs noted.  ABD:  Soft, nontender and  distended over the lower abdominal area with tympany to percussion. Normal bowel sounds. No guarding  EXT:  Moves all extremities well. No cyanosis. No redness.  Trace pedal edema.   Skin: Dry, intact, no bleeding    Results Review:    Results From Last 14 Days   Lab Units 11/05/22  0548 11/04/22  2100 11/04/22  1626 11/03/22  1628 10/31/22  1249   LACTATE mmol/L 2.0 3.2* 3.2*   < >  --    TRIGLYCERIDES mg/dL  --   --   --   --  83    < > = values in this interval not displayed.     Results from last 7 days   Lab Units 11/05/22  0548 11/04/22  1626 11/04/22  0255 11/03/22  1430 10/31/22  1249   SODIUM mmol/L 138  --  142 140 137   POTASSIUM mmol/L 4.0 4.2 3.4* 4.0 4.4   CHLORIDE mmol/L 103  --  104 105 97*   TOTAL CO2 mmol/L  --   --   --   --  30.5*   CO2 mmol/L 25.7  --  23.6 25.5  --    BUN mg/dL 34*  --  23 19 24*   CREATININE mg/dL 1.04*  --  1.26* 1.14* 1.11*   CALCIUM mg/dL 8.1*  --  7.4* 7.3* 9.1   AST (SGOT) U/L  --   --   --   --  21   ALT (SGPT) U/L  --   --   --   --  15   ANION GAP mmol/L 9.3  --  14.4 9.5  --    ALBUMIN g/dL  --   --   --   --  4.80         Results from last 7 days   Lab Units 10/31/22  1249   TSH uIU/mL 7.590*         Results from last 7 days   Lab Units 11/05/22  0544 11/04/22  0255 11/03/22  1430   WBC 10*3/mm3 12.24* 8.70 5.37   HEMOGLOBIN g/dL 10.4* 9.7* 9.4*   HEMATOCRIT % 32.1* 29.7* 30.3*   PLATELETS 10*3/mm3 278 297 244   MCV fL 80.5 81.6  82.8   NEUTROPHIL % %  --   --  46.3   LYMPHOCYTE % %  --   --  37.6   MONOCYTES % %  --   --  12.8*   EOSINOPHIL % %  --   --  2.0   BASOPHIL % %  --   --  0.7   IMM GRAN % %  --   --  0.6*             Results from last 7 days   Lab Units 10/31/22  1249   TRIGLYCERIDES mg/dL 83   HDL CHOL mg/dL 86*     Results from last 7 days   Lab Units 11/03/22  1912 11/03/22  1654 11/03/22  1539   PH, ARTERIAL pH units 7.315* 7.236* 7.342*   PCO2, ARTERIAL mm Hg 49.2* 51.5* 37.8   PO2 ART mm Hg 138.6* 58.2* 57.0*   HCO3 ART mmol/L 25.1 21.9* 20.5*         Glucose   Date/Time Value Ref Range Status   11/05/2022 1148 102 70 - 130 mg/dL Final     Comment:     Meter: [id number] : [id number] Rosalina Hodge NA   11/04/2022 1803 122 70 - 130 mg/dL Final     Comment:     Meter: [id number] : [id number] SmarterShade NA   11/04/2022 1200 197 (H) 70 - 130 mg/dL Final     Comment:     Meter: [id number] : [id number] SmarterShade NA   11/04/2022 0631 191 (H) 70 - 130 mg/dL Final     Comment:     Meter: [id number] : [id number] Ubi Video ADRIANNE   11/04/2022 0044 185 (H) 70 - 130 mg/dL Final     Comment:     Meter: [id number] : [id number] Ubi Video ADRIANNE   11/03/2022 1513 163 (H) 70 - 130 mg/dL Final     Comment:     Meter: [id number] : [id number] Rufus Good RN     Results from last 7 days   Lab Units 11/05/22  0548 11/05/22  0426 11/04/22  2100 11/04/22  1626 11/04/22  1312 11/04/22  0642 11/04/22  0255 11/03/22  2145 11/03/22  1628   PROCALCITONIN ng/mL  --  0.58*  --   --   --   --   --  0.27*  --    LACTATE mmol/L 2.0  --  3.2* 3.2* 6.9* 3.1*  3.1* 3.4*  --  1.6     Results from last 7 days   Lab Units 11/03/22  1628   BLOODCX  No growth at 24 hours  No growth at 24 hours     Results from last 7 days   Lab Units 11/03/22  1637   NITRITE UA  Negative                   Imaging:   Imaging Results (All)           I reviewed the patient's new clinical results.  I personally viewed and interpreted the patient's  imaging results:        Medication Review:   apixaban, 2.5 mg, Oral, Q12H  cefTRIAXone, 1 g, Intravenous, Q24H  levothyroxine, 75 mcg, Oral, Daily  pantoprazole, 40 mg, Oral, Q AM  senna-docusate sodium, 2 tablet, Oral, BID  sodium chloride, 10 mL, Intravenous, Q12H        EPINEPHrine, 0.02-0.3 mcg/kg/min, Last Rate: 0.04 mcg/kg/min (11/03/22 8688)  sodium chloride, 75 mL/hr, Last Rate: 75 mL/hr (11/03/22 1003)        ASSESSMENT:   1. A. fib/flutter with RVR status post ablation with ventricular pacemaker  2. Acute hypoxemic and hypercapnic respiratory failure, improved and resolved  3. Acute on chronic congestive heart failure of diastolic type  4. Hypotensive shock, likely cardiogenic  5. Severe mitral regurgitation and moderate to severe tricuspid regurgitation  6. Pulmonary hypertension, moderate  7. Anemia  8. Renal insufficiency, transient and improving  9. Hyperglycemia, mild and resolved  10. Lactic acidosis, resolved  11. Elevated procalcitonin with no obvious source of sepsis or    PLAN:  We will check a bladder scan to make sure there is no urine retention as the cause of her abdominal distention and we will continue with the laxatives to prevent any more constipation  Patient had no obvious source of sepsis, her hemodynamic collapse might be from the tachycardia especially with her underlying pulmonary hypertension.  Patient was ruled out for adrenal insufficiency and currently doing well of the steroids.  Her procalcitonin was slightly positive and she is on the Rocephin, we will give total of 3 days and discontinue specially if all the cultures remain negative.  Will monitor in the CCU for 1 more day given how sick she was just recently and will  continue to follow  Discussed with patient and family, notes and labs and films reviewed    Disposition: Stepdown unit by tomorrow if continues to improve    Lizzy Rick MD  11/05/22  14:26 EDT             Dictated utilizing Dragon dictation

## 2022-11-05 NOTE — PLAN OF CARE
Goal Outcome Evaluation:               Alert and oriented.  Vitals stable.  Worked with PT.  Up to chair a good portion of the day.  Vitals stable.  On room air.  Bumex administered.

## 2022-11-05 NOTE — PLAN OF CARE
Goal Outcome Evaluation:  Plan of Care Reviewed With: patient        Progress: improving     Pt remain in CCU, AO x4. Pt is up and walking when need to use the restroom. Pt was not tolerating Bi-pap was taken off O2 stat dropped to into the 80s and was placed on 2L [place] came back into high 90s. VSS.

## 2022-11-06 LAB
ANION GAP SERPL CALCULATED.3IONS-SCNC: 7.5 MMOL/L (ref 5–15)
BUN SERPL-MCNC: 25 MG/DL (ref 8–23)
BUN/CREAT SERPL: 28.7 (ref 7–25)
CALCIUM SPEC-SCNC: 8.1 MG/DL (ref 8.6–10.5)
CHLORIDE SERPL-SCNC: 103 MMOL/L (ref 98–107)
CO2 SERPL-SCNC: 24.5 MMOL/L (ref 22–29)
CREAT SERPL-MCNC: 0.87 MG/DL (ref 0.57–1)
DEPRECATED RDW RBC AUTO: 43.4 FL (ref 37–54)
EGFRCR SERPLBLD CKD-EPI 2021: 63.8 ML/MIN/1.73
ERYTHROCYTE [DISTWIDTH] IN BLOOD BY AUTOMATED COUNT: 13.9 % (ref 12.3–15.4)
GLUCOSE BLDC GLUCOMTR-MCNC: 132 MG/DL (ref 70–130)
GLUCOSE BLDC GLUCOMTR-MCNC: 81 MG/DL (ref 70–130)
GLUCOSE SERPL-MCNC: 114 MG/DL (ref 65–99)
HCT VFR BLD AUTO: 27.2 % (ref 34–46.6)
HGB BLD-MCNC: 8.5 G/DL (ref 12–15.9)
MCH RBC QN AUTO: 26.5 PG (ref 26.6–33)
MCHC RBC AUTO-ENTMCNC: 31.3 G/DL (ref 31.5–35.7)
MCV RBC AUTO: 84.7 FL (ref 79–97)
PLATELET # BLD AUTO: 207 10*3/MM3 (ref 140–450)
PMV BLD AUTO: 10.1 FL (ref 6–12)
POTASSIUM SERPL-SCNC: 3.9 MMOL/L (ref 3.5–5.2)
RBC # BLD AUTO: 3.21 10*6/MM3 (ref 3.77–5.28)
SODIUM SERPL-SCNC: 135 MMOL/L (ref 136–145)
WBC NRBC COR # BLD: 7.15 10*3/MM3 (ref 3.4–10.8)

## 2022-11-06 PROCEDURE — 82962 GLUCOSE BLOOD TEST: CPT

## 2022-11-06 PROCEDURE — 99232 SBSQ HOSP IP/OBS MODERATE 35: CPT | Performed by: INTERNAL MEDICINE

## 2022-11-06 PROCEDURE — 85027 COMPLETE CBC AUTOMATED: CPT | Performed by: INTERNAL MEDICINE

## 2022-11-06 PROCEDURE — 80048 BASIC METABOLIC PNL TOTAL CA: CPT | Performed by: INTERNAL MEDICINE

## 2022-11-06 RX ORDER — FLUTICASONE PROPIONATE 50 MCG
2 SPRAY, SUSPENSION (ML) NASAL DAILY
Status: DISCONTINUED | OUTPATIENT
Start: 2022-11-06 | End: 2022-11-08 | Stop reason: HOSPADM

## 2022-11-06 RX ADMIN — Medication 10 ML: at 09:16

## 2022-11-06 RX ADMIN — LEVOTHYROXINE SODIUM 75 MCG: 0.07 TABLET ORAL at 09:16

## 2022-11-06 RX ADMIN — APIXABAN 2.5 MG: 2.5 TABLET, FILM COATED ORAL at 20:13

## 2022-11-06 RX ADMIN — DOCUSATE SODIUM 50MG AND SENNOSIDES 8.6MG 2 TABLET: 8.6; 5 TABLET, FILM COATED ORAL at 20:11

## 2022-11-06 RX ADMIN — APIXABAN 2.5 MG: 2.5 TABLET, FILM COATED ORAL at 09:16

## 2022-11-06 RX ADMIN — ALPRAZOLAM 1 MG: 0.5 TABLET ORAL at 22:23

## 2022-11-06 RX ADMIN — Medication 10 ML: at 20:11

## 2022-11-06 RX ADMIN — FLUTICASONE PROPIONATE 2 SPRAY: 50 SPRAY, METERED NASAL at 14:23

## 2022-11-06 RX ADMIN — PANTOPRAZOLE SODIUM 40 MG: 40 TABLET, DELAYED RELEASE ORAL at 05:15

## 2022-11-06 NOTE — PLAN OF CARE
Goal Outcome Evaluation:          Outcome Evaluation: S/p ablation complicated by acute hypoxic respiratory failure. Transferred to stepdown from CCU. Right groin site c/d/s. Vpaced on tele, all other VSS, on RA. No c/o pain or SOA.    Diuretic in Use: None  Response to Diuretics (Output greater than intake): -600 ml net  Daily Weight (up or down): +4 kg per chart review  O2 Requirements: RA  Functional Status (Activity level, tolerance and respiratory symptoms): SBA, no c/o SOA  Discharge Plans: Home when ok with all

## 2022-11-06 NOTE — PROGRESS NOTES
Electrophysiology Follow-Up Note      Patient Name: Demetra Rush  Age/Sex: 89 y.o. female  : 1933  MRN: [id number]      Day of Service: 22       Chief Complaint/Follow-up: AF w/RVR, s/p AV node ablation 11/3, complicated by prolonged hypotension    Interval History: less SOB, feels better      Temp:  [97.4 °F (36.3 °C)-98.3 °F (36.8 °C)] 97.4 °F (36.3 °C)  Heart Rate:  [79-81] 80  Resp:  [18] 18  BP: ()/(49-84) 130/67     PHYSICAL EXAM:    General Appearance: No acute distress, thin, frail, elderly female  Eyes: Conjunctiva and lids: No erythema, swelling, or discharge. Sclera non-icteric.   HENT: Atraumatic, normocephalic. External eyes, ears, and nose normal.   Respiratory: No signs of respiratory distress. Respiration rhythm and depth normal.   Cardiovascular:  Heart Rate and Rhythm: Normal, Heart Sounds: S1 and S2. 2/6 HSM  Arterial Pulses: Posterior tibialis and dorsalis pedis pulses normal.   Lower Extremities: No edema noted.  Gastrointestinal:  Abdomen flat, non-tender.   Musculoskeletal: Normal movement of extremities  Skin: Warm and dry.   Psychiatric: Patient alert and cooperative       ECG/TELE:           Results from last 7 days   Lab Units 22  0339 22  0548 22  1626 22  0255   SODIUM mmol/L 135* 138  --  142   POTASSIUM mmol/L 3.9 4.0 4.2 3.4*   CHLORIDE mmol/L 103 103  --  104   CO2 mmol/L 24.5 25.7  --  23.6   BUN mg/dL 25* 34*  --  23   CREATININE mg/dL 0.87 1.04*  --  1.26*   GLUCOSE mg/dL 114* 101*  --  172*   CALCIUM mg/dL 8.1* 8.1*  --  7.4*     Results from last 7 days   Lab Units 22  0339 22  0544 22  0255   WBC 10*3/mm3 7.15 12.24* 8.70   HEMOGLOBIN g/dL 8.5* 10.4* 9.7*   HEMATOCRIT % 27.2* 32.1* 29.7*   PLATELETS 10*3/mm3 207 278 297             Results from last 7 days   Lab Units 10/31/22  1249   TSH uIU/mL 7.590*     Results for orders placed during the hospital encounter of 22    Adult Transthoracic Echo  Complete W/ Cont if Necessary Per Protocol    Interpretation Summary  •  Left ventricular systolic function is normal. Calculated left ventricular EF = 65.5%  •  Mildly reduced right ventricular systolic function noted.  •  The right ventricular cavity is moderately dilated.  •  Left atrial volume is severely increased.  •  The right atrial cavity is moderately  dilated.  •  Severe mitral valve regurgitation is present.  •  Moderate to severe tricuspid valve regurgitation is present.  •  Calculated right ventricular systolic pressure from tricuspid regurgitation is 48 mmHg.  •  Moderate pulmonary hypertension is present.          Current Medications:   Scheduled Meds:apixaban, 2.5 mg, Oral, Q12H  levothyroxine, 75 mcg, Oral, Daily  pantoprazole, 40 mg, Oral, Q AM  senna-docusate sodium, 2 tablet, Oral, BID  sodium chloride, 10 mL, Intravenous, Q12H            Acute diastolic CHF (congestive heart failure) (HCC)    Atrial fibrillation (HCC)       Plan:     1.  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 No RV strain, she took last dose of apixaban night prior to procedure yesterday, so unlikely PE. Thursday she required 3 pressors, hypotension unresponsive to 1.5 L of IVF's, 1 mg of atropine in case she was persistently vagal--gave steroids and she seem to get better--?adrenal crisis/insufficiency. She did get volume overloaded and developed acute hypoxic/hypercapnic resp failure---mostly like secondary to volume overload, was on bipap for a short time--responded well to dose of IV Bumex.     2. SOB/hypoxia, acute hypoxic and hypercapnic respiratory failure-currently on RA but O2 sat 88-90% while sitting in chair and talking with me, does feel SOB.  Some borderline hypotension overnight but BP improved this AM, will give another dose of IV bumex    3. Severe MR-if blood pressure allows could consider afterload reduction, BP a little soft earlier this AM, will cont to monitor    4.  Moderate pulm HTN    5.  Acute renal failure, improving    6.  Lactic acidosis- resolved    Looks good, OK to transfer out of the ICU today from our standpoint, will await evaluation and disposition decision from intensivist, appreciate their help with this patient    Sourav Bradshaw III, MD  11/06/22  07:56 EST

## 2022-11-06 NOTE — PROGRESS NOTES
"  PROGRESS NOTE  Patient Name: Demetra Rush  Age/Sex: 89 y.o. female  : 1933  MRN: [id number]    Date of Admission: 11/3/2022  Date of Encounter Visit: 22   LOS: 3 days   Patient Care Team:  Melissa Bolivar MD as PCP - General (Family Medicine)  Ari Hall MD as Referring Physician (Orthopedic Surgery)    Chief Complaint: A. fib with RVR, hypertension    Hospital course: Patient is doing better, afebrile, on room air, ambulating with no dyspnea, heart rate is paced at 80 bpm, she was cleared to transfer out by cardiology.  Hemodynamically stable  Afebrile and off antibiotics.        REVIEW OF SYSTEMS:   CONSTITUTIONAL: no fever or chills  CARDIOVASCULAR: No chest pain, chest pressure or chest discomfort. No palpitations or edema.   RESPIRATORY: No shortness of breath, cough or sputum.   GASTROINTESTINAL: No anorexia, nausea, vomiting or diarrhea. No abdominal pain or blood.   HEMATOLOGIC: No bleeding or bruising.     Ventilator/Non-Invasive Ventilation Settings (From admission, onward)     Start     Ordered                         Vital Signs  Temp:  [97.4 °F (36.3 °C)-98.3 °F (36.8 °C)] 98 °F (36.7 °C)  Heart Rate:  [79-81] 80  Resp:  [18] 18  BP: ()/(49-73) 113/55  SpO2:  [91 %-100 %] 94 %  on  Flow (L/min):  [2] 2 Device (Oxygen Therapy): room air    Intake/Output Summary (Last 24 hours) at 2022 1304  Last data filed at 2022 0500  Gross per 24 hour   Intake --   Output 600 ml   Net -600 ml     Flowsheet Rows    Flowsheet Row First Filed Value   Admission Height 154.9 cm (61\") Documented at 2022 1003   Admission Weight 48.1 kg (106 lb) Documented at 2022 1003        Body mass index is 24.7 kg/m².      22  1603 22  0518 22  0404   Weight: 48.1 kg (106 lb) 55.6 kg (122 lb 9.2 oz) 59.3 kg (130 lb 11.7 oz)       Physical Exam:  GEN:  No acute distress, alert, cooperative, well developed, on room air  EYES:   Sclerae clear. No icterus. PERRL. Normal " EOM  ENT:   External ears/nose normal, no oral lesions, no thrush, mucous membranes moist  NECK:  Supple, midline trachea, no JVD  LUNGS: Normal chest on inspection, clear to auscultation bilaterally.  Respirations regular, even and unlabored.   CV: She has a paced rhythm, should the monitor shows atrial flutter with a paced ventricle, soft systolic murmur. Normal S1/S2. No  gallops, or rubs noted.  ABD:  Soft, nontender and nondistended. Normal bowel sounds. No guarding  EXT:  Moves all extremities well. No cyanosis. No redness.  Trace pedal edema.   Skin: Dry, intact, no bleeding    Results Review:    Results From Last 14 Days   Lab Units 11/05/22  0548 11/04/22  2100 11/04/22  1626 11/03/22  1628 10/31/22  1249   LACTATE mmol/L 2.0 3.2* 3.2*   < >  --    TRIGLYCERIDES mg/dL  --   --   --   --  83    < > = values in this interval not displayed.     Results from last 7 days   Lab Units 11/06/22  0339 11/05/22  0548 11/04/22  1626 11/04/22  0255 11/03/22  1430 10/31/22  1249   SODIUM mmol/L 135* 138  --  142 140 137   POTASSIUM mmol/L 3.9 4.0 4.2 3.4* 4.0 4.4   CHLORIDE mmol/L 103 103  --  104 105 97*   TOTAL CO2 mmol/L  --   --   --   --   --  30.5*   CO2 mmol/L 24.5 25.7  --  23.6 25.5  --    BUN mg/dL 25* 34*  --  23 19 24*   CREATININE mg/dL 0.87 1.04*  --  1.26* 1.14* 1.11*   CALCIUM mg/dL 8.1* 8.1*  --  7.4* 7.3* 9.1   AST (SGOT) U/L  --   --   --   --   --  21   ALT (SGPT) U/L  --   --   --   --   --  15   ANION GAP mmol/L 7.5 9.3  --  14.4 9.5  --    ALBUMIN g/dL  --   --   --   --   --  4.80         Results from last 7 days   Lab Units 10/31/22  1249   TSH uIU/mL 7.590*         Results from last 7 days   Lab Units 11/06/22  0339 11/05/22  0544 11/04/22  0255 11/03/22  1430   WBC 10*3/mm3 7.15 12.24* 8.70 5.37   HEMOGLOBIN g/dL 8.5* 10.4* 9.7* 9.4*   HEMATOCRIT % 27.2* 32.1* 29.7* 30.3*   PLATELETS 10*3/mm3 207 278 297 244   MCV fL 84.7 80.5 81.6 82.8   NEUTROPHIL % %  --   --   --  46.3   LYMPHOCYTE % %  --    --   --  37.6   MONOCYTES % %  --   --   --  12.8*   EOSINOPHIL % %  --   --   --  2.0   BASOPHIL % %  --   --   --  0.7   IMM GRAN % %  --   --   --  0.6*             Results from last 7 days   Lab Units 10/31/22  1249   TRIGLYCERIDES mg/dL 83   HDL CHOL mg/dL 86*     Results from last 7 days   Lab Units 11/03/22  1912 11/03/22  1654 11/03/22  1539   PH, ARTERIAL pH units 7.315* 7.236* 7.342*   PCO2, ARTERIAL mm Hg 49.2* 51.5* 37.8   PO2 ART mm Hg 138.6* 58.2* 57.0*   HCO3 ART mmol/L 25.1 21.9* 20.5*         Glucose   Date/Time Value Ref Range Status   11/06/2022 0549 81 70 - 130 mg/dL Final     Comment:     Meter: [id number] : [id number] Romero Barnes NA   11/06/2022 0112 132 (H) 70 - 130 mg/dL Final     Comment:     Meter: [id number] : [id number] Romero Barnes NA   11/05/2022 1725 96 70 - 130 mg/dL Final     Comment:     Meter: [id number] : [id number] Rosalina Hodge NA   11/05/2022 1148 102 70 - 130 mg/dL Final     Comment:     Meter: [id number] : [id number] Rosalina Hodge NA   11/04/2022 1803 122 70 - 130 mg/dL Final     Comment:     Meter: [id number] : [id number] Wood Azalea NA   11/04/2022 1200 197 (H) 70 - 130 mg/dL Final     Comment:     Meter: [id number] : [id number] Wood Azalea NA   11/04/2022 0631 191 (H) 70 - 130 mg/dL Final     Comment:     Meter: [id number] : [id number] Yoseph Vaughn ADRIANNE   11/04/2022 0044 185 (H) 70 - 130 mg/dL Final     Comment:     Meter: [id number] : [id number] Yoseph Vaughn ADRIANNE     Results from last 7 days   Lab Units 11/05/22  0548 11/05/22  0426 11/04/22  2100 11/04/22  1626 11/04/22  1312 11/04/22  0642 11/04/22  0255 11/03/22  2145 11/03/22  1628   PROCALCITONIN ng/mL  --  0.58*  --   --   --   --   --  0.27*  --    LACTATE mmol/L 2.0  --  3.2* 3.2* 6.9* 3.1*  3.1* 3.4*  --  1.6     Results from last 7 days   Lab Units 11/03/22  1628   BLOODCX  No growth at 2 days  No growth at 2 days     Results from last 7 days   Lab Units 11/03/22  1637    NITRITE UA  Negative                   Imaging:   Imaging Results (All)           I reviewed the patient's new clinical results.  I personally viewed and interpreted the patient's imaging results:        Medication Review:   apixaban, 2.5 mg, Oral, Q12H  levothyroxine, 75 mcg, Oral, Daily  pantoprazole, 40 mg, Oral, Q AM  senna-docusate sodium, 2 tablet, Oral, BID  sodium chloride, 10 mL, Intravenous, Q12H             ASSESSMENT:   1. A. fib/flutter with RVR status post ablation with ventricular pacemaker  2. Acute hypoxemic and hypercapnic respiratory failure, improved and resolved  3. Acute on chronic congestive heart failure of diastolic type  4. Hypotensive shock, likely cardiogenic  5. Severe mitral regurgitation and moderate to severe tricuspid regurgitation  6. Pulmonary hypertension, moderate  7. Anemia  8. Renal insufficiency, transient and improving  9. Hyperglycemia, mild and resolved  10. Lactic acidosis, resolved  11. Elevated procalcitonin with no obvious source of sepsis   12. Sneezing    PLAN:  patient is having no more problem with the constipation  No more abdominal distention  No problem with urinary retention  She is afebrile  Infection is looking less unlikely now and the patient was treated with only 3 days of Rocephin and this was discontinued, her white count is normal and we will continue to hold on any further antibiotics  She is cleared to transfer out of the intensive care unit  She is cleared for discharge home once okay with cardiology we will go ahead and transfer out of the ICU and will follow-up as needed.  Please call with any question or concern  We will go ahead and resume her nasal steroids from her home medication list to help with the sneezing and the nasal allergy symptoms    Disposition: Stepdown unit by tomorrow if continues to improve    Lizzy Rick MD  11/06/22  13:04 EST             Dictated utilizing Dragon dictation

## 2022-11-07 ENCOUNTER — APPOINTMENT (OUTPATIENT)
Dept: CARDIOLOGY | Facility: HOSPITAL | Age: 87
End: 2022-11-07

## 2022-11-07 LAB
ANION GAP SERPL CALCULATED.3IONS-SCNC: 8.9 MMOL/L (ref 5–15)
BASOPHILS # BLD AUTO: 0.04 10*3/MM3 (ref 0–0.2)
BASOPHILS NFR BLD AUTO: 0.8 % (ref 0–1.5)
BH CV LOWER ARTERIAL LEFT 2ND DIGIT SYS MAX: 182
BH CV LOWER ARTERIAL LEFT 3RD DIGIT SYS MAX: 131
BH CV LOWER ARTERIAL LEFT 4TH DIGIT SYS MAX: NORMAL
BH CV LOWER ARTERIAL LEFT 5TH DIGIT SYS MAX: NORMAL
BH CV LOWER ARTERIAL LEFT ABI RATIO: 1.45
BH CV LOWER ARTERIAL LEFT DORSALIS PEDIS SYS MAX: 184
BH CV LOWER ARTERIAL LEFT GREAT TOE SYS MAX: 113
BH CV LOWER ARTERIAL LEFT POST TIBIAL SYS MAX: 191
BH CV LOWER ARTERIAL LEFT TBI RATIO: 0.86
BH CV LOWER ARTERIAL RIGHT 2ND DIGIT SYS MAX: 108
BH CV LOWER ARTERIAL RIGHT 3RD DIGIT SYS MAX: 118
BH CV LOWER ARTERIAL RIGHT 4TH DIGIT SYS MAX: NORMAL
BH CV LOWER ARTERIAL RIGHT 5TH DIGIT SYS MAX: NORMAL
BH CV LOWER ARTERIAL RIGHT ABI RATIO: 1.36
BH CV LOWER ARTERIAL RIGHT DORSALIS PEDIS SYS MAX: 160
BH CV LOWER ARTERIAL RIGHT GREAT TOE SYS MAX: 152
BH CV LOWER ARTERIAL RIGHT POST TIBIAL SYS MAX: 180
BH CV LOWER ARTERIAL RIGHT TBI RATIO: 1.15
BUN SERPL-MCNC: 16 MG/DL (ref 8–23)
BUN/CREAT SERPL: 16.2 (ref 7–25)
CALCIUM SPEC-SCNC: 8.9 MG/DL (ref 8.6–10.5)
CHLORIDE SERPL-SCNC: 100 MMOL/L (ref 98–107)
CO2 SERPL-SCNC: 28.1 MMOL/L (ref 22–29)
CREAT SERPL-MCNC: 0.99 MG/DL (ref 0.57–1)
DEPRECATED RDW RBC AUTO: 40.8 FL (ref 37–54)
EGFRCR SERPLBLD CKD-EPI 2021: 54.6 ML/MIN/1.73
EOSINOPHIL # BLD AUTO: 0.09 10*3/MM3 (ref 0–0.4)
EOSINOPHIL NFR BLD AUTO: 1.8 % (ref 0.3–6.2)
ERYTHROCYTE [DISTWIDTH] IN BLOOD BY AUTOMATED COUNT: 13.6 % (ref 12.3–15.4)
GLUCOSE SERPL-MCNC: 125 MG/DL (ref 65–99)
HCT VFR BLD AUTO: 32.5 % (ref 34–46.6)
HGB BLD-MCNC: 10.1 G/DL (ref 12–15.9)
IMM GRANULOCYTES # BLD AUTO: 0.06 10*3/MM3 (ref 0–0.05)
IMM GRANULOCYTES NFR BLD AUTO: 1.2 % (ref 0–0.5)
LYMPHOCYTES # BLD AUTO: 0.76 10*3/MM3 (ref 0.7–3.1)
LYMPHOCYTES NFR BLD AUTO: 15.6 % (ref 19.6–45.3)
MAXIMAL PREDICTED HEART RATE: 131 BPM
MCH RBC QN AUTO: 25.9 PG (ref 26.6–33)
MCHC RBC AUTO-ENTMCNC: 31.1 G/DL (ref 31.5–35.7)
MCV RBC AUTO: 83.3 FL (ref 79–97)
MONOCYTES # BLD AUTO: 0.46 10*3/MM3 (ref 0.1–0.9)
MONOCYTES NFR BLD AUTO: 9.4 % (ref 5–12)
NEUTROPHILS NFR BLD AUTO: 3.47 10*3/MM3 (ref 1.7–7)
NEUTROPHILS NFR BLD AUTO: 71.2 % (ref 42.7–76)
NRBC BLD AUTO-RTO: 0.2 /100 WBC (ref 0–0.2)
PLATELET # BLD AUTO: 260 10*3/MM3 (ref 140–450)
PMV BLD AUTO: 9.7 FL (ref 6–12)
POTASSIUM SERPL-SCNC: 3.8 MMOL/L (ref 3.5–5.2)
RBC # BLD AUTO: 3.9 10*6/MM3 (ref 3.77–5.28)
SODIUM SERPL-SCNC: 137 MMOL/L (ref 136–145)
STRESS TARGET HR: 111 BPM
UPPER ARTERIAL LEFT ARM BRACHIAL SYS MAX: 132 MMHG
UPPER ARTERIAL RIGHT ARM BRACHIAL SYS MAX: 122 MMHG
WBC NRBC COR # BLD: 4.88 10*3/MM3 (ref 3.4–10.8)

## 2022-11-07 PROCEDURE — 80048 BASIC METABOLIC PNL TOTAL CA: CPT | Performed by: INTERNAL MEDICINE

## 2022-11-07 PROCEDURE — 97535 SELF CARE MNGMENT TRAINING: CPT

## 2022-11-07 PROCEDURE — 93922 UPR/L XTREMITY ART 2 LEVELS: CPT

## 2022-11-07 PROCEDURE — 99232 SBSQ HOSP IP/OBS MODERATE 35: CPT | Performed by: NURSE PRACTITIONER

## 2022-11-07 PROCEDURE — 83036 HEMOGLOBIN GLYCOSYLATED A1C: CPT | Performed by: STUDENT IN AN ORGANIZED HEALTH CARE EDUCATION/TRAINING PROGRAM

## 2022-11-07 PROCEDURE — C1786 PMKR, SINGLE, RATE-RESP: HCPCS

## 2022-11-07 PROCEDURE — 97165 OT EVAL LOW COMPLEX 30 MIN: CPT

## 2022-11-07 PROCEDURE — 97530 THERAPEUTIC ACTIVITIES: CPT

## 2022-11-07 PROCEDURE — 85025 COMPLETE CBC W/AUTO DIFF WBC: CPT | Performed by: INTERNAL MEDICINE

## 2022-11-07 RX ORDER — NITROGLYCERIN 0.4 MG/1
0.4 TABLET SUBLINGUAL
Status: DISCONTINUED | OUTPATIENT
Start: 2022-11-07 | End: 2022-11-08 | Stop reason: HOSPADM

## 2022-11-07 RX ORDER — TORSEMIDE 10 MG/1
10 TABLET ORAL DAILY
Status: DISCONTINUED | OUTPATIENT
Start: 2022-11-07 | End: 2022-11-08 | Stop reason: HOSPADM

## 2022-11-07 RX ADMIN — TORSEMIDE 10 MG: 10 TABLET ORAL at 11:54

## 2022-11-07 RX ADMIN — APIXABAN 2.5 MG: 2.5 TABLET, FILM COATED ORAL at 20:51

## 2022-11-07 RX ADMIN — ALPRAZOLAM 1 MG: 0.5 TABLET ORAL at 22:52

## 2022-11-07 RX ADMIN — LEVOTHYROXINE SODIUM 75 MCG: 0.07 TABLET ORAL at 08:51

## 2022-11-07 RX ADMIN — FLUTICASONE PROPIONATE 2 SPRAY: 50 SPRAY, METERED NASAL at 08:51

## 2022-11-07 RX ADMIN — Medication 10 ML: at 20:51

## 2022-11-07 RX ADMIN — APIXABAN 2.5 MG: 2.5 TABLET, FILM COATED ORAL at 08:51

## 2022-11-07 RX ADMIN — DOCUSATE SODIUM 50MG AND SENNOSIDES 8.6MG 1 TABLET: 8.6; 5 TABLET, FILM COATED ORAL at 20:51

## 2022-11-07 RX ADMIN — Medication 10 ML: at 08:51

## 2022-11-07 RX ADMIN — PANTOPRAZOLE SODIUM 40 MG: 40 TABLET, DELAYED RELEASE ORAL at 06:10

## 2022-11-07 RX ADMIN — DOCUSATE SODIUM 50MG AND SENNOSIDES 8.6MG 2 TABLET: 8.6; 5 TABLET, FILM COATED ORAL at 08:51

## 2022-11-07 NOTE — PLAN OF CARE
Goal Outcome Evaluation:  Plan of Care Reviewed With: patient           Outcome Evaluation: Pt see for initial OT evaluation. Pt is a 89 year old female admitted to [facility] on 11/3 with CHF. Pt reports that she lived in [place] with her daughter who is very sick but earlier this year she has moved back to [place] and now lives with her other daugther. Pt is ind. with ADLs at baseline and does not use any AD for ambulation. Pt is SBA-CGA for sit to stand with RW. Pt will benefit from skilled OT services to increase strength, balance, and endurance to maximize ind. with ADLs.

## 2022-11-07 NOTE — PLAN OF CARE
Goal Outcome Evaluation:  Plan of Care Reviewed With: patient        Progress: no change  Outcome Evaluation: Pt requiring less assistance this date with ambulation. Pt SBA for bed mobility, CGA for transfers and CGA for ambulation of 60' w/ RW. Pt has good sitting and static standing balance and demos a mildly unsteady gait w/ ambulation. Pt fatigued, SOB and in increased pain on return to bed. Pt denying exercises due to pain and lunch arriving. PT will cont to follow and progress her functional mobility as tolerated.

## 2022-11-07 NOTE — CONSULTS
"Name: Demetra Rush ADMIT: 11/3/2022   : 1933  PCP: Melissa Bolivar MD    MRN: [id number] LOS: 4 days   AGE/SEX: 89 y.o. female  ROOM: [facility]     Consults  Amanda Negrete MD     LOS: 4 days   Patient Care Team:  Melissa Bolivar MD as PCP - General (Family Medicine)  Ari Hall MD as Referring Physician (Orthopedic Surgery)    Subjective     History of Present Illness  89 y.o. female with a history of atrial fibrillation with RVR who had an AV node ablation on 11/3/22.  Her post procedure course was complicated by hypotension.  She has been started on eliquis for post procedure anticoagulation.  She has been complaining of bilateral leg and foot pain for 1.5 years now.  The pain is described as her feet feeling \"hot\" at night and \"cold\" during the day.  She also has tingling and numbness of both feet, right is worse than the left.  She has red discoloration of bilateral feet that has also been present for 1.5 years now.  She has been seen by a dermatologist who said she had \"busted capillaries\" in her feet.  She has had a back injury in her youth.  She has had 3 pregnancies.  No history of DVT.  No varicose veins.  She has never been a cigarette smoker.  She was living independently until May of this year.  She does ambulate independently but does occasionally have trouble with her balance.  She is not diabetic.  She has not had any ulcerations of her feet.  The feet become more red with standing but her symptoms do not change with her feet elevated versus dependent.    Review of Systems   Constitutional: Negative.    HENT: Negative.    Eyes: Negative.    Respiratory: Negative.    Cardiovascular: Positive for leg swelling.   Gastrointestinal: Negative.    Endocrine: Negative.    Genitourinary: Negative.    Musculoskeletal: Negative.    Skin: Positive for color change.   Allergic/Immunologic: Negative.    Neurological: Positive for numbness.   Hematological: Negative.  "   Psychiatric/Behavioral: Negative.        Past Medical History:   Diagnosis Date   • Atrial fibrillation (HCC)    • Chronic diastolic (congestive) heart failure (HCC)    • GERD (gastroesophageal reflux disease)    • Hyperlipidemia    • Hypertension    • Hypothyroidism    • Mitral regurgitation    • Osteoporosis 08/29/2022   • Pulmonary hypertension (HCC)    • Stroke (HCC)    • UTI (urinary tract infection)        Past Surgical History:   Procedure Laterality Date   • ADENOIDECTOMY     • BLADDER SURGERY     • CARDIAC ELECTROPHYSIOLOGY PROCEDURE N/A 10/20/2022    Procedure: Pacemaker SC new--Medtronic;  Surgeon: Albino Gaston MD;  Location:  [facility] CATH INVASIVE LOCATION;  Service: Cardiology;  Laterality: N/A;   • CARDIAC ELECTROPHYSIOLOGY PROCEDURE N/A 11/3/2022    Procedure: AV node ablation---has Medtronic pacemaker;  Surgeon: Albino Gaston MD;  Location:  [facility] CATH INVASIVE LOCATION;  Service: Cardiovascular;  Laterality: N/A;   • CHOLECYSTECTOMY     • HYSTERECTOMY         Family History   Problem Relation Age of Onset   • Heart disease Mother    • Other Mother         fluid in lungs   • Heart disease Father    • Heart attack Father    • Cancer Sister    • Tongue cancer Sister        Social History     Tobacco Use   • Smoking status: Never   • Smokeless tobacco: Never   Vaping Use   • Vaping Use: Never used   Substance Use Topics   • Alcohol use: Yes     Alcohol/week: 1.0 standard drink     Types: 1 Glasses of wine per week     Comment: glass maybe once a month   • Drug use: Never       Allergies: Amlodipine, Codeine, and Sulfa antibiotics    Medications Prior to Admission   Medication Sig Dispense Refill Last Dose   • ALPRAZolam (XANAX) 1 MG tablet Take 1 tablet by mouth At Night As Needed for Anxiety. One to two tablets at night 90 tablet 0 11/2/2022   • apixaban (ELIQUIS) 2.5 MG tablet tablet Take 1 tablet by mouth Every 12 (Twelve) Hours. Indications: Atrial Fibrillation 60 tablet 5 11/2/2022   •  ciprofloxacin (Cipro) 250 MG tablet Take 1 tablet by mouth 2 (Two) Times a Day. 10 tablet 0 11/2/2022   • dilTIAZem (TIAZAC) 120 MG 24 hr capsule    11/3/2022   • levothyroxine (SYNTHROID, LEVOTHROID) 75 MCG tablet Take 1 tablet by mouth Daily. 90 tablet 1 11/3/2022   • metoprolol tartrate (LOPRESSOR) 25 MG tablet Take 1 tablet by mouth 2 (Two) Times a Day. 60 tablet 3 11/3/2022   • pantoprazole (PROTONIX) 40 MG EC tablet TAKE ONE TABLET DAILY 30 tablet 0 11/3/2022   • potassium chloride (K-TAB) 20 MEQ tablet controlled-release ER tablet Take 1 tablet by mouth Daily. 60 tablet 2 11/2/2022   • sucralfate (CARAFATE) 1 g tablet Take 1 tablet by mouth 2 (Two) Times a Day. 60 tablet 0 11/3/2022   • torsemide (DEMADEX) 10 MG tablet Take 1 tablet by mouth Daily.   11/3/2022     apixaban, 2.5 mg, Oral, Q12H  fluticasone, 2 spray, Each Nare, Daily  levothyroxine, 75 mcg, Oral, Daily  pantoprazole, 40 mg, Oral, Q AM  senna-docusate sodium, 2 tablet, Oral, BID  sodium chloride, 10 mL, Intravenous, Q12H  torsemide, 10 mg, Oral, Daily         •  acetaminophen **OR** acetaminophen  •  ALPRAZolam  •  bisacodyl **OR** bisacodyl  •  senna-docusate sodium **AND** polyethylene glycol **AND** bisacodyl **AND** bisacodyl  •  calcium carbonate  •  EPINEPHrine  •  fentaNYL citrate (PF)  •  magnesium sulfate **OR** magnesium sulfate **OR** magnesium sulfate  •  midazolam  •  ondansetron  •  potassium chloride **OR** potassium chloride **OR** potassium chloride  •  potassium chloride  •  potassium chloride  •  sodium chloride      Objective   Temp:  [98 °F (36.7 °C)-98.9 °F (37.2 °C)] 98.9 °F (37.2 °C)  Heart Rate:  [79-82] 82  Resp:  [18] 18  BP: (113-135)/(53-68) 133/68    I/O this shift:  In: 240 [P.O.:240]  Out: -     Physical Exam  Vitals reviewed.   Constitutional:       General: She is not in acute distress.     Appearance: Normal appearance. She is normal weight.   HENT:      Head: Normocephalic and atraumatic.      Right Ear:  External ear normal.      Left Ear: External ear normal.      Nose: Nose normal.      Mouth/Throat:      Mouth: Mucous membranes are moist.      Pharynx: Oropharynx is clear.   Eyes:      Extraocular Movements: Extraocular movements intact.      Conjunctiva/sclera: Conjunctivae normal.      Pupils: Pupils are equal, round, and reactive to light.   Cardiovascular:      Rate and Rhythm: Normal rate and regular rhythm.      Comments: Palpable femoral pulses bilaterally  Right groin puncture site c/d/i with dressing in place, no hematoma  Left DP and PT pulses palpable, right DP pulse thready  Bilateral erythema and dependent rubor of feet that does not resolve with laying supine  Pulmonary:      Effort: Pulmonary effort is normal. No respiratory distress.   Abdominal:      General: Abdomen is flat.      Palpations: Abdomen is soft.   Musculoskeletal:         General: Swelling present.      Cervical back: Normal range of motion and neck supple.   Skin:     General: Skin is warm and dry.      Capillary Refill: Capillary refill takes less than 2 seconds.   Neurological:      General: No focal deficit present.      Mental Status: She is alert and oriented to person, place, and time.   Psychiatric:         Mood and Affect: Mood normal.         Behavior: Behavior normal.         Results from last 7 days   Lab Units 11/06/22  0339 11/05/22  0544 11/04/22  0255 11/03/22  1430   WBC 10*3/mm3 7.15 12.24* 8.70 5.37   HEMOGLOBIN g/dL 8.5* 10.4* 9.7* 9.4*   PLATELETS 10*3/mm3 207 278 297 244     Results from last 7 days   Lab Units 11/06/22  0339 11/05/22  0548 11/04/22  1626 11/04/22  0255 11/03/22  1430 10/31/22  1249   SODIUM mmol/L 135* 138  --  142 140 137   POTASSIUM mmol/L 3.9 4.0 4.2 3.4* 4.0 4.4   CHLORIDE mmol/L 103 103  --  104 105 97*   TOTAL CO2 mmol/L  --   --   --   --   --  30.5*   CO2 mmol/L 24.5 25.7  --  23.6 25.5  --    BUN mg/dL 25* 34*  --  23 19 24*   CREATININE mg/dL 0.87 1.04*  --  1.26* 1.14* 1.11*    GLUCOSE mg/dL 114* 101*  --  172* 217* 97   Estimated Creatinine Clearance: 35.8 mL/min (by C-G formula based on SCr of 0.87 mg/dL).      Imaging Studies:  n/a    Active Hospital Problems    Diagnosis  POA   • **Acute diastolic CHF (congestive heart failure) (HCC) [I50.31]  Unknown   • Atrial fibrillation (HCC) [I48.91]  Unknown      Resolved Hospital Problems   No resolved problems to display.     Problem Points:  4:  Patient has a new problem, with additional work-up planned  Total problem points:4 or more    Data Points:  1:  I have reviewed or order clinical lab test  1:  I have reviewed or order radiology test (except heart catheterization or echo)  1:  I have reviewed or order medicine test (PFT, EKG, caridac echo or cath)  Total data points:3    Risk Points:  Moderate: New diagnosis with unknown prognosis    MDM Prob point Data point Risk   SF 1 1 Minimal   Low 2 2 Low   Mod 3 3 Moderate   High 4 4 High     Code MDM History Exam Time   62473 SF/Low Detailed Detailed 30   18222 Mod Comprehensive Comprehensive 50   70761 High Comprehensive Comprehensive 70     Detailed history:  4 elements HPI or status of 3 chronic problems; 2-9 system ROS  Comprehensive:  4 elements HPI or status of 3 chronic problems;  10 system ROS    Detailed Exam:  12 findings from any organ system  Comprehensive Exam:  2 findings from each of 9 systems.     [place]    Assessment & Plan       Acute diastolic CHF (congestive heart failure) (HCC)    Atrial fibrillation (HCC)        89 y.o. female with bilateral foot discoloration, neuropathic pain that have been present for 1.5 years.  She is now s/p AV node ablation on 11/3/22 and on eliquis 2.5 mg BID    -we discussed possible etiologies of her foot pain and discoloration, including arterial insufficiency.  I have recommended we start by obtaining an WILIAN with digit pressures to assess her arterial status.  This could be related to small vessel disease as seen with diabetes  mellitus, but she does not carry a diagnosis of diabetes mellitus.  I will obtain a hemoglobin A1C.      -her pain is neuropathic and is not classic rest pain.   She does not have ulceration.  I do not think she has acute limb ischemia and she does not require an acute intervention at this time.  We will continue her eliquis for now.    -will follow    I discussed the patients findings and my recommendations with patient.  Please call my office with any question: [phone]    Amanda Negrete MD  11/07/22  10:54 EST

## 2022-11-07 NOTE — THERAPY EVALUATION
Patient Name: Demetra Rush  : 1933    MRN: [id number]                              Today's Date: 2022       Admit Date: 11/3/2022    Visit Dx:     ICD-10-CM ICD-9-CM   1. Acute diastolic CHF (congestive heart failure) (HCC)  I50.31 428.31     428.0   2. Persistent atrial fibrillation (HCC)  I48.19 427.31     Patient Active Problem List   Diagnosis   • Osteoporosis   • Acute diastolic CHF (congestive heart failure) (HCC)   • Pulmonary hypertension (HCC)   • Mitral regurgitation   • Primary hypertension   • Atrial fibrillation (HCC)   • Chronic diastolic (congestive) heart failure (HCC)   • Primary insomnia   • Acquired hypothyroidism     Past Medical History:   Diagnosis Date   • Atrial fibrillation (HCC)    • Chronic diastolic (congestive) heart failure (HCC)    • GERD (gastroesophageal reflux disease)    • Hyperlipidemia    • Hypertension    • Hypothyroidism    • Mitral regurgitation    • Osteoporosis 2022   • Pulmonary hypertension (HCC)    • Stroke (HCC)    • UTI (urinary tract infection)      Past Surgical History:   Procedure Laterality Date   • ADENOIDECTOMY     • BLADDER SURGERY     • CARDIAC ELECTROPHYSIOLOGY PROCEDURE N/A 10/20/2022    Procedure: Pacemaker SC new--Medtronic;  Surgeon: Albino Gaston MD;  Location:  [facility] C[facility]VAS[facility] LOCATION;  Service: Cardiology;  Laterality: N/A;   • CARDIAC ELECTROPHYSIOLOGY PROCEDURE N/A 11/3/2022    Procedure: AV node ablation---has Medtronic pacemaker;  Surgeon: Albino Gaston MD;  Location:  [facility] C[facility] INVASIVE LOCATION;  Service: Cardiovascular;  Laterality: N/A;   • CHOLECYSTECTOMY     • HYSTERECTOMY        General Information     Row Name 22 154          OT Time and Intention    Document Type evaluation  -KN     Mode of Treatment individual therapy;occupational therapy  -KN     Row Name 22 154          General Information    Patient Profile Reviewed yes  -KN     Prior Level of Function independent:  -KN     Existing  Precautions/Restrictions fall  -KN     Barriers to Rehab medically complex  -KN     Row Name 11/07/22 1542          Living Environment    People in Home child(raphael), adult  -KN     Row Name 11/07/22 1542          Home Main Entrance    Number of Stairs, Main Entrance four  -KN     Row Name 11/07/22 1542          Cognition    Orientation Status (Cognition) oriented x 4  -KN     Row Name 11/07/22 1542          Safety Issues, Functional Mobility    Impairments Affecting Function (Mobility) strength;endurance/activity tolerance;shortness of breath;range of motion (ROM);grasp  -           User Key  (r) = Recorded By, (t) = Taken By, (c) = Cosigned By    Initials Name Provider Type    Brigido Gomez OT Occupational Therapist                 Mobility/ADL's     Row Name 11/07/22 1544          Bed Mobility    Bed Mobility sit-supine  -KN     Supine-Sit [place] (Bed Mobility) standby assist  -KN     Sit-Supine [place] (Bed Mobility) standby assist  -KN     Assistive Device (Bed Mobility) bed rails;head of bed elevated  -     Row Name 11/07/22 1544          Bed-Chair Transfer    Bed-Chair [place] (Transfers) not tested  -     Row Name 11/07/22 1544          Sit-Stand Transfer    Sit-Stand [place] (Transfers) standby assist  -     Assistive Device (Sit-Stand Transfers) walker, front-wheeled  -     Row Name 11/07/22 1544          Activities of Daily Living    BADL Assessment/Intervention grooming  -     Row Name 11/07/22 1544          Grooming Assessment/Training    [place] Level (Grooming) hair care, combing/brushing;wash face, hands;set up  -KN     Position (Grooming) --  seated in bed with HOB elevated  -           User Key  (r) = Recorded By, (t) = Taken By, (c) = Cosigned By    Initials Name Provider Type    Brigido Gomez OT Occupational Therapist               Obj/Interventions     Row Name 11/07/22 1546          Sensory Assessment (Somatosensory)    Sensory Assessment  (Somatosensory) UE sensation intact  -     Row Name 11/07/22 1546          Vision Assessment/Intervention    Visual Impairment/Limitations WFL  -     Row Name 11/07/22 1546          Range of Motion Comprehensive    General Range of Motion no range of motion deficits identified  -     Row Name 11/07/22 1546          Strength Comprehensive (MMT)    General Manual Muscle Testing (MMT) Assessment no strength deficits identified  -           User Key  (r) = Recorded By, (t) = Taken By, (c) = Cosigned By    Initials Name Provider Type    Brigido Gomez OT Occupational Therapist               Goals/Plan     Row Name 11/07/22 1552          Bed Mobility Goal 1 (OT)    Activity/Assistive Device (Bed Mobility Goal 1, OT) bed mobility activities, all  -KN     [place] Level/Cues Needed (Bed Mobility Goal 1, OT) modified independence  -KN     Time Frame (Bed Mobility Goal 1, OT) short term goal (STG);2 weeks  -     Row Name 11/07/22 1552          Transfer Goal 1 (OT)    Activity/Assistive Device (Transfer Goal 1, OT) transfers, all  -KN     [place] Level/Cues Needed (Transfer Goal 1, OT) modified independence  -KN     Time Frame (Transfer Goal 1, OT) short term goal (STG);2 weeks  -     Row Name 11/07/22 1552          Bathing Goal 1 (OT)    Activity/Device (Bathing Goal 1, OT) upper body bathing  -KN     [place] Level/Cues Needed (Bathing Goal 1, OT) modified independence  -KN     Time Frame (Bathing Goal 1, OT) short term goal (STG);2 weeks  -           User Key  (r) = Recorded By, (t) = Taken By, (c) = Cosigned By    Initials Name Provider Type    Brigido Gomez OT Occupational Therapist               Clinical Impression     Row Name 11/07/22 1547          Pain Assessment    Pretreatment Pain Rating 8/10  -KN     Posttreatment Pain Rating 9/10  -KN     Pain Location - Side/Orientation Bilateral  -     Pain Location - foot  -     Row Name 11/07/22 1547          Plan of Care Review    Plan of  Care Reviewed With patient  -KN     Outcome Evaluation Pt see for initial OT evaluation. Pt is a 89 year old female admitted to [facility] on 11/3 with CHF. Pt reports that she lived in [place] with her daughter who is very sick but earlier this year she has moved back to [place] and now lives with her other daugther. Pt is ind. with ADLs at baseline and does not use any AD for ambulation. Pt is SBA-CGA for sit to stand with RW. Pt will benefit from skilled OT services to increase strength, balance, and endurance to maximize ind. with ADLs.  -     Row Name 11/07/22 1547          Therapy Assessment/Plan (OT)    Rehab Potential (OT) good, to achieve stated therapy goals  -     Criteria for Skilled Therapeutic Interventions Met (OT) yes;meets criteria  -     Therapy Frequency (OT) 3 times/wk  -     Row Name 11/07/22 1547          Therapy Plan Review/Discharge Plan (OT)    Anticipated Discharge Disposition (OT) home with home health;home with assist;home  -     Row Name 11/07/22 1547          Vital Signs    O2 Delivery Pre Treatment room air  -KN     O2 Delivery Intra Treatment room air  -KN     O2 Delivery Post Treatment room air  -KN     Pre Patient Position Supine  -KN     Intra Patient Position Standing  -KN     Post Patient Position Supine  -KN     Row Name 11/07/22 1547          Positioning and Restraints    Pre-Treatment Position in bed  -KN     Post Treatment Position bed  -KN     In Bed call light within reach;encouraged to call for assist;exit alarm on  -KN           User Key  (r) = Recorded By, (t) = Taken By, (c) = Cosigned By    Initials Name Provider Type    Brigido Gomez, OT Occupational Therapist               Outcome Measures     Row Name 11/07/22 1139          How much help from another is currently needed...    Putting on and taking off regular lower body clothing? 3  -KN     Bathing (including washing, rinsing, and drying) 3  -KN     Toileting (which includes using toilet bed pan or urinal) 3  -KN      Putting on and taking off regular upper body clothing 4  -KN     Taking care of personal grooming (such as brushing teeth) 4  -KN     Eating meals 4  -KN     AM-PAC 6 Clicks Score (OT) 21  -KN     Row Name 11/07/22 1318 11/07/22 0800       How much help from another person do you currently need...    Turning from your back to your side while in flat bed without using bedrails? 4  -MG 3  -JS    Moving from lying on back to sitting on the side of a flat bed without bedrails? 3  -MG 3  -JS    Moving to and from a bed to a chair (including a wheelchair)? 3  -MG 3  -JS    Standing up from a chair using your arms (e.g., wheelchair, bedside chair)? 3  -MG 3  -JS    Climbing 3-5 steps with a railing? 3  -MG 3  -JS    To walk in hospital room? 3  -MG 3  -JS    AM-PAC 6 Clicks Score (PT) 19  -MG 18  -JS    Highest level of mobility 6 --> Walked 10 steps or more  -MG 6 --> Walked 10 steps or more  -JS    Row Name 11/07/22 1552          Modified Sebastian Scale    Modified Saleem Scale 4 - Moderately severe disability.  Unable to walk without assistance, and unable to attend to own bodily needs without assistance.  -KN     Row Name 11/07/22 1552 11/07/22 1318       Functional Assessment    Outcome Measure Options AM-PAC 6 Clicks Daily Activity (OT);Modified Sebastian  -KN AM-PAC 6 Clicks Basic Mobility (PT)  -MG          User Key  (r) = Recorded By, (t) = Taken By, (c) = Cosigned By    Initials Name Provider Type    Yakelin So, RN Registered Nurse    Vida Morris, PT Physical Therapist    Brigido Gomez, OT Occupational Therapist                  OT Recommendation and Plan  Therapy Frequency (OT): 3 times/wk  Plan of Care Review  Plan of Care Reviewed With: patient  Outcome Evaluation: Pt see for initial OT evaluation. Pt is a 89 year old female admitted to [facility] on 11/3 with CHF. Pt reports that she lived in [place] with her daughter who is very sick but earlier this year she has moved back to [place] and now lives  with her other daugther. Pt is ind. with ADLs at baseline and does not use any AD for ambulation. Pt is SBA-CGA for sit to stand with RW. Pt will benefit from skilled OT services to increase strength, balance, and endurance to maximize ind. with ADLs.     Time Calculation:    Time Calculation- OT     Row Name 11/07/22 1553             Time Calculation- OT    OT Start Time 0845  -KN      OT Stop Time 0900  -KN      OT Time Calculation (min) 15 min  -KN      Total Timed Code Minutes- OT 10 minute(s)  -KN      OT Received On 11/07/22  -KN      OT - Next Appointment 11/09/22  -KN      OT Goal Re-Cert Due Date 11/21/22  -KN         Timed Charges    38496 - OT Self Care/Mgmt Minutes 10  -KN         Untimed Charges    OT Eval/Re-eval Minutes 5  -KN         Total Minutes    Timed Charges Total Minutes 10  -KN      Untimed Charges Total Minutes 5  -KN       Total Minutes 15  -KN            User Key  (r) = Recorded By, (t) = Taken By, (c) = Cosigned By    Initials Name Provider Type    KN Brigido Cortez OT Occupational Therapist              Therapy Charges for Today     Code Description Service Date Service Provider Modifiers Qty    54242630621 HC OT SELF CARE/MGMT/TRAIN EA 15 MIN 11/7/2022 Brigido Cortez OT GO 1    86260297673 HC OT EVAL LOW COMPLEXITY 2 11/7/2022 Brigido Cortez OT GO 1               Brigido Cortez OT  11/7/2022

## 2022-11-07 NOTE — PLAN OF CARE
Goal Outcome Evaluation:      Pt alert and oriented ambulated to bathroom with 1 asst. Vitals stable, vascular consult done re her BLE redneess no acute problems noted will cont to monitor

## 2022-11-07 NOTE — PLAN OF CARE
Goal Outcome Evaluation:   Diuretic in Use: torsemide  Response to Diuretics (Output greater than intake): good  Daily Weight (up or down): down  O2 Requirements: room air  Functional Status (Activity level, tolerance and respiratory symptoms): upx1 standby  Discharge Plans: dc to home

## 2022-11-07 NOTE — PLAN OF CARE
Goal Outcome Evaluation:  Plan of Care Reviewed With: patient           Outcome Evaluation: pt educate to call for assist, call light inreach , bedalarm intact, room air no SOA , cont to monitor

## 2022-11-07 NOTE — PROGRESS NOTES
Electrophysiology Follow-Up Note      Patient Name: Demetra Rush  Age/Sex: 89 y.o. female  : 1933  MRN: [id number]      Day of Service: 22       Chief Complaint/Follow-up: AF w/RVR, s/p AV node ablation 11/3, complicated by prolonged hypotension    Interval History: No acute events overnight but says she thinks she is starting to retain fluid again--she thinks this because of her weight---however documented weights are all over the placed, 130 , 114 today.     She is also extremely worried about her feet---they go from hot to cold and have some discoloration, they tingling.       Temp:  [98 °F (36.7 °C)-98.9 °F (37.2 °C)] 98.9 °F (37.2 °C)  Heart Rate:  [79-82] 82  Resp:  [18] 18  BP: (113-135)/(53-68) 133/68     PHYSICAL EXAM:    General Appearance: No acute distress  Eyes: Conjunctiva and lids: No erythema, swelling, or discharge. Sclera non-icteric.   HENT: Atraumatic, normocephalic. External eyes, ears, and nose normal.   Respiratory: No signs of respiratory distress. Respiration rhythm and depth normal.   Clear to auscultation. No rales, crackles, rhonchi, or wheezing auscultated.   Cardiovascular:  Heart Rate and Rhythm: Normal, Heart Sounds:  S1 and S2.   Murmurs: No murmurs noted. No rubs, thrills, or gallops.   Arterial Pulses: Posterior tibialis and dorsalis pedis pulses normal.   Lower Extremities: Right foot with some mild edema. Somewhat brynn appearance bilateral feet  Gastrointestinal:  Abdomen soft, non-distended, non-tender.  Musculoskeletal: Moves all extremities.   Skin: Warm and dry.   Psychiatric: Patient alert and oriented to person, place, and time. Speech and behavior appropriate. Normal mood and affect.       ECG/TELE:           Results from last 7 days   Lab Units 22  0339 22  0548 22  1626 22  0255   SODIUM mmol/L 135* 138  --  142   POTASSIUM mmol/L 3.9 4.0 4.2 3.4*   CHLORIDE mmol/L 103 103  --  104   CO2 mmol/L 24.5 25.7  --  23.6    BUN mg/dL 25* 34*  --  23   CREATININE mg/dL 0.87 1.04*  --  1.26*   GLUCOSE mg/dL 114* 101*  --  172*   CALCIUM mg/dL 8.1* 8.1*  --  7.4*     Results from last 7 days   Lab Units 11/06/22  0339 11/05/22  0544 11/04/22  0255   WBC 10*3/mm3 7.15 12.24* 8.70   HEMOGLOBIN g/dL 8.5* 10.4* 9.7*   HEMATOCRIT % 27.2* 32.1* 29.7*   PLATELETS 10*3/mm3 207 278 297             Results from last 7 days   Lab Units 10/31/22  1249   TSH uIU/mL 7.590*           Current Medications:   Scheduled Meds:apixaban, 2.5 mg, Oral, Q12H  fluticasone, 2 spray, Each Nare, Daily  levothyroxine, 75 mcg, Oral, Daily  pantoprazole, 40 mg, Oral, Q AM  senna-docusate sodium, 2 tablet, Oral, BID  sodium chloride, 10 mL, Intravenous, Q12H            Acute diastolic CHF (congestive heart failure) (HCC)    Atrial fibrillation (HCC)       Plan:     ---Perm afib w/RVR, s/p staged PPM 10/20/2022, AV node ablation 11/3---procedure complicated by acute hypotension and shock of unclear etiology     No evidence of hemorrhage, pericardial effusion/tamponade---does not appear to be septic but could not completely rule out so intensivist treating with empiric antibiotics.     No RV strain, she took last dose of apixaban night prior to procedure yesterday, so unlikely PE.      Initially she required 3 pressors, hypotension unresponsive to 1.5 L of IVF's, 1 mg of atropine in case she was persistently vagal--gave steroids and she seem to get better--?adrenal crisis/insufficiency     She did get volume overloaded and developed acute hypoxic/hypercapnic resp failure---mostly like secondary to volume overload, was on bipap for a short time--responded well to dose of IV Bumex.     She feels like she is accumulating fluid but lungs clear---will restart her home torsemide.      --She had an elevated procal but no obvious source of sepsis--treated with antibiotics for 3 says, stopped and has done fine from that standpoint.     --Anemia, H/H 8.5/27 yesterday, 10/4/32 on  11/5---? Dilutional, will recheck CBC.    --She is complaining and extremely worried about her feet---she says they tingling and go from hot to cold daily---they do have a somewhat brynn appearance---this has been going on for awhile but she is really worried about it and it is uncomfortable for her. She says if she did not have Xanax to sleep at night that she would not be able to because they are so uncomfortable.        Mary Beth Teran, APRN  11/07/22  09:21 EST

## 2022-11-07 NOTE — NURSING NOTE
Diuretic in Use: none  Response to Diuretics (Output greater than intake): yes  Daily Weight (up or down): down  O2 Requirements: Room air ,  Functional Status (Activity level, tolerance and respiratory symptoms):   Discharge Plans:   Denies any pain , walk to bathroom with walker, vital sign stable ,paced on the monitor, no SOA,  Both feet red, and burning, pt getting stronger cont to monitor.

## 2022-11-07 NOTE — CASE MANAGEMENT/SOCIAL WORK
Continued Stay Note  [facility]     Patient Name: Demetra Rush  MRN: [id number]  Today's Date: 11/7/2022    Admit Date: 11/3/2022    Plan: Home w/ family;  [facility] accepted ([phone]).   Discharge Plan     Row Name 11/07/22 1530       Plan    Plan Home w/ family;  [facility] accepted ([phone]).    Plan Comments CCP spoke with Adriana Kelly/[facility] [phone], and she confirmed Pt has been accepted and they will follow her once she discharges home.  Pt lives w/ daughter, son-in-law and grandson and they assist her as needed.  CCP will continue to follow.........Kierra HIGGINBOTHAM RN CM               Discharge Codes    No documentation.               Expected Discharge Date and Time     Expected Discharge Date Expected Discharge Time    Nov 8, 2022             Kierra Haney RN

## 2022-11-07 NOTE — THERAPY TREATMENT NOTE
Patient Name: Demetra Rush  : 1933    MRN: [id number]                              Today's Date: 2022       Admit Date: 11/3/2022    Visit Dx:     ICD-10-CM ICD-9-CM   1. Acute diastolic CHF (congestive heart failure) (HCC)  I50.31 428.31     428.0   2. Persistent atrial fibrillation (HCC)  I48.19 427.31     Patient Active Problem List   Diagnosis   • Osteoporosis   • Acute diastolic CHF (congestive heart failure) (HCC)   • Pulmonary hypertension (HCC)   • Mitral regurgitation   • Primary hypertension   • Atrial fibrillation (HCC)   • Chronic diastolic (congestive) heart failure (HCC)   • Primary insomnia   • Acquired hypothyroidism     Past Medical History:   Diagnosis Date   • Atrial fibrillation (HCC)    • Chronic diastolic (congestive) heart failure (HCC)    • GERD (gastroesophageal reflux disease)    • Hyperlipidemia    • Hypertension    • Hypothyroidism    • Mitral regurgitation    • Osteoporosis 2022   • Pulmonary hypertension (HCC)    • Stroke (HCC)    • UTI (urinary tract infection)      Past Surgical History:   Procedure Laterality Date   • ADENOIDECTOMY     • BLADDER SURGERY     • CARDIAC ELECTROPHYSIOLOGY PROCEDURE N/A 10/20/2022    Procedure: Pacemaker SC new--Medtronic;  Surgeon: Albino Gaston MD;  Location:  [facility] CATH INVASIVE LOCATION;  Service: Cardiology;  Laterality: N/A;   • CARDIAC ELECTROPHYSIOLOGY PROCEDURE N/A 11/3/2022    Procedure: AV node ablation---has Medtronic pacemaker;  Surgeon: Albino Gaston MD;  Location:  [facility] CATH INVASIVE LOCATION;  Service: Cardiovascular;  Laterality: N/A;   • CHOLECYSTECTOMY     • HYSTERECTOMY        General Information     Row Name 22 1309          Physical Therapy Time and Intention    Document Type therapy note (daily note)  -MG     Mode of Treatment physical therapy;individual therapy  -MG     Row Name 22 1309          General Information    Patient Profile Reviewed yes  -MG     Existing Precautions/Restrictions fall   -MG     Row Name 11/07/22 1309          Cognition    Orientation Status (Cognition) oriented x 4  -MG     Row Name 11/07/22 1309          Safety Issues, Functional Mobility    Impairments Affecting Function (Mobility) strength;endurance/activity tolerance;shortness of breath  -MG     Comment, Safety Issues/Impairments (Mobility) Gait belt and non-skid socsk donned.  -MG           User Key  (r) = Recorded By, (t) = Taken By, (c) = Cosigned By    Initials Name Provider Type    MG Vida Landin, PT Physical Therapist               Mobility     Row Name 11/07/22 1309          Bed Mobility    Bed Mobility supine-sit;sit-supine  -MG     Supine-Sit [place] (Bed Mobility) standby assist  -MG     Sit-Supine [place] (Bed Mobility) standby assist  -MG     Assistive Device (Bed Mobility) bed rails;head of bed elevated  -MG     Comment, (Bed Mobility) Increased time to complete  -MG     Row Name 11/07/22 1309          Sit-Stand Transfer    Sit-Stand [place] (Transfers) standby assist  -MG     Assistive Device (Sit-Stand Transfers) walker, front-wheeled  -MG     Row Name 11/07/22 1309          Gait/Stairs (Locomotion)    [place] Level (Gait) contact guard  -MG     Assistive Device (Gait) walker, front-wheeled  -MG     Distance in Feet (Gait) 60  -MG     Deviations/Abnormal Patterns (Gait) bartolo decreased;gait speed decreased  -MG     Bilateral Gait Deviations heel strike decreased  -MG     Comment, (Gait/Stairs) Mildly unsteady, but no overt LOB. Limited due to feet pain, fatigue and SOB.  -MG           User Key  (r) = Recorded By, (t) = Taken By, (c) = Cosigned By    Initials Name Provider Type    MG Vida Landin, PT Physical Therapist               Obj/Interventions     Row Name 11/07/22 1314          Balance    Static Sitting Balance modified independence  -MG     Dynamic Sitting Balance standby assist  -MG     Position, Sitting Balance unsupported;sitting edge of bed  -MG     Static Standing Balance  contact guard;standby assist  -MG     Dynamic Standing Balance contact guard  -MG     Position/Device Used, Standing Balance walker, front-wheeled  -MG     Comment, Balance Good sitting and static standing balance. Mildly unsteady w/ amb. No LOB noted.  -MG           User Key  (r) = Recorded By, (t) = Taken By, (c) = Cosigned By    Initials Name Provider Type    MG Vida Landin, PT Physical Therapist               Goals/Plan    No documentation.                Clinical Impression     Row Name 11/07/22 1315          Pain    Pretreatment Pain Rating 10/10  -MG     Posttreatment Pain Rating 10/10  -MG     Pain Location - Side/Orientation Bilateral  -MG     Pain Location - foot  -MG     Pre/Posttreatment Pain Comment States pain and tingles in her feet. Worse w/ WBing.  -MG     Pain Intervention(s) Medication (See MAR);Ambulation/increased activity;Repositioned;Rest  -MG     Row Name 11/07/22 1315          Plan of Care Review    Plan of Care Reviewed With patient  -MG     Progress no change  -MG     Outcome Evaluation Pt requiring less assistance this date with ambulation. Pt SBA for bed mobility, CGA for transfers and CGA for ambulation of 60' w/ RW. Pt has good sitting and static standing balance and demos a mildly unsteady gait w/ ambulation. Pt fatigued, SOB and in increased pain on return to bed. Pt denying exercises due to pain and lunch arriving. PT will cont to follow and progress her functional mobility as tolerated.  -MG     Row Name 11/07/22 1310          Vital Signs    Pretreatment Heart Rate (beats/min) 80  -MG     Posttreatment Heart Rate (beats/min) 82  -MG     Pre SpO2 (%) 98  -MG     O2 Delivery Pre Treatment room air  -MG     O2 Delivery Intra Treatment room air  -MG     Post SpO2 (%) 100  -MG     O2 Delivery Post Treatment room air  -MG     Pre Patient Position Supine  -MG     Intra Patient Position Standing  -MG     Post Patient Position Supine  -MG     Row Name 11/07/22 9317          Positioning  and Restraints    Pre-Treatment Position in bed  -MG     Post Treatment Position bed  -MG     In Bed notified nsg;supine;fowlers;call light within reach;encouraged to call for assist;exit alarm on  -MG           User Key  (r) = Recorded By, (t) = Taken By, (c) = Cosigned By    Initials Name Provider Type    Vida Morris, PT Physical Therapist               Outcome Measures     Row Name 11/07/22 1318 11/07/22 0800       How much help from another person do you currently need...    Turning from your back to your side while in flat bed without using bedrails? 4  -MG 3  -JS    Moving from lying on back to sitting on the side of a flat bed without bedrails? 3  -MG 3  -JS    Moving to and from a bed to a chair (including a wheelchair)? 3  -MG 3  -JS    Standing up from a chair using your arms (e.g., wheelchair, bedside chair)? 3  -MG 3  -JS    Climbing 3-5 steps with a railing? 3  -MG 3  -JS    To walk in hospital room? 3  -MG 3  -JS    AM-PAC 6 Clicks Score (PT) 19  -MG 18  -JS    Highest level of mobility 6 --> Walked 10 steps or more  -MG 6 --> Walked 10 steps or more  -JS    Row Name 11/07/22 1318          Functional Assessment    Outcome Measure Options AM-PAC 6 Clicks Basic Mobility (PT)  -MG           User Key  (r) = Recorded By, (t) = Taken By, (c) = Cosigned By    Initials Name Provider Type    Yakelin So, RN Registered Nurse    Vida Morris, PT Physical Therapist                             Physical Therapy Education     Title: PT OT SLP Therapies (In Progress)     Topic: Physical Therapy (In Progress)     Point: Mobility training (Done)     Learning Progress Summary           Patient Acceptance, E, VU by MG at 11/7/2022 1318    Acceptance, E, VU by  at 11/5/2022 1519                   Point: Home exercise program (Not Started)     Learner Progress:  Not documented in this visit.          Point: Body mechanics (Done)     Learning Progress Summary           Patient Acceptance, E, VU by MG at  11/7/2022 1318    Acceptance, E, VU by  at 11/5/2022 1519                   Point: Precautions (Done)     Learning Progress Summary           Patient Acceptance, E, VU by  at 11/7/2022 1318    Acceptance, E, VU by  at 11/5/2022 1519                               User Key     Initials Effective Dates Name Provider Type Discipline     05/24/22 -  Vida Landin, PT Physical Therapist PT    HEMA 05/02/22 -  Azalea Kim PT Physical Therapist PT              PT Recommendation and Plan     Plan of Care Reviewed With: patient  Progress: no change  Outcome Evaluation: Pt requiring less assistance this date with ambulation. Pt SBA for bed mobility, CGA for transfers and CGA for ambulation of 60' w/ RW. Pt has good sitting and static standing balance and demos a mildly unsteady gait w/ ambulation. Pt fatigued, SOB and in increased pain on return to bed. Pt denying exercises due to pain and lunch arriving. PT will cont to follow and progress her functional mobility as tolerated.     Time Calculation:    PT Charges     Row Name 11/07/22 1318             Time Calculation    Start Time 1111  -MG      Stop Time 1126  -MG      Time Calculation (min) 15 min  -MG      PT Received On 11/07/22  -MG      PT - Next Appointment 11/08/22  -MG            User Key  (r) = Recorded By, (t) = Taken By, (c) = Cosigned By    Initials Name Provider Type    Vida Morris, PT Physical Therapist              Therapy Charges for Today     Code Description Service Date Service Provider Modifiers Qty    98094390479 HC PT THERAPEUTIC ACT EA 15 MIN 11/7/2022 Vida Landin, PT GP 1          PT G-Codes  Outcome Measure Options: AM-PAC 6 Clicks Basic Mobility (PT)  AM-PAC 6 Clicks Score (PT): 19    Vida Landin PT  11/7/2022

## 2022-11-08 ENCOUNTER — READMISSION MANAGEMENT (OUTPATIENT)
Dept: CALL CENTER | Facility: HOSPITAL | Age: 87
End: 2022-11-08

## 2022-11-08 VITALS
WEIGHT: 112.3 LBS | SYSTOLIC BLOOD PRESSURE: 139 MMHG | OXYGEN SATURATION: 96 % | HEART RATE: 80 BPM | BODY MASS INDEX: 21.2 KG/M2 | TEMPERATURE: 98 F | HEIGHT: 61 IN | RESPIRATION RATE: 16 BRPM | DIASTOLIC BLOOD PRESSURE: 74 MMHG

## 2022-11-08 LAB
ANION GAP SERPL CALCULATED.3IONS-SCNC: 8.9 MMOL/L (ref 5–15)
BACTERIA SPEC AEROBE CULT: NORMAL
BACTERIA SPEC AEROBE CULT: NORMAL
BUN SERPL-MCNC: 16 MG/DL (ref 8–23)
BUN/CREAT SERPL: 22.2 (ref 7–25)
CALCIUM SPEC-SCNC: 8.2 MG/DL (ref 8.6–10.5)
CHLORIDE SERPL-SCNC: 103 MMOL/L (ref 98–107)
CO2 SERPL-SCNC: 27.1 MMOL/L (ref 22–29)
CREAT SERPL-MCNC: 0.72 MG/DL (ref 0.57–1)
EGFRCR SERPLBLD CKD-EPI 2021: 80 ML/MIN/1.73
GLUCOSE SERPL-MCNC: 79 MG/DL (ref 65–99)
HBA1C MFR BLD: 5.7 % (ref 4.8–5.6)
POTASSIUM SERPL-SCNC: 3.7 MMOL/L (ref 3.5–5.2)
SODIUM SERPL-SCNC: 139 MMOL/L (ref 136–145)

## 2022-11-08 PROCEDURE — 80048 BASIC METABOLIC PNL TOTAL CA: CPT | Performed by: NURSE PRACTITIONER

## 2022-11-08 PROCEDURE — 99238 HOSP IP/OBS DSCHRG MGMT 30/<: CPT | Performed by: NURSE PRACTITIONER

## 2022-11-08 RX ADMIN — Medication 10 ML: at 09:29

## 2022-11-08 RX ADMIN — TORSEMIDE 10 MG: 10 TABLET ORAL at 09:29

## 2022-11-08 RX ADMIN — FLUTICASONE PROPIONATE 2 SPRAY: 50 SPRAY, METERED NASAL at 09:31

## 2022-11-08 RX ADMIN — PANTOPRAZOLE SODIUM 40 MG: 40 TABLET, DELAYED RELEASE ORAL at 06:30

## 2022-11-08 RX ADMIN — APIXABAN 2.5 MG: 2.5 TABLET, FILM COATED ORAL at 09:29

## 2022-11-08 RX ADMIN — LEVOTHYROXINE SODIUM 75 MCG: 0.07 TABLET ORAL at 09:29

## 2022-11-08 NOTE — PROGRESS NOTES
Name: Demetra Rush ADMIT: 11/3/2022   : 1933  PCP: Melissa Bolivar MD    MRN: [id number] LOS: 5 days   AGE/SEX: 89 y.o. female  ROOM: [address] [place] [address]    [place], Subsequent Hospital Care    89 y.o. female with bilateral foot discoloration and neuropathic pain    No change in bilateral feet feeling hot and cold.  Up in chair, ready to be discharged    Scheduled Medications:   apixaban, 2.5 mg, Oral, Q12H  fluticasone, 2 spray, Each Nare, Daily  levothyroxine, 75 mcg, Oral, Daily  pantoprazole, 40 mg, Oral, Q AM  senna-docusate sodium, 2 tablet, Oral, BID  sodium chloride, 10 mL, Intravenous, Q12H  torsemide, 10 mg, Oral, Daily      Active Infusions:     Vital Signs  Vital Signs Patient Vitals for the past 24 hrs:   BP Temp Temp src Pulse Resp SpO2 Weight   22 0635 139/74 98 °F (36.7 °C) Oral 80 16 96 % --   22 0626 -- -- -- -- -- -- 50.9 kg (112 lb 4.8 oz)   22 2256 155/82 97.8 °F (36.6 °C) Oral 79 18 -- --   22 1900 139/72 99 °F (37.2 °C) Oral 86 18 98 % --     I/O:  I/O last 3 completed shifts:  In: 1060 [P.O.:1060]  Out: 1800 [Urine:1800]  Physical Exam:    Physical Exam   Bilateral DP palpable, unchanged foot discoloration bilaterally    Imaging:      CBC    Results from last 7 days   Lab Units 22  1726 22  0339 22  0544 22  0255 22  1430   WBC 10*3/mm3 4.88 7.15 12.24* 8.70 5.37   HEMOGLOBIN g/dL 10.1* 8.5* 10.4* 9.7* 9.4*   PLATELETS 10*3/mm3 260 207 278 297 244     BMP   Results from last 7 days   Lab Units 22  0349 22  1726 22  0339 22  0548 22  1626 22  0255 22  1430   SODIUM mmol/L 139 137 135* 138  --  142 140   POTASSIUM mmol/L 3.7 3.8 3.9 4.0 4.2 3.4* 4.0   CHLORIDE mmol/L 103 100 103 103  --  104 105   CO2 mmol/L 27.1 28.1 24.5 25.7  --  23.6 25.5   BUN mg/dL 16 16 25* 34*  --  23 19   CREATININE mg/dL 0.72 0.99 0.87 1.04*  --  1.26* 1.14*   GLUCOSE mg/dL 79 125* 114* 101*  --   172* 217*     Coag     Assessment & Plan   Assessment & Plan    Acute diastolic CHF (congestive heart failure) (HCC)    Atrial fibrillation (HCC)    89 y.o. female with bilateral foot discoloration and neuropathic foot pain    -I personally and independently reviewed her non invasive arterial testing.  She has normal ABIs with normal multiphasic waveforms throughout.  Her toe pressures are all within normal limits.  She has no evidence of arterial insufficiency.    -I have advised her that the telangiectasias are causing the discoloration and are cosmetic.  These are not a limb threatening problem.  These are not contributing to her foot pain.  I do not recommend any intervention for these.    -her pain is consistent with a peripheral neuropathy.  I recommend further evaluation for peripheral neuropathy per her PCP or even a neurologist.  I discussed with her that there are medical causes of neuropathy (diabetes mellitus, although she does not carry this diagnosis and her HbA1c is 5.7, vitamin deficiencies, etc) as well as anatomic causes such as lumbar spinal stenosis.  We discussed the medical management of this with neurontin.    -no scheduled vascular surgery follow up needed as her circulation is normal.      Personal protective equipment used for this patient encounter:  Patient wearing surgical mask []    Provider wearing a surgical mask [x]    Gloves []    Eye protection []    Face Shield []    Gown []    N 95 respirator or CAPR/PAPR []   Duration of interaction 20 mins    Amanda Negrete MD  Vascular Surgery  [facility]  O: [phone]  F: [phone]      Please call my office with any question: [phone]    Active Hospital Problems    Diagnosis  POA   • **Acute diastolic CHF (congestive heart failure) (HCC) [I50.31]  Unknown   • Atrial fibrillation (HCC) [I48.91]  Unknown      Resolved Hospital Problems   No resolved problems to display.

## 2022-11-08 NOTE — DISCHARGE SUMMARY
DISCHARGE NOTE    Patient Name: Demetra Rush  Age/Sex: 89 y.o. female  : 1933  MRN: [id number]    Date of Discharge:  2022   Date of Admit: 11/3/2022  Encounter Provider: FELECIA Stone  Place of Service: [facility] CARDIOLOGY  Patient Care Team:  Melissa Bolivar MD as PCP - General (Family Medicine)  Ari Hall MD as Referring Physician (Orthopedic Surgery)    Subjective:     Discharge Diagnosis:    Acute diastolic CHF (congestive heart failure) (HCC)    Atrial fibrillation (HCC)      Hospital Course:     89-year-old patient of Dr. Ji's who was admitted in Sept with acute hypoxic respiratory failure in [place] to aspiration pneumonia.  She was found to be in A. fib with RVR at that time, an echo revealed normal LV systolic function with a severely dilated left atrium, severe MR and severe pulmonary hypertension.    There have been many attempts to try and rate control for A. fib, she was even placed on amiodarone and had cardioversion on 10/14 which was successful but was back in A. fib when she was seen in the office on 10/17.  She had symptoms of diastolic heart failure so she was admitted and seen by Dr. Gaston and myself.    Recommendation was to do a staged pacemaker and AV node ablation.    She underwent implantation of a permanent pacemaker on 10/29, tolerated procedure well.     She returned as an outpatient on 11/3 for the AV node ablation, during the procedure she became acutely hypotensive --- she had an extensive evaluation for pericardial effusion, retroperitoneal bleed, all of that was negative, her H&H remained stable, she ended up going to the coronary care unit was initially on 3 pressors had been given at least a liter and a half of fluid and still really not much response until we gave her a dose of steroids.    It still really  unclear as to what happened but suspect she probably had an adrenal crisis.  She was on Cipro for a UTI that she had started on Monday and we did do a sepsis work-up and treated her empirically with antibiotics for 3 days her procalcitonin was mildly elevated but she really never had any other symptoms or indication of sepsis so those were stopped.    He was able to come off of the pressors by the next morning, she did initially get volume overloaded with all of the fluids so she ended up needing IV diuretics and BiPAP for short time as well but by the next morning she was down to 1 L of oxygen and doing well.    Given her age we have monitor closely, she finally transferred down to the CVI unit and we have got her up moving around she is on room air her blood pressures been stable--- most of her home medications have been restarted.    On 11/6 her H&H came back at 8.5 and 27, she had no signs of bleeding and I repeated it yesterday and it was 10.1 and 32 I suspect that the 8.5/27 was probably a spurious reading.    Her BMP is stable with normal renal function.    Yesterday she had complaints of her intermittently discolored feet and they tingle, this was not a new problem she is experienced for at least a year and a half she says they get hot and code I did have vascular see her, appreciate their help.    Dr. Andres ordered ABIs which were normal bilaterally.  If she is okay with it I will get a go ahead and discharge the patient home today and she can follow-up with them as directed as an outpatient.    Suspect that she does have some degree of neuropathy and will have her follow up with PCP.     Pacemaker functioning well she is at a lower rate of 80.    She has a follow-up appointment with FELECIA Cates on 11/28 we will keep that appointment and add a pacemaker appointment that day and I will have her see me in the office in 2 to 3 months with a device check.    She is also going home with home health.    She  was on both metoprolol and diltiazem primarily for rate control given her problems with hypotension I have stopped both of these we can evaluate as an outpatient and see if she needs anything for blood pressure in the future.    Vital Signs  Temp:  [97.8 °F (36.6 °C)-99 °F (37.2 °C)] 98 °F (36.7 °C)  Heart Rate:  [79-86] 80  Resp:  [16-18] 16  BP: (139-155)/(72-82) 139/74    Intake/Output Summary (Last 24 hours) at 11/8/2022 0833  Last data filed at 11/8/2022 0626  Gross per 24 hour   Intake 960 ml   Output 1800 ml   Net -840 ml       Physical Exam:    General Appearance: No acute distress, well developed and well nourished.   Eyes: Conjunctiva and lids: No erythema, swelling, or discharge. Sclera non-icteric.   HENT: Atraumatic, normocephalic. External eyes, ears, and nose normal.   Respiratory: No signs of respiratory distress. Respiration rhythm and depth normal.   Clear to auscultation. No rales, crackles, rhonchi, or wheezing auscultated.   Cardiovascular:  Heart Rate and Rhythm: Normal, Heart Sounds:  S1 and S2.  Murmurs: No murmurs noted. No rubs, thrills, or gallops.   Arterial Pulses: Posterior tibialis and dorsalis pedis pulses normal.   Lower Extremities: No edema noted.  Gastrointestinal:  Abdomen soft, non-distended, non-tender.  Musculoskeletal: Normal movement of extremities  Skin: Warm and dry.   Psychiatric: Patient alert and oriented to person, place, and time. Speech and behavior appropriate. Normal mood and affect.    Labs:   Results from last 7 days   Lab Units 11/08/22  0349 11/07/22  1726 11/06/22  0339 11/05/22  0548 11/04/22  1626 11/04/22  0255 11/03/22  1430   SODIUM mmol/L 139 137 135* 138  --  142 140   POTASSIUM mmol/L 3.7 3.8 3.9 4.0 4.2 3.4* 4.0   CHLORIDE mmol/L 103 100 103 103  --  104 105   CO2 mmol/L 27.1 28.1 24.5 25.7  --  23.6 25.5   BUN mg/dL 16 16 25* 34*  --  23 19   CREATININE mg/dL 0.72 0.99 0.87 1.04*  --  1.26* 1.14*   GLUCOSE mg/dL 79 125* 114* 101*  --  172* 217*    CALCIUM mg/dL 8.2* 8.9 8.1* 8.1*  --  7.4* 7.3*         Results from last 7 days   Lab Units 11/07/22  1726 11/06/22  0339 11/05/22  0544 11/04/22  0255 11/03/22  1430   WBC 10*3/mm3 4.88 7.15 12.24* 8.70 5.37   HEMOGLOBIN g/dL 10.1* 8.5* 10.4* 9.7* 9.4*   HEMATOCRIT % 32.5* 27.2* 32.1* 29.7* 30.3*   PLATELETS 10*3/mm3 260 207 278 297 244     Discharge Diet:    Dietary Orders (From admission, onward)     Start     Ordered    11/04/22 0811  Diet Regular; Cardiac, Consistent Carbohydrate  Diet Effective Now        Comments: She needs to be able to sit up before she can eat.   Question Answer Comment   Diet Texture / Consistency Regular    Common Modifiers Cardiac    Common Modifiers Consistent Carbohydrate        11/04/22 0811                  Activity at Discharge:  As tolerated    Discharge Medications     Discharge Medications      Continue These Medications      Instructions Start Date   ALPRAZolam 1 MG tablet  Commonly known as: XANAX   1 mg, Oral, Nightly PRN, One to two tablets at night      apixaban 2.5 MG tablet tablet  Commonly known as: ELIQUIS   2.5 mg, Oral, Every 12 Hours Scheduled      levothyroxine 75 MCG tablet  Commonly known as: SYNTHROID, LEVOTHROID   75 mcg, Oral, Daily      pantoprazole 40 MG EC tablet  Commonly known as: PROTONIX   TAKE ONE TABLET DAILY      potassium chloride ER 20 MEQ tablet controlled-release ER tablet  Commonly known as: K-TAB   20 mEq, Oral, Daily      sucralfate 1 g tablet  Commonly known as: CARAFATE   1 g, Oral, 2 Times Daily      torsemide 10 MG tablet  Commonly known as: DEMADEX   10 mg, Oral, Daily         Stop These Medications    ciprofloxacin 250 MG tablet  Commonly known as: Cipro     dilTIAZem 120 MG 24 hr capsule  Commonly known as: TIAZAC     metoprolol tartrate 25 MG tablet  Commonly known as: LOPRESSOR            Discharge disposition: home    Follow-up Appointments   Contact information for follow-up providers     Michelle Pitts APRN Follow up on  11/28/2022.    Specialties: Cardiology, Nurse Practitioner  Why: she has scheduled appt, will add pacemaker check for that day as well --patient is aware  Contact information:  [address] [facility] [address]  [place] [id number] [phone]             Mary Beth Teran APRN Follow up in 2 month(s).    Specialty: Cardiology  Contact information:  [address] [facility] [address]  [facility] [address]  [phone]             Melissa Bolivar MD Follow up in 1 week(s).    Specialty: Family Medicine  Contact information:  [address] [facility] [address]  [facility] [address]  [phone]                   Contact information for after-discharge care     Home Medical Care     [facility] .    Service: Home Health Services  Contact information:  [address] [place] [address] [phone]                           Future Appointments   Date Time Provider Department Center   11/28/2022 11:30 AM Michelle Pitts APRN [facility] CD LCGKR ELIU   11/28/2022  3:30 PM Luis Long MD [facility] GE EA JAQUI [facility]   3/8/2023  2:30 PM REFERRED INJECTION CHAIR EP BH INFUS EP LAG         FELECIA Stone  11/08/22  08:33 EST

## 2022-11-08 NOTE — PLAN OF CARE
Goal Outcome Evaluation:  Plan of Care Reviewed With: patient      Pt is s/p ablation, groin soft, drsg CDI. Pt rested well throughout night, VSS plans to D/c home today

## 2022-11-08 NOTE — CASE MANAGEMENT/SOCIAL WORK
Case Management Discharge Note      Final Note: CCP confirmed with Ning Segovia that Pt is current with [facility].  Pt discharged home with continued [facility]…..Kierra GHOTRA /TRENTON CM    Provided Post Acute Provider List?: Yes  Post Acute Provider List: Home Health, Nursing Home  Delivered To: Patient  Method of Delivery: In person    Selected Continued Care - Discharged on 11/8/2022 Admission date: 11/3/2022 - Discharge disposition: Home or Self Care    Destination    No services have been selected for the patient.              Durable Medical Equipment    No services have been selected for the patient.              Dialysis/Infusion    No services have been selected for the patient.              Home Medical Care Coordination complete.    Service Provider Selected Services Address Phone Fax Patient Preferred    [facility] - [facility] [address], [place] [id number] [phone] --          Therapy    No services have been selected for the patient.              Community Resources    No services have been selected for the patient.              Community & DME    No services have been selected for the patient.                Selected Continued Care - Prior Encounters Includes continued care and service providers with selected services from prior encounters from 8/5/2022 to 11/8/2022    Discharged on 10/21/2022 Admission date: 10/18/2022 - Discharge disposition: Home or Self Care    Home Medical Care     Service Provider Selected Services Address Phone Fax Patient Preferred    [facility] - [facility] [address] [id number] [phone] --                Discharged on 9/18/2022 Admission date: 9/12/2022 - Discharge disposition: Home[place]Health Care [facility]    [place] Medical Care     Service Provider Selected Services Address Phone Fax Patient Preferred    [facility] - [facility] [address] [facility] [address] [id number] [phone] --                    Transportation Services  Private: Car    Final Discharge Disposition Code: 06 - home with home health care

## 2022-11-08 NOTE — CASE MANAGEMENT/SOCIAL WORK
Continued Stay Note  [facility]     Patient Name: Demetra Rush  MRN: [id number]  Today's Date: 11/8/2022    Admit Date: 11/3/2022    Plan: Home with continued [facility]   Discharge Plan     Row Name 11/08/22 1315       Plan    Plan Home with continued [facility]    Plan Comments Per Silva, daughter- Pt is current with [facility] so [facility] will not be needed.  [facility] called [facility]ing[facility]  to advise but her mailbox is full.  [facility] sent Ning a message.  CCP following...........Kierra HIGGINBOTHAM/RN CM               Discharge Codes    No documentation.               Expected Discharge Date and Time     Expected Discharge Date Expected Discharge Time    Nov 8, 2022             Kierra Haney RN

## 2022-11-08 NOTE — OUTREACH NOTE
Prep Survey    Flowsheet Row Responses   [facility] patient discharged from? [place]   Is LACE score < 7 ? No   Emergency Room discharge w/ pulse ox? No   Eligibility [facility]   Date of Admission 11/03/22   Date of Discharge 11/08/22   Discharge Disposition Home or Self Care   Discharge diagnosis Acute diastolic CHF    Does the patient have one of the following disease processes/diagnoses(primary or secondary)? CHF   Does the patient have Home health ordered? Yes   What is the Home health agency?   [facility]   Is there a DME ordered? No   Prep survey completed? Yes          VERENA WEN - Registered Nurse

## 2022-11-09 ENCOUNTER — TRANSITIONAL CARE MANAGEMENT TELEPHONE ENCOUNTER (OUTPATIENT)
Dept: CALL CENTER | Facility: HOSPITAL | Age: 87
End: 2022-11-09

## 2022-11-09 NOTE — OUTREACH NOTE
Call Center TCM Note    Flowsheet Row Responses   [facility] patient discharged from? [place]   Does the patient have one of the following disease processes/diagnoses(primary or secondary)? CHF   TCM attempt successful? No   Unsuccessful attempts Attempt 2           Beverly Kauffman RN    11/9/2022, 16:09 EST

## 2022-11-09 NOTE — OUTREACH NOTE
Call Center TCM Note    Flowsheet Row Responses   [facility] patient discharged from? [place]   Does the patient have one of the following disease processes/diagnoses(primary or secondary)? CHF   TCM attempt successful? No  [Reached daughter, Nettie but she was in a meeting. Call back later today. ]   Unsuccessful attempts Attempt 1           Beverly Kauffman RN    11/9/2022, 14:57 EST

## 2022-11-10 ENCOUNTER — TRANSITIONAL CARE MANAGEMENT TELEPHONE ENCOUNTER (OUTPATIENT)
Dept: CALL CENTER | Facility: HOSPITAL | Age: 87
End: 2022-11-10

## 2022-11-10 NOTE — OUTREACH NOTE
Call Center TCM Note    Flowsheet Row Responses   [facility] patient discharged from? [place]   Does the patient have one of the following disease processes/diagnoses(primary or secondary)? CHF   TCM attempt successful? Yes   Call start time 1535   Call end time 1547   Discharge diagnosis Acute diastolic CHF    Person spoke with today (if not patient) and relationship Reyna    Meds reviewed with patient/caregiver? Yes   Is the patient having any side effects they believe may be caused by any medication additions or changes? No   Does the patient have all medications ordered at discharge? N/A   Is the patient taking all medications as directed (includes completed medication regime)? Yes   Medication comments Torsemide on AVS states 10mg, however it is to be 20 mg tab Q day. Verified by aprn at cardio office.    Comments No available Hosp dc fu appts that meet TCM guidelines.    Does the patient have an appointment with their PCP within 7 days of discharge? No appointments available   Nursing Interventions Routed TCM call to PCP office   What is the Home health agency?   [facility]    Has home health visited the patient within 72 hours of discharge? Call prior to 72 hours   Pulse Ox monitoring Intermittent   Pulse Ox device source Patient   O2 Sat comments doing well   Psychosocial issues? No   Did the patient receive a copy of their discharge instructions? Yes   Nursing interventions Reviewed instructions with patient   What is the patient's perception of their health status since discharge? Improving   Nursing interventions Nurse provided patient education   Notified Case Management Education issues   Is the patient able to teach back Heart Failure Zones? Yes   CHF Zone this Call Green Zone   Green Zone Patient reports doing well, No new swelling -  feet, ankles and legs look normal for you, Weight check stable   Green Zone Interventions Daily weight check, Meds as directed, Low sodium diet   TCM call  completed? Yes   Wrap up additional comments Reports Pt doing well.    Call end time [phone]           Autumn Orourke RN    11/10/2022, 15:48 EST

## 2022-11-14 ENCOUNTER — TELEPHONE (OUTPATIENT)
Dept: FAMILY MEDICINE CLINIC | Facility: CLINIC | Age: 87
End: 2022-11-14

## 2022-11-14 DIAGNOSIS — F51.01 PRIMARY INSOMNIA: ICD-10-CM

## 2022-11-14 RX ORDER — ALPRAZOLAM 1 MG/1
1 TABLET ORAL NIGHTLY PRN
Qty: 90 TABLET | Refills: 0 | Status: SHIPPED | OUTPATIENT
Start: 2022-11-14 | End: 2022-11-16 | Stop reason: SDUPTHER

## 2022-11-14 NOTE — TELEPHONE ENCOUNTER
Pharmacy called for clarification on rx instructions. It say 1 a day before bed but then says 1 to 2 nightly.

## 2022-11-15 RX ORDER — LEVOTHYROXINE SODIUM 0.07 MG/1
75 TABLET ORAL DAILY
Qty: 90 TABLET | Refills: 1 | Status: SHIPPED | OUTPATIENT
Start: 2022-11-15 | End: 2023-03-17

## 2022-11-16 DIAGNOSIS — F51.01 PRIMARY INSOMNIA: ICD-10-CM

## 2022-11-16 RX ORDER — ALPRAZOLAM 1 MG/1
1 TABLET ORAL NIGHTLY PRN
Qty: 90 TABLET | Refills: 0 | Status: SHIPPED | OUTPATIENT
Start: 2022-11-16 | End: 2023-01-18 | Stop reason: SDUPTHER

## 2022-11-17 ENCOUNTER — OFFICE VISIT (OUTPATIENT)
Dept: FAMILY MEDICINE CLINIC | Facility: CLINIC | Age: 87
End: 2022-11-17

## 2022-11-17 VITALS
OXYGEN SATURATION: 92 % | WEIGHT: 105.4 LBS | TEMPERATURE: 97.5 F | BODY MASS INDEX: 19.92 KG/M2 | HEART RATE: 80 BPM | SYSTOLIC BLOOD PRESSURE: 148 MMHG | DIASTOLIC BLOOD PRESSURE: 65 MMHG

## 2022-11-17 DIAGNOSIS — I50.31 ACUTE DIASTOLIC CHF (CONGESTIVE HEART FAILURE): Primary | ICD-10-CM

## 2022-11-17 DIAGNOSIS — G62.9 NEUROPATHY: ICD-10-CM

## 2022-11-17 PROCEDURE — 1111F DSCHRG MED/CURRENT MED MERGE: CPT | Performed by: NURSE PRACTITIONER

## 2022-11-17 PROCEDURE — 99495 TRANSJ CARE MGMT MOD F2F 14D: CPT | Performed by: NURSE PRACTITIONER

## 2022-11-17 RX ORDER — TORSEMIDE 20 MG/1
20 TABLET ORAL DAILY
COMMUNITY
Start: 2022-11-08

## 2022-11-17 NOTE — PROGRESS NOTES
Transitional Care Follow Up Visit  Subjective   History obtained from patient and daughter.   Demetra Rush is a 89 y.o. female who presents for a transitional care management visit.    Within 48 business hours after discharge our office contacted her via telephone to coordinate her care and needs.      I reviewed and discussed the details of that call along with the discharge summary, hospital problems, inpatient lab results, inpatient diagnostic studies, and consultation reports with Demetra.     Current outpatient and discharge medications have been reconciled for the patient.  Reviewed by: FELECIA Brownlee      Date of TCM Phone Call 11/8/2022   [facility]   Date of Admission 11/3/2022   Date of Discharge 11/8/2022   Discharge Disposition Home or Self Care     Risk for Readmission (LACE) Score: 12 (11/8/2022  6:00 AM)      History of Present Illness   Course During Hospital Stay:  89-year-old patient who underwent AV node ablation as an outpatient on 11/3/2022. During the procedure she became acutely hypotensive. She had an extensive evaluation for pericardial effusion and retroperitoneal bleed which was all negative. Her H&H remained stable, ended up going to the coronary care unit, was initially put on 3 pressors, given at least a liter and a half of fluid with minimal response until she was given a dose of steroids. It was suspected she probably had an adrenal crisis.  She was on Cipro for a UTI that she had started on Monday and a sepsis work-up was completed. She was treated empirically with antibiotics for 3 days and although her procalcitonin was mildly elevated she really never had any other symptoms or indication of sepsis so those were stopped. She was able to come off of the pressors by the next morning. She initially became volume overloaded with all of the fluids so she ended up needing IV diuretics and BiPAP for short time as well but by the next morning she was down to  1 L of oxygen and doing well. On 11/6 her H&H came back at 8.5 and 27. She had no signs of bleeding and repeat CBC was 10.1 and 32. She complained of intermittent discoloration of feet with tingling sensation. Vascular was consulted and they ordered ABIs which were normal bilaterally. It was suspected that she does have some degree of neuropathy. Pacemaker functioned well with a lower rate of 80. She was on both metoprolol and diltiazem primarily for rate control. Given her problems with hypotension these have been stopped.     The following portions of the patient's history were reviewed and updated as appropriate: allergies, current medications, past family history, past medical history, past social history, past surgical history and problem list.    Review of Systems   Constitutional: Negative for fatigue.   Respiratory: Negative for shortness of breath.    Cardiovascular: Negative for chest pain.   Neurological: Positive for numbness.       Objective   Physical Exam  Vitals and nursing note reviewed.   Constitutional:       Appearance: Normal appearance.   Cardiovascular:      Rate and Rhythm: Normal rate and regular rhythm.   Pulmonary:      Effort: Pulmonary effort is normal.      Breath sounds: Normal breath sounds.   Neurological:      Mental Status: She is alert and oriented to person, place, and time.   Psychiatric:         Mood and Affect: Mood normal.         Assessment & Plan   Diagnoses and all orders for this visit:    1. Acute diastolic CHF (congestive heart failure) (HCC) (Primary)  Comments:  - Patient will follow-up with cardiology as planned.     2. Neuropathy  Comments:  - May consider duloxetine 30 mg daily pending lab result.   Orders:  -     Vitamin B12

## 2022-11-18 ENCOUNTER — READMISSION MANAGEMENT (OUTPATIENT)
Dept: CALL CENTER | Facility: HOSPITAL | Age: 87
End: 2022-11-18

## 2022-11-18 ENCOUNTER — TELEPHONE (OUTPATIENT)
Dept: FAMILY MEDICINE CLINIC | Facility: CLINIC | Age: 87
End: 2022-11-18

## 2022-11-18 NOTE — TELEPHONE ENCOUNTER
Caller: DAVID    Relationship: [facility]     Best call back number: [phone]    What orders are you requesting (i.e. lab or imaging):     [facility] CONTINUED OCCUPATIONAL THERAPY 1 TIME A WEEK   FOR 4 WEEKS.    In what timeframe would the patient need to come in:11/18/2022    Where will you receive your lab/imaging services: [facility]

## 2022-11-18 NOTE — OUTREACH NOTE
CHF Week 2 Survey    Flowsheet Row Responses   [facility], [place], operated by [facility] patient discharged from? [facility]   Does the patient have one of the following disease processes/diagnoses(primary or secondary)? CHF   Week 2 attempt successful? Yes   Call start time 1655   Call end time 1700   Person spoke with today (if not patient) and relationship wiliam Orellana reviewed with patient/caregiver? Yes   Is the patient having any side effects they believe may be caused by any medication additions or changes? No   Does the patient have all medications ordered at discharge? Yes   Is the patient taking all medications as directed (includes completed medication regime)? Yes   Does the patient have a primary care provider?  Yes   Does the patient have an appointment with their PCP within 7 days of discharge? Yes   Has the patient kept scheduled appointments due by today? Yes   What is the Home health agency?   [facility]    Has home health visited the patient within 72 hours of discharge? Yes   Pulse Ox monitoring Intermittent   Pulse Ox device source Patient   O2 Sat comments states WNL's   O2 Sat: education provided When to seek care   Psychosocial issues? No   Did the patient receive a copy of their discharge instructions? Yes   Nursing interventions Reviewed instructions with patient   What is the patient's perception of their health status since discharge? Improving   Nursing interventions Nurse provided patient education   Is the patient able to teach back signs and symptoms of worsening condition? (i.e. weight gain, shortness of air, etc.) Yes   Is the patient/caregiver able to teach back the hierarchy of who to call/visit for symptoms/problems? PCP, Specialist, Home health nurse, Urgent Care, ED, 911 Yes   Additional teach back comments daughter states pt comfortable, free of swelling, eats well, has numerous visits and calls from  and others, states would not need any more calls   Is the patient able to teach back Heart  From: Bree Gage  To: Nurse Practicioner Jacque Song  Sent: 3/8/2021 8:24 AM PST  Subject: Non-Urgent Medical Question    Yes, it is the same. I am attaching. The only thing they did ask was to change page 3; #7 to a duration of 5 days per episode if possible. Ended up in  Thursday with a migraine, fever and sore throat along with the severe pain from the tooth extraction. Covid neg. He thinks its yet another virus. Are there other immunology specialists in [place]? Dr Mixon is leaving and I prefer not to see Dr Reyes again, I am getting sick too often and its wearing me down. Especially with work, I cannot keep getting sick. I am back at work today but will be written up for excessive absences aside from my FMLA. I believe my depression gets worse during these times and I've been snappy, garrett, mean. I'd Like to trial up my aripiprazole to a full tab HS      ----- Message -----   From:Nurse Practicioner Jacque Song   Sent:3/4/2021 2:44 PM PST   To:Bree Gage   Subject:RE: Non-Urgent Medical Question    Is is just for renewal of what we did last year?      ----- Message -----   From:Bree Gage   Sent:3/4/2021 7:46 AM PST   To:Nurse Practicioner Jacque Song   Subject:Non-Urgent Medical Question    Good morning. I had to call out today and I have an urgent care appointment as I have a super sore throat I've had a mild fever for a week and there's a possibility that the extraction has caused infection & I'm downright miserable with a migraine & nausea. I have an appointment with you today to fill out my FMLA paperwork. Can these be uploaded via [employer] to you?    Failure Zones? Yes   CHF Zone this Call Green Zone   Green Zone Patient reports doing well, No new or worsening shortness of breath, Physical activity level is normal for you, No new swelling -  feet, ankles and legs look normal for you, Weight check stable, No chest pain   Green Zone Interventions Daily weight check, Follow up visits planned, Meds as directed, Low sodium diet   CHF Week 2 call completed? Yes   Revoked No further contact(revokes)-requires comment   Is the patient interested in additional calls from an ambulatory ?  NOTE:  applies to high risk patients requiring additional follow-up. No   Would this patient benefit from a Referral to [facility] Social Work? No   Graduated/Revoked comments daughter states pt stable, has many interactions with HH, PCP, etc, no more calls needed   Call end time 1700          LANI HIGGINBOTHAM - Registered Nurse

## 2022-11-21 ENCOUNTER — CLINICAL SUPPORT (OUTPATIENT)
Dept: FAMILY MEDICINE CLINIC | Facility: CLINIC | Age: 87
End: 2022-11-21
Payer: MEDICARE

## 2022-11-22 ENCOUNTER — TELEPHONE (OUTPATIENT)
Dept: FAMILY MEDICINE CLINIC | Facility: CLINIC | Age: 87
End: 2022-11-22

## 2022-11-22 DIAGNOSIS — G62.9 NEUROPATHY: Primary | ICD-10-CM

## 2022-11-22 LAB — VIT B12 SERPL-MCNC: 671 PG/ML (ref 211–946)

## 2022-11-22 RX ORDER — DULOXETIN HYDROCHLORIDE 30 MG/1
30 CAPSULE, DELAYED RELEASE ORAL DAILY
Qty: 90 CAPSULE | Refills: 0 | Status: SHIPPED | OUTPATIENT
Start: 2022-11-22 | End: 2022-12-06 | Stop reason: SINTOL

## 2022-11-22 NOTE — TELEPHONE ENCOUNTER
B12 is normal so would recommend starting duloxetine 30 mg daily.    Patient aware of results and recommendations.

## 2022-11-26 NOTE — TELEPHONE ENCOUNTER
Rx Refill Note  Requested Prescriptions     Pending Prescriptions Disp Refills   • sucralfate (CARAFATE) 1 g tablet [Pharmacy Med Name: sucralfate 1 gram tablet] 60 tablet 0     Sig: TAKE ONE TABLET BY MOUTH TWICE DAILY   • pantoprazole (PROTONIX) 40 MG EC tablet [Pharmacy Med Name: pantoprazole 40 mg tablet,delayed release] 30 tablet 0     Sig: TAKE ONE TABLET BY MOUTH DAILY      Last office visit with prescribing clinician: 10/31/2022      Next office visit with prescribing clinician: 1/18/2023            Louis Barlow CMA/LMR  11/26/22, 10:02 EST

## 2022-11-28 ENCOUNTER — CLINICAL SUPPORT NO REQUIREMENTS (OUTPATIENT)
Dept: CARDIOLOGY | Facility: CLINIC | Age: 87
End: 2022-11-28

## 2022-11-28 ENCOUNTER — OFFICE VISIT (OUTPATIENT)
Dept: CARDIOLOGY | Facility: CLINIC | Age: 87
End: 2022-11-28

## 2022-11-28 ENCOUNTER — OFFICE VISIT (OUTPATIENT)
Dept: GASTROENTEROLOGY | Facility: CLINIC | Age: 87
End: 2022-11-28

## 2022-11-28 VITALS
HEART RATE: 80 BPM | BODY MASS INDEX: 19.26 KG/M2 | WEIGHT: 102 LBS | SYSTOLIC BLOOD PRESSURE: 130 MMHG | HEIGHT: 61 IN | DIASTOLIC BLOOD PRESSURE: 60 MMHG

## 2022-11-28 VITALS
BODY MASS INDEX: 19.93 KG/M2 | WEIGHT: 101.5 LBS | HEIGHT: 60 IN | SYSTOLIC BLOOD PRESSURE: 153 MMHG | TEMPERATURE: 95 F | DIASTOLIC BLOOD PRESSURE: 69 MMHG | HEART RATE: 80 BPM

## 2022-11-28 DIAGNOSIS — I34.0 NONRHEUMATIC MITRAL VALVE REGURGITATION: ICD-10-CM

## 2022-11-28 DIAGNOSIS — K21.9 GASTROESOPHAGEAL REFLUX DISEASE, UNSPECIFIED WHETHER ESOPHAGITIS PRESENT: Primary | ICD-10-CM

## 2022-11-28 DIAGNOSIS — I97.190 COMPLETE AV BLOCK DUE TO AV NODAL ABLATION: Primary | ICD-10-CM

## 2022-11-28 DIAGNOSIS — I70.0 AORTIC CALCIFICATION: ICD-10-CM

## 2022-11-28 DIAGNOSIS — I48.19 PERSISTENT ATRIAL FIBRILLATION: Primary | ICD-10-CM

## 2022-11-28 DIAGNOSIS — I27.20 PULMONARY HYPERTENSION: ICD-10-CM

## 2022-11-28 DIAGNOSIS — R63.4 WEIGHT LOSS: ICD-10-CM

## 2022-11-28 DIAGNOSIS — I50.32 CHRONIC DIASTOLIC (CONGESTIVE) HEART FAILURE: ICD-10-CM

## 2022-11-28 DIAGNOSIS — I44.2 COMPLETE AV BLOCK DUE TO AV NODAL ABLATION: Primary | ICD-10-CM

## 2022-11-28 PROCEDURE — 93000 ELECTROCARDIOGRAM COMPLETE: CPT | Performed by: NURSE PRACTITIONER

## 2022-11-28 PROCEDURE — 99214 OFFICE O/P EST MOD 30 MIN: CPT | Performed by: NURSE PRACTITIONER

## 2022-11-28 PROCEDURE — 99214 OFFICE O/P EST MOD 30 MIN: CPT | Performed by: INTERNAL MEDICINE

## 2022-11-28 PROCEDURE — 93279 PRGRMG DEV EVAL PM/LDLS PM: CPT | Performed by: INTERNAL MEDICINE

## 2022-11-28 RX ORDER — PANTOPRAZOLE SODIUM 40 MG/1
TABLET, DELAYED RELEASE ORAL
Qty: 30 TABLET | Refills: 0 | Status: SHIPPED | OUTPATIENT
Start: 2022-11-28 | End: 2022-12-19

## 2022-11-28 RX ORDER — SUCRALFATE 1 G/1
TABLET ORAL
Qty: 60 TABLET | Refills: 0 | Status: SHIPPED | OUTPATIENT
Start: 2022-11-28 | End: 2022-12-19

## 2022-11-28 NOTE — PROGRESS NOTES
Date of Office Visit: 22  Encounter Provider: FELECIA Cooley  Place of Service: [facility] CARDIOLOGY  Patient Name: Demetra Rush  :1933    Chief Complaint   Patient presents with   • Atrial fibrillation, persistent (HCC)   • Acute diastolic CHF (congestive heart failure) (HCC)   • Follow-up   :     HPI: Demetra Rush is a 89 y.o. female  with persistent atrial fibrillation status post single-chamber pacemaker and AV node ablation 2022,, diastolic congestive heart failure, mitral regurgitation, neuropathy, pulmonary hypertension, esophageal dysmotility, and Cheondoism        She was seen inpatient by Dr. Ji. I will visit with her in follow up today.      In 2022 she presented with hypoxic respiratory failure secondary to aspiration pneumonia.  She required mechanical ventilation in the setting of severe mitral regurgitation and esophageal dysmotility.  Cardiology and gastroenterology followed.  It was hard to rate control patient so digoxin was added.  She was started on low-dose torsemide due to history of allergy to furosemide.  Gastroenterology recommended calcium channel blocker for esophageal dysmotility as well as follow-up in their office.  Her A. fib was difficult to manage and she was started on amiodarone and had cardioversion which was successful but unfortunately had recurrent atrial fibrillation.  She had issues with congestive heart failure with rapid A. fib and ultimately had single-chamber Medtronic pacemaker implanted with planned AV node ablation completed 11/3/2022..  Follow-up echo 2022 showed normal left ventricular systolic function, severe mitral regurgitation and moderate pulmonary hypertension.    She presents today for reassessment.  She continues to take Eliquis without signs of bleeding such as blood in the urine or stool.  She has been started on Cymbalta and has had about 3 doses since her  "neuropathy diagnosis.  She has been battling constipation but no chest pain.  Her breathing has been stable.  No swelling on current dose of torsemide.          Allergies   Allergen Reactions   • Amlodipine Swelling     In feet and legs.   • Codeine Itching   • Sulfa Antibiotics Hives           Family and social history reviewed.     ROS  All other systems were reviewed and are negative          Objective:     Vitals:    11/28/22 1155   BP: 130/60   BP Location: Left arm   Patient Position: Sitting   Pulse: 80   Weight: 46.3 kg (102 lb)   Height: 154.9 cm (61\")     Body mass index is 19.27 kg/m².    PHYSICAL EXAM:  Pulmonary:      Effort: Pulmonary effort is normal.      Breath sounds: Normal breath sounds.   Cardiovascular:      Regular rhythm.      Murmurs: There is a systolic murmur.           ECG 12 Lead    Date/Time: 11/28/2022 1:13 PM  Performed by: Michelle Pitts APRN  Authorized by: Michelle Pitts APRN   Comparison: compared with previous ECG   Similar to previous ECG  Rhythm: atrial fibrillation and paced  Rate: normal  Pacing: ventricular paced rhythm  Clinical impression: abnormal EKG              Current Outpatient Medications   Medication Sig Dispense Refill   • ALPRAZolam (XANAX) 1 MG tablet Take 1 tablet by mouth At Night As Needed for Anxiety. 90 tablet 0   • apixaban (ELIQUIS) 2.5 MG tablet tablet Take 1 tablet by mouth Every 12 (Twelve) Hours. Indications: Atrial Fibrillation 60 tablet 5   • DULoxetine (CYMBALTA) 30 MG capsule Take 1 capsule by mouth Daily. 90 capsule 0   • levothyroxine (SYNTHROID, LEVOTHROID) 75 MCG tablet Take 1 tablet by mouth Daily. 90 tablet 1   • pantoprazole (PROTONIX) 40 MG EC tablet TAKE ONE TABLET BY MOUTH DAILY 30 tablet 0   • potassium chloride (K-TAB) 20 MEQ tablet controlled-release ER tablet Take 1 tablet by mouth Daily. 60 tablet 2   • sucralfate (CARAFATE) 1 g tablet TAKE ONE TABLET BY MOUTH TWICE DAILY 60 tablet 0   • torsemide (DEMADEX) 20 MG tablet Take 20 mg " by mouth Daily.       No current facility-administered medications for this visit.     Assessment:       Diagnosis Plan   1. Persistent atrial fibrillation (HCC)        2. Nonrheumatic mitral valve regurgitation        3. Aortic calcification (HCC)        4. Pulmonary hypertension (HCC)             Orders Placed This Encounter   Procedures   • ECG 12 Lead     This order was created via procedure documentation     Order Specific Question:   Release to patient     Answer:   Routine Release         Plan:       1. 89 year old female with persistent atrial fibrillation status post single-chamber [employer] pacemaker and AV node ablation November 2022 by Dr. Gaston.  She is maintained on Eliquis 2.5 mg twice daily without signs of bleeding    2.  Diastolic congestive heart failure secondary to atrial fibrillation and valvular heart disease.  She is currently euvolemic on torsemide 20 mg daily.     3. Moderate to severe mitral valve regurgitation - conservative therapy is desired by patient.   Her atrial fibrillation is now controlled so with she continues to have volume issues she may need evaluation for possible mitral clip-as above at this time she appears euvolemic on current dose torsemide     4. Pulmonary hypertension-breathing much better  5. esophageal dysmotility -continue calcium channel blocker and recommendations per GI.    She has intermittent nausea as well as constipation.  She has follow-up with gastroenterology.  I have asked her to discuss her constipation issues with him as well  6.  Aortic valve sclerosis  7.  Neuropathy now on Cymbalta      Keep January appointment with electrophysiology otherwise see myself or Dr. Ji in approximately April           It has been a pleasure to participate in this patient's care.      Thank you,  FELECIA Cooley      **I used Dragon to dictate this note:**

## 2022-11-28 NOTE — PROGRESS NOTES
Chief Complaint   Patient presents with   • blood in stool   • Constipation   • Heartburn     Subjective     HPI  Demetra Rush is a 89 y.o. female who presents today for office follow up.     Seen by our service during hospitalization in September for aspiration PNA, some ? GERD contribution.  She underwent esophagram noted below, some mild dysmotiliy but nothing out of proportion to what I would expect for her age.  Doing well on carafate BID and PPI.  Some occasional constipation improved with colace.  Some gradual weight loss.  She does report decreased appetite.     FL Esophagram Complete Single Contrast (09/14/2022 16:08)    She was recently hospitalized earlier this month for CHF exacerbation, underwent heart cath.  Severe MR with elevated RVSP.  CT A/P performed that admission reviewed, NAD.         Objective   Vitals:    11/28/22 1412   BP: 153/69   Pulse: 80   Temp: 95 °F (35 °C)       Physical Exam  Vitals reviewed.   Constitutional:       Appearance: She is well-developed.   HENT:      Head: Normocephalic and atraumatic.   Neurological:      Mental Status: She is alert and oriented to person, place, and time.   Psychiatric:         Behavior: Behavior normal.         Thought Content: Thought content normal.         Judgment: Judgment normal.       The following data was reviewed by: Luis Long MD on 11/28/2022:  Common labs    Common Labs 11/6/22 11/6/22 11/7/22 11/7/22 11/7/22 11/8/22    0339 [id number] 1726 1726 1726    Glucose 114 (A)   125 (A)  79   BUN 25 (A)   16  16   Creatinine 0.87   0.99  0.72   Sodium 135 (A)   137  139   Potassium 3.9   3.8  3.7   Chloride 103   100  103   Calcium 8.1 (A)   8.9  8.2 (A)   WBC  7.15 4.88      Hemoglobin  8.5 (A) 10.1 (A)      Hematocrit  27.2 (A) 32.5 (A)      Platelets  207 260      Hemoglobin A1C     5.70 (A)    (A) Abnormal value       Comments are available for some flowsheets but are not being displayed.           Data reviewed: Recent  hospitalization notes from 11/2022     Assessment & Plan   Assessment:     1. Gastroesophageal reflux disease, unspecified whether esophagitis present    2. Weight loss    3. Chronic diastolic (congestive) heart failure (HCC)      Plan:   Continue colace for constipation  Decrease carafate to QD and continue Protonix  She is having some gradual weight loss possibly related to her underlying heart disease.  We did discuss the possibility of endoscopic evaluation although in this elderly lady with multiple medical issues I think we all agree at this time its probably not in her best interest.      Follow up in 3 mos            Luis Long M.D.  [facility]  [address] [place] [address]  Office: [phone]

## 2022-11-29 ENCOUNTER — TELEPHONE (OUTPATIENT)
Dept: FAMILY MEDICINE CLINIC | Facility: CLINIC | Age: 87
End: 2022-11-29

## 2022-11-29 NOTE — TELEPHONE ENCOUNTER
Caller: DELMY    Relationship: PHYSICAL THERAPIST    Best call back number: [phone]  What orders are you requesting (i.e. lab or imaging): PT ORDERS ONCE A WEEK FOR 8 WEEKS.     In what timeframe would the patient need to come in: AS SOON AS POSSIBLE    Where will you receive your lab/imaging services: IN HOME    Additional notes:

## 2022-12-03 ENCOUNTER — PATIENT MESSAGE (OUTPATIENT)
Dept: FAMILY MEDICINE CLINIC | Facility: CLINIC | Age: 87
End: 2022-12-03

## 2022-12-03 DIAGNOSIS — G62.9 NEUROPATHY: Primary | ICD-10-CM

## 2022-12-06 RX ORDER — AMITRIPTYLINE HYDROCHLORIDE 10 MG/1
10 TABLET, FILM COATED ORAL NIGHTLY
Qty: 30 TABLET | Refills: 1 | Status: SHIPPED | OUTPATIENT
Start: 2022-12-06 | End: 2023-01-10 | Stop reason: ALTCHOICE

## 2022-12-19 RX ORDER — PANTOPRAZOLE SODIUM 40 MG/1
TABLET, DELAYED RELEASE ORAL
Qty: 30 TABLET | Refills: 0 | Status: SHIPPED | OUTPATIENT
Start: 2022-12-19 | End: 2023-01-18

## 2022-12-19 RX ORDER — SUCRALFATE 1 G/1
TABLET ORAL
Qty: 60 TABLET | Refills: 0 | Status: SHIPPED | OUTPATIENT
Start: 2022-12-19 | End: 2023-01-18

## 2022-12-19 NOTE — TELEPHONE ENCOUNTER
Rx Refill Note  Requested Prescriptions     Pending Prescriptions Disp Refills   • sucralfate (CARAFATE) 1 g tablet [Pharmacy Med Name: sucralfate 1 gram tablet] 60 tablet 0     Sig: TAKE ONE TABLET BY MOUTH TWICE DAILY   • pantoprazole (PROTONIX) 40 MG EC tablet [Pharmacy Med Name: pantoprazole 40 mg tablet,delayed release] 30 tablet 0     Sig: TAKE ONE TABLET BY MOUTH DAILY      Last office visit with prescribing clinician: 10/31/2022   Last telemedicine visit with prescribing clinician: 1/18/2023   Next office visit with prescribing clinician: 1/18/2023                         Would you like a call back once the refill request has been completed: [] Yes [] No    If the office needs to give you a call back, can they leave a voicemail: [] Yes [] No    Johana Murray MA  12/19/22, 13:56 EST

## 2023-01-10 ENCOUNTER — CLINICAL SUPPORT NO REQUIREMENTS (OUTPATIENT)
Dept: CARDIOLOGY | Facility: CLINIC | Age: 88
End: 2023-01-10
Payer: MEDICARE

## 2023-01-10 ENCOUNTER — OFFICE VISIT (OUTPATIENT)
Dept: CARDIOLOGY | Facility: CLINIC | Age: 88
End: 2023-01-10
Payer: MEDICARE

## 2023-01-10 VITALS
BODY MASS INDEX: 19.26 KG/M2 | HEART RATE: 80 BPM | WEIGHT: 102 LBS | HEIGHT: 61 IN | SYSTOLIC BLOOD PRESSURE: 126 MMHG | DIASTOLIC BLOOD PRESSURE: 80 MMHG

## 2023-01-10 DIAGNOSIS — I10 PRIMARY HYPERTENSION: ICD-10-CM

## 2023-01-10 DIAGNOSIS — Z95.0 PRESENCE OF CARDIAC PACEMAKER: ICD-10-CM

## 2023-01-10 DIAGNOSIS — I97.190 COMPLETE AV BLOCK DUE TO AV NODAL ABLATION: Primary | ICD-10-CM

## 2023-01-10 DIAGNOSIS — I48.19 ATRIAL FIBRILLATION, PERSISTENT: Primary | ICD-10-CM

## 2023-01-10 DIAGNOSIS — I44.2 COMPLETE AV BLOCK DUE TO AV NODAL ABLATION: ICD-10-CM

## 2023-01-10 DIAGNOSIS — I97.190 COMPLETE AV BLOCK DUE TO AV NODAL ABLATION: ICD-10-CM

## 2023-01-10 DIAGNOSIS — I44.2 COMPLETE AV BLOCK DUE TO AV NODAL ABLATION: Primary | ICD-10-CM

## 2023-01-10 DIAGNOSIS — I50.32 CHRONIC DIASTOLIC (CONGESTIVE) HEART FAILURE: ICD-10-CM

## 2023-01-10 PROCEDURE — 99024 POSTOP FOLLOW-UP VISIT: CPT | Performed by: NURSE PRACTITIONER

## 2023-01-10 PROCEDURE — 93279 PRGRMG DEV EVAL PM/LDLS PM: CPT | Performed by: INTERNAL MEDICINE

## 2023-01-10 PROCEDURE — 93000 ELECTROCARDIOGRAM COMPLETE: CPT | Performed by: NURSE PRACTITIONER

## 2023-01-10 RX ORDER — IPRATROPIUM BROMIDE 42 UG/1
SPRAY, METERED NASAL
COMMUNITY
Start: 2022-12-05

## 2023-01-10 NOTE — PROGRESS NOTES
Date of Office Visit: 01/10/2023  Encounter Provider: FELECIA Stone  Place of Service: [facility] CARDIOLOGY  Patient Name: Demetra Rush  :1933    Chief Complaint   Patient presents with   • Atrial Fibrillation   • acute diastolic CHF   • Pacemaker Check   :     HPI: Demetra Rush is a 89 y.o. female who saw Dr. Ji---admitted in Sept with acute hypoxic resp failure secondary to aspiration pneumonia--she was in afib w/RVR at that time, echo showed normal EF, severely dilated LA, sever MR and severe pul HTN.     Multiple attempts at controlling her AF w/meds---limited by hypotension, Dr. Marquez and I saw her and recommended pace and ablate.     PPM 10/20/2022, AV node ablation 11/3--complicated by acute hypotension and shock--unclear etiology but ultimately felt to be an adrenal crisis/insufficiency --she responded to steroids.     She presents for follow up and device check.     She is doing better. No chest pain, some dyspnea with exertion, no PND, orthopnea or edema.     Her primary complaint is her her LE---she has neuropathy---she has tried 3 different medications, intolerant so far--has follow up with PCP  and looks like the plan is to try gabapentin next.     Apixaban for AC--she is on 2.5 mg BID---age/weight.     Device interrogation shows normal testing and function. 100% V paced, no arrhythmias. We did turn rate response on and lowered lower rate to 70 ppm.        Past Medical History:   Diagnosis Date   • Atrial fibrillation (HCC)    • Chronic diastolic (congestive) heart failure (HCC)    • GERD (gastroesophageal reflux disease)    • Hyperlipidemia    • Hypertension    • Hypothyroidism    • Mitral regurgitation    • Neuropathy of both feet    • Osteoporosis 2022   • Pulmonary hypertension (HCC)    • Stroke (HCC)    • UTI (urinary tract infection)        Past Surgical History:   Procedure Laterality Date   • ADENOIDECTOMY     • BLADDER SURGERY      • CARDIAC ELECTROPHYSIOLOGY PROCEDURE N/A 10/20/2022    Procedure: Pacemaker SC new--Medtronic;  Surgeon: Albino Gaston MD;  Location:  [facility] CATH INVASIVE LOCATION;  Service: Cardiology;  Laterality: N/A;   • CARDIAC ELECTROPHYSIOLOGY PROCEDURE N/A 11/3/2022    Procedure: AV node ablation---has Medtronic pacemaker;  Surgeon: Albino Gaston MD;  Location:  [facility] CATH INVASIVE LOCATION;  Service: Cardiovascular;  Laterality: N/A;   • CHOLECYSTECTOMY     • HYSTERECTOMY         Social History     Socioeconomic History   • Marital status:    Tobacco Use   • Smoking status: Never   • Smokeless tobacco: Never   Vaping Use   • Vaping Use: Never used   Substance and Sexual Activity   • Alcohol use: Yes     Alcohol/week: 1.0 standard drink     Types: 1 Glasses of wine per week     Comment: glass maybe once a month   • Drug use: Never   • Sexual activity: Defer       Family History   Problem Relation Age of Onset   • Heart disease Mother    • Other Mother         fluid in lungs   • Heart disease Father    • Heart attack Father    • Cancer Sister    • Tongue cancer Sister        Review of Systems   Constitutional: Negative for chills, fever and malaise/fatigue.   Cardiovascular: Negative for chest pain, dyspnea on exertion, leg swelling, near-syncope, orthopnea, palpitations, paroxysmal nocturnal dyspnea and syncope.   Respiratory: Negative for cough and shortness of breath.    Hematologic/Lymphatic: Negative.    Musculoskeletal: Negative for joint pain, joint swelling and myalgias.   Gastrointestinal: Negative for abdominal pain, diarrhea, melena, nausea and vomiting.   Genitourinary: Negative for frequency and hematuria.   Neurological: Negative for light-headedness, numbness, paresthesias and seizures.   Allergic/Immunologic: Negative.    All other systems reviewed and are negative.      Allergies   Allergen Reactions   • Amlodipine Swelling     In feet and legs.   • Codeine Itching   • Sulfa Antibiotics Hives          Current Outpatient Medications:   •  ALPRAZolam (XANAX) 1 MG tablet, Take 1 tablet by mouth At Night As Needed for Anxiety., Disp: 90 tablet, Rfl: 0  •  apixaban (ELIQUIS) 2.5 MG tablet tablet, Take 1 tablet by mouth Every 12 (Twelve) Hours. Indications: Atrial Fibrillation, Disp: 60 tablet, Rfl: 5  •  ipratropium (ATROVENT) 0.06 % nasal spray, , Disp: , Rfl:   •  levothyroxine (SYNTHROID, LEVOTHROID) 75 MCG tablet, Take 1 tablet by mouth Daily., Disp: 90 tablet, Rfl: 1  •  pantoprazole (PROTONIX) 40 MG EC tablet, TAKE ONE TABLET BY MOUTH DAILY, Disp: 30 tablet, Rfl: 0  •  potassium chloride (K-TAB) 20 MEQ tablet controlled-release ER tablet, Take 1 tablet by mouth Daily., Disp: 60 tablet, Rfl: 2  •  sucralfate (CARAFATE) 1 g tablet, TAKE ONE TABLET BY MOUTH TWICE DAILY, Disp: 60 tablet, Rfl: 0  •  torsemide (DEMADEX) 20 MG tablet, Take 20 mg by mouth Daily., Disp: , Rfl:       Objective:     Vitals:    01/10/23 0906   BP: 126/80   Pulse: 80   Weight: 46.3 kg (102 lb)   Height: 154.9 cm (61\")     Body mass index is 19.27 kg/m².    PHYSICAL EXAM:    Vitals Reviewed.   General Appearance: No acute distress  Eyes: Conjunctiva and lids: No erythema, swelling, or discharge. Sclera non-icteric.   HENT: Atraumatic, normocephalic. External eyes, ears, and nose normal.   Respiratory: No signs of respiratory distress. Respiration rhythm and depth normal.   Clear to auscultation. No rales, crackles, rhonchi, or wheezing auscultated.   Cardiovascular:  Heart Rate and Rhythm: Normal, Heart Sounds: Normal S1 and S2.   Murmurs: No murmurs noted. No rubs, thrills, or gallops.   Lower Extremities: No edema noted.  Gastrointestinal:  Abdomen soft, non-distended, non-tender.   Musculoskeletal: Normal movement of extremities  Skin: Warm and dry.   Psychiatric: Patient alert and oriented to person, place, and time. Speech and behavior appropriate. Normal mood and affect.       ECG 12 Lead    Date/Time: 1/10/2023 9:17  AM  Performed by: Mary Beth Teran APRN  Authorized by: Mary Beth Teran APRN   Comparison: compared with previous ECG   Similar to previous ECG  Rhythm: atrial fibrillation and paced  BPM: 80  Pacin% capture and ventricular paced rhythm            Assessment:       Diagnosis Plan   1. Atrial fibrillation, persistent (HCC)        2. Presence of cardiac pacemaker        3. 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 Chronic diastolic (congestive) heart failure (HCC)        5. Primary hypertension               Plan:       1.-3. AF, persistent, unable to control HR/rhtyhm w/meds---s/p PPM & AV node ablation. Much improved. Device interrogation within normal limits.     4. Chronic diastolic HF--euvolemic by exam. Following with edilma Mccray in April.     5. HTN, controlled.     Follow up with Dr. Ji in April as scheduled, follow up with Dr. Gaston in 6 months with device check.     As always, it has been a pleasure to participate in your patient's care.      Sincerely,         FELECIA Ball

## 2023-01-18 ENCOUNTER — OFFICE VISIT (OUTPATIENT)
Dept: FAMILY MEDICINE CLINIC | Facility: CLINIC | Age: 88
End: 2023-01-18
Payer: MEDICARE

## 2023-01-18 VITALS
DIASTOLIC BLOOD PRESSURE: 80 MMHG | OXYGEN SATURATION: 98 % | HEIGHT: 61 IN | WEIGHT: 104.4 LBS | SYSTOLIC BLOOD PRESSURE: 130 MMHG | HEART RATE: 63 BPM | TEMPERATURE: 96.9 F | BODY MASS INDEX: 19.71 KG/M2

## 2023-01-18 DIAGNOSIS — N30.00 ACUTE CYSTITIS WITHOUT HEMATURIA: Primary | ICD-10-CM

## 2023-01-18 DIAGNOSIS — R30.0 BURNING WITH URINATION: ICD-10-CM

## 2023-01-18 DIAGNOSIS — I10 PRIMARY HYPERTENSION: ICD-10-CM

## 2023-01-18 DIAGNOSIS — F51.01 PRIMARY INSOMNIA: ICD-10-CM

## 2023-01-18 DIAGNOSIS — I48.19 ATRIAL FIBRILLATION, PERSISTENT: ICD-10-CM

## 2023-01-18 DIAGNOSIS — E03.9 ACQUIRED HYPOTHYROIDISM: ICD-10-CM

## 2023-01-18 DIAGNOSIS — I50.32 CHRONIC DIASTOLIC (CONGESTIVE) HEART FAILURE: ICD-10-CM

## 2023-01-18 DIAGNOSIS — R20.2 TINGLING IN EXTREMITIES: ICD-10-CM

## 2023-01-18 DIAGNOSIS — Z79.899 HIGH RISK MEDICATION USE: Primary | ICD-10-CM

## 2023-01-18 DIAGNOSIS — I27.20 PULMONARY HYPERTENSION: ICD-10-CM

## 2023-01-18 LAB
BILIRUB BLD-MCNC: NEGATIVE MG/DL
CLARITY, POC: CLEAR
COLOR UR: YELLOW
EXPIRATION DATE: ABNORMAL
GLUCOSE UR STRIP-MCNC: NEGATIVE MG/DL
KETONES UR QL: NEGATIVE
LEUKOCYTE EST, POC: ABNORMAL
Lab: ABNORMAL
NITRITE UR-MCNC: NEGATIVE MG/ML
PH UR: 6.5 [PH] (ref 5–8)
PROT UR STRIP-MCNC: ABNORMAL MG/DL
RBC # UR STRIP: NEGATIVE /UL
SP GR UR: 1.01 (ref 1–1.03)
UROBILINOGEN UR QL: ABNORMAL

## 2023-01-18 PROCEDURE — 99214 OFFICE O/P EST MOD 30 MIN: CPT | Performed by: FAMILY MEDICINE

## 2023-01-18 PROCEDURE — 81003 URINALYSIS AUTO W/O SCOPE: CPT | Performed by: FAMILY MEDICINE

## 2023-01-18 RX ORDER — ALPRAZOLAM 1 MG/1
1 TABLET ORAL NIGHTLY PRN
Qty: 90 TABLET | Refills: 0 | Status: SHIPPED | OUTPATIENT
Start: 2023-01-18

## 2023-01-18 RX ORDER — SUCRALFATE 1 G/1
TABLET ORAL
Qty: 60 TABLET | Refills: 0 | Status: SHIPPED | OUTPATIENT
Start: 2023-01-18 | End: 2023-02-15

## 2023-01-18 RX ORDER — PANTOPRAZOLE SODIUM 40 MG/1
TABLET, DELAYED RELEASE ORAL
Qty: 30 TABLET | Refills: 0 | Status: SHIPPED | OUTPATIENT
Start: 2023-01-18 | End: 2023-02-15

## 2023-01-18 RX ORDER — AMITRIPTYLINE HYDROCHLORIDE 25 MG/1
25 TABLET, FILM COATED ORAL NIGHTLY
Qty: 90 TABLET | Refills: 3 | Status: SHIPPED | OUTPATIENT
Start: 2023-01-18 | End: 2023-01-27

## 2023-01-18 RX ORDER — CIPROFLOXACIN 250 MG/1
250 TABLET, FILM COATED ORAL 2 TIMES DAILY
Qty: 6 TABLET | Refills: 0 | Status: SHIPPED | OUTPATIENT
Start: 2023-01-18 | End: 2023-02-24

## 2023-01-18 NOTE — PROGRESS NOTES
Subjective   Demetra Rush is a 89 y.o. female.     Chief Complaint   Patient presents with   • Follow-up     Discuss medications for neuropathy    • leg cramp     Every night maybe increase potassium to 2 would help   • Immunizations     Booster        History of Present Illness   htn- doing well on meds     Hypothyroidism- due labs, taking meds daily and due recheck labs. She is only taking it 6 days a week.     Having some cramping in legs. martha's normal. Needs K rechecked.      Chf/afib/pulm htn- following with cardiology, does have some fatigue and has to take breaks when doing chores and sit down. Is on a blood thinner and rate controlled.  Has a pacemaker and has had an ablation.      Insomnia- has had for years and is stable on xanax. Has tried to come off this and can't. Understands risk and benefits. It is a quality of life issue for her. Only sleeps 3 hours at night without this.     Having some neuropathy symptoms. For 2 years. Tingling. Has had nerve testing a year ago per pt. Has idiopathic neuropathy. Has tried gabapentin and could not tolerate 2/2 nausea.     Shots are utd.     The following portions of the patient's history were reviewed and updated as appropriate: allergies, current medications, past family history, past medical history, past social history, past surgical history and problem list.    Past Medical History:   Diagnosis Date   • Atrial fibrillation (HCC)    • Chronic diastolic (congestive) heart failure (HCC)    • GERD (gastroesophageal reflux disease)    • Hyperlipidemia    • Hypertension    • Hypothyroidism    • Mitral regurgitation    • Neuropathy of both feet    • Osteoporosis 08/29/2022   • Pulmonary hypertension (HCC)    • Stroke (HCC)    • UTI (urinary tract infection)        Past Surgical History:   Procedure Laterality Date   • ADENOIDECTOMY     • BLADDER SURGERY     • CARDIAC ELECTROPHYSIOLOGY PROCEDURE N/A 10/20/2022    Procedure: Pacemaker SC new--Medtronic;  Surgeon:  "Albino Gaston MD;  Location:  [facility] CATH INVASIVE LOCATION;  Service: Cardiology;  Laterality: N/A;   • CARDIAC ELECTROPHYSIOLOGY PROCEDURE N/A 11/3/2022    Procedure: AV node ablation---has Medtronic pacemaker;  Surgeon: Albino Gaston MD;  Location: [facility] CATH INVASIVE LOCATION;  Service: Cardiovascular;  Laterality: N/A;   • CHOLECYSTECTOMY     • HYSTERECTOMY         Family History   Problem Relation Age of Onset   • Heart disease Mother    • Other Mother         fluid in lungs   • Heart disease Father    • Heart attack Father    • Cancer Sister    • Tongue cancer Sister        Social History     Socioeconomic History   • Marital status:    Tobacco Use   • Smoking status: Never   • Smokeless tobacco: Never   Vaping Use   • Vaping Use: Never used   Substance and Sexual Activity   • Alcohol use: Yes     Alcohol/week: 1.0 standard drink     Types: 1 Glasses of wine per week     Comment: glass maybe once a month   • Drug use: Never   • Sexual activity: Defer       Review of Systems   Constitutional: Negative for fever.   Respiratory: Negative for shortness of breath.        Objective   Visit Vitals  /80 (BP Location: Left arm, Patient Position: Sitting)   Pulse 63   Temp 96.9 °F (36.1 °C)   Ht 154.9 cm (60.98\")   Wt 47.4 kg (104 lb 6.4 oz)   SpO2 98%   BMI 19.74 kg/m²     Body mass index is 19.74 kg/m².  Physical Exam  Constitutional:       Appearance: Normal appearance. She is well-developed.   Cardiovascular:      Rate and Rhythm: Normal rate and regular rhythm.      Heart sounds: Normal heart sounds.   Pulmonary:      Effort: Pulmonary effort is normal.      Breath sounds: Normal breath sounds.   Musculoskeletal:         General: No swelling. Normal range of motion.   Skin:     General: Skin is warm and dry.      Findings: No rash.   Neurological:      General: No focal deficit present.      Mental Status: She is alert and oriented to person, place, and time.   Psychiatric:         Mood and " Affect: Mood normal.         Behavior: Behavior normal.           Assessment & Plan   Diagnoses and all orders for this visit:    1. High risk medication use (Primary)  -     Compliance Drug Analysis, Ur - Urine, Clean Catch    2. Primary hypertension    3. Acquired hypothyroidism  -     Comprehensive Metabolic Panel  -     TSH Rfx On Abnormal To Free T4    4. Pulmonary hypertension (HCC)    5. Primary insomnia  -     ALPRAZolam (XANAX) 1 MG tablet; Take 1 tablet by mouth At Night As Needed for Anxiety.  Dispense: 90 tablet; Refill: 0    6. Chronic diastolic (congestive) heart failure (HCC)    7. Atrial fibrillation, persistent (HCC)    8. Tingling in extremities  -     Vitamin B12  -     amitriptyline (ELAVIL) 25 MG tablet; Take 1 tablet by mouth Every Night.  Dispense: 90 tablet; Refill: 3        Discussed risks and benefits of amitriptyline and will try to not use xanax with this. Brendan f/u in 3 months.

## 2023-01-27 ENCOUNTER — TELEMEDICINE (OUTPATIENT)
Dept: FAMILY MEDICINE CLINIC | Facility: CLINIC | Age: 88
End: 2023-01-27
Payer: MEDICARE

## 2023-01-27 DIAGNOSIS — R20.2 TINGLING IN EXTREMITIES: Primary | ICD-10-CM

## 2023-01-27 LAB
ALBUMIN SERPL-MCNC: 4.2 G/DL (ref 3.6–4.6)
ALBUMIN/GLOB SERPL: 1.6 {RATIO} (ref 1.2–2.2)
ALP SERPL-CCNC: 75 IU/L (ref 44–121)
ALT SERPL-CCNC: 10 IU/L (ref 0–32)
AST SERPL-CCNC: 18 IU/L (ref 0–40)
BILIRUB SERPL-MCNC: 0.2 MG/DL (ref 0–1.2)
BUN SERPL-MCNC: 19 MG/DL (ref 8–27)
BUN/CREAT SERPL: 20 (ref 12–28)
CALCIUM SERPL-MCNC: 9.2 MG/DL (ref 8.7–10.3)
CHLORIDE SERPL-SCNC: 97 MMOL/L (ref 96–106)
CO2 SERPL-SCNC: 27 MMOL/L (ref 20–29)
CREAT SERPL-MCNC: 0.95 MG/DL (ref 0.57–1)
DRUGS UR: NORMAL
EGFRCR SERPLBLD CKD-EPI 2021: 57 ML/MIN/1.73
GLOBULIN SER CALC-MCNC: 2.7 G/DL (ref 1.5–4.5)
GLUCOSE SERPL-MCNC: 103 MG/DL (ref 70–99)
POTASSIUM SERPL-SCNC: 4.8 MMOL/L (ref 3.5–5.2)
PROT SERPL-MCNC: 6.9 G/DL (ref 6–8.5)
SODIUM SERPL-SCNC: 139 MMOL/L (ref 134–144)
TSH SERPL DL<=0.005 MIU/L-ACNC: 3.47 UIU/ML (ref 0.45–4.5)
VIT B12 SERPL-MCNC: 486 PG/ML (ref 232–1245)

## 2023-01-27 PROCEDURE — 99214 OFFICE O/P EST MOD 30 MIN: CPT | Performed by: FAMILY MEDICINE

## 2023-01-27 RX ORDER — GABAPENTIN 100 MG/1
100 CAPSULE ORAL 3 TIMES DAILY
Qty: 90 CAPSULE | Refills: 2 | Status: SHIPPED | OUTPATIENT
Start: 2023-01-27 | End: 2023-02-27 | Stop reason: SDUPTHER

## 2023-01-27 NOTE — PROGRESS NOTES
Subjective   Demetra Rush is a 89 y.o. female.     Chief Complaint   Patient presents with   • Pain       History of Present Illness   Having some neuropathy symptoms. For 2 years. Tingling. Has had nerve testing a year ago per pt. Has idiopathic neuropathy. Has tried amitriptyline and could not tolerate 2/2 nausea. Is wanting to try gabapentin. Has not tried gabapentin before per history from her daughter.  History is from patient and daughter bronson.        The following portions of the patient's history were reviewed and updated as appropriate: allergies, current medications, past family history, past medical history, past social history, past surgical history and problem list.    Past Medical History:   Diagnosis Date   • Atrial fibrillation (HCC)    • Chronic diastolic (congestive) heart failure (HCC)    • GERD (gastroesophageal reflux disease)    • Hyperlipidemia    • Hypertension    • Hypothyroidism    • Mitral regurgitation    • Neuropathy of both feet    • Osteoporosis 08/29/2022   • Pulmonary hypertension (HCC)    • Stroke (HCC)    • UTI (urinary tract infection)        Past Surgical History:   Procedure Laterality Date   • ADENOIDECTOMY     • BLADDER SURGERY     • CARDIAC ELECTROPHYSIOLOGY PROCEDURE N/A 10/20/2022    Procedure: Pacemaker SC new--Medtronic;  Surgeon: Albino Gaston MD;  Location: [facility] CATH INVASIVE LOCATION;  Service: Cardiology;  Laterality: N/A;   • CARDIAC ELECTROPHYSIOLOGY PROCEDURE N/A 11/3/2022    Procedure: AV node ablation---has Medtronic pacemaker;  Surgeon: Albino Gaston MD;  Location:  [facility] CATH INVASIVE LOCATION;  Service: Cardiovascular;  Laterality: N/A;   • CHOLECYSTECTOMY     • HYSTERECTOMY         Family History   Problem Relation Age of Onset   • Heart disease Mother    • Other Mother         fluid in lungs   • Heart disease Father    • Heart attack Father    • Cancer Sister    • Tongue cancer Sister        Social History     Socioeconomic History   • Marital  status:    Tobacco Use   • Smoking status: Never   • Smokeless tobacco: Never   Vaping Use   • Vaping Use: Never used   Substance and Sexual Activity   • Alcohol use: Yes     Alcohol/week: 1.0 standard drink     Types: 1 Glasses of wine per week     Comment: glass maybe once a month   • Drug use: Never   • Sexual activity: Defer       Review of Systems   Constitutional: Negative for fever.       Objective   There were no vitals taken for this visit.  There is no height or weight on file to calculate BMI.  Physical Exam  Constitutional:       General: She is not in acute distress.     Appearance: Normal appearance.   Neurological:      General: No focal deficit present.      Mental Status: She is alert and oriented to person, place, and time.   Psychiatric:         Mood and Affect: Mood normal.         Behavior: Behavior normal.           Assessment & Plan   Diagnoses and all orders for this visit:    1. Tingling in extremities (Primary)  -     gabapentin (NEURONTIN) 100 MG capsule; Take 1 capsule by mouth 3 (Three) Times a Day.  Dispense: 90 capsule; Refill: 2             Consent for video visit secondary to the pandemic and spent 13 min.  Discussed risk and benefits of the medicine as well as tapering up on it.  Follow-up in 1 month and we can discuss dosage adjustment. Brendan. Will get contract and utox if she stays on this.

## 2023-02-15 RX ORDER — PANTOPRAZOLE SODIUM 40 MG/1
TABLET, DELAYED RELEASE ORAL
Qty: 30 TABLET | Refills: 5 | Status: SHIPPED | OUTPATIENT
Start: 2023-02-15 | End: 2023-02-24 | Stop reason: SDUPTHER

## 2023-02-15 RX ORDER — SUCRALFATE 1 G/1
TABLET ORAL
Qty: 60 TABLET | Refills: 5 | Status: SHIPPED | OUTPATIENT
Start: 2023-02-15 | End: 2023-02-24 | Stop reason: SDUPTHER

## 2023-02-17 RX ORDER — POTASSIUM CHLORIDE 20 MEQ/1
TABLET, EXTENDED RELEASE ORAL
Qty: 60 TABLET | Refills: 0 | Status: SHIPPED | OUTPATIENT
Start: 2023-02-17

## 2023-02-17 RX ORDER — APIXABAN 2.5 MG/1
TABLET, FILM COATED ORAL
Qty: 60 TABLET | Refills: 1 | Status: SHIPPED | OUTPATIENT
Start: 2023-02-17

## 2023-02-17 NOTE — TELEPHONE ENCOUNTER
Failed protocol.      NOV-04/24/23-JHL  LOV-11/28/22-AL    Plan:       1. 89 year old female with persistent atrial fibrillation status post single-chamber [employer] pacemaker and AV node ablation November 2022 by Dr. Gaston.  She is maintained on Eliquis 2.5 mg twice daily without signs of bleeding     2.  Diastolic congestive heart failure secondary to atrial fibrillation and valvular heart disease.  She is currently euvolemic on torsemide 20 mg daily.     3. Moderate to severe mitral valve regurgitation - conservative therapy is desired by patient.   Her atrial fibrillation is now controlled so with she continues to have volume issues she may need evaluation for possible mitral clip-as above at this time she appears euvolemic on current dose torsemide     4. Pulmonary hypertension-breathing much better  5. esophageal dysmotility -continue calcium channel blocker and recommendations per GI.    She has intermittent nausea as well as constipation.  She has follow-up with gastroenterology.  I have asked her to discuss her constipation issues with him as well  6.  Aortic valve sclerosis  7.  Neuropathy now on Cymbalta        Keep January appointment with electrophysiology otherwise see myself or Dr. Ji in approximately April       Please advise.    Praful

## 2023-02-24 ENCOUNTER — PATIENT MESSAGE (OUTPATIENT)
Dept: FAMILY MEDICINE CLINIC | Facility: CLINIC | Age: 88
End: 2023-02-24
Payer: MEDICARE

## 2023-02-24 DIAGNOSIS — F51.01 PRIMARY INSOMNIA: ICD-10-CM

## 2023-02-24 RX ORDER — SUCRALFATE 1 G/1
TABLET ORAL
Qty: 90 TABLET | Refills: 2 | Status: SHIPPED | OUTPATIENT
Start: 2023-02-24 | End: 2023-02-27 | Stop reason: SDUPTHER

## 2023-02-24 RX ORDER — PANTOPRAZOLE SODIUM 40 MG/1
TABLET, DELAYED RELEASE ORAL
Qty: 180 TABLET | Refills: 2 | Status: SHIPPED | OUTPATIENT
Start: 2023-02-24 | End: 2023-02-27 | Stop reason: SDUPTHER

## 2023-02-24 NOTE — TELEPHONE ENCOUNTER
From: Demetra Rush  To: Melissa Bolivar  Sent: 2/24/2023 12:11 PM EST  Subject: Prescription Change    Dr. Osmel Saucedo:     At some point, I am sorry I don't remember when, we changed mom's pantoprazole to 2x daily and sucralfate to 1x daily. However, I am trying to get her refills and [facility] needs a new prescription to reflect the above. Can you please assist by sending a corrected prescription?     Thank you so very much,     Reyna

## 2023-02-27 ENCOUNTER — TELEMEDICINE (OUTPATIENT)
Dept: FAMILY MEDICINE CLINIC | Facility: CLINIC | Age: 88
End: 2023-02-27
Payer: MEDICARE

## 2023-02-27 DIAGNOSIS — R13.10 DYSPHAGIA, UNSPECIFIED TYPE: Primary | ICD-10-CM

## 2023-02-27 DIAGNOSIS — R20.2 TINGLING IN EXTREMITIES: ICD-10-CM

## 2023-02-27 PROCEDURE — 99214 OFFICE O/P EST MOD 30 MIN: CPT | Performed by: FAMILY MEDICINE

## 2023-02-27 RX ORDER — GABAPENTIN 300 MG/1
300 CAPSULE ORAL 3 TIMES DAILY
Qty: 270 CAPSULE | Refills: 1 | Status: SHIPPED | OUTPATIENT
Start: 2023-02-27

## 2023-02-27 RX ORDER — PANTOPRAZOLE SODIUM 40 MG/1
TABLET, DELAYED RELEASE ORAL
Qty: 180 TABLET | Refills: 3 | Status: SHIPPED | OUTPATIENT
Start: 2023-02-27

## 2023-02-27 RX ORDER — SUCRALFATE 1 G/1
TABLET ORAL
Qty: 90 TABLET | Refills: 3 | Status: SHIPPED | OUTPATIENT
Start: 2023-02-27

## 2023-02-27 NOTE — PROGRESS NOTES
Subjective   Demetra Rush is a 89 y.o. female.     Chief Complaint   Patient presents with   • Pain       History of Present Illness   Having some neuropathy symptoms. For 2 years. Tingling. Has had nerve testing a year ago per pt. Has idiopathic neuropathy. Has tried amitriptyline and could not tolerate 2/2 nausea. History is from patient and daughter bronson.        The following portions of the patient's history were reviewed and updated as appropriate: allergies, current medications, past family history, past medical history, past social history, past surgical history and problem list.    Past Medical History:   Diagnosis Date   • Atrial fibrillation (HCC)    • Chronic diastolic (congestive) heart failure (HCC)    • GERD (gastroesophageal reflux disease)    • Hyperlipidemia    • Hypertension    • Hypothyroidism    • Mitral regurgitation    • Neuropathy of both feet    • Osteoporosis 08/29/2022   • Pulmonary hypertension (HCC)    • Stroke (HCC)    • UTI (urinary tract infection)        Past Surgical History:   Procedure Laterality Date   • ADENOIDECTOMY     • BLADDER SURGERY     • CARDIAC ELECTROPHYSIOLOGY PROCEDURE N/A 10/20/2022    Procedure: Pacemaker SC new--Medtronic;  Surgeon: Albino Gaston MD;  Location: [facility] INVASIVE LOCATION;  Service: Cardiology;  Laterality: N/A;   • CARDIAC ELECTROPHYSIOLOGY PROCEDURE N/A 11/3/2022    Procedure: AV node ablation---has Medtronic pacemaker;  Surgeon: Albino Gaston MD;  Location: [facility] CATH INVASIVE LOCATION;  Service: Cardiovascular;  Laterality: N/A;   • CHOLECYSTECTOMY     • HYSTERECTOMY         Family History   Problem Relation Age of Onset   • Heart disease Mother    • Other Mother         fluid in lungs   • Heart disease Father    • Heart attack Father    • Cancer Sister    • Tongue cancer Sister        Social History     Socioeconomic History   • Marital status:    Tobacco Use   • Smoking status: Never   • Smokeless tobacco: Never   Vaping Use    • Vaping Use: Never used   Substance and Sexual Activity   • Alcohol use: Yes     Alcohol/week: 1.0 standard drink     Types: 1 Glasses of wine per week     Comment: glass maybe once a month   • Drug use: Never   • Sexual activity: Defer       Review of Systems   Constitutional: Negative for fever.       Objective   There were no vitals taken for this visit.  There is no height or weight on file to calculate BMI.  Physical Exam  Constitutional:       General: She is not in acute distress.     Appearance: Normal appearance.   Neurological:      General: No focal deficit present.      Mental Status: She is alert and oriented to person, place, and time.   Psychiatric:         Mood and Affect: Mood normal.         Behavior: Behavior normal.           Assessment & Plan   Diagnoses and all orders for this visit:    1. Dysphagia, unspecified type (Primary)  -     sucralfate (CARAFATE) 1 g tablet; Take 1 tablet by mouth daily  Dispense: 90 tablet; Refill: 3  -     pantoprazole (PROTONIX) 40 MG EC tablet; Take 1 tablet by mouth twice a day  Dispense: 180 tablet; Refill: 3    2. Tingling in extremities  -     gabapentin (NEURONTIN) 300 MG capsule; Take 1 capsule by mouth 3 (Three) Times a Day.  Dispense: 270 capsule; Refill: 1             Consent for video visit secondary to the pandemic and spent 11 min.   Brendan. Will get contract and utox if she stays on this. Pt will taper up to 300mg tid.

## 2023-03-17 RX ORDER — LEVOTHYROXINE SODIUM 0.07 MG/1
TABLET ORAL
Qty: 90 TABLET | Refills: 1 | Status: SHIPPED | OUTPATIENT
Start: 2023-03-17

## 2023-04-13 RX ORDER — TORSEMIDE 20 MG/1
TABLET ORAL
Qty: 180 TABLET | Refills: 0 | Status: SHIPPED | OUTPATIENT
Start: 2023-04-13

## 2023-04-13 RX ORDER — APIXABAN 2.5 MG/1
TABLET, FILM COATED ORAL
Qty: 180 TABLET | Refills: 3 | Status: SHIPPED | OUTPATIENT
Start: 2023-04-13

## 2023-04-24 ENCOUNTER — OFFICE VISIT (OUTPATIENT)
Dept: CARDIOLOGY | Facility: CLINIC | Age: 88
End: 2023-04-24
Payer: MEDICARE

## 2023-04-24 ENCOUNTER — HOSPITAL ENCOUNTER (OUTPATIENT)
Dept: GENERAL RADIOLOGY | Facility: HOSPITAL | Age: 88
Discharge: HOME | End: 2023-04-24
Admitting: INTERNAL MEDICINE
Payer: MEDICARE

## 2023-04-24 VITALS
BODY MASS INDEX: 19.87 KG/M2 | HEIGHT: 60 IN | DIASTOLIC BLOOD PRESSURE: 60 MMHG | HEART RATE: 68 BPM | SYSTOLIC BLOOD PRESSURE: 124 MMHG | WEIGHT: 101.2 LBS

## 2023-04-24 DIAGNOSIS — L81.9 DISCOLORATION OF SKIN OF MULTIPLE SITES OF LOWER EXTREMITY: ICD-10-CM

## 2023-04-24 DIAGNOSIS — R05.1 ACUTE COUGH: ICD-10-CM

## 2023-04-24 DIAGNOSIS — R05.1 ACUTE COUGH: Primary | ICD-10-CM

## 2023-04-24 PROCEDURE — 71046 X-RAY EXAM CHEST 2 VIEWS: CPT

## 2023-04-24 PROCEDURE — 1160F RVW MEDS BY RX/DR IN RCRD: CPT | Performed by: INTERNAL MEDICINE

## 2023-04-24 PROCEDURE — 99214 OFFICE O/P EST MOD 30 MIN: CPT | Performed by: INTERNAL MEDICINE

## 2023-04-24 PROCEDURE — 1159F MED LIST DOCD IN RCRD: CPT | Performed by: INTERNAL MEDICINE

## 2023-04-24 NOTE — PROGRESS NOTES
[facility]      Patient Name: Demetra Rush  :1933  Age: 89 y.o.  Encounter Provider:  Albino Ji Jr, MD      Chief Complaint: Follow-up atrial fibrillation and nonrheumatic mitral regurgitation      HPI  Demetra Rush is a 89 y.o. female who previously followed with [facility] cardiology but is recently moved to [place] with a history of moderate mitral regurgitation, chronic diastolic heart failure, HLD and HTN. An ECHO on 2022 showed LVEF >55%,Mitral regurgitation, mild to moderate.      Pt was seen in follow up on 22 by [facility] cardiology. Pt denied any worsening cardiac symptoms and mild bilateral lower extremity swelling that is unchanged. Pt reported she has been gradually losing weight over a period of years. Pt denied any chest pain. No medication changes were made.      Pt presented to ER on  22 with complaints of increased breathing difficulties that started this evening. Pt tried to drink some pickle juice to help with reflux and it came back up. She then immediately had increased shortness of breath and was found to be hypoxic and hypotensive per EMS. Pt was given 300 cc NS per EMS. Pt had increased abd discomfort and reflux of the past several days with increasing weakness, sore throat and cough. Pt also reported chest discomfort from vomiting. In ER, BP 76/41, Glucose 261,  CO2 16.9,BUN/CR 18/0.93, troponin negative, Lactate 7.5, procal 0.03, MG 2.2, WBC 13.25, HGB 12.3, respiratory panel negative, CXR showed Cardiomegaly is present. Extensive bilateral alveolar and interstitial infiltrates are seen. Small right pleural effusion is suspected. No pneumothorax is identified. Pt was intubated in ER. EKG showed SR 66, Nonspecific IVCD with a QRS of 130, borderline prolonged QTC of 490 Nonspecific repolarization abnormalities with T wave inversion noted in lead V3,  Pt was strted on Levophed for hypotension post intubation. Pt was  admitted to CCU. GI was consulted for GERD. CT scan in ER showed mild distention of the stomach.   On that admission patient was found to be in atrial fibrillation with RVR and is asymptomatic.  She denies chest pain, shortness of air or palpitations.  No dizziness or syncope.  No orthopnea, PND or edema.      In October she was readmitted with heart failure and persistent atrial fibrillation with rates that were difficult to control.  Pacemaker was implanted and she was scheduled for future AV lakhwinder ablation which took place in November.  She had follow-up in EP clinic with Yumiko Teran at which time her device was interrogated and functioning well.  They turned on rate response to see if activity would be better tolerated.  She remained on low-dose Eliquis and presents today for routine follow-up.    She notes productive cough but much improved volume status.  She has been able to wean torsemide and has stable weight at home.  She is mostly complaining of a bluish discoloration to her feet.  She has noted temperature changes with her extremities since starting on Eliquis.  She denies any leg pain.  Limited functional capacity secondary to musculoskeletal issues but no chest pain or shortness of air with activity.  No orthopnea, PND or edema.      The following portions of the patient's history were reviewed and updated as appropriate: allergies, current medications, past family history, past medical history, past social history, past surgical history and problem list.      Review of Systems   Constitutional: Negative for chills and fever.   HENT: Negative for hoarse voice and sore throat.    Eyes: Negative for double vision and photophobia.   Cardiovascular: Negative for chest pain, leg swelling, near-syncope, orthopnea, palpitations, paroxysmal nocturnal dyspnea and syncope.   Respiratory: Negative for cough and wheezing.    Skin: Positive for color change. Negative for poor wound healing and rash.  "  Musculoskeletal: Negative for arthritis and joint swelling.   Gastrointestinal: Negative for bloating, abdominal pain, hematemesis and hematochezia.   Neurological: Negative for dizziness and focal weakness.   Psychiatric/Behavioral: Negative for depression and suicidal ideas.       OBJECTIVE:   Vital Signs  Vitals:    04/24/23 1346   BP: 124/60   Pulse: 68     Estimated body mass index is 19.76 kg/m² as calculated from the following:    Height as of this encounter: 152.4 cm (60\").    Weight as of this encounter: 45.9 kg (101 lb 3.2 oz).    Vitals reviewed.   Constitutional:       Appearance: Not in distress. Chronically ill-appearing.   Neck:      Vascular: No JVR. JVD normal.   Pulmonary:      Effort: Pulmonary effort is normal.      Breath sounds: No wheezing. No rhonchi. No rales.      Comments: Decreased air entry bibasilar zones  Chest:      Chest wall: Not tender to palpatation.   Cardiovascular:      PMI at left midclavicular line. Normal rate. Regular rhythm. Normal S1. Normal S2.      Murmurs: There is no murmur.      No gallop. No click. No rub.   Pulses:     Intact distal pulses.   Edema:     Peripheral edema absent.   Abdominal:      General: Bowel sounds are normal.      Palpations: Abdomen is soft.      Tenderness: There is no abdominal tenderness.   Musculoskeletal: Normal range of motion.         General: No tenderness. Skin:     General: Skin is warm and dry.      Coloration: Skin is plethoric.   Neurological:      General: No focal deficit present.      Mental Status: Alert and oriented to person, place and time.         Procedures    Lipid Panel        10/31/2022    12:49   Lipid Panel   Total Cholesterol 212     Triglycerides 83     HDL Cholesterol 86     VLDL Cholesterol 15     LDL Cholesterol  111          BUN   Date Value Ref Range Status   01/18/2023 19 8 - 27 mg/dL Final   11/08/2022 16 8 - 23 mg/dL Final     Creatinine   Date Value Ref Range Status   01/18/2023 0.95 0.57 - 1.00 mg/dL " Final   11/08/2022 0.72 0.57 - 1.00 mg/dL Final     Potassium   Date Value Ref Range Status   01/18/2023 4.8 3.5 - 5.2 mmol/L Final   11/08/2022 3.7 3.5 - 5.2 mmol/L Final     ALT (SGPT)   Date Value Ref Range Status   01/18/2023 10 0 - 32 IU/L Final   09/18/2022 9 1 - 33 U/L Final     AST (SGOT)   Date Value Ref Range Status   01/18/2023 18 0 - 40 IU/L Final   09/18/2022 18 1 - 32 U/L Final     Comment:     Slight hemolysis detected by analyzer. Results may be affected.           ASSESSMENT:     89-year-old female with history of chronic diastolic heart failure, atrial fibrillation and nonrheumatic mitral regurgitation who presents for routine follow-up    PLAN OF CARE:     1. Chronic diastolic heart failure -much improved after AV lakhwinder ablation.  We can back off on torsemide to as needed use of weight gain greater than 2 pounds in 1 day or 5 pounds in 1 week.  Blood pressure and heart rate are well controlled today in clinic.  Continue same  2. Cough -unremarkable physical exam.  Chest x-ray checked with no evidence of infection or heart failure/pulmonary edema.  PCP follow-up in the next 1 to 2 weeks  3. Longstanding persistent atrial fibrillation -status post AV lakhwinder ablation and pacemaker implantation.  Normal device function on last interrogation.  Continue low-dose Eliquis  4. Lower extremity plethora -bluish-purple discoloration to bilateral feet with good pulses and no significant edema.  Normal WILIAN in November.  We will make a referral to vascular/vein center.  5. Nonrheumatic mitral regurgitation -severe mitral regurgitation on last echocardiogram.  No heart failure symptoms at this time.  Monitor clinical progress.    Return to clinic 6 months         Discharge Medications          Accurate as of April 24, 2023  1:51 PM. If you have any questions, ask your nurse or doctor.            Continue These Medications      Instructions Start Date   ALPRAZolam 1 MG tablet  Commonly known as: XANAX   1 mg, Oral,  Nightly PRN      Eliquis 2.5 MG tablet tablet  Generic drug: apixaban   TAKE ONE TABLET BY MOUTH TWICE DAILY      gabapentin 300 MG capsule  Commonly known as: NEURONTIN   300 mg, Oral, 3 Times Daily      ipratropium 0.06 % nasal spray  Commonly known as: ATROVENT   No dose, route, or frequency recorded.      levothyroxine 75 MCG tablet  Commonly known as: SYNTHROID, LEVOTHROID   TAKE ONE TABLET BY MOUTH DAILY      pantoprazole 40 MG EC tablet  Commonly known as: PROTONIX   Take 1 tablet by mouth twice a day      potassium chloride 20 MEQ CR tablet  Commonly known as: K-DUR,KLOR-CON   TAKE ONE TABLET BY MOUTH DAILY      potassium chloride ER 20 MEQ tablet controlled-release ER tablet  Commonly known as: K-TAB   20 mEq, Oral, Daily      sucralfate 1 g tablet  Commonly known as: CARAFATE   Take 1 tablet by mouth daily      torsemide 20 MG tablet  Commonly known as: DEMADEX   TAKE ONE TABLET BY MOUTH DAILY             Thank you for allowing me to participate in the care of your patient,      Sincerely,   Albino Ji MD  [facility]  04/24/23  13:51 EDT

## 2023-04-25 NOTE — PROGRESS NOTES
Please let patient and family know there is no evidence of heart failure or pneumonia on this chest x-ray

## 2023-05-12 RX ORDER — POTASSIUM CHLORIDE 20 MEQ/1
TABLET, EXTENDED RELEASE ORAL
Qty: 60 TABLET | Refills: 0 | Status: SHIPPED | OUTPATIENT
Start: 2023-05-12

## 2023-05-25 ENCOUNTER — TELEPHONE (OUTPATIENT)
Dept: FAMILY MEDICINE CLINIC | Facility: CLINIC | Age: 88
End: 2023-05-25
Payer: MEDICARE

## 2023-05-25 NOTE — TELEPHONE ENCOUNTER
Handicap form is ready for pickup at .    ----- Message from Josie Ramsay MD sent at 5/25/2023  1:52 PM EDT -----  Regarding: FW: Demetra Rush - Handicap Sticker  Contact: [phone]  I have put it on your desk.  ----- Message -----  From: Demetra Rush  Sent: 5/25/2023  12:42 PM EDT  To: [facility] [place] [facility] Clinical Pool  Subject: Demetra Rush - Handicap Sticker                 Dr. Osmel Saucedo:     This is Demetra Orellana's daughter.  Mom's mobility is continuing to be unstable when she walks.  When I take her anywhere, I have to drop her at the door and then park.  I hope you can help with what is required to get a handicap sticker for her.  If I should reach out to someone else, please let me know.     Thank you so very much,     Reyna

## 2023-06-07 DIAGNOSIS — F51.01 PRIMARY INSOMNIA: ICD-10-CM

## 2023-06-07 RX ORDER — ALPRAZOLAM 1 MG/1
TABLET ORAL
Qty: 30 TABLET | Refills: 0 | Status: SHIPPED | OUTPATIENT
Start: 2023-06-07

## 2023-06-12 RX ORDER — POTASSIUM CHLORIDE 750 MG/1
20 TABLET, EXTENDED RELEASE ORAL DAILY
Qty: 180 TABLET | Refills: 1 | Status: SHIPPED | OUTPATIENT
Start: 2023-06-12

## 2023-06-19 ENCOUNTER — CLINICAL SUPPORT NO REQUIREMENTS (OUTPATIENT)
Dept: CARDIOLOGY | Facility: CLINIC | Age: 88
End: 2023-06-19
Payer: MEDICARE

## 2023-06-19 ENCOUNTER — OFFICE VISIT (OUTPATIENT)
Dept: CARDIOLOGY | Facility: CLINIC | Age: 88
End: 2023-06-19
Payer: MEDICARE

## 2023-06-19 VITALS
HEIGHT: 61 IN | WEIGHT: 103 LBS | BODY MASS INDEX: 19.45 KG/M2 | SYSTOLIC BLOOD PRESSURE: 130 MMHG | DIASTOLIC BLOOD PRESSURE: 66 MMHG | HEART RATE: 70 BPM

## 2023-06-19 DIAGNOSIS — I44.2 COMPLETE AV BLOCK DUE TO AV NODAL ABLATION: Primary | ICD-10-CM

## 2023-06-19 DIAGNOSIS — I97.190 COMPLETE AV BLOCK DUE TO AV NODAL ABLATION: Primary | ICD-10-CM

## 2023-06-19 DIAGNOSIS — Z95.0 PRESENCE OF CARDIAC PACEMAKER: ICD-10-CM

## 2023-06-19 DIAGNOSIS — I48.19 ATRIAL FIBRILLATION, PERSISTENT: ICD-10-CM

## 2023-06-19 PROCEDURE — 93000 ELECTROCARDIOGRAM COMPLETE: CPT | Performed by: INTERNAL MEDICINE

## 2023-06-19 PROCEDURE — 99214 OFFICE O/P EST MOD 30 MIN: CPT | Performed by: INTERNAL MEDICINE

## 2023-06-19 NOTE — PROGRESS NOTES
Date of Office Visit: 2023  Encounter Provider: Albino Gaston MD  Place of Service: [facility] CARDIOLOGY  Patient Name: Demetra Rush  : 1933    Subjective:     Encounter Date:2023      Patient ID: Demetra Rush is a 89 y.o. female who has a cc of  pers AF and diastolic HF and  I did LBB area pacer and av node ablation in .    She feels better.     No more hospitalizations since that procedure       No anginal chest pain,   No sig mendosa,   No soa,   No fainting,  No orthostasis.   No edema.   Exercise tolerance: walks with a cane.     There have been no hospital admission since the last visit.     There have been no bleeding events.       Past Medical History:   Diagnosis Date    Atrial fibrillation     Chronic diastolic (congestive) heart failure     GERD (gastroesophageal reflux disease)     Hyperlipidemia     Hypertension     Hypothyroidism     Mitral regurgitation     Neuropathy of both feet     Osteoporosis 2022    Pulmonary hypertension     Stroke     UTI (urinary tract infection)        Social History     Socioeconomic History    Marital status:    Tobacco Use    Smoking status: Never    Smokeless tobacco: Never   Vaping Use    Vaping Use: Never used   Substance and Sexual Activity    Alcohol use: Yes     Alcohol/week: 1.0 standard drink     Types: 1 Glasses of wine per week     Comment: glass maybe once a month    Drug use: Never    Sexual activity: Defer       Family History   Problem Relation Age of Onset    Heart disease Mother     Other Mother         fluid in lungs    Heart disease Father     Heart attack Father     Cancer Sister     Tongue cancer Sister        Review of Systems   Constitutional: Negative for fever and night sweats.   HENT:  Negative for ear pain and stridor.    Eyes:  Negative for discharge and visual halos.   Cardiovascular:  Negative for cyanosis.   Respiratory:  Negative for hemoptysis and sputum production.   "  Hematologic/Lymphatic: Negative for adenopathy.   Skin:  Negative for nail changes and unusual hair distribution.   Musculoskeletal:  Positive for arthritis and joint pain. Negative for gout and joint swelling.   Gastrointestinal:  Negative for bowel incontinence and flatus.   Genitourinary:  Negative for dysuria and flank pain.   Neurological:  Negative for seizures and tremors.   Psychiatric/Behavioral:  Negative for altered mental status. The patient is not nervous/anxious.           Objective:     Vitals:    06/19/23 1116   BP: 130/66   Pulse: 70   Weight: 46.7 kg (103 lb)   Height: 154.9 cm (61\")         Eyes:      General:         Right eye: No discharge.         Left eye: No discharge.   HENT:      Head: Normocephalic and atraumatic.   Neck:      Thyroid: No thyromegaly.      Vascular: No JVD.   Pulmonary:      Effort: Pulmonary effort is normal.      Breath sounds: Normal breath sounds. No rales.   Cardiovascular:      Normal rate. Regular rhythm.      No gallop.    Edema:     Peripheral edema absent.   Abdominal:      General: Bowel sounds are normal.      Palpations: Abdomen is soft.      Tenderness: There is no abdominal tenderness.   Musculoskeletal: Normal range of motion.         General: No deformity. Skin:     General: Skin is warm and dry.      Findings: No erythema.   Neurological:      Mental Status: Alert and oriented to person, place, and time.      Motor: Normal muscle tone.   Psychiatric:         Behavior: Behavior normal.         Thought Content: Thought content normal.         ECG 12 Lead    Date/Time: 6/19/2023 11:35 AM  Performed by: Albino Gaston MD  Authorized by: Albino Gaston MD   Comparison: compared with previous ECG   Similar to previous ECG  Rhythm: atrial fibrillation and paced        Lab Review:       Assessment:          Diagnosis Plan   1. 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 Presence of cardiac pacemaker        3. Atrial fibrillation, persistent      "          Plan:     Amazing response.     Pacer and av node helped her a lot     Pacer is great     I reviewed the pacemaker/ICD tracings and the pacing and sensing parameters are normal.      I changed her to bipolar pacing today to save battery.     For her AF -she remains on dose adjusted a-ban.

## 2023-07-05 ENCOUNTER — TELEPHONE (OUTPATIENT)
Dept: FAMILY MEDICINE CLINIC | Facility: CLINIC | Age: 88
End: 2023-07-05

## 2023-07-05 NOTE — TELEPHONE ENCOUNTER
Caller: KRIS VASQUEZ    Relationship to patient: Emergency Contact    Best call back number: [phone]     Patient is needing: TRYING TO GET AN APPOINTMENT FOR MOTHER IN OFFICE FOR LEFT FOOT PAIN, NEXT AVAILABLE IN OFFICE IS 7/19/23 WITH ANY PROVIDER. WOULD LIKE TO KNOW IF MOM CAN BE WORKED IN AS SOON AS POSSIBLE.

## 2023-07-10 PROBLEM — N39.0 ACUTE UTI: Status: ACTIVE | Noted: 2023-07-10

## 2023-07-11 PROBLEM — S42.031A CLOSED DISPLACED FRACTURE OF ACROMIAL END OF RIGHT CLAVICLE: Status: ACTIVE | Noted: 2023-07-11

## 2023-07-28 ENCOUNTER — READMISSION MANAGEMENT (OUTPATIENT)
Dept: CALL CENTER | Facility: HOSPITAL | Age: 88
End: 2023-07-28
Payer: MEDICARE

## 2023-07-28 ENCOUNTER — TELEPHONE (OUTPATIENT)
Dept: FAMILY MEDICINE CLINIC | Facility: CLINIC | Age: 88
End: 2023-07-28
Payer: MEDICARE

## 2023-07-28 ENCOUNTER — TELEPHONE (OUTPATIENT)
Dept: FAMILY MEDICINE CLINIC | Facility: CLINIC | Age: 88
End: 2023-07-28

## 2023-07-28 DIAGNOSIS — S09.90XA CLOSED HEAD INJURY, INITIAL ENCOUNTER: ICD-10-CM

## 2023-07-28 RX ORDER — ALPRAZOLAM 0.5 MG/1
0.5 TABLET ORAL NIGHTLY PRN
Qty: 10 TABLET | Refills: 0 | Status: SHIPPED | OUTPATIENT
Start: 2023-07-28

## 2023-07-28 NOTE — TELEPHONE ENCOUNTER
Caller: [facility]    Relationship: Home Health    Best call back number: [phone]     What orders are you requesting (i.e. lab or imaging):VERBAL ORDERS FOR / NURSING / PT / OT    In what timeframe would the patient need to come in: ASAP    Where will you receive your lab/imaging services:     Additional notes: PATIENT WILL BE DISCHARGED FROM [facility] THEY ARE REQUESTING THAT PATIENT HAS HOME HEALTH WHEN SHE GOES HOME.

## 2023-07-28 NOTE — OUTREACH NOTE
Prep Survey      Flowsheet Row Responses   Alevism facility patient discharged from? Non-BH   Is LACE score < 7 ? Non-BH Discharge   Eligibility [facility] -   Date of Discharge 07/28/23   Discharge Disposition Home or Self Care   Discharge diagnosis Displaced fracture of lateral end of right clavicle   Does the patient have one of the following disease processes/diagnoses(primary or secondary)? Other   Prep survey completed? Yes            Comfort FELIX - Registered Nurse

## 2023-07-28 NOTE — TELEPHONE ENCOUNTER
Patient's daughter called to request a refill of her Alprazolam be sent to [facility] - [facility], [place] - [address] - [phone] [facility] [phone] FX

## 2023-07-28 NOTE — TELEPHONE ENCOUNTER
Patient's daughter called to request a hospital follow up for a fall that injured patient's clavicle. Can this be done over video visit or does it need to be done in- office? The appointment has been scheduled with Michelle on 8/8 at 3:00. Patient's daughter would like a call back with this information at [phone].

## 2023-07-31 ENCOUNTER — TRANSITIONAL CARE MANAGEMENT TELEPHONE ENCOUNTER (OUTPATIENT)
Dept: CALL CENTER | Facility: HOSPITAL | Age: 88
End: 2023-07-31
Payer: MEDICARE

## 2023-08-08 ENCOUNTER — OFFICE VISIT (OUTPATIENT)
Dept: FAMILY MEDICINE CLINIC | Facility: CLINIC | Age: 88
End: 2023-08-08
Payer: MEDICARE

## 2023-08-08 VITALS
TEMPERATURE: 97.1 F | OXYGEN SATURATION: 99 % | HEIGHT: 61 IN | DIASTOLIC BLOOD PRESSURE: 70 MMHG | BODY MASS INDEX: 19.79 KG/M2 | WEIGHT: 104.8 LBS | SYSTOLIC BLOOD PRESSURE: 140 MMHG | HEART RATE: 65 BPM

## 2023-08-08 DIAGNOSIS — N39.0 URINARY TRACT INFECTION WITHOUT HEMATURIA, SITE UNSPECIFIED: ICD-10-CM

## 2023-08-08 DIAGNOSIS — N30.20 CHRONIC CYSTITIS: ICD-10-CM

## 2023-08-08 DIAGNOSIS — S42.031D CLOSED DISPLACED FRACTURE OF ACROMIAL END OF RIGHT CLAVICLE WITH ROUTINE HEALING, SUBSEQUENT ENCOUNTER: ICD-10-CM

## 2023-08-08 DIAGNOSIS — Z09 HOSPITAL DISCHARGE FOLLOW-UP: Primary | ICD-10-CM

## 2023-08-08 DIAGNOSIS — S09.90XA CLOSED HEAD INJURY, INITIAL ENCOUNTER: ICD-10-CM

## 2023-08-08 LAB
BILIRUB BLD-MCNC: NEGATIVE MG/DL
CLARITY, POC: CLEAR
COLOR UR: YELLOW
EXPIRATION DATE: ABNORMAL
GLUCOSE UR STRIP-MCNC: NEGATIVE MG/DL
KETONES UR QL: NEGATIVE
LEUKOCYTE EST, POC: NEGATIVE
Lab: ABNORMAL
NITRITE UR-MCNC: NEGATIVE MG/ML
PH UR: 7 [PH] (ref 5–8)
PROT UR STRIP-MCNC: ABNORMAL MG/DL
RBC # UR STRIP: NEGATIVE /UL
SP GR UR: 1.01 (ref 1–1.03)
UROBILINOGEN UR QL: NORMAL

## 2023-08-08 NOTE — PROGRESS NOTES
Transitional Care Follow Up Visit  Subjective     Demetra MONET Victor Manuel is a 89 y.o. female who presents for a transitional care management visit.    Within 48 business hours after discharge our office contacted her via telephone to coordinate her care and needs.      I reviewed and discussed the details of that call along with the discharge summary, hospital problems, inpatient lab results, inpatient diagnostic studies, and consultation reports with Demetra.     Current outpatient and discharge medications have been reconciled for the patient.  Reviewed by: FELECIA Garcia          7/28/2023     5:59 PM   Date of TCM Phone Call   [facility] -   Date of Discharge 7/28/2023   Discharge Disposition Home or Self Care     Risk for Readmission (LACE)   No data recorded    History of Present Illness   Patient and daughter are here to follow up from hospital and SNF stay. She is still getting outpatient rehab for broken clavicle. She is home now. Feels like she is getting another UTI. She had another one in the hospital a was given IV antibiotics. She is concerned as she gets UTI's approximately every 2 months or so. There is some concern for taking torsemide daily as previously cardiology had let her take every other day due to frequent urination. She denies chronic ankle edema, shortness of breath.     Needs alprazolam refilled.     Chronic bilateral arm pain and swelling due to arthritis. She gets some swelling and severe pain. Using topical cream mostly, using tylenol and aleve sparingly.     Course During Hospital Stay:  89 y.o. female Patient is a 89-year-old female with a history of hypothyroidism, hypertension, paroxysmal A-fib, complete AV block due to AV lakhwinder ablation status post cardiac pacemaker, presented to the ED mechanical fall.  Patient was complaining of right shoulder pain, x-ray was obtained and patient was found to hav right lateral first clavicle fracture with minimal  "displacement.  Orthopedic surgery evaluated, recommended conservative management.  W weightbearing recommendations\" right Upper Extremity: Light use of the hand with less than a 1 pound lift and okay for her to lift heavier objects if her elbow is stabilized on a hard surface, avoiding lift through the shoulder\".  Patient will need to follow-up with orthopedic surgery  in 3 weeks.  In addition patient was diagnosed with UTI, pansensitive E. coli treated with IV ceftriaxone while inpatient x3 and no need for additional antibiotics postdischarge inpatient.  Vital signs stable as well as afebrile.  Patient tolerating p.o. interactive and conversational and medically stable for discharge today and CCP is coordinated additional discharge needs at SNF including transportation.     The following portions of the patient's history were reviewed and updated as appropriate: allergies, current medications, past family history, past medical history, past social history, past surgical history, and problem list.    Review of Systems    Objective   Physical Exam  Constitutional:       Appearance: Normal appearance.   HENT:      Head: Normocephalic.   Eyes:      Extraocular Movements: Extraocular movements intact.      Pupils: Pupils are equal, round, and reactive to light.   Cardiovascular:      Rate and Rhythm: Normal rate and regular rhythm.   Pulmonary:      Effort: Pulmonary effort is normal.      Breath sounds: Normal breath sounds.   Musculoskeletal:         General: Normal range of motion.   Skin:     General: Skin is warm and dry.   Neurological:      General: No focal deficit present.      Mental Status: She is alert and oriented to person, place, and time.   Psychiatric:         Mood and Affect: Mood normal.       Assessment & Plan   Problems Addressed this Visit          Musculoskeletal and Injuries    Closed displaced fracture of acromial end of right clavicle     Other Visit Diagnoses       Hospital discharge follow-up "    -  Primary    Urinary tract infection without hematuria, site unspecified        Relevant Orders    POCT urinalysis dipstick, automated (Completed)    Urine Culture - Urine, Urine, Clean Catch    Chronic cystitis        Relevant Orders    Ambulatory Referral to Urology          Diagnoses         Codes Comments    Hospital discharge follow-up    -  Primary ICD-10-CM: Z09  ICD-9-CM: V67.59     Urinary tract infection without hematuria, site unspecified     ICD-10-CM: N39.0  ICD-9-CM: 599.0     Chronic cystitis     ICD-10-CM: N30.20  ICD-9-CM: 595.2     Closed displaced fracture of acromial end of right clavicle with routine healing, subsequent encounter     ICD-10-CM: S42.031D  ICD-9-CM: V54.11

## 2023-08-09 RX ORDER — ALPRAZOLAM 0.5 MG/1
0.5 TABLET ORAL NIGHTLY PRN
Qty: 10 TABLET | Refills: 0 | OUTPATIENT
Start: 2023-08-09

## 2023-08-10 LAB
BACTERIA UR CULT: NORMAL
BACTERIA UR CULT: NORMAL

## 2023-08-11 DIAGNOSIS — S09.90XA CLOSED HEAD INJURY, INITIAL ENCOUNTER: ICD-10-CM

## 2023-08-14 RX ORDER — ALPRAZOLAM 0.5 MG/1
0.5 TABLET ORAL NIGHTLY PRN
Qty: 90 TABLET | Refills: 0 | Status: SHIPPED | OUTPATIENT
Start: 2023-08-14

## 2023-08-16 DIAGNOSIS — S09.90XA CLOSED HEAD INJURY, INITIAL ENCOUNTER: ICD-10-CM

## 2023-08-16 RX ORDER — ALPRAZOLAM 0.5 MG/1
0.5 TABLET ORAL NIGHTLY PRN
Qty: 90 TABLET | Refills: 0 | OUTPATIENT
Start: 2023-08-16

## 2023-08-16 NOTE — TELEPHONE ENCOUNTER
Caller: [facility] - [place], [place] - [address] [address] - [phone] [facility] [phone] FX    Relationship: Pharmacy    Best call back number: [phone]    Requested Prescriptions:   Requested Prescriptions     Pending Prescriptions Disp Refills    ALPRAZolam (XANAX) 0.5 MG tablet 90 tablet 0     Sig: Take 1 tablet by mouth At Night As Needed for Sleep.        Pharmacy where request should be sent: [facility] - [place], [place] - [address] - [phone] [facility] [phone] FX     Last office visit with prescribing clinician: 1/18/2023   Last telemedicine visit with prescribing clinician: 2/27/2023   Next office visit with prescribing clinician: 8/30/2023     Additional details provided by patient:     Does the patient have less than a 3 day supply:  [x] Yes  [] No    Would you like a call back once the refill request has been completed: [] Yes [x] No    If the office needs to give you a call back, can they leave a voicemail: [] Yes [x] No    Jaison Handley Rep   08/16/23 14:27 EDT

## 2023-08-30 ENCOUNTER — OFFICE VISIT (OUTPATIENT)
Dept: FAMILY MEDICINE CLINIC | Facility: CLINIC | Age: 88
End: 2023-08-30
Payer: MEDICARE

## 2023-08-30 VITALS
DIASTOLIC BLOOD PRESSURE: 68 MMHG | OXYGEN SATURATION: 95 % | SYSTOLIC BLOOD PRESSURE: 138 MMHG | HEIGHT: 61 IN | HEART RATE: 100 BPM | WEIGHT: 105.2 LBS | BODY MASS INDEX: 19.86 KG/M2

## 2023-08-30 DIAGNOSIS — I50.32 CHRONIC DIASTOLIC (CONGESTIVE) HEART FAILURE: ICD-10-CM

## 2023-08-30 DIAGNOSIS — N39.0 RECURRENT UTI: ICD-10-CM

## 2023-08-30 DIAGNOSIS — E03.9 ACQUIRED HYPOTHYROIDISM: ICD-10-CM

## 2023-08-30 DIAGNOSIS — I10 PRIMARY HYPERTENSION: ICD-10-CM

## 2023-08-30 DIAGNOSIS — R30.0 DYSURIA: ICD-10-CM

## 2023-08-30 DIAGNOSIS — I48.19 ATRIAL FIBRILLATION, PERSISTENT: ICD-10-CM

## 2023-08-30 DIAGNOSIS — I27.20 PULMONARY HYPERTENSION: ICD-10-CM

## 2023-08-30 DIAGNOSIS — F51.01 PRIMARY INSOMNIA: ICD-10-CM

## 2023-08-30 DIAGNOSIS — Z00.00 MEDICARE ANNUAL WELLNESS VISIT, SUBSEQUENT: Primary | ICD-10-CM

## 2023-08-30 PROBLEM — Z95.0 PRESENCE OF CARDIAC PACEMAKER: Status: ACTIVE | Noted: 2022-10-18

## 2023-08-30 PROBLEM — Z95.0 PRESENCE OF CARDIAC PACEMAKER: Status: RESOLVED | Noted: 2023-01-10 | Resolved: 2023-08-30

## 2023-08-30 LAB
BILIRUB BLD-MCNC: NEGATIVE MG/DL
CLARITY, POC: ABNORMAL
COLOR UR: YELLOW
EXPIRATION DATE: ABNORMAL
GLUCOSE UR STRIP-MCNC: NEGATIVE MG/DL
KETONES UR QL: NEGATIVE
LEUKOCYTE EST, POC: ABNORMAL
Lab: ABNORMAL
NITRITE UR-MCNC: NEGATIVE MG/ML
PH UR: 7 [PH] (ref 5–8)
PROT UR STRIP-MCNC: ABNORMAL MG/DL
RBC # UR STRIP: ABNORMAL /UL
SP GR UR: 1.01 (ref 1–1.03)
UROBILINOGEN UR QL: NORMAL

## 2023-08-30 RX ORDER — ESTRADIOL 0.1 MG/G
1 CREAM VAGINAL DAILY
Qty: 42.5 G | Refills: 12 | Status: SHIPPED | OUTPATIENT
Start: 2023-08-30

## 2023-08-30 RX ORDER — CIPROFLOXACIN 250 MG/1
250 TABLET, FILM COATED ORAL 2 TIMES DAILY
Qty: 10 TABLET | Refills: 0 | Status: SHIPPED | OUTPATIENT
Start: 2023-08-30

## 2023-08-30 NOTE — PROGRESS NOTES
The ABCs of the Annual Wellness Visit  Subsequent Medicare Wellness Visit    Subjective    Demetra Rush is a 89 y.o. female who presents for a Subsequent Medicare Wellness Visit.    The following portions of the patient's history were reviewed and   updated as appropriate: allergies, current medications, past family history, past medical history, past social history, past surgical history, and problem list.    Compared to one year ago, the patient feels her physical   health is better.    Compared to one year ago, the patient feels her mental   health is better.    Recent Hospitalizations:  This patient has had a [facility] admission record on file within the last 365 days.    Current Medical Providers:  Patient Care Team:  Melissa Bolivar MD as PCP - General (Family Medicine)  Ari Hall MD as Referring Physician (Orthopedic Surgery)    Outpatient Medications Prior to Visit   Medication Sig Dispense Refill    acetaminophen (TYLENOL) 325 MG tablet Take 2 tablets by mouth Every 4 (Four) Hours As Needed for Mild Pain.      ALPRAZolam (XANAX) 0.5 MG tablet Take 1 tablet by mouth At Night As Needed for Sleep. 90 tablet 0    Eliquis 2.5 MG tablet tablet TAKE ONE TABLET BY MOUTH TWICE DAILY 180 tablet 3    gabapentin (NEURONTIN) 300 MG capsule Take 1 capsule by mouth Every Night. 3 capsule 0    ipratropium (ATROVENT) 0.06 % nasal spray 1 spray into the nostril(s) as directed by provider Daily As Needed.      levothyroxine (SYNTHROID, LEVOTHROID) 75 MCG tablet TAKE ONE TABLET BY MOUTH DAILY 90 tablet 1    pantoprazole (PROTONIX) 40 MG EC tablet Take 1 tablet by mouth twice a day 180 tablet 3    potassium chloride (K-DUR,KLOR-CON) 10 MEQ CR tablet Take 2 tablets by mouth Daily. 180 tablet 1    torsemide (DEMADEX) 20 MG tablet TAKE ONE TABLET BY MOUTH DAILY (Patient taking differently: Take 1 tablet by mouth Every Other Day.) 180 tablet 0     No facility-administered medications prior to visit.       No  "opioid medication identified on active medication list. I have reviewed chart for other potential  high risk medication/s and harmful drug interactions in the elderly.        Aspirin is not on active medication list.  Aspirin use is contraindicated for this patient due to: current use of Eliquis.  .    Patient Active Problem List   Diagnosis    Osteoporosis    Acute diastolic CHF (congestive heart failure)    Pulmonary hypertension    Mitral regurgitation    Primary hypertension    Atrial fibrillation, persistent    Chronic diastolic (congestive) heart failure    Primary insomnia    Acquired hypothyroidism    Complete AV block due to AV lakhwinder ablation    Tingling in extremities    Acute UTI    Closed displaced fracture of acromial end of right clavicle    Medicare annual wellness visit, subsequent    Presence of cardiac pacemaker     Advance Care Planning   Advance Care Planning     Advance Directive is on file.  ACP discussion was held with the patient during this visit. Patient has an advance directive in EMR which is still valid.      Objective    Vitals:    08/30/23 1139   BP: 138/68   BP Location: Left arm   Patient Position: Sitting   Cuff Size: Adult   Pulse: 100   SpO2: 95%   Weight: 47.7 kg (105 lb 3.2 oz)   Height: 154.9 cm (61\")     Estimated body mass index is 19.88 kg/mý as calculated from the following:    Height as of this encounter: 154.9 cm (61\").    Weight as of this encounter: 47.7 kg (105 lb 3.2 oz).    BMI is within normal parameters. No other follow-up for BMI required.      Does the patient have evidence of cognitive impairment? Yes          HEALTH RISK ASSESSMENT    Smoking Status:  Social History     Tobacco Use   Smoking Status Never   Smokeless Tobacco Never     Alcohol Consumption:  Social History     Substance and Sexual Activity   Alcohol Use Yes    Alcohol/week: 1.0 standard drink    Types: 1 Glasses of wine per week    Comment: glass maybe once a month     Fall Risk Screen:    YISSEL " Fall Risk Assessment was completed, and patient is at HIGH risk for falls. Assessment completed on:2023    Depression Screenin/30/2023    11:40 AM   PHQ-2/PHQ-9 Depression Screening   Little Interest or Pleasure in Doing Things 0-->not at all   Feeling Down, Depressed or Hopeless 0-->not at all   PHQ-9: Brief Depression Severity Measure Score 0       Health Habits and Functional and Cognitive Screenin/30/2023    11:00 AM   Functional & Cognitive Status   Do you have difficulty preparing food and eating? No   Do you have difficulty bathing yourself, getting dressed or grooming yourself? No   Do you have difficulty using the toilet? No   Do you have difficulty moving around from place to place? No   Do you have trouble with steps or getting out of a bed or a chair? No   Current Diet Well Balanced Diet   Dental Exam Up to date   Eye Exam Up to date   Exercise (times per week) 0 times per week   Current Exercises Include No Regular Exercise   Do you need help using the phone?  No   Are you deaf or do you have serious difficulty hearing?  Yes   Do you need help to go to places out of walking distance? Yes   Do you need help shopping? No   Do you need help preparing meals?  No   Do you need help with housework?  No   Do you need help with laundry? No   Do you need help taking your medications? Yes   Do you need help managing money? No   Do you ever drive or ride in a car without wearing a seat belt? No   Have you felt unusual stress, anger or loneliness in the last month? No   Who do you live with? Child   If you need help, do you have trouble finding someone available to you? No   Have you been bothered in the last four weeks by sexual problems? No   Do you have difficulty concentrating, remembering or making decisions? No       Age-appropriate Screening Schedule:  Refer to the list below for future screening recommendations based on patient's age, sex and/or medical conditions. Orders for these  recommended tests are listed in the plan section. The patient has been provided with a written plan.    Health Maintenance   Topic Date Due    COVID-19 Vaccine (7 - Pfizer series) 02/28/2023    ANNUAL WELLNESS VISIT  03/14/2023    INFLUENZA VACCINE  10/01/2023    LIPID PANEL  10/31/2023    DXA SCAN  07/16/2024    TDAP/TD VACCINES (2 - Td or Tdap) 04/02/2025    Pneumococcal Vaccine 65+  Completed    ZOSTER VACCINE  Completed                  CMS Preventative Services Quick Reference  Risk Factors Identified During Encounter  None Identified  The above risks/problems have been discussed with the patient.  Pertinent information has been shared with the patient in the After Visit Summary.  An After Visit Summary and PPPS were made available to the patient.    Follow Up:   Next Medicare Wellness visit to be scheduled in 1 year.       Additional E&M Note during same encounter follows:  Patient has multiple medical problems which are significant and separately identifiable that require additional work above and beyond the Medicare Wellness Visit.      Chief Complaint  Annual Exam and Urinary Tract Infection (Discuss ongoing symptoms /)    Subjective        HPI  Demetra Rush is also being seen today for     htn- doing well on meds     Hypothyroidism- due labs, taking meds daily and due recheck labs. Takes daily now.      Chf/afib/pulm htn- following with cardiology, does have some fatigue and has to take breaks when doing chores and sit down. Is on a blood thinner and rate controlled.  Has a pacemaker and has had an ablation.      Insomnia- has had for years and is stable on xanax. Has tried to come off this and can't. Understands risk and benefits. It is a quality of life issue for her. Only sleeps 3 hours at night without this.      Having some neuropathy symptoms. For years. Tingling. Has had nerve testing a year ago per pt. Has idiopathic neuropathy. Has tried gabapentin and amitriptyline and could not tolerate 2/2  "nausea. Has now gotten back on gabapentin and this helps.     Pt has concerns for UTI. Having dysuria. Azo helps some. For one week. Has recurrent uti's and used to be on estrace for prevention and wanting to get back on this.     Review of Systems   Respiratory:  Negative for shortness of breath.    Cardiovascular:  Negative for chest pain.     Objective   Vital Signs:  /68 (BP Location: Left arm, Patient Position: Sitting, Cuff Size: Adult)   Pulse 100   Ht 154.9 cm (61\")   Wt 47.7 kg (105 lb 3.2 oz)   SpO2 95%   BMI 19.88 kg/mý     Physical Exam  Constitutional:       Appearance: Normal appearance. She is well-developed.   Cardiovascular:      Rate and Rhythm: Normal rate and regular rhythm.      Heart sounds: Normal heart sounds.   Pulmonary:      Effort: Pulmonary effort is normal.      Breath sounds: Normal breath sounds.   Musculoskeletal:         General: No swelling. Normal range of motion.   Skin:     General: Skin is warm and dry.      Findings: No rash.   Neurological:      General: No focal deficit present.      Mental Status: She is alert and oriented to person, place, and time.   Psychiatric:         Mood and Affect: Mood normal.         Behavior: Behavior normal.                       Assessment and Plan   Diagnoses and all orders for this visit:    1. Medicare annual wellness visit, subsequent (Primary)    2. Primary hypertension  -     Comprehensive Metabolic Panel  -     Lipid Panel    3. Atrial fibrillation, persistent    4. Chronic diastolic (congestive) heart failure    5. Acquired hypothyroidism  -     TSH Rfx On Abnormal To Free T4    6. Pulmonary hypertension    7. Primary insomnia    8. Dysuria  -     Urine Culture - Urine, Urine, Clean Catch  -     POC Urinalysis Dipstick, Automated             Follow Up   Return in about 3 months (around 11/30/2023) for Recheck.  Patient was given instructions and counseling regarding her condition or for health maintenance advice. Please see " specific information pulled into the AVS if appropriate.     High risk for falls. Is in PT for this right now. Discussed risk factors and f/u in 6 months. Cont meds.

## 2023-09-04 LAB
ALBUMIN SERPL-MCNC: 4.3 G/DL (ref 3.5–5.2)
ALBUMIN/GLOB SERPL: 1.4 G/DL
ALP SERPL-CCNC: 88 U/L (ref 39–117)
ALT SERPL-CCNC: 7 U/L (ref 1–33)
AST SERPL-CCNC: 17 U/L (ref 1–32)
BACTERIA UR CULT: ABNORMAL
BACTERIA UR CULT: ABNORMAL
BILIRUB SERPL-MCNC: 0.4 MG/DL (ref 0–1.2)
BUN SERPL-MCNC: 22 MG/DL (ref 8–23)
BUN/CREAT SERPL: 25 (ref 7–25)
CALCIUM SERPL-MCNC: 9.4 MG/DL (ref 8.2–9.6)
CHLORIDE SERPL-SCNC: 100 MMOL/L (ref 98–107)
CHOLEST SERPL-MCNC: 219 MG/DL (ref 0–200)
CO2 SERPL-SCNC: 24.8 MMOL/L (ref 22–29)
CREAT SERPL-MCNC: 0.88 MG/DL (ref 0.57–1)
EGFRCR SERPLBLD CKD-EPI 2021: 62.5 ML/MIN/1.73
GLOBULIN SER CALC-MCNC: 3.1 GM/DL
GLUCOSE SERPL-MCNC: 78 MG/DL (ref 65–99)
HDLC SERPL-MCNC: 83 MG/DL (ref 40–60)
LDLC SERPL CALC-MCNC: 124 MG/DL (ref 0–100)
OTHER ANTIBIOTIC SUSC ISLT: ABNORMAL
POTASSIUM SERPL-SCNC: 4.9 MMOL/L (ref 3.5–5.2)
PROT SERPL-MCNC: 7.4 G/DL (ref 6–8.5)
SODIUM SERPL-SCNC: 137 MMOL/L (ref 136–145)
TRIGL SERPL-MCNC: 69 MG/DL (ref 0–150)
TSH SERPL DL<=0.005 MIU/L-ACNC: 1.94 UIU/ML (ref 0.27–4.2)
VLDLC SERPL CALC-MCNC: 12 MG/DL (ref 5–40)

## 2023-09-06 DIAGNOSIS — R30.0 DYSURIA: ICD-10-CM

## 2023-09-06 RX ORDER — CIPROFLOXACIN 250 MG/1
250 TABLET, FILM COATED ORAL 2 TIMES DAILY
Qty: 10 TABLET | Refills: 0 | Status: SHIPPED | OUTPATIENT
Start: 2023-09-06

## 2023-10-04 DIAGNOSIS — H91.93 DECREASED HEARING OF BOTH EARS: Primary | ICD-10-CM

## 2023-10-04 DIAGNOSIS — H90.3 SENSORINEURAL HEARING LOSS (SNHL) OF BOTH EARS: ICD-10-CM

## 2023-10-23 DIAGNOSIS — S09.90XA CLOSED HEAD INJURY, INITIAL ENCOUNTER: ICD-10-CM

## 2023-10-25 RX ORDER — TORSEMIDE 20 MG/1
TABLET ORAL
Qty: 30 TABLET | Refills: 3 | Status: SHIPPED | OUTPATIENT
Start: 2023-10-25

## 2023-10-25 RX ORDER — ALPRAZOLAM 0.5 MG/1
0.5 TABLET ORAL NIGHTLY PRN
Qty: 90 TABLET | Refills: 0 | Status: SHIPPED | OUTPATIENT
Start: 2023-10-25

## 2023-11-07 ENCOUNTER — PATIENT MESSAGE (OUTPATIENT)
Dept: FAMILY MEDICINE CLINIC | Facility: CLINIC | Age: 88
End: 2023-11-07
Payer: MEDICARE

## 2023-11-07 NOTE — TELEPHONE ENCOUNTER
From: Demetra Rush  To: April Osmel  Sent: 11/7/2023 1:08 PM EST  Subject: Restasis    Hello,   If possible, can you please provide a prescription for Mom to get a refill on her Restasis 0.05%?

## 2023-11-08 DIAGNOSIS — H04.129 DRY EYE: Primary | ICD-10-CM

## 2023-11-08 RX ORDER — CYCLOSPORINE 0.5 MG/ML
1 EMULSION OPHTHALMIC 2 TIMES DAILY
Qty: 1 EACH | Refills: 11 | Status: SHIPPED | OUTPATIENT
Start: 2023-11-08

## 2023-11-17 RX ORDER — LEVOTHYROXINE SODIUM 0.07 MG/1
TABLET ORAL
Qty: 90 TABLET | Refills: 1 | Status: SHIPPED | OUTPATIENT
Start: 2023-11-17

## 2023-12-20 DIAGNOSIS — S09.90XA CLOSED HEAD INJURY, INITIAL ENCOUNTER: ICD-10-CM

## 2023-12-24 RX ORDER — ALPRAZOLAM 0.5 MG/1
0.5 TABLET ORAL NIGHTLY PRN
Qty: 90 TABLET | Refills: 0 | Status: SHIPPED | OUTPATIENT
Start: 2023-12-24

## 2023-12-26 RX ORDER — POTASSIUM CHLORIDE 750 MG/1
20 TABLET, EXTENDED RELEASE ORAL DAILY
Qty: 180 TABLET | Refills: 1 | Status: SHIPPED | OUTPATIENT
Start: 2023-12-26

## 2023-12-26 NOTE — TELEPHONE ENCOUNTER
Rx Refill Note  Requested Prescriptions     Pending Prescriptions Disp Refills    potassium chloride (K-DUR,KLOR-CON) 10 MEQ CR tablet 180 tablet 1     Sig: Take 2 tablets by mouth Daily.      Last office visit with prescribing clinician: Visit date not found   Last telemedicine visit with prescribing clinician: Visit date not found   Next office visit with prescribing clinician: Visit date not found                         Would you like a call back once the refill request has been completed: [] Yes [] No    If the office needs to give you a call back, can they leave a voicemail: [] Yes [] No    Latisha Cortés CMA  12/26/23, 14:56 EST

## 2024-01-09 ENCOUNTER — OFFICE VISIT (OUTPATIENT)
Dept: FAMILY MEDICINE CLINIC | Facility: CLINIC | Age: 89
End: 2024-01-09
Payer: MEDICARE

## 2024-01-09 VITALS
HEIGHT: 61 IN | BODY MASS INDEX: 19.71 KG/M2 | HEART RATE: 80 BPM | DIASTOLIC BLOOD PRESSURE: 70 MMHG | WEIGHT: 104.4 LBS | SYSTOLIC BLOOD PRESSURE: 145 MMHG | TEMPERATURE: 97.1 F | OXYGEN SATURATION: 93 %

## 2024-01-09 DIAGNOSIS — Z23 ENCOUNTER FOR VACCINATION: ICD-10-CM

## 2024-01-09 DIAGNOSIS — R30.0 DYSURIA: ICD-10-CM

## 2024-01-09 DIAGNOSIS — R19.7 DIARRHEA, UNSPECIFIED TYPE: Primary | ICD-10-CM

## 2024-01-09 LAB
BILIRUB BLD-MCNC: NEGATIVE MG/DL
CLARITY, POC: ABNORMAL
COLOR UR: YELLOW
EXPIRATION DATE: ABNORMAL
GLUCOSE UR STRIP-MCNC: NEGATIVE MG/DL
KETONES UR QL: NEGATIVE
LEUKOCYTE EST, POC: ABNORMAL
Lab: ABNORMAL
NITRITE UR-MCNC: POSITIVE MG/ML
PH UR: 6 [PH] (ref 5–8)
PROT UR STRIP-MCNC: ABNORMAL MG/DL
RBC # UR STRIP: ABNORMAL /UL
SP GR UR: 1.01 (ref 1–1.03)
UROBILINOGEN UR QL: NORMAL

## 2024-01-09 PROCEDURE — G0008 ADMIN INFLUENZA VIRUS VAC: HCPCS | Performed by: NURSE PRACTITIONER

## 2024-01-09 PROCEDURE — 99214 OFFICE O/P EST MOD 30 MIN: CPT | Performed by: NURSE PRACTITIONER

## 2024-01-09 PROCEDURE — 1159F MED LIST DOCD IN RCRD: CPT | Performed by: NURSE PRACTITIONER

## 2024-01-09 PROCEDURE — 1160F RVW MEDS BY RX/DR IN RCRD: CPT | Performed by: NURSE PRACTITIONER

## 2024-01-09 PROCEDURE — 90662 IIV NO PRSV INCREASED AG IM: CPT | Performed by: NURSE PRACTITIONER

## 2024-01-09 PROCEDURE — 81003 URINALYSIS AUTO W/O SCOPE: CPT | Performed by: NURSE PRACTITIONER

## 2024-01-09 RX ORDER — DIPHENOXYLATE HYDROCHLORIDE AND ATROPINE SULFATE 2.5; .025 MG/1; MG/1
1 TABLET ORAL 4 TIMES DAILY PRN
Qty: 20 TABLET | Refills: 0 | Status: SHIPPED | OUTPATIENT
Start: 2024-01-09

## 2024-01-09 NOTE — PROGRESS NOTES
"Chief Complaint  Diarrhea (Multiple times daily for past few months - uncontrollable) and uncontollable urine leakage with strong smell, dark color    Subjective        Demetra Rush presents to [facility] PRIMARY [facility]ARE  History of Present Illness  Patient is here to discuss ongoing loose uncontrollable stools for approximately 4 months. She states she stools at least 7-8 times daily. When this first occurred she did try a few otc meds with no improvement. She had a similar issue about 4 years ago and she was put on budesonide TID with some relief. She was later removed from it and the diarrhea eventually came back. She has had stool tested multiple times for bacteria. She has tried eating differently to see if it will help. She has not tried to eliminate medications as a source of the diarrhea.     She states her urine smells and she gets frequent UTI. She does not feel a burn like usual. She did have symptoms very short lived last week. Takes azo at times. She just finished an antibiotic about two week ago. She is not sure who sent it or where she got it. She thinks maybe from urology.   Objective   Vital Signs:  /70 (BP Location: Right arm, Patient Position: Sitting, Cuff Size: Adult)   Pulse 80   Temp 97.1 °F (36.2 °C) (Temporal)   Ht 154.9 cm (61\")   Wt 47.4 kg (104 lb 6.4 oz)   SpO2 93%   BMI 19.73 kg/m²   Estimated body mass index is 19.73 kg/m² as calculated from the following:    Height as of this encounter: 154.9 cm (61\").    Weight as of this encounter: 47.4 kg (104 lb 6.4 oz).       BMI is within normal parameters. No other follow-up for BMI required.      Physical Exam  Constitutional:       Appearance: Normal appearance.   HENT:      Head: Normocephalic.   Eyes:      Extraocular Movements: Extraocular movements intact.      Pupils: Pupils are equal, round, and reactive to light.   Cardiovascular:      Rate and Rhythm: Normal rate and regular rhythm.   Pulmonary:      " Effort: Pulmonary effort is normal.      Breath sounds: Normal breath sounds.   Musculoskeletal:         General: Normal range of motion.      Cervical back: Normal range of motion.   Skin:     General: Skin is warm and dry.   Neurological:      General: No focal deficit present.      Mental Status: She is alert and oriented to person, place, and time.   Psychiatric:         Mood and Affect: Mood normal.        Result Review :                   Assessment and Plan   Diagnoses and all orders for this visit:    1. Diarrhea, unspecified type (Primary)  -     diphenoxylate-atropine (Lomotil) 2.5-0.025 MG per tablet; Take 1 tablet by mouth 4 (Four) Times a Day As Needed for Diarrhea.  Dispense: 20 tablet; Refill: 0  -     Ambulatory Referral to Gastroenterology    2. Dysuria  -     Cancel: POCT urinalysis dipstick, automated  -     POCT urinalysis dipstick, automated  -     Urine Culture - Urine, Urine, Clean Catch; Future    3. Encounter for vaccination  -     Fluzone High-Dose 65+yrs ([id number])             Follow Up   Return if symptoms worsen or fail to improve.  Patient was given instructions and counseling regarding her condition or for health maintenance advice. Please see specific information pulled into the AVS if appropriate.

## 2024-01-12 LAB
BACTERIA UR CULT: NORMAL
BACTERIA UR CULT: NORMAL

## 2024-01-17 DIAGNOSIS — S09.90XA CLOSED HEAD INJURY, INITIAL ENCOUNTER: ICD-10-CM

## 2024-01-17 RX ORDER — GABAPENTIN 300 MG/1
CAPSULE ORAL
Qty: 270 CAPSULE | Refills: 1 | Status: SHIPPED | OUTPATIENT
Start: 2024-01-17

## 2024-01-17 NOTE — TELEPHONE ENCOUNTER
LOV 1/9/24  NOV None  Last fill 7/13/23    Does not meet protocol  Please advise      [facility]

## 2024-01-23 ENCOUNTER — CLINICAL SUPPORT NO REQUIREMENTS (OUTPATIENT)
Age: 89
End: 2024-01-23
Payer: MEDICARE

## 2024-01-23 ENCOUNTER — OFFICE VISIT (OUTPATIENT)
Age: 89
End: 2024-01-23
Payer: MEDICARE

## 2024-01-23 VITALS
DIASTOLIC BLOOD PRESSURE: 78 MMHG | WEIGHT: 105 LBS | HEART RATE: 81 BPM | BODY MASS INDEX: 19.83 KG/M2 | HEIGHT: 61 IN | SYSTOLIC BLOOD PRESSURE: 132 MMHG

## 2024-01-23 DIAGNOSIS — I10 PRIMARY HYPERTENSION: ICD-10-CM

## 2024-01-23 DIAGNOSIS — I50.32 CHRONIC DIASTOLIC (CONGESTIVE) HEART FAILURE: ICD-10-CM

## 2024-01-23 DIAGNOSIS — L81.9 DISCOLORATION OF SKIN OF LOWER LEG: ICD-10-CM

## 2024-01-23 DIAGNOSIS — R60.0 LOWER EXTREMITY EDEMA: ICD-10-CM

## 2024-01-23 DIAGNOSIS — I44.2 COMPLETE AV BLOCK DUE TO AV NODAL ABLATION: ICD-10-CM

## 2024-01-23 DIAGNOSIS — I97.190 COMPLETE AV BLOCK DUE TO AV NODAL ABLATION: Primary | ICD-10-CM

## 2024-01-23 DIAGNOSIS — I48.19 ATRIAL FIBRILLATION, PERSISTENT: Primary | ICD-10-CM

## 2024-01-23 DIAGNOSIS — Z95.0 PRESENCE OF CARDIAC PACEMAKER: ICD-10-CM

## 2024-01-23 DIAGNOSIS — I44.2 COMPLETE AV BLOCK DUE TO AV NODAL ABLATION: Primary | ICD-10-CM

## 2024-01-23 DIAGNOSIS — I97.190 COMPLETE AV BLOCK DUE TO AV NODAL ABLATION: ICD-10-CM

## 2024-01-23 PROCEDURE — 1160F RVW MEDS BY RX/DR IN RCRD: CPT | Performed by: NURSE PRACTITIONER

## 2024-01-23 PROCEDURE — 1159F MED LIST DOCD IN RCRD: CPT | Performed by: NURSE PRACTITIONER

## 2024-01-23 PROCEDURE — 99213 OFFICE O/P EST LOW 20 MIN: CPT | Performed by: NURSE PRACTITIONER

## 2024-01-23 PROCEDURE — 93000 ELECTROCARDIOGRAM COMPLETE: CPT | Performed by: NURSE PRACTITIONER

## 2024-01-23 PROCEDURE — 93279 PRGRMG DEV EVAL PM/LDLS PM: CPT | Performed by: INTERNAL MEDICINE

## 2024-01-23 NOTE — PROGRESS NOTES
Date of Office Visit: 2024  Encounter Provider: FELECIA Stone  Place of Service: [facility] CARDIOLOGY  Patient Name: Demetra Rush  :1933    Chief Complaint   Patient presents with    persistent AFIB    complete AV block due to AV node ablation    Pacemaker Check   :     HPI: Demetra Rush is a 89 y.o. female who follows with Dr. Ji and Dr. Gaston.     2022 Acute hypoxic resp failure secondary to asp. Pneumonia, had AF w/RVR at that time---made multiple attempts at controlling AF w/meds, unsuccessful, s/p PPM and AV node ablation in late .     Last seen by Dr. Ji in 2023 and Dr. Gaston 2023.    Presents for follow up and device check.     From cardiac standpoint she is doing well. She has no complaints of chest pain, dyspnea, PND or orthopnea.    She has persistent discoloration of bilateral lower extremities/feet---she also has pain/neuropathy for which she is on gabapentin and she says it has helped a lot. She has intermittent LE edema predominantly if her legs are dependent for periods of time so she tried to keep them elevated when possible.     Device check shows normal testing and function. 100% V paced. No arrhythmias.     Apixaban for OAC--tolerating well.      Past Medical History:   Diagnosis Date    Atrial fibrillation     Chronic diastolic (congestive) heart failure     GERD (gastroesophageal reflux disease)     Hyperlipidemia     Hypertension     Hypothyroidism     Mitral regurgitation     Neuropathy of both feet     Osteoporosis 2022    Pulmonary hypertension     Stroke     UTI (urinary tract infection)        Past Surgical History:   Procedure Laterality Date    ADENOIDECTOMY      BLADDER SURGERY      CARDIAC ELECTROPHYSIOLOGY PROCEDURE N/A 10/20/2022    Procedure: Pacemaker SC new--Medtronic;  Surgeon: Albino Gaston MD;  Location: [facility] INVASIVE LOC[facility];  Service: Cardiology;  Laterality: N/A;    CARDIAC  ELECTROPHYSIOLOGY PROCEDURE N/A 11/3/2022    Procedure: AV node ablation---has Medtronic pacemaker;  Surgeon: Albino Gaston MD;  Location: [facility] INVASIVE LOCATION;  Service: Cardiovascular;  Laterality: N/A;    CHOLECYSTECTOMY      HYSTERECTOMY         Social History     Socioeconomic History    Marital status:    Tobacco Use    Smoking status: Never    Smokeless tobacco: Never   Vaping Use    Vaping Use: Never used   Substance and Sexual Activity    Alcohol use: Yes     Alcohol/week: 1.0 standard drink of alcohol     Types: 1 Glasses of wine per week     Comment: glass maybe once a month    Drug use: Never    Sexual activity: Defer       Family History   Problem Relation Age of Onset    Heart disease Mother     Other Mother         fluid in lungs    Heart disease Father     Heart attack Father     Cancer Sister     Tongue cancer Sister        Review of Systems   Constitutional: Negative for chills, fever and malaise/fatigue.   Cardiovascular:  Negative for chest pain, dyspnea on exertion, leg swelling, near-syncope, orthopnea, palpitations, paroxysmal nocturnal dyspnea and syncope.   Respiratory:  Negative for cough and shortness of breath.    Hematologic/Lymphatic: Negative.    Musculoskeletal:  Negative for joint pain, joint swelling and myalgias.   Gastrointestinal:  Negative for abdominal pain, diarrhea, melena, nausea and vomiting.   Genitourinary:  Negative for frequency and hematuria.   Neurological:  Negative for light-headedness, numbness, paresthesias and seizures.   Allergic/Immunologic: Negative.    All other systems reviewed and are negative.      Allergies   Allergen Reactions    Amlodipine Swelling     In feet and legs.    Codeine Itching    Sulfa Antibiotics Hives         Current Outpatient Medications:     acetaminophen (TYLENOL) 325 MG tablet, Take 2 tablets by mouth Every 4 (Four) Hours As Needed for Mild Pain., Disp: , Rfl:     ALPRAZolam (XANAX) 0.5 MG tablet, TAKE ONE TABLET BY  "MOUTH AT BEDTIME AS NEEDED, Disp: 90 tablet, Rfl: 0    cycloSPORINE (Restasis) 0.05 % ophthalmic emulsion, Administer 1 drop to both eyes 2 (Two) Times a Day., Disp: 1 each, Rfl: 11    diphenoxylate-atropine (Lomotil) 2.5-0.025 MG per tablet, Take 1 tablet by mouth 4 (Four) Times a Day As Needed for Diarrhea., Disp: 20 tablet, Rfl: 0    Eliquis 2.5 MG tablet tablet, TAKE ONE TABLET BY MOUTH TWICE DAILY, Disp: 180 tablet, Rfl: 3    estradiol (ESTRACE VAGINAL) 0.1 MG/GM vaginal cream, Insert 1 g into the vagina Daily. Until symptoms are better then 3 times a week., Disp: 42.5 g, Rfl: 12    gabapentin (NEURONTIN) 300 MG capsule, TAKE ONE CAPSULE BY MOUTH THREE TIMES DAILY (MORNING, NOON, AND BEDTIME), Disp: 270 capsule, Rfl: 1    ipratropium (ATROVENT) 0.06 % nasal spray, 1 spray into the nostril(s) as directed by provider Daily As Needed., Disp: , Rfl:     levothyroxine (SYNTHROID, LEVOTHROID) 75 MCG tablet, TAKE ONE TABLET BY MOUTH DAILY, Disp: 90 tablet, Rfl: 1    pantoprazole (PROTONIX) 40 MG EC tablet, Take 1 tablet by mouth twice a day, Disp: 180 tablet, Rfl: 3    potassium chloride (K-DUR,KLOR-CON) 10 MEQ CR tablet, Take 2 tablets by mouth Daily., Disp: 180 tablet, Rfl: 1    torsemide (DEMADEX) 20 MG tablet, TAKE ONE TABLET BY MOUTH DAILY AS NEEDED IF WEIGHT GAIN IS > 2LBS IN ONE DAY OR 5LBS IN ONE WEEK., Disp: 30 tablet, Rfl: 3      Objective:     Vitals:    01/23/24 0831   Weight: 47.6 kg (105 lb)   Height: 154.9 cm (61\")     Body mass index is 19.84 kg/m².    PHYSICAL EXAM:    Vitals Reviewed.   General Appearance: No acute distress, well developed and well nourished.   Eyes: Conjunctiva and lids: No erythema, swelling, or discharge. Sclera non-icteric.   HENT: Atraumatic, normocephalic. External eyes, ears, and nose normal.   Respiratory: No signs of respiratory distress. Respiration rhythm and depth normal.   Clear to auscultation. No rales, crackles, rhonchi, or wheezing auscultated. " "  Cardiovascular:  Heart Rate and Rhythm: Normal, Heart Sounds: S1 and S2.   Murmurs: No murmurs noted. No rubs, thrills, or gallops.   Arterial Pulses:  Posterior tibialis and dorsalis pedis pulses normal.   Lower Extremities: No edema noted. Noted purplish/blue discoloration of bilateral lower extremities---lower shin area and feet.   Gastrointestinal:  Abdomen soft, non-distended, non-tender.   Musculoskeletal: Normal movement of extremities  Skin: Warm and dry.   Psychiatric: Patient alert and oriented to person, place, and time. Speech and behavior appropriate. Normal mood and affect.       ECG 12 Lead    Date/Time: 1/23/2024 8:37 AM  Performed by: Mary Beth Teran APRN    Authorized by: Mary Beth Teran APRN  Comparison: compared with previous ECG   Similar to previous ECG  Rhythm: atrial fibrillation and paced  BPM: 81  Pacing: ventricular paced rhythm and 100% capture          Assessment:       Diagnosis Plan   1. Atrial fibrillation, persistent        2. 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 Presence of cardiac pacemaker        4. Chronic diastolic (congestive) heart failure        5. Primary hypertension               Plan:       1.-3. Persistent AF--unable to control HR w/meds, s/p PPM and AV node ablation. Normal testing and function. Remains on low dose apixaban due to age/weight.     4. Chronic diastolic HF---euvolemic by exam.     5. HTN, controlled.     6. Lower extremity discoloration bilateral---she did not remember until I reminded her and reviewed records---she has had this for years, she was seen by Dr. Negrete, vascular during her hospitalization in 11/2022----she has normal WILIAN's and good peripheral pulses----per Dr. Negrete's note:    \"I have advised her that the telangiectasias are causing the discoloration and are cosmetic.  These are not a limb threatening problem.  These are not contributing to her foot pain.  I do not recommend any intervention for these-her pain is " "consistent with a peripheral neuropathy.\"     Follow up with Dr. Gaston in 1 year w/device check and Dr. Ji in 6 months.     As always, it has been a pleasure to participate in your patient's care.      Sincerely,         FELECIA Ball  "

## 2024-02-07 ENCOUNTER — TELEPHONE (OUTPATIENT)
Dept: FAMILY MEDICINE CLINIC | Facility: CLINIC | Age: 89
End: 2024-02-07

## 2024-02-07 ENCOUNTER — OFFICE VISIT (OUTPATIENT)
Dept: FAMILY MEDICINE CLINIC | Facility: CLINIC | Age: 89
End: 2024-02-07
Payer: MEDICARE

## 2024-02-07 VITALS
DIASTOLIC BLOOD PRESSURE: 70 MMHG | BODY MASS INDEX: 19.63 KG/M2 | SYSTOLIC BLOOD PRESSURE: 110 MMHG | WEIGHT: 104 LBS | RESPIRATION RATE: 16 BRPM | OXYGEN SATURATION: 98 % | HEIGHT: 61 IN | HEART RATE: 111 BPM

## 2024-02-07 DIAGNOSIS — R05.1 ACUTE COUGH: ICD-10-CM

## 2024-02-07 DIAGNOSIS — R30.0 DYSURIA: ICD-10-CM

## 2024-02-07 DIAGNOSIS — R06.2 WHEEZING: ICD-10-CM

## 2024-02-07 DIAGNOSIS — R11.0 NAUSEA: ICD-10-CM

## 2024-02-07 DIAGNOSIS — J06.9 UPPER RESPIRATORY TRACT INFECTION, UNSPECIFIED TYPE: ICD-10-CM

## 2024-02-07 DIAGNOSIS — R05.1 ACUTE COUGH: Primary | ICD-10-CM

## 2024-02-07 DIAGNOSIS — R19.7 DIARRHEA, UNSPECIFIED TYPE: ICD-10-CM

## 2024-02-07 DIAGNOSIS — N30.01 ACUTE CYSTITIS WITH HEMATURIA: Primary | ICD-10-CM

## 2024-02-07 LAB
BILIRUB BLD-MCNC: NEGATIVE MG/DL
CLARITY, POC: ABNORMAL
COLOR UR: YELLOW
EXPIRATION DATE: ABNORMAL
EXPIRATION DATE: NORMAL
FLUAV AG UPPER RESP QL IA.RAPID: NOT DETECTED
FLUBV AG UPPER RESP QL IA.RAPID: NOT DETECTED
GLUCOSE UR STRIP-MCNC: NEGATIVE MG/DL
INTERNAL CONTROL: NORMAL
KETONES UR QL: NEGATIVE
LEUKOCYTE EST, POC: ABNORMAL
Lab: ABNORMAL
Lab: NORMAL
NITRITE UR-MCNC: POSITIVE MG/ML
PH UR: 6 [PH] (ref 5–8)
PROT UR STRIP-MCNC: ABNORMAL MG/DL
RBC # UR STRIP: ABNORMAL /UL
SARS-COV-2 AG UPPER RESP QL IA.RAPID: NOT DETECTED
SP GR UR: 1.01 (ref 1–1.03)
UROBILINOGEN UR QL: ABNORMAL

## 2024-02-07 RX ORDER — DIPHENOXYLATE HYDROCHLORIDE AND ATROPINE SULFATE 2.5; .025 MG/1; MG/1
1 TABLET ORAL 4 TIMES DAILY PRN
Qty: 30 TABLET | Refills: 0 | Status: SHIPPED | OUTPATIENT
Start: 2024-02-07

## 2024-02-07 RX ORDER — PREDNISONE 20 MG/1
20 TABLET ORAL DAILY
Qty: 7 TABLET | Refills: 0 | Status: SHIPPED | OUTPATIENT
Start: 2024-02-07 | End: 2024-02-14

## 2024-02-07 RX ORDER — ONDANSETRON 4 MG/1
4 TABLET, FILM COATED ORAL EVERY 8 HOURS PRN
Qty: 30 TABLET | Refills: 0 | Status: SHIPPED | OUTPATIENT
Start: 2024-02-07

## 2024-02-07 RX ORDER — BENZONATATE 200 MG/1
200 CAPSULE ORAL 3 TIMES DAILY PRN
Qty: 30 CAPSULE | Refills: 0 | Status: SHIPPED | OUTPATIENT
Start: 2024-02-07

## 2024-02-07 RX ORDER — CEFDINIR 300 MG/1
300 CAPSULE ORAL 2 TIMES DAILY
Qty: 20 CAPSULE | Refills: 0 | Status: SHIPPED | OUTPATIENT
Start: 2024-02-07 | End: 2024-02-17

## 2024-02-07 NOTE — TELEPHONE ENCOUNTER
Caller: Demetra Rush    Relationship: Self    Best call back number: [phone]     What is the best time to reach you: ANYTIME    Who are you requesting to speak with (clinical staff, provider,  specific staff member): CLINICAL    What was the call regarding: PATIENTS DAUGHTER CALLING CONCERNED ABOUT HER MOTHER SHE IS BEING SEEN SEEN BY CHIP MORENO TODAY. SHE STATED THAT SHE HAS COME DOWN WITH THE SAME SYMPTOMS SHE HAS SHE IS WANTING TO KNOW IF SHE SHOULD BE ADMITTED INTO THE HOSPITAL.     SHE STATED THAT HER SYMPTOMS HAVE BEEN SEVERE     Is it okay if the provider responds through MyChart: NO

## 2024-02-07 NOTE — PROGRESS NOTES
"Chief Complaint  Cough, Nasal Congestion, and Sore Throat    Subjective          History of Present Illness      Cough/nasal congestion/sore throat  Covid, flu a and b negative in office today.  For 1 week, cough, \"rattling\" in her chest when she breathes or coughs.  Coughing up Grayish sputum.  Denies fever, chills, , vomiting.  Diarrhea and nausea.           SARS Antigen  Not Detected, Presumptive Negative Not Detected        Influenza A Antigen RAMONA  Not Detected Not Detected        Influenza B Antigen RAMONA  Not Detected Not Detected            dysuria  Burning, pain.   Ongoing since 01/09/24.    Brief Urine Lab Results  (Last result in the past 365 days)        Color   Clarity   Blood   Leuk Est   Nitrite   Protein   CREAT   Urine HCG        02/07/24 1144 Yellow   Hazy   1+   Small (1+)   Positive   Trace                   Objective   Vital Signs:  /70 (BP Location: Right arm, Patient Position: Sitting, Cuff Size: Adult)   Pulse 111   Resp 16   Ht 154.9 cm (60.98\")   Wt 47.2 kg (104 lb)   SpO2 98%   BMI 19.66 kg/m²   Estimated body mass index is 19.66 kg/m² as calculated from the following:    Height as of this encounter: 154.9 cm (60.98\").    Weight as of this encounter: 47.2 kg (104 lb).       BMI is within normal parameters. No other follow-up for BMI required.      Physical Exam  Vitals reviewed.   Constitutional:       General: She is not in acute distress.     Appearance: She is ill-appearing.   HENT:      Head: Normocephalic and atraumatic.      Right Ear: Tympanic membrane normal.      Left Ear: Tympanic membrane normal.      Nose: Congestion and rhinorrhea present.      Right Sinus: No maxillary sinus tenderness or frontal sinus tenderness.      Left Sinus: No maxillary sinus tenderness or frontal sinus tenderness.      Mouth/Throat:      Mouth: Mucous membranes are moist.      Pharynx: Posterior oropharyngeal erythema present. No oropharyngeal exudate.   Eyes:      Conjunctiva/sclera: " Conjunctivae normal.      Pupils: Pupils are equal, round, and reactive to light.   Cardiovascular:      Rate and Rhythm: Normal rate and regular rhythm.      Pulses: Normal pulses.      Heart sounds: No murmur heard.     No gallop.   Pulmonary:      Effort: Pulmonary effort is normal. No respiratory distress.      Breath sounds: Normal breath sounds. No wheezing.      Comments: Congested cough noted during exam  Abdominal:      General: Bowel sounds are normal. There is no distension.      Palpations: Abdomen is soft.      Tenderness: There is no abdominal tenderness. There is no right CVA tenderness or left CVA tenderness.   Musculoskeletal:         General: Normal range of motion.      Cervical back: Normal range of motion and neck supple. No tenderness.   Skin:     General: Skin is warm and dry.   Neurological:      Mental Status: She is alert and oriented to person, place, and time. Mental status is at baseline.   Psychiatric:         Mood and Affect: Mood normal.        Result Review :                     Assessment and Plan     Diagnoses and all orders for this visit:    1. Acute cystitis with hematuria (Primary)  -     cefdinir (OMNICEF) 300 MG capsule; Take 1 capsule by mouth 2 (Two) Times a Day for 10 days.  Dispense: 20 capsule; Refill: 0    2. Acute cough  -     POCT SARS-CoV-2 Antigen RAMONA + Flu  -     predniSONE (DELTASONE) 20 MG tablet; Take 1 tablet by mouth Daily for 7 days.  Dispense: 7 tablet; Refill: 0    3. Dysuria  -     POCT urinalysis dipstick, automated  -     Urine Culture - Urine, Urine, Clean Catch    4. Diarrhea, unspecified type  -     diphenoxylate-atropine (Lomotil) 2.5-0.025 MG per tablet; Take 1 tablet by mouth 4 (Four) Times a Day As Needed for Diarrhea.  Dispense: 30 tablet; Refill: 0    5. Wheezing  -     predniSONE (DELTASONE) 20 MG tablet; Take 1 tablet by mouth Daily for 7 days.  Dispense: 7 tablet; Refill: 0    6. Nausea  -     ondansetron (Zofran) 4 MG tablet; Take 1 tablet  by mouth Every 8 (Eight) Hours As Needed for Nausea or Vomiting.  Dispense: 30 tablet; Refill: 0    7. Upper respiratory tract infection, unspecified type  -     cefdinir (OMNICEF) 300 MG capsule; Take 1 capsule by mouth 2 (Two) Times a Day for 10 days.  Dispense: 20 capsule; Refill: 0    Covid, flu a and b negative in office today.  UA + leukocytes, blood, and nitrates. Will send for a culture and treat for UTI today. Antibiotics prescribed for URI and UTI.  Increase fluids. Get plenty of rest.  F/u if symptoms do not improve.  Will contact patient with Urine culture results.  F/U with gastro for diarrhea, provided patient with office number to schedule an appt.       Follow Up     Return if symptoms worsen or fail to improve.  Patient was given instructions and counseling regarding her condition or for health maintenance advice. Please see specific information pulled into the AVS if appropriate.

## 2024-02-09 PROCEDURE — 93296 REM INTERROG EVL PM/IDS: CPT | Performed by: INTERNAL MEDICINE

## 2024-02-09 PROCEDURE — 93294 REM INTERROG EVL PM/LDLS PM: CPT | Performed by: INTERNAL MEDICINE

## 2024-02-12 LAB
BACTERIA UR CULT: ABNORMAL
BACTERIA UR CULT: ABNORMAL
OTHER ANTIBIOTIC SUSC ISLT: ABNORMAL

## 2024-02-14 RX ORDER — TORSEMIDE 20 MG/1
TABLET ORAL
Qty: 30 TABLET | Refills: 3 | Status: SHIPPED | OUTPATIENT
Start: 2024-02-14

## 2024-03-06 DIAGNOSIS — S09.90XA CLOSED HEAD INJURY, INITIAL ENCOUNTER: ICD-10-CM

## 2024-03-06 DIAGNOSIS — R13.10 DYSPHAGIA, UNSPECIFIED TYPE: ICD-10-CM

## 2024-03-08 RX ORDER — ALPRAZOLAM 0.5 MG/1
0.5 TABLET ORAL NIGHTLY PRN
Qty: 90 TABLET | Refills: 0 | Status: SHIPPED | OUTPATIENT
Start: 2024-03-08

## 2024-03-08 RX ORDER — PANTOPRAZOLE SODIUM 40 MG/1
TABLET, DELAYED RELEASE ORAL
Qty: 180 TABLET | Refills: 3 | Status: SHIPPED | OUTPATIENT
Start: 2024-03-08

## 2024-03-14 ENCOUNTER — PATIENT MESSAGE (OUTPATIENT)
Dept: FAMILY MEDICINE CLINIC | Facility: CLINIC | Age: 89
End: 2024-03-14
Payer: MEDICARE

## 2024-03-14 DIAGNOSIS — R19.7 DIARRHEA, UNSPECIFIED TYPE: ICD-10-CM

## 2024-03-15 RX ORDER — DIPHENOXYLATE HYDROCHLORIDE AND ATROPINE SULFATE 2.5; .025 MG/1; MG/1
1 TABLET ORAL 4 TIMES DAILY PRN
Qty: 30 TABLET | Refills: 0 | Status: SHIPPED | OUTPATIENT
Start: 2024-03-15

## 2024-03-15 NOTE — TELEPHONE ENCOUNTER
From: Demetra Rush  To: April Osmel  Sent: 3/14/2024 1:43 PM EDT  Subject: Diphenoxylate Atrop 2.5-025    Is it possible to get a refill of Diphenoxylate Atrop 2.5-025 for mom's diarrhea?

## 2024-04-19 ENCOUNTER — TELEPHONE (OUTPATIENT)
Dept: GASTROENTEROLOGY | Facility: CLINIC | Age: 89
End: 2024-04-19
Payer: MEDICARE

## 2024-04-19 NOTE — TELEPHONE ENCOUNTER
Called patient and spoke with her daughter to schedule appt. Pts daughter wanted to know if any medication could be called in to help her resolve some issues from the [employer] message below until the appt in June. She also asked to make sure if the medication is called in that it is sent to [facility] and she would like a call back when/if it is called in.

## 2024-04-19 NOTE — TELEPHONE ENCOUNTER
----- Message from Demetra Rush sent at 4/19/2024 11:12 AM EDT -----  Regarding: Appointment Request  Contact: [phone]  Appointment Request From: Demetra Rush    With Provider: Luis Long [[facility] GASTROENTEROLOGY]    Preferred Date Range: 4/22/2024 – 4/24/2024    Preferred Times: Any Time    Reason for visit: Follow-up    Comments:  Continued issue with diarrhea.  Dr. Bolivar has prescribed Diphenoxylate-ATROP 2.5-0.025 twice, but I am not seeing improvement.  Additional available appointment dates: 4/29 through 5/3.

## 2024-04-23 ENCOUNTER — TELEPHONE (OUTPATIENT)
Dept: GASTROENTEROLOGY | Facility: CLINIC | Age: 89
End: 2024-04-23
Payer: MEDICARE

## 2024-04-26 ENCOUNTER — LAB (OUTPATIENT)
Dept: LAB | Facility: HOSPITAL | Age: 89
End: 2024-04-26
Payer: MEDICARE

## 2024-04-26 PROCEDURE — 87045 FECES CULTURE AEROBIC BACT: CPT | Performed by: PHYSICIAN ASSISTANT

## 2024-04-26 PROCEDURE — 87427 SHIGA-LIKE TOXIN AG IA: CPT | Performed by: PHYSICIAN ASSISTANT

## 2024-04-26 PROCEDURE — 87329 GIARDIA AG IA: CPT | Performed by: PHYSICIAN ASSISTANT

## 2024-04-26 PROCEDURE — 87324 CLOSTRIDIUM AG IA: CPT | Performed by: PHYSICIAN ASSISTANT

## 2024-04-26 PROCEDURE — 87328 CRYPTOSPORIDIUM AG IA: CPT | Performed by: PHYSICIAN ASSISTANT

## 2024-04-26 PROCEDURE — 87209 SMEAR COMPLEX STAIN: CPT | Performed by: PHYSICIAN ASSISTANT

## 2024-04-26 PROCEDURE — 83993 ASSAY FOR CALPROTECTIN FECAL: CPT | Performed by: PHYSICIAN ASSISTANT

## 2024-04-26 PROCEDURE — 87177 OVA AND PARASITES SMEARS: CPT | Performed by: PHYSICIAN ASSISTANT

## 2024-04-26 PROCEDURE — 87046 STOOL CULTR AEROBIC BACT EA: CPT | Performed by: PHYSICIAN ASSISTANT

## 2024-04-26 NOTE — TELEPHONE ENCOUNTER
Hub staff attempted to follow warm transfer process and was unsuccessful     Caller: KRIS VASQUEZ    Relationship to patient: Emergency Contact    Best call back number: [phone]    Patient is needing: PT'S DAUGHTER IS CALLING IN REGARDS TO THE STOOL KIT THEY PICKED UP YESTERDAY. SHE SAID THERE WASN'T ANY INSTRUCTIONS WITH IT. PLEASE CALL AND ADVISE. IT'S OK TO LVM.

## 2024-05-01 ENCOUNTER — OFFICE VISIT (OUTPATIENT)
Dept: GASTROENTEROLOGY | Facility: CLINIC | Age: 89
End: 2024-05-01
Payer: MEDICARE

## 2024-05-01 VITALS
WEIGHT: 108.3 LBS | HEIGHT: 60 IN | HEART RATE: 71 BPM | BODY MASS INDEX: 21.26 KG/M2 | TEMPERATURE: 96.6 F | DIASTOLIC BLOOD PRESSURE: 69 MMHG | SYSTOLIC BLOOD PRESSURE: 122 MMHG

## 2024-05-01 DIAGNOSIS — R19.7 DIARRHEA, UNSPECIFIED TYPE: Primary | ICD-10-CM

## 2024-05-01 NOTE — PROGRESS NOTES
"Chief Complaint  Diarrhea and Abdominal Pain    Subjective          History of Present Illness    Demetra Rush is a  89 y.o. female presents for diarrhea is a new problem.  She is a patient of Dr. Long last seen in 2022 for GERD.  She is new to me.    She called in reporting diarrhea 5-6 months ago.  Occurring up to 8 times/day, some better recently. Lower abdominal discomfort, eased some with a BM. On Lomotil but does not feel like this is helping. Stopped OJ/V8 which did not help. Occasional nocturnal stools and stool incontinence.  Denies new medication, change in diet, or illness at onset. Denies blood or black stool, abnormal weight loss, .     She does report being empirically treated with Budesonide 9mg daily around 2013 and 2020 which resolved her diarrhea at that time.  Sounds like she had microscopic colitis but does not recall having colonoscopy to diagnose this.    She is on pantoprazole since hospital stay in September.  Reports ulcer many years ago.  Currently denies reflux or heartburn.    4/26/2024 stool studies negative for infection including Giardia, Cryptosporidium, stool culture, and C. difficile.  Still pending ova and parasites.  Fecal calprotectin normal at 5.    She is on Eliquis for Afib.     Objective   Vital Signs:   /69   Pulse 71   Temp 96.6 °F (35.9 °C)   Ht 152.4 cm (60\")   Wt 49.1 kg (108 lb 4.8 oz)   BMI 21.15 kg/m²       Physical Exam  Vitals reviewed.   Constitutional:       General: She is awake. She is not in acute distress.     Appearance: Normal appearance. She is well-developed and well-groomed.   HENT:      Head: Normocephalic.   Pulmonary:      Effort: Pulmonary effort is normal. No respiratory distress.   Abdominal:      General: Abdomen is flat. Bowel sounds are normal. There is no distension.      Palpations: Abdomen is soft. There is no hepatomegaly or mass.      Tenderness: There is no abdominal tenderness.   Skin:     Coloration: Skin is not pale. "   Neurological:      Mental Status: She is alert and oriented to person, place, and time.      Gait: Gait is intact.   Psychiatric:         Mood and Affect: Mood and affect normal.         Speech: Speech normal.         Behavior: Behavior is cooperative.         Judgment: Judgment normal.          Result Review :             Assessment and Plan    Diagnoses and all orders for this visit:    1. Diarrhea, unspecified type (Primary)  -     CT Abdomen Pelvis With Contrast  -     Celiac Comprehensive Panel  -     CBC & Differential  -     Comprehensive Metabolic Panel  -     C-reactive Protein  -     Sedimentation Rate  -     Thyroid Panel With TSH  -     Magnesium      She sounds like she has a history of microscopic colitis previously treated successfully with budesonide.  Unfortunately her fecal calprotectin was completely negative with no evidence of inflammation.  I did discuss with her that, at this point, budesonide is not indicated but we will keep it on the option list.    I would like to see if we can determine underlying source of her diarrhea.      Recommend she hold pantoprazole for 1 week as this can cause diarrhea.  Recommend further assessment with labs above and CT scan.    Meantime, start Imodium 2 pills every morning and 1-2 pills every 2 hours if still with diarrhea.  Consider Colestid if this is not working.    Consider repeat fecal calprotectin as possible to get a false negative.  Per [facility] website, false negative results can occur in patients to have granulocytopenia due to depression.  Will await CBC results.    Follow Up   Return in about 2 months (around 7/2/2024).    Dragon dictation used throughout this note.     Naz Mena PA-C

## 2024-05-02 LAB
ALBUMIN SERPL-MCNC: 4.4 G/DL (ref 3.5–5.2)
ALBUMIN/GLOB SERPL: 1.5 G/DL
ALP SERPL-CCNC: 97 U/L (ref 39–117)
ALT SERPL-CCNC: 12 U/L (ref 1–33)
AST SERPL-CCNC: 16 U/L (ref 1–32)
BASOPHILS # BLD AUTO: 0.05 10*3/MM3 (ref 0–0.2)
BASOPHILS NFR BLD AUTO: 0.7 % (ref 0–1.5)
BILIRUB SERPL-MCNC: 0.4 MG/DL (ref 0–1.2)
BUN SERPL-MCNC: 16 MG/DL (ref 8–23)
BUN/CREAT SERPL: 17.4 (ref 7–25)
CALCIUM SERPL-MCNC: 9.7 MG/DL (ref 8.2–9.6)
CHLORIDE SERPL-SCNC: 98 MMOL/L (ref 98–107)
CO2 SERPL-SCNC: 30.4 MMOL/L (ref 22–29)
CREAT SERPL-MCNC: 0.92 MG/DL (ref 0.57–1)
CRP SERPL-MCNC: 0.44 MG/DL (ref 0–0.5)
EGFRCR SERPLBLD CKD-EPI 2021: 59.3 ML/MIN/1.73
ENDOMYSIUM IGA SER QL: NEGATIVE
EOSINOPHIL # BLD AUTO: 0.13 10*3/MM3 (ref 0–0.4)
EOSINOPHIL NFR BLD AUTO: 1.9 % (ref 0.3–6.2)
ERYTHROCYTE [DISTWIDTH] IN BLOOD BY AUTOMATED COUNT: 14.2 % (ref 12.3–15.4)
ERYTHROCYTE [SEDIMENTATION RATE] IN BLOOD BY WESTERGREN METHOD: 15 MM/HR (ref 0–30)
FT4I SERPL CALC-MCNC: 3 (ref 1.2–4.9)
GLIADIN PEPTIDE IGA SER-ACNC: 5 UNITS (ref 0–19)
GLIADIN PEPTIDE IGG SER-ACNC: 4 UNITS (ref 0–19)
GLOBULIN SER CALC-MCNC: 2.9 GM/DL
GLUCOSE SERPL-MCNC: 92 MG/DL (ref 65–99)
HCT VFR BLD AUTO: 38 % (ref 34–46.6)
HGB BLD-MCNC: 12.3 G/DL (ref 12–15.9)
IGA SERPL-MCNC: 309 MG/DL (ref 64–422)
IMM GRANULOCYTES # BLD AUTO: 0.02 10*3/MM3 (ref 0–0.05)
IMM GRANULOCYTES NFR BLD AUTO: 0.3 % (ref 0–0.5)
LYMPHOCYTES # BLD AUTO: 1.12 10*3/MM3 (ref 0.7–3.1)
LYMPHOCYTES NFR BLD AUTO: 16.5 % (ref 19.6–45.3)
MAGNESIUM SERPL-MCNC: 2.2 MG/DL (ref 1.6–2.4)
MCH RBC QN AUTO: 28 PG (ref 26.6–33)
MCHC RBC AUTO-ENTMCNC: 32.4 G/DL (ref 31.5–35.7)
MCV RBC AUTO: 86.4 FL (ref 79–97)
MONOCYTES # BLD AUTO: 0.7 10*3/MM3 (ref 0.1–0.9)
MONOCYTES NFR BLD AUTO: 10.3 % (ref 5–12)
NEUTROPHILS # BLD AUTO: 4.78 10*3/MM3 (ref 1.7–7)
NEUTROPHILS NFR BLD AUTO: 70.3 % (ref 42.7–76)
NRBC BLD AUTO-RTO: 0 /100 WBC (ref 0–0.2)
PLATELET # BLD AUTO: 272 10*3/MM3 (ref 140–450)
POTASSIUM SERPL-SCNC: 3.3 MMOL/L (ref 3.5–5.2)
PROT SERPL-MCNC: 7.3 G/DL (ref 6–8.5)
RBC # BLD AUTO: 4.4 10*6/MM3 (ref 3.77–5.28)
SODIUM SERPL-SCNC: 139 MMOL/L (ref 136–145)
T3RU NFR SERPL: 35 % (ref 24–39)
T4 SERPL-MCNC: 8.5 UG/DL (ref 4.5–12)
TSH SERPL DL<=0.005 MIU/L-ACNC: 1.93 UIU/ML (ref 0.45–4.5)
TTG IGA SER-ACNC: <2 U/ML (ref 0–3)
TTG IGG SER-ACNC: 7 U/ML (ref 0–5)
WBC # BLD AUTO: 6.8 10*3/MM3 (ref 3.4–10.8)

## 2024-05-03 RX ORDER — POTASSIUM CHLORIDE 750 MG/1
TABLET, FILM COATED, EXTENDED RELEASE ORAL
Qty: 90 TABLET | Refills: 0 | Status: SHIPPED | OUTPATIENT
Start: 2024-05-03

## 2024-05-03 NOTE — PROGRESS NOTES
Spoke with daughter. Mildly low potassium likely from the diarrhea. She is on torsemide and Klor Con 20 MEQ QAM, Will increase to additional 10 MEQ in the evening. Reviewed other labs with her. Gave her number to call for CT scan.

## 2024-05-06 RX ORDER — LEVOTHYROXINE SODIUM 0.07 MG/1
TABLET ORAL
Qty: 90 TABLET | Refills: 2 | Status: SHIPPED | OUTPATIENT
Start: 2024-05-06

## 2024-05-25 ENCOUNTER — HOSPITAL ENCOUNTER (OUTPATIENT)
Facility: HOSPITAL | Age: 89
Discharge: HOME | End: 2024-05-25
Payer: MEDICARE

## 2024-05-25 PROCEDURE — 74177 CT ABD & PELVIS W/CONTRAST: CPT

## 2024-05-25 PROCEDURE — 0 DIATRIZOATE MEGLUMINE & SODIUM PER 1 ML: Performed by: PHYSICIAN ASSISTANT

## 2024-05-25 PROCEDURE — 25510000001 IOPAMIDOL 61 % SOLUTION: Performed by: PHYSICIAN ASSISTANT

## 2024-05-25 RX ADMIN — IOPAMIDOL 85 ML: 612 INJECTION, SOLUTION INTRAVENOUS at 15:55

## 2024-05-25 RX ADMIN — DIATRIZOATE MEGLUMINE AND DIATRIZOATE SODIUM 30 ML: 600; 100 SOLUTION ORAL; RECTAL at 14:20

## 2024-05-30 DIAGNOSIS — S09.90XA CLOSED HEAD INJURY, INITIAL ENCOUNTER: ICD-10-CM

## 2024-05-31 RX ORDER — ALPRAZOLAM 0.5 MG/1
0.5 TABLET ORAL NIGHTLY PRN
Qty: 90 TABLET | Refills: 0 | Status: SHIPPED | OUTPATIENT
Start: 2024-05-31

## 2024-06-01 ENCOUNTER — PATIENT MESSAGE (OUTPATIENT)
Dept: GASTROENTEROLOGY | Facility: CLINIC | Age: 89
End: 2024-06-01
Payer: MEDICARE

## 2024-06-04 RX ORDER — MONTELUKAST SODIUM 4 MG/1
TABLET, CHEWABLE ORAL
Qty: 60 TABLET | Refills: 1 | Status: SHIPPED | OUTPATIENT
Start: 2024-06-04

## 2024-06-04 RX ORDER — POTASSIUM CHLORIDE 750 MG/1
TABLET, FILM COATED, EXTENDED RELEASE ORAL
Qty: 90 TABLET | Refills: 0 | Status: SHIPPED | OUTPATIENT
Start: 2024-06-04

## 2024-06-04 NOTE — TELEPHONE ENCOUNTER
From: Demetra Rush  To: Naz Mena  Sent: 6/1/2024 12:57 PM EDT  Subject: Chronic Diarrhea    Dr. Rajesh Saucedo:     My mother’s diarrhea is still chronic. After the CT scan on 5/25, do you have a recommendation?    Thank you,   Nettielele Pond (daughter)

## 2024-06-11 ENCOUNTER — TELEPHONE (OUTPATIENT)
Dept: CARDIOLOGY | Facility: CLINIC | Age: 89
End: 2024-06-11
Payer: MEDICARE

## 2024-06-11 RX ORDER — TORSEMIDE 20 MG/1
20 TABLET ORAL DAILY PRN
Qty: 60 TABLET | Refills: 3 | Status: SHIPPED | OUTPATIENT
Start: 2024-06-11

## 2024-06-11 NOTE — TELEPHONE ENCOUNTER
Received a fax from [facility] requesting a refill on torsemide 20mg with directions: take 1 tablet by mouth daily as needed for weight gain greater than 2 lbs in one day or 5 lbs in one week.  This is documented in her LOV with YESY on 4/24/23.    Torsemide is not an active medication on her MAR.      I spoke with Nettie, the pt's daughter to confirm that the pt was taking this medication.  She states the pt is taking the torsemide 20mg every morning, but would love to stop it if she can because it makes her urinate all the time.     Please advise and send in the prescription as you deem appropriate.

## 2024-07-02 ENCOUNTER — OFFICE VISIT (OUTPATIENT)
Dept: GASTROENTEROLOGY | Facility: CLINIC | Age: 89
End: 2024-07-02
Payer: MEDICARE

## 2024-07-02 VITALS
BODY MASS INDEX: 20.62 KG/M2 | SYSTOLIC BLOOD PRESSURE: 136 MMHG | DIASTOLIC BLOOD PRESSURE: 72 MMHG | TEMPERATURE: 97.4 F | HEIGHT: 61 IN | WEIGHT: 109.2 LBS | HEART RATE: 69 BPM

## 2024-07-02 DIAGNOSIS — S09.90XA CLOSED HEAD INJURY, INITIAL ENCOUNTER: ICD-10-CM

## 2024-07-02 DIAGNOSIS — E87.6 HYPOKALEMIA: ICD-10-CM

## 2024-07-02 DIAGNOSIS — R19.7 DIARRHEA, UNSPECIFIED TYPE: Primary | ICD-10-CM

## 2024-07-02 DIAGNOSIS — R30.0 DYSURIA: ICD-10-CM

## 2024-07-02 RX ORDER — BUDESONIDE 3 MG/1
CAPSULE, COATED PELLETS ORAL
Qty: 180 CAPSULE | Refills: 0 | Status: SHIPPED | OUTPATIENT
Start: 2024-07-02 | End: 2024-09-30

## 2024-07-02 NOTE — PROGRESS NOTES
Chief Complaint  Diarrhea, Abdominal Pain, and Nausea    Subjective          History of Present Illness    Demetra Rush is a  89 y.o. female presents for follow-up on diarrhea.  She is a patient of Dr. Long last seen by me on 5/1/2024.    Following up on daily diarrhea that started about 8 months ago, 8 bowel movements per day associated with lower abdominal discomfort improved with a bowel movement.  Occasional nocturnal stools and incontinence.  Blow out recently.  Failed scheduled Imodium 2 tablets twice daily, Lomotil daily, and colestipol twice daily.    I increased her potassium after last visit due to low potassium likely from combination of torsemide use and excessive diarrhea. She is taking less of the torsemide due to increased urination and weight loss.  She reports seeing the potassium pill in her stool.    Reports pain and chills with urination. She normally get's this with bladder infections. Last antibiotic 5-6 months ago for UTI.  Reviewed urine culture from February 2024 which showed multiple antibiotic resistance.    5/25/2024 contrasted CT scan was unremarkable other than paucity of formed stool in colon with fluid consistent with reports of diarrhea.    5/1/2024 labs show normal CBC, CRP, sed rate, thyroid panel, and magnesium.  CMP with potassium 3.3, calcium 9.7, normal albumin, LFTs, and kidney functions.    4/26/2024 stool studies showed negative for infection including parasites and C. difficile, fecal calprotectin <5    From 5/1/2024 office note:  She does report being empirically treated with Budesonide 9mg daily around 2013 and 2020 which resolved her diarrhea at that time.  Sounds like she had microscopic colitis but does not recall having colonoscopy to diagnose this.     She is on pantoprazole since hospital stay in September.  Reports ulcer many years ago.  Currently denies reflux or heartburn.     4/26/2024 stool studies negative for infection including Giardia,  "Cryptosporidium, stool culture, and C. difficile.  Still pending ova and parasites.  Fecal calprotectin normal at 5.     She is on Eliquis for Afib.        Objective   Vital Signs:   /72   Pulse 69   Temp 97.4 °F (36.3 °C)   Ht 154.9 cm (61\")   Wt 49.5 kg (109 lb 3.2 oz)   BMI 20.63 kg/m²       Physical Exam  Vitals reviewed.   Constitutional:       General: She is awake. She is not in acute distress.     Appearance: Normal appearance. She is well-developed and well-groomed.   HENT:      Head: Normocephalic.   Pulmonary:      Effort: Pulmonary effort is normal. No respiratory distress.   Skin:     Coloration: Skin is not pale.   Neurological:      Mental Status: She is alert and oriented to person, place, and time.      Gait: Gait is intact.   Psychiatric:         Mood and Affect: Mood and affect normal.         Speech: Speech normal.         Behavior: Behavior is cooperative.         Judgment: Judgment normal.          Result Review :             Assessment and Plan    Diagnoses and all orders for this visit:    1. Diarrhea, unspecified type (Primary)  -     Basic Metabolic Panel  -     Urine Culture - Urine, Urine, Clean Catch  -     Urinalysis With Microscopic - Urine, Clean Catch    2. Dysuria  -     Urine Culture - Urine, Urine, Clean Catch  -     Urinalysis With Microscopic - Urine, Clean Catch    3. Hypokalemia  -     Basic Metabolic Panel    Other orders  -     Budesonide (ENTOCORT EC) 3 MG 24 hr capsule; Take 3 capsules by mouth Every Morning for 30 days, THEN 2 capsules Every Morning for 30 days, THEN 1 capsule Every Morning for 30 days.  Dispense: 180 capsule; Refill: 0    We have tried mutliple other medicines with no change in diarrhea. Given history of microscopic colitis with negative work up and failure of imodium, lomotil, and colestid--will go ahead and try budesonide as this has worked for her in the past.     Will recheck potassium level today as the potassium pill does not appear to " be absorbing as she is finding it in her stool.  Hopefully with decreasing torsemide her potassium is better.    She get's frequent bladder infections and thinks she has one today. She has multi- bacteria resistence on recent urine culture. Will check U/A today with culture send out.  Plan to treat with appropiate antibiotic and refer back to Dr. Bolivar for further work up and treatment if persistent.     Follow Up   Return in about 9 weeks (around 9/3/2024) for at 1130 (she will check with Daughter and let me know if this does not work).    Dragon dictation used throughout this note.     Naz Mean PA-C

## 2024-07-03 LAB
APPEARANCE UR: CLEAR
BACTERIA #/AREA URNS HPF: ABNORMAL /[HPF]
BILIRUB UR QL STRIP: NEGATIVE
BUN SERPL-MCNC: 24 MG/DL (ref 10–36)
BUN/CREAT SERPL: 29 (ref 12–28)
CALCIUM SERPL-MCNC: 9.2 MG/DL (ref 8.7–10.3)
CASTS URNS QL MICRO: ABNORMAL /LPF
CHLORIDE SERPL-SCNC: 100 MMOL/L (ref 96–106)
CO2 SERPL-SCNC: 24 MMOL/L (ref 20–29)
COLOR UR: YELLOW
CREAT SERPL-MCNC: 0.82 MG/DL (ref 0.57–1)
EGFRCR SERPLBLD CKD-EPI 2021: 68 ML/MIN/1.73
EPI CELLS #/AREA URNS HPF: ABNORMAL /HPF (ref 0–10)
GLUCOSE SERPL-MCNC: 89 MG/DL (ref 70–99)
GLUCOSE UR QL STRIP: NEGATIVE
HGB UR QL STRIP: NEGATIVE
KETONES UR QL STRIP: NEGATIVE
LEUKOCYTE ESTERASE UR QL STRIP: ABNORMAL
MICRO URNS: ABNORMAL
NITRITE UR QL STRIP: NEGATIVE
PH UR STRIP: 5.5 [PH] (ref 5–7.5)
POTASSIUM SERPL-SCNC: 4.9 MMOL/L (ref 3.5–5.2)
PROT UR QL STRIP: ABNORMAL
RBC #/AREA URNS HPF: ABNORMAL /HPF (ref 0–2)
SODIUM SERPL-SCNC: 138 MMOL/L (ref 134–144)
SP GR UR STRIP: 1.02 (ref 1–1.03)
UROBILINOGEN UR STRIP-MCNC: 0.2 MG/DL (ref 0.2–1)
WBC #/AREA URNS HPF: ABNORMAL /HPF (ref 0–5)

## 2024-07-03 RX ORDER — POTASSIUM CHLORIDE 750 MG/1
TABLET, FILM COATED, EXTENDED RELEASE ORAL
Qty: 90 TABLET | Refills: 0 | Status: SHIPPED | OUTPATIENT
Start: 2024-07-03

## 2024-07-03 RX ORDER — GABAPENTIN 300 MG/1
CAPSULE ORAL
Qty: 270 CAPSULE | Refills: 1 | Status: SHIPPED | OUTPATIENT
Start: 2024-07-03

## 2024-07-05 ENCOUNTER — TELEPHONE (OUTPATIENT)
Dept: GASTROENTEROLOGY | Facility: CLINIC | Age: 89
End: 2024-07-05
Payer: MEDICARE

## 2024-07-06 LAB
BACTERIA UR CULT: ABNORMAL
BACTERIA UR CULT: ABNORMAL
OTHER ANTIBIOTIC SUSC ISLT: ABNORMAL

## 2024-07-08 ENCOUNTER — TELEPHONE (OUTPATIENT)
Dept: GASTROENTEROLOGY | Facility: CLINIC | Age: 89
End: 2024-07-08
Payer: MEDICARE

## 2024-07-08 RX ORDER — CEPHALEXIN 500 MG/1
500 CAPSULE ORAL 2 TIMES DAILY
Qty: 14 CAPSULE | Refills: 0 | Status: SHIPPED | OUTPATIENT
Start: 2024-07-08 | End: 2024-07-15

## 2024-07-08 NOTE — TELEPHONE ENCOUNTER
Called pt and spoke w/daughter (ok per JEFE) advised of Naz Mena PA-C's note.  She verbalized understanding.    Daughter is asking if pt should take Colestipol and Budesonide. Msg sent to [facility].     Daughter is also asking about the status of Budesonide PA.  Msg sent to MA pool.

## 2024-07-08 NOTE — PROGRESS NOTES
Please call patient advise she has a bladder infection that shows susceptibility to most antibiotics which is good.  I sent in Keflex to [facility] for her.  Further problems with bladder infection, she will need to go to her PCP.

## 2024-07-08 NOTE — TELEPHONE ENCOUNTER
When she receives budesonide, she can stop the colestipol as patient reported to me she was not getting much benefit from colestipol.    Waiting on MA's to respond on PA for budesonide.  It was submitted on 7/5/2024.

## 2024-07-08 NOTE — TELEPHONE ENCOUNTER
----- Message from Naz Mena sent at 7/8/2024  7:50 AM EDT -----  Please call patient advise she has a bladder infection that shows susceptibility to most antibiotics which is good.  I sent in Keflex to [facility] for her.  Further problems with bladder infection, she will need to go to her PCP.

## 2024-07-09 NOTE — TELEPHONE ENCOUNTER
Called pt and spoke w/daughter (ok per JEFE) and advised of Naz Mena PA-C's note.  She verbalized understanding.    Message sent to MA's to  ck on PA for Budesonide.

## 2024-07-10 NOTE — TELEPHONE ENCOUNTER
I called [facility] to check on the budesonide PA. I was told they have until tomorrow to give a response.

## 2024-07-11 ENCOUNTER — TELEPHONE (OUTPATIENT)
Dept: GASTROENTEROLOGY | Facility: CLINIC | Age: 89
End: 2024-07-11
Payer: MEDICARE

## 2024-07-11 NOTE — TELEPHONE ENCOUNTER
I just resubmitted the PA with the dx of chronic diarrhea. I will let you know if it's approved or not.

## 2024-07-11 NOTE — TELEPHONE ENCOUNTER
Regarding: Budesonide Rejection  Contact: [phone]  ----- Message from Naz Mena PA-C sent at 7/11/2024  3:38 PM EDT -----  Please rerun Budesonide with diagnosis of chronic diarrhea.  If still not approved, can she get it cheaper through [facility] or [facility]?  Please keep me and the patient's daughter updated.     ----- Message sent from Naz Mena PA-C to Demetra Rush at 7/11/2024  3:36 PM -----   I will have the office resend the PA with the diagnosis as chronic diarrhea.  If not, we may be able to get it cheaper for out-of-pocket through [facility] or a similar discount company.  I will have the medical assistant's to check on both.      ----- Message -----       From:Demetra Rush       Sent:7/11/2024 11:14 AM EDT         To:Naz Mena    Subject:Budesonide Rejection    Hello, I received a call today from [employer] stating that the PA for the Budesonide was rejected because it was listed for unspecified diarrhea. The [facility] representative stated that this information was sent to you, and if it was listed as chronic diarrhea, it should be approved. Can you please let me know if this is accurate? If yes, when can this correction take place? If no, what is the recommendation for Mom's diarrhea issue?

## 2024-07-12 ENCOUNTER — TELEPHONE (OUTPATIENT)
Dept: GASTROENTEROLOGY | Facility: CLINIC | Age: 89
End: 2024-07-12
Payer: MEDICARE

## 2024-07-12 NOTE — TELEPHONE ENCOUNTER
Regarding: FW: Budesonide Rejection  Contact: [phone]  Routed to Reyna to resubmit PA  ----- Message -----  From: Naz Mena PA-C  Sent: 7/11/2024   3:38 PM EDT  To: [facility] [place]  Subject: Budesonide Rejection                             ----- Message from Naz Mena PA-C sent at 7/11/2024  3:38 PM EDT -----  Please rerun Budesonide with diagnosis of chronic diarrhea.  If still not approved, can she get it cheaper through [facility] or [facility]?  Please keep me and the patient's daughter updated.     ----- Message sent from Naz Mena PA-C to Demetra Rush at 7/11/2024  3:36 PM -----   I will have the office resend the PA with the diagnosis as chronic diarrhea.  If not, we may be able to get it cheaper for out-of-pocket through [facility] or a similar discount company.  I will have the medical assistant's to check on both.      ----- Message -----       From:Demetra Rush       Sent:7/11/2024 11:14 AM EDT         To:Naz Mena    Subject:Budesonide Rejection    Hello, I received a call today from [facility] stating that the PA for the Budesonide was rejected because it was listed for unspecified diarrhea. The [facility] representative stated that this information was sent to you, and if it was listed as chronic diarrhea, it should be approved. Can you please let me know if this is accurate? If yes, when can this correction take place? If no, what is the recommendation for Mom's diarrhea issue?

## 2024-07-12 NOTE — TELEPHONE ENCOUNTER
I scanned the denial letter into the patients chart. The reason for denial was because of the ICD code. I tried to resubmit the PA and it will not let me because the PA was already denied.

## 2024-07-12 NOTE — TELEPHONE ENCOUNTER
Reyna, you will need to call the insurance and do a PA over the phone and let them know that you have a denial due to incorrect dx codes that need to be changed and the PA be resubmitted.

## 2024-07-12 NOTE — TELEPHONE ENCOUNTER
I resubmitted the PA for Budesonide 7/11/2024.    System was not able to process the request because the previous Prior Authorization Request was Denied.      I can call [facility] and see if we can do a verbal PA or see what needs to be done to get her PA approved,when I finish up with this morning clinic.

## 2024-07-15 ENCOUNTER — TELEPHONE (OUTPATIENT)
Dept: GASTROENTEROLOGY | Facility: CLINIC | Age: 89
End: 2024-07-15
Payer: MEDICARE

## 2024-07-15 NOTE — TELEPHONE ENCOUNTER
Received Appeal application. Paperwork completed with the exception of Naz's note. Placed on  Naz's desk for completion.

## 2024-07-15 NOTE — TELEPHONE ENCOUNTER
I spoke with Lady at [facility] and she told me to do a appeal for the denial letter for Budesonide.     She said, she will fax it to [phone] to file the appeal.

## 2024-07-22 ENCOUNTER — PATIENT MESSAGE (OUTPATIENT)
Dept: GASTROENTEROLOGY | Facility: CLINIC | Age: 89
End: 2024-07-22
Payer: MEDICARE

## 2024-07-22 NOTE — TELEPHONE ENCOUNTER
I believe I signed the paperwork.  Please make sure he follow-up on this to get her what she needs.

## 2024-07-23 RX ORDER — BUDESONIDE 3 MG/1
CAPSULE, COATED PELLETS ORAL
Qty: 180 CAPSULE | Refills: 0 | Status: SHIPPED | OUTPATIENT
Start: 2024-07-23 | End: 2024-10-20

## 2024-07-23 NOTE — TELEPHONE ENCOUNTER
"From: Naz Mena  To: Demetra Rush  Sent: 7/22/2024 6:11 PM EDT  Subject: Budesonide    I am sorry for the delay in getting the paperwork signed. I signed it today as soon as I came in. Sometimes appeals can take up to 30 days though.    You could try downloading the liv \"goodRx.\" It is a typically yellow cross icon in the Liv Store. Then you can put in the drug which is \"budesonide,\" form: \"capsule\", dosage: \"3 mg\", and quantity would be 90 capsules. Then search prices and see what it would cost at her current pharmacy and other pharmacies. If any of the cost is reasonable for you, I can send the budesonide anywhere you would like. Just let me know. Then you would show the pharmacist the GoodRx price and she should get the medicine for that amount. Right now, the [employer] price is $48.21 and [employer] is $33.69. She does not need to use her insurance to use the [employer] liv.    I am sorry your questions were not answered in full by someone else while I was out.    Naz Mena PA-C  "

## 2024-07-29 ENCOUNTER — OFFICE VISIT (OUTPATIENT)
Dept: CARDIOLOGY | Facility: CLINIC | Age: 89
End: 2024-07-29
Payer: MEDICARE

## 2024-07-29 VITALS
DIASTOLIC BLOOD PRESSURE: 80 MMHG | SYSTOLIC BLOOD PRESSURE: 140 MMHG | HEIGHT: 61 IN | HEART RATE: 68 BPM | WEIGHT: 109 LBS | BODY MASS INDEX: 20.58 KG/M2

## 2024-07-29 DIAGNOSIS — I48.19 ATRIAL FIBRILLATION, PERSISTENT: Primary | ICD-10-CM

## 2024-07-29 DIAGNOSIS — I97.190 COMPLETE AV BLOCK DUE TO AV NODAL ABLATION: ICD-10-CM

## 2024-07-29 DIAGNOSIS — Z95.0 PRESENCE OF CARDIAC PACEMAKER: ICD-10-CM

## 2024-07-29 DIAGNOSIS — I44.2 COMPLETE AV BLOCK DUE TO AV NODAL ABLATION: ICD-10-CM

## 2024-07-29 DIAGNOSIS — I50.32 CHRONIC DIASTOLIC (CONGESTIVE) HEART FAILURE: ICD-10-CM

## 2024-07-29 PROCEDURE — 1160F RVW MEDS BY RX/DR IN RCRD: CPT | Performed by: INTERNAL MEDICINE

## 2024-07-29 PROCEDURE — 1159F MED LIST DOCD IN RCRD: CPT | Performed by: INTERNAL MEDICINE

## 2024-07-29 PROCEDURE — 99214 OFFICE O/P EST MOD 30 MIN: CPT | Performed by: INTERNAL MEDICINE

## 2024-07-29 NOTE — PROGRESS NOTES
[facility]      Patient Name: Demetra Rush  :1933  Age: 89 y.o.  Encounter Provider:  Albino Ji Jr, MD      Chief Complaint: Follow-up atrial fibrillation and nonrheumatic mitral regurgitation      HPI  Demetra Rush is a 89 y.o. female who previously followed with [facility] cardiology but is recently moved to [place] with a history of moderate mitral regurgitation, chronic diastolic heart failure, HLD and HTN. An ECHO on 2022 showed LVEF >55%,Mitral regurgitation, mild to moderate.      Pt was seen in follow up on 22 by [facility] cardiology. Pt denied any worsening cardiac symptoms and mild bilateral lower extremity swelling that is unchanged. Pt reported she has been gradually losing weight over a period of years. Pt denied any chest pain. No medication changes were made.      Pt presented to ER on  22 with complaints of increased breathing difficulties that started this evening. Pt tried to drink some pickle juice to help with reflux and it came back up. She then immediately had increased shortness of breath and was found to be hypoxic and hypotensive per EMS. Pt was given 300 cc NS per EMS. Pt had increased abd discomfort and reflux of the past several days with increasing weakness, sore throat and cough. Pt also reported chest discomfort from vomiting. In ER, BP 76/41, Glucose 261,  CO2 16.9,BUN/CR 18/0.93, troponin negative, Lactate 7.5, procal 0.03, MG 2.2, WBC 13.25, HGB 12.3, respiratory panel negative, CXR showed Cardiomegaly is present. Extensive bilateral alveolar and interstitial infiltrates are seen. Small right pleural effusion is suspected. No pneumothorax is identified. Pt was intubated in ER. EKG showed SR 66, Nonspecific IVCD with a QRS of 130, borderline prolonged QTC of 490 Nonspecific repolarization abnormalities with T wave inversion noted in lead V3,  Pt was strted on Levophed for hypotension post intubation. Pt was  admitted to CCU. GI was consulted for GERD. CT scan in ER showed mild distention of the stomach.   On that admission patient was found to be in atrial fibrillation with RVR and is asymptomatic.  She denies chest pain, shortness of air or palpitations.  No dizziness or syncope.  No orthopnea, PND or edema.      In October she was readmitted with heart failure and persistent atrial fibrillation with rates that were difficult to control.  Pacemaker was implanted and she was scheduled for future AV lakhwinder ablation which took place in November.  She had follow-up in EP clinic with Yumiko Teran at which time her device was interrogated and functioning well.  They turned on rate response to see if activity would be better tolerated.  She remained on low-dose Eliquis and presents today for routine follow-up.    She notes productive cough but much improved volume status.  She has been able to wean torsemide and has stable weight at home.  She is mostly complaining of a bluish discoloration to her feet.  She has noted temperature changes with her extremities since starting on Eliquis.  She denies any leg pain.  Limited functional capacity secondary to musculoskeletal issues but no chest pain or shortness of air with activity.  No orthopnea, PND or edema.    Patient has vertiginous symptoms and significant balance issues.  She is mostly relegated to wheelchair.  She had a fall with hip fracture in 2021.  She is still active around the home.  She denies any palpitations or syncope.  There have been no recent bleeding complications of apixaban therapy.    The following portions of the patient's history were reviewed and updated as appropriate: allergies, current medications, past family history, past medical history, past social history, past surgical history and problem list.      Review of Systems   Constitutional: Negative for chills and fever.   HENT:  Negative for hoarse voice and sore throat.    Eyes:  Negative for double  "vision and photophobia.   Cardiovascular:  Negative for chest pain, leg swelling, near-syncope, orthopnea, palpitations, paroxysmal nocturnal dyspnea and syncope.   Respiratory:  Negative for cough and wheezing.    Skin:  Positive for color change. Negative for poor wound healing and rash.   Musculoskeletal:  Negative for arthritis and joint swelling.   Gastrointestinal:  Negative for bloating, abdominal pain, hematemesis and hematochezia.   Neurological:  Negative for dizziness and focal weakness.   Psychiatric/Behavioral:  Negative for depression and suicidal ideas.        OBJECTIVE:   Vital Signs  Vitals:    07/29/24 1325   BP: 140/80   Pulse: 68     Estimated body mass index is 20.6 kg/m² as calculated from the following:    Height as of this encounter: 154.9 cm (61\").    Weight as of this encounter: 49.4 kg (109 lb).    Vitals reviewed.   Constitutional:       Appearance: Not in distress. Chronically ill-appearing.   Neck:      Vascular: No JVR. JVD normal.   Pulmonary:      Effort: Pulmonary effort is normal.      Breath sounds: No wheezing. No rhonchi. No rales.      Comments: Decreased air entry bibasilar zones  Chest:      Chest wall: Not tender to palpatation.   Cardiovascular:      PMI at left midclavicular line. Normal rate. Regular rhythm. Normal S1. Normal S2.       Murmurs: There is no murmur.      No gallop.  No click. No rub.   Pulses:     Intact distal pulses.   Edema:     Peripheral edema absent.   Abdominal:      General: Bowel sounds are normal.      Palpations: Abdomen is soft.      Tenderness: There is no abdominal tenderness.   Musculoskeletal: Normal range of motion.         General: No tenderness. Skin:     General: Skin is warm and dry.      Coloration: Skin is plethoric.   Neurological:      General: No focal deficit present.      Mental Status: Alert and oriented to person, place and time.       Procedures    Lipid Panel          8/30/2023    13:14   Lipid Panel   Total Cholesterol 219  "   Triglycerides 69    HDL Cholesterol 83    VLDL Cholesterol 12    LDL Cholesterol  124         BUN   Date Value Ref Range Status   07/02/2024 24 10 - 36 mg/dL Final   07/12/2023 15 8 - 23 mg/dL Final     Creatinine   Date Value Ref Range Status   07/02/2024 0.82 0.57 - 1.00 mg/dL Final   07/12/2023 0.73 0.57 - 1.00 mg/dL Final     Potassium   Date Value Ref Range Status   07/02/2024 4.9 3.5 - 5.2 mmol/L Final   07/12/2023 3.9 3.5 - 5.2 mmol/L Final     ALT (SGPT)   Date Value Ref Range Status   05/01/2024 12 1 - 33 U/L Final   07/10/2023 9 1 - 33 U/L Final     AST (SGOT)   Date Value Ref Range Status   05/01/2024 16 1 - 32 U/L Final   07/10/2023 14 1 - 32 U/L Final           ASSESSMENT:     89-year-old female with history of chronic diastolic heart failure, atrial fibrillation and nonrheumatic mitral regurgitation who presents for routine follow-up    PLAN OF CARE:     Chronic diastolic heart failure -much improved after AV lakhwinder ablation.  We can back off on torsemide to as needed use of weight gain greater than 2 pounds in 1 day or 5 pounds in 1 week.  Blood pressure and heart rate are well controlled today in clinic.  Continue same  Longstanding persistent atrial fibrillation -status post AV lakhwinder ablation and pacemaker implantation.  Normal device function on last interrogation.  I am concerned about her ongoing balance issues and we had a long and involved discussion regarding potential for evaluation of Watchman device.  I will refer to structural cardiology clinic for evaluation.  Lower extremity plethora -bluish-purple discoloration to bilateral feet with good pulses and no significant edema.  Normal WILIAN in November.  We will make a referral to vascular/vein center.  Nonrheumatic mitral regurgitation -severe mitral regurgitation on last echocardiogram.  No heart failure symptoms at this time.  Monitor clinical progress.    Return to clinic 6 months    08/07/24     Demetra Rush  7/22/1933     Utilizing  shared decision-making, the patient has decided to pursue left atrial appendage occlusion evaluation.  He/She has been deemed to be a suitable candidate for short-term anticoagulation therapy, but unable to take long-term anticoagulation secondary to falls.         Discharge Medications            Accurate as of July 29, 2024  1:29 PM. If you have any questions, ask your nurse or doctor.                Continue These Medications        Instructions Start Date   acetaminophen 325 MG tablet  Commonly known as: TYLENOL   650 mg, Oral, Every 4 Hours PRN      ALPRAZolam 0.5 MG tablet  Commonly known as: XANAX   0.5 mg, Oral, Nightly PRN      apixaban 2.5 MG tablet tablet  Commonly known as: Eliquis   2.5 mg, Oral, 2 Times Daily      Budesonide 3 MG 24 hr capsule  Commonly known as: ENTOCORT EC   Take 3 capsules by mouth Every Morning for 30 days, THEN 2 capsules Every Morning for 30 days, THEN 1 capsule Every Morning for 30 days.   Start Date: July 23, 2024     gabapentin 300 MG capsule  Commonly known as: NEURONTIN   TAKE ONE CAPSULE BY MOUTH THREE TIMES DAILY (MORNING, NOON, AND BEDTIME)      levothyroxine 75 MCG tablet  Commonly known as: SYNTHROID, LEVOTHROID   TAKE ONE TABLET BY MOUTH DAILY      pantoprazole 40 MG EC tablet  Commonly known as: PROTONIX   TAKE ONE TABLET BY MOUTH TWICE DAILY      potassium chloride 10 MEQ CR tablet   TAKE TWO TABLETS EVERY MORNING and TAKE ONE TABLET AT BEDTIME      torsemide 20 MG tablet  Commonly known as: DEMADEX   20 mg, Oral, Daily PRN               Thank you for allowing me to participate in the care of your patient,      Sincerely,   Albino Ji MD  [facility]  07/29/24  13:29 EDT

## 2024-08-09 RX ORDER — POTASSIUM CHLORIDE 750 MG/1
TABLET, FILM COATED, EXTENDED RELEASE ORAL
Qty: 90 TABLET | Refills: 0 | OUTPATIENT
Start: 2024-08-09

## 2024-08-16 ENCOUNTER — TELEPHONE (OUTPATIENT)
Age: 89
End: 2024-08-16
Payer: MEDICARE

## 2024-08-16 NOTE — TELEPHONE ENCOUNTER
Pt  has a SC VVIR  MDT Attesta pacemaker device that was implanted 10/20/2022. Pt is also dependent; she had AVN Ablation 11/03/2022. In pt's most recent remote transmission 08/15/24, it appears the RV Threshold is gradually increasing. It is now resulting at 2V @ 0.4ms with programmed output of 3.75V @ 0.46ms. We last saw pt in office for manual device testing 01/23/2024 with manual RV threshold resulting at 1.25V @ 0.4ms.

## 2024-08-19 ENCOUNTER — PATIENT MESSAGE (OUTPATIENT)
Dept: GASTROENTEROLOGY | Facility: CLINIC | Age: 89
End: 2024-08-19
Payer: MEDICARE

## 2024-08-20 ENCOUNTER — CLINICAL SUPPORT NO REQUIREMENTS (OUTPATIENT)
Age: 89
End: 2024-08-20
Payer: MEDICARE

## 2024-08-20 DIAGNOSIS — Z95.0 PRESENCE OF CARDIAC PACEMAKER: Primary | ICD-10-CM

## 2024-08-27 DIAGNOSIS — E87.6 HYPOKALEMIA: ICD-10-CM

## 2024-08-27 DIAGNOSIS — K52.9 CHRONIC DIARRHEA: Primary | ICD-10-CM

## 2024-08-28 RX ORDER — POTASSIUM CHLORIDE 750 MG/1
TABLET, EXTENDED RELEASE ORAL
Qty: 90 TABLET | Refills: 0 | OUTPATIENT
Start: 2024-08-28

## 2024-08-28 NOTE — TELEPHONE ENCOUNTER
Please call patient/daughter and find out how much potassium she is taking per day.  Then let's get her potassium checked to see where she is.  Ideally she will need to get her potassium filled through her primary doctor in the long run.  I filled it last time because she was potassium deficient due to the diarrhea.

## 2024-08-29 DIAGNOSIS — S09.90XA CLOSED HEAD INJURY, INITIAL ENCOUNTER: ICD-10-CM

## 2024-08-29 RX ORDER — POTASSIUM CHLORIDE 750 MG/1
TABLET, EXTENDED RELEASE ORAL
Qty: 90 TABLET | Refills: 0 | Status: SHIPPED | OUTPATIENT
Start: 2024-08-29

## 2024-08-29 RX ORDER — ALPRAZOLAM 0.5 MG
0.5 TABLET ORAL NIGHTLY PRN
Qty: 90 TABLET | Refills: 0 | OUTPATIENT
Start: 2024-08-29

## 2024-08-29 RX ORDER — POTASSIUM CHLORIDE 750 MG/1
TABLET, EXTENDED RELEASE ORAL
Qty: 90 TABLET | Refills: 0 | OUTPATIENT
Start: 2024-08-29

## 2024-08-29 NOTE — TELEPHONE ENCOUNTER
From: Demetra Rush  To: Naz Mena  Sent: 8/19/2024 1:11 PM EDT  Subject: Dr. Rajesh Lombardi:      Mom prescribed potassium and the pharmacy said the refill was rejected by the physician. Is this ok with you?

## 2024-08-30 ENCOUNTER — TELEPHONE (OUTPATIENT)
Dept: CARDIOLOGY | Facility: HOSPITAL | Age: 89
End: 2024-08-30
Payer: MEDICARE

## 2024-08-30 DIAGNOSIS — S09.90XA CLOSED HEAD INJURY, INITIAL ENCOUNTER: ICD-10-CM

## 2024-08-30 RX ORDER — ALPRAZOLAM 0.5 MG
0.5 TABLET ORAL NIGHTLY PRN
Qty: 90 TABLET | Refills: 0 | OUTPATIENT
Start: 2024-08-30

## 2024-08-30 NOTE — TELEPHONE ENCOUNTER
Dr Ji referred Ms Rush for LAAO/Watchman evaluation. I called & spoke with pt's daughter (Nettie). We discussed the Watchman procedure. Nettie expressed concerns about her mother's frailty and risk for falls. She states they try not to get her out very much due to her fall risk. She walks with a walker at home & outside of the home. Nettie is also concerned about her mother being under general anesthesia for any procedures. She is interested in speaking with Dr Wynn further and has asked if this can be a tele visit.  I reviewed the above with Dr Wynn and he agrees that we can work out a video visit to discuss.   I have communicated this with Nettie. I am also mailing out a shared decision-making tool with info on Watchman. Appt has been requested. RTRN

## 2024-09-17 ENCOUNTER — PATIENT MESSAGE (OUTPATIENT)
Dept: FAMILY MEDICINE CLINIC | Facility: CLINIC | Age: 89
End: 2024-09-17
Payer: MEDICARE

## 2024-09-18 ENCOUNTER — OFFICE VISIT (OUTPATIENT)
Dept: FAMILY MEDICINE CLINIC | Facility: CLINIC | Age: 89
End: 2024-09-18
Payer: MEDICARE

## 2024-09-18 VITALS
WEIGHT: 108 LBS | HEIGHT: 60 IN | DIASTOLIC BLOOD PRESSURE: 84 MMHG | OXYGEN SATURATION: 98 % | BODY MASS INDEX: 21.2 KG/M2 | HEART RATE: 84 BPM | TEMPERATURE: 98 F | SYSTOLIC BLOOD PRESSURE: 148 MMHG

## 2024-09-18 DIAGNOSIS — Z23 IMMUNIZATION DUE: ICD-10-CM

## 2024-09-18 DIAGNOSIS — R20.2 TINGLING IN EXTREMITIES: ICD-10-CM

## 2024-09-18 DIAGNOSIS — I97.190 COMPLETE AV BLOCK DUE TO AV NODAL ABLATION: ICD-10-CM

## 2024-09-18 DIAGNOSIS — Z78.0 POST-MENOPAUSAL: ICD-10-CM

## 2024-09-18 DIAGNOSIS — E03.9 ACQUIRED HYPOTHYROIDISM: ICD-10-CM

## 2024-09-18 DIAGNOSIS — Z11.1 SCREENING-PULMONARY TB: ICD-10-CM

## 2024-09-18 DIAGNOSIS — I27.20 PULMONARY HYPERTENSION: ICD-10-CM

## 2024-09-18 DIAGNOSIS — I44.2 COMPLETE AV BLOCK DUE TO AV NODAL ABLATION: ICD-10-CM

## 2024-09-18 DIAGNOSIS — Z79.899 LONG TERM USE OF DRUG: ICD-10-CM

## 2024-09-18 DIAGNOSIS — I50.32 CHRONIC DIASTOLIC (CONGESTIVE) HEART FAILURE: ICD-10-CM

## 2024-09-18 DIAGNOSIS — S09.90XA CLOSED HEAD INJURY, INITIAL ENCOUNTER: ICD-10-CM

## 2024-09-18 DIAGNOSIS — I10 PRIMARY HYPERTENSION: Primary | ICD-10-CM

## 2024-09-18 DIAGNOSIS — R13.10 DYSPHAGIA, UNSPECIFIED TYPE: ICD-10-CM

## 2024-09-18 DIAGNOSIS — F51.01 PRIMARY INSOMNIA: ICD-10-CM

## 2024-09-18 PROCEDURE — 90480 ADMN SARSCOV2 VAC 1/ONLY CMP: CPT | Performed by: FAMILY MEDICINE

## 2024-09-18 PROCEDURE — 99214 OFFICE O/P EST MOD 30 MIN: CPT | Performed by: FAMILY MEDICINE

## 2024-09-18 PROCEDURE — 90662 IIV NO PRSV INCREASED AG IM: CPT | Performed by: FAMILY MEDICINE

## 2024-09-18 PROCEDURE — G0008 ADMIN INFLUENZA VIRUS VAC: HCPCS | Performed by: FAMILY MEDICINE

## 2024-09-18 PROCEDURE — 1126F AMNT PAIN NOTED NONE PRSNT: CPT | Performed by: FAMILY MEDICINE

## 2024-09-18 PROCEDURE — 91320 SARSCV2 VAC 30MCG TRS-SUC IM: CPT | Performed by: FAMILY MEDICINE

## 2024-09-18 PROCEDURE — 1159F MED LIST DOCD IN RCRD: CPT | Performed by: FAMILY MEDICINE

## 2024-09-18 PROCEDURE — 1160F RVW MEDS BY RX/DR IN RCRD: CPT | Performed by: FAMILY MEDICINE

## 2024-09-18 RX ORDER — ALPRAZOLAM 0.5 MG
0.5 TABLET ORAL NIGHTLY PRN
Qty: 90 TABLET | Refills: 0 | Status: SHIPPED | OUTPATIENT
Start: 2024-09-18

## 2024-09-18 RX ORDER — PANTOPRAZOLE SODIUM 40 MG/1
TABLET, DELAYED RELEASE ORAL
Qty: 180 TABLET | Refills: 3 | Status: SHIPPED | OUTPATIENT
Start: 2024-09-18

## 2024-09-18 RX ORDER — LEVOTHYROXINE SODIUM 75 UG/1
75 TABLET ORAL DAILY
Qty: 90 TABLET | Refills: 2 | Status: SHIPPED | OUTPATIENT
Start: 2024-09-18

## 2024-09-18 RX ORDER — GABAPENTIN 300 MG/1
300 CAPSULE ORAL 3 TIMES DAILY
Qty: 270 CAPSULE | Refills: 1 | Status: SHIPPED | OUTPATIENT
Start: 2024-09-18

## 2024-09-19 ENCOUNTER — TELEMEDICINE (OUTPATIENT)
Dept: CARDIOLOGY | Facility: CLINIC | Age: 89
End: 2024-09-19
Payer: MEDICARE

## 2024-09-19 DIAGNOSIS — I48.19 ATRIAL FIBRILLATION, PERSISTENT: Primary | ICD-10-CM

## 2024-09-19 LAB
ALBUMIN SERPL-MCNC: 4.4 G/DL (ref 3.6–4.6)
ALP SERPL-CCNC: 89 IU/L (ref 44–121)
ALT SERPL-CCNC: 13 IU/L (ref 0–32)
AST SERPL-CCNC: 18 IU/L (ref 0–40)
BILIRUB SERPL-MCNC: 0.4 MG/DL (ref 0–1.2)
BUN SERPL-MCNC: 19 MG/DL (ref 10–36)
BUN/CREAT SERPL: 22 (ref 12–28)
CALCIUM SERPL-MCNC: 9.5 MG/DL (ref 8.7–10.3)
CHLORIDE SERPL-SCNC: 100 MMOL/L (ref 96–106)
CHOLEST SERPL-MCNC: 222 MG/DL (ref 100–199)
CO2 SERPL-SCNC: 21 MMOL/L (ref 20–29)
CREAT SERPL-MCNC: 0.87 MG/DL (ref 0.57–1)
EGFRCR SERPLBLD CKD-EPI 2021: 63 ML/MIN/1.73
GLOBULIN SER CALC-MCNC: 3 G/DL (ref 1.5–4.5)
GLUCOSE SERPL-MCNC: 104 MG/DL (ref 70–99)
HDLC SERPL-MCNC: 83 MG/DL
LDLC SERPL CALC-MCNC: 116 MG/DL (ref 0–99)
POTASSIUM SERPL-SCNC: 4.7 MMOL/L (ref 3.5–5.2)
PROT SERPL-MCNC: 7.4 G/DL (ref 6–8.5)
SODIUM SERPL-SCNC: 139 MMOL/L (ref 134–144)
TRIGL SERPL-MCNC: 132 MG/DL (ref 0–149)
TSH SERPL DL<=0.005 MIU/L-ACNC: 3.14 UIU/ML (ref 0.45–4.5)
VLDLC SERPL CALC-MCNC: 23 MG/DL (ref 5–40)

## 2024-09-24 ENCOUNTER — APPOINTMENT (OUTPATIENT)
Dept: GENERAL RADIOLOGY | Facility: HOSPITAL | Age: 89
End: 2024-09-24
Payer: MEDICARE

## 2024-09-24 ENCOUNTER — APPOINTMENT (OUTPATIENT)
Dept: NEUROLOGY | Facility: HOSPITAL | Age: 89
End: 2024-09-24
Payer: MEDICARE

## 2024-09-24 ENCOUNTER — HOSPITAL ENCOUNTER (INPATIENT)
Facility: HOSPITAL | Age: 89
LOS: 4 days | Discharge: NURSING, REHAB OR LONG-TERM CARE | End: 2024-09-30
Attending: EMERGENCY MEDICINE | Admitting: INTERNAL MEDICINE
Payer: MEDICARE

## 2024-09-24 ENCOUNTER — APPOINTMENT (OUTPATIENT)
Dept: CT IMAGING | Facility: HOSPITAL | Age: 89
End: 2024-09-24
Payer: MEDICARE

## 2024-09-24 DIAGNOSIS — R41.0 CONFUSION: ICD-10-CM

## 2024-09-24 DIAGNOSIS — Z79.01 ANTICOAGULATED: ICD-10-CM

## 2024-09-24 DIAGNOSIS — R47.9 SPEECH DISTURBANCE, UNSPECIFIED TYPE: ICD-10-CM

## 2024-09-24 DIAGNOSIS — S09.90XA CLOSED HEAD INJURY, INITIAL ENCOUNTER: ICD-10-CM

## 2024-09-24 DIAGNOSIS — I48.91 ATRIAL FIBRILLATION, UNSPECIFIED TYPE: ICD-10-CM
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
## 2024-09-24 PROBLEM — R29.90 STROKE-LIKE SYMPTOMS: Status: ACTIVE | Noted: 2024-09-24

## 2024-09-24 LAB
ALBUMIN SERPL-MCNC: 3.5 G/DL (ref 3.5–5.2)
ALBUMIN/GLOB SERPL: 1 G/DL
ALP SERPL-CCNC: 78 U/L (ref 39–117)
ALT SERPL W P-5'-P-CCNC: 8 U/L (ref 1–33)
AMORPH URATE CRY URNS QL MICRO: ABNORMAL /HPF
ANION GAP SERPL CALCULATED.3IONS-SCNC: 9.6 MMOL/L (ref 5–15)
APTT PPP: 32.4 SECONDS (ref 22.7–35.4)
APTT PPP: 33.5 SECONDS (ref 22.7–35.4)
AST SERPL-CCNC: 13 U/L (ref 1–32)
BACTERIA UR QL AUTO: ABNORMAL /HPF
BASOPHILS # BLD AUTO: 0.03 10*3/MM3 (ref 0–0.2)
BASOPHILS NFR BLD AUTO: 0.4 % (ref 0–1.5)
BILIRUB SERPL-MCNC: 0.4 MG/DL (ref 0–1.2)
BILIRUB UR QL STRIP: NEGATIVE
BUN SERPL-MCNC: 21 MG/DL (ref 8–23)
BUN/CREAT SERPL: 23.6 (ref 7–25)
CALCIUM SPEC-SCNC: 9.2 MG/DL (ref 8.2–9.6)
CHLORIDE SERPL-SCNC: 104 MMOL/L (ref 98–107)
CHOLEST SERPL-MCNC: 190 MG/DL (ref 0–200)
CLARITY UR: ABNORMAL
CO2 SERPL-SCNC: 25.4 MMOL/L (ref 22–29)
COLOR UR: YELLOW
CREAT SERPL-MCNC: 0.89 MG/DL (ref 0.57–1)
D-LACTATE SERPL-SCNC: 0.8 MMOL/L (ref 0.5–2)
DEPRECATED RDW RBC AUTO: 43.3 FL (ref 37–54)
EGFRCR SERPLBLD CKD-EPI 2021: 61.3 ML/MIN/1.73
EOSINOPHIL # BLD AUTO: 0.08 10*3/MM3 (ref 0–0.4)
EOSINOPHIL NFR BLD AUTO: 1.1 % (ref 0.3–6.2)
ERYTHROCYTE [DISTWIDTH] IN BLOOD BY AUTOMATED COUNT: 14.3 % (ref 12.3–15.4)
GLOBULIN UR ELPH-MCNC: 3.4 GM/DL
GLUCOSE BLDC GLUCOMTR-MCNC: 100 MG/DL (ref 70–130)
GLUCOSE BLDC GLUCOMTR-MCNC: 103 MG/DL (ref 70–130)
GLUCOSE SERPL-MCNC: 97 MG/DL (ref 65–99)
GLUCOSE UR STRIP-MCNC: NEGATIVE MG/DL
HBA1C MFR BLD: 5.8 % (ref 4.8–5.6)
HCT VFR BLD AUTO: 35.8 % (ref 34–46.6)
HDLC SERPL-MCNC: 75 MG/DL (ref 40–60)
HGB BLD-MCNC: 11.5 G/DL (ref 12–15.9)
HGB UR QL STRIP.AUTO: ABNORMAL
HOLD SPECIMEN: NORMAL
HOLD SPECIMEN: NORMAL
HYALINE CASTS UR QL AUTO: ABNORMAL /LPF
IMM GRANULOCYTES # BLD AUTO: 0.04 10*3/MM3 (ref 0–0.05)
IMM GRANULOCYTES NFR BLD AUTO: 0.6 % (ref 0–0.5)
INR PPP: 1.22 (ref 0.9–1.1)
INR PPP: 1.26 (ref 0.9–1.1)
KETONES UR QL STRIP: ABNORMAL
LDLC SERPL CALC-MCNC: 102 MG/DL (ref 0–100)
LDLC/HDLC SERPL: 1.35 {RATIO}
LEUKOCYTE ESTERASE UR QL STRIP.AUTO: ABNORMAL
LYMPHOCYTES # BLD AUTO: 0.95 10*3/MM3 (ref 0.7–3.1)
LYMPHOCYTES NFR BLD AUTO: 13.6 % (ref 19.6–45.3)
MAGNESIUM SERPL-MCNC: 2.2 MG/DL (ref 1.7–2.3)
MCH RBC QN AUTO: 27 PG (ref 26.6–33)
MCHC RBC AUTO-ENTMCNC: 32.1 G/DL (ref 31.5–35.7)
MCV RBC AUTO: 84 FL (ref 79–97)
MONOCYTES # BLD AUTO: 0.8 10*3/MM3 (ref 0.1–0.9)
MONOCYTES NFR BLD AUTO: 11.4 % (ref 5–12)
NEUTROPHILS NFR BLD AUTO: 5.1 10*3/MM3 (ref 1.7–7)
NEUTROPHILS NFR BLD AUTO: 72.9 % (ref 42.7–76)
NITRITE UR QL STRIP: POSITIVE
NRBC BLD AUTO-RTO: 0 /100 WBC (ref 0–0.2)
PH UR STRIP.AUTO: 8 [PH] (ref 5–8)
PLATELET # BLD AUTO: 253 10*3/MM3 (ref 140–450)
PMV BLD AUTO: 9.6 FL (ref 6–12)
POTASSIUM SERPL-SCNC: 4.5 MMOL/L (ref 3.5–5.2)
PROCALCITONIN SERPL-MCNC: 0.05 NG/ML (ref 0–0.25)
PROT SERPL-MCNC: 6.9 G/DL (ref 6–8.5)
PROT UR QL STRIP: ABNORMAL
PROTHROMBIN TIME: 15.6 SECONDS (ref 11.7–14.2)
PROTHROMBIN TIME: 16.1 SECONDS (ref 11.7–14.2)
QT INTERVAL: 447 MS
QTC INTERVAL: 483 MS
RBC # BLD AUTO: 4.26 10*6/MM3 (ref 3.77–5.28)
RBC # UR STRIP: ABNORMAL /HPF
REF LAB TEST METHOD: ABNORMAL
SODIUM SERPL-SCNC: 139 MMOL/L (ref 136–145)
SP GR UR STRIP: 1.02 (ref 1–1.03)
SQUAMOUS #/AREA URNS HPF: ABNORMAL /HPF
TRI-PHOS CRY URNS QL MICRO: ABNORMAL /HPF
TRIGL SERPL-MCNC: 68 MG/DL (ref 0–150)
TROPONIN T SERPL HS-MCNC: 14 NG/L
TROPONIN T SERPL HS-MCNC: 15 NG/L
UROBILINOGEN UR QL STRIP: ABNORMAL
VLDLC SERPL-MCNC: 13 MG/DL (ref 5–40)
WBC # UR STRIP: ABNORMAL /HPF
WBC NRBC COR # BLD AUTO: 7 10*3/MM3 (ref 3.4–10.8)
WHOLE BLOOD HOLD COAG: NORMAL
WHOLE BLOOD HOLD SPECIMEN: NORMAL

## 2024-09-24 PROCEDURE — 85025 COMPLETE CBC W/AUTO DIFF WBC: CPT | Performed by: EMERGENCY MEDICINE

## 2024-09-24 PROCEDURE — 71045 X-RAY EXAM CHEST 1 VIEW: CPT

## 2024-09-24 PROCEDURE — 85610 PROTHROMBIN TIME: CPT | Performed by: PHYSICIAN ASSISTANT

## 2024-09-24 PROCEDURE — 83036 HEMOGLOBIN GLYCOSYLATED A1C: CPT | Performed by: PHYSICIAN ASSISTANT

## 2024-09-24 PROCEDURE — 72125 CT NECK SPINE W/O DYE: CPT

## 2024-09-24 PROCEDURE — 83735 ASSAY OF MAGNESIUM: CPT | Performed by: EMERGENCY MEDICINE

## 2024-09-24 PROCEDURE — 93010 ELECTROCARDIOGRAM REPORT: CPT | Performed by: INTERNAL MEDICINE

## 2024-09-24 PROCEDURE — 84484 ASSAY OF TROPONIN QUANT: CPT | Performed by: PHYSICIAN ASSISTANT

## 2024-09-24 PROCEDURE — G0378 HOSPITAL OBSERVATION PER HR: HCPCS

## 2024-09-24 PROCEDURE — 87086 URINE CULTURE/COLONY COUNT: CPT | Performed by: PHYSICIAN ASSISTANT

## 2024-09-24 PROCEDURE — 25010000002 CEFTRIAXONE PER 250 MG: Performed by: PHYSICIAN ASSISTANT

## 2024-09-24 PROCEDURE — 87040 BLOOD CULTURE FOR BACTERIA: CPT | Performed by: PHYSICIAN ASSISTANT

## 2024-09-24 PROCEDURE — 93005 ELECTROCARDIOGRAM TRACING: CPT | Performed by: EMERGENCY MEDICINE

## 2024-09-24 PROCEDURE — 80061 LIPID PANEL: CPT | Performed by: PHYSICIAN ASSISTANT

## 2024-09-24 PROCEDURE — 85730 THROMBOPLASTIN TIME PARTIAL: CPT | Performed by: PHYSICIAN ASSISTANT

## 2024-09-24 PROCEDURE — 87088 URINE BACTERIA CULTURE: CPT | Performed by: PHYSICIAN ASSISTANT

## 2024-09-24 PROCEDURE — 84484 ASSAY OF TROPONIN QUANT: CPT | Performed by: EMERGENCY MEDICINE

## 2024-09-24 PROCEDURE — 82948 REAGENT STRIP/BLOOD GLUCOSE: CPT

## 2024-09-24 PROCEDURE — 36415 COLL VENOUS BLD VENIPUNCTURE: CPT | Performed by: PHYSICIAN ASSISTANT

## 2024-09-24 PROCEDURE — 83605 ASSAY OF LACTIC ACID: CPT | Performed by: PHYSICIAN ASSISTANT

## 2024-09-24 PROCEDURE — 95819 EEG AWAKE AND ASLEEP: CPT | Performed by: STUDENT IN AN ORGANIZED HEALTH CARE EDUCATION/TRAINING PROGRAM

## 2024-09-24 PROCEDURE — 99291 CRITICAL CARE FIRST HOUR: CPT

## 2024-09-24 PROCEDURE — 85610 PROTHROMBIN TIME: CPT | Performed by: EMERGENCY MEDICINE

## 2024-09-24 PROCEDURE — 85730 THROMBOPLASTIN TIME PARTIAL: CPT | Performed by: EMERGENCY MEDICINE

## 2024-09-24 PROCEDURE — 81001 URINALYSIS AUTO W/SCOPE: CPT | Performed by: EMERGENCY MEDICINE

## 2024-09-24 PROCEDURE — 84145 PROCALCITONIN (PCT): CPT | Performed by: EMERGENCY MEDICINE

## 2024-09-24 PROCEDURE — 70450 CT HEAD/BRAIN W/O DYE: CPT

## 2024-09-24 PROCEDURE — 80053 COMPREHEN METABOLIC PANEL: CPT | Performed by: EMERGENCY MEDICINE

## 2024-09-24 PROCEDURE — 87186 SC STD MICRODIL/AGAR DIL: CPT | Performed by: PHYSICIAN ASSISTANT

## 2024-09-24 PROCEDURE — 95819 EEG AWAKE AND ASLEEP: CPT

## 2024-09-24 PROCEDURE — 36415 COLL VENOUS BLD VENIPUNCTURE: CPT

## 2024-09-24 RX ORDER — ATORVASTATIN CALCIUM 20 MG/1
40 TABLET, FILM COATED ORAL NIGHTLY
Status: DISCONTINUED | OUTPATIENT
Start: 2024-09-24 | End: 2024-09-30 | Stop reason: HOSPADM

## 2024-09-24 RX ORDER — ASPIRIN 325 MG
325 TABLET ORAL DAILY
Status: DISCONTINUED | OUTPATIENT
Start: 2024-09-24 | End: 2024-09-25

## 2024-09-24 RX ORDER — ONDANSETRON 2 MG/ML
4 INJECTION INTRAMUSCULAR; INTRAVENOUS EVERY 6 HOURS PRN
Status: DISCONTINUED | OUTPATIENT
Start: 2024-09-24 | End: 2024-09-30 | Stop reason: HOSPADM

## 2024-09-24 RX ORDER — ALPRAZOLAM 0.5 MG
0.5 TABLET ORAL NIGHTLY PRN
Status: DISCONTINUED | OUTPATIENT
Start: 2024-09-24 | End: 2024-09-30 | Stop reason: HOSPADM

## 2024-09-24 RX ORDER — PANTOPRAZOLE SODIUM 40 MG/1
40 TABLET, DELAYED RELEASE ORAL
Status: DISCONTINUED | OUTPATIENT
Start: 2024-09-25 | End: 2024-09-30 | Stop reason: HOSPADM

## 2024-09-24 RX ORDER — LEVOTHYROXINE SODIUM 75 UG/1
75 TABLET ORAL DAILY
Status: DISCONTINUED | OUTPATIENT
Start: 2024-09-25 | End: 2024-09-30 | Stop reason: HOSPADM

## 2024-09-24 RX ORDER — SODIUM CHLORIDE 9 MG/ML
40 INJECTION, SOLUTION INTRAVENOUS AS NEEDED
Status: DISCONTINUED | OUTPATIENT
Start: 2024-09-24 | End: 2024-09-30 | Stop reason: HOSPADM

## 2024-09-24 RX ORDER — SODIUM CHLORIDE 0.9 % (FLUSH) 0.9 %
10 SYRINGE (ML) INJECTION AS NEEDED
Status: DISCONTINUED | OUTPATIENT
Start: 2024-09-24 | End: 2024-09-30 | Stop reason: HOSPADM

## 2024-09-24 RX ORDER — GABAPENTIN 300 MG/1
300 CAPSULE ORAL 3 TIMES DAILY
Status: DISCONTINUED | OUTPATIENT
Start: 2024-09-24 | End: 2024-09-30 | Stop reason: HOSPADM

## 2024-09-24 RX ORDER — SODIUM CHLORIDE 0.9 % (FLUSH) 0.9 %
10 SYRINGE (ML) INJECTION EVERY 12 HOURS SCHEDULED
Status: DISCONTINUED | OUTPATIENT
Start: 2024-09-24 | End: 2024-09-30 | Stop reason: HOSPADM

## 2024-09-24 RX ORDER — ASPIRIN 300 MG/1
300 SUPPOSITORY RECTAL DAILY
Status: DISCONTINUED | OUTPATIENT
Start: 2024-09-24 | End: 2024-09-25

## 2024-09-24 RX ADMIN — GABAPENTIN 300 MG: 300 CAPSULE ORAL at 21:59

## 2024-09-24 RX ADMIN — ALPRAZOLAM 0.5 MG: 0.5 TABLET ORAL at 23:42

## 2024-09-24 RX ADMIN — APIXABAN 2.5 MG: 2.5 TABLET, FILM COATED ORAL at 21:59

## 2024-09-24 RX ADMIN — ASPIRIN 325 MG: 325 TABLET ORAL at 21:57

## 2024-09-24 RX ADMIN — ATORVASTATIN CALCIUM 40 MG: 20 TABLET, FILM COATED ORAL at 21:57

## 2024-09-24 RX ADMIN — CEFTRIAXONE SODIUM 1000 MG: 1 INJECTION, POWDER, FOR SOLUTION INTRAMUSCULAR; INTRAVENOUS at 22:58

## 2024-09-24 NOTE — ED NOTES
Nursing report ED to floor  Demetra Rush  89 y.o.  female    HPI :  HPI (Adult)  Stated Reason for Visit: increased weakness and EMS.    Chief Complaint  Chief Complaint   Patient presents with    Weakness - Generalized    Fall       Admitting doctor:   Ning Mcneill MD    Admitting diagnosis:   The primary encounter diagnosis was Fall, initial encounter. Diagnoses of Closed head injury, initial encounter, Anticoagulated, Speech disturbance, unspecified type, Confusion, and Atrial fibrillation, unspecified type were also pertinent to this visit.    Code status:   Current Code Status       Date Active Code Status Order ID Comments User Context       9/24/2024 1854 CPR (Attempt to Resuscitate) [id number]  Luis Tran III, PA ED        Question Answer    Code Status (Patient has no pulse and is not breathing) CPR (Attempt to Resuscitate)    Medical Interventions (Patient has pulse or is breathing) Full Support    Level Of Support Discussed With Patient                    Allergies:   Sulfate, Amlodipine, Codeine, and Sulfa antibiotics    Isolation:   No active isolations    Intake and Output  No intake or output data in the 24 hours ending 09/24/24 1922    Weight:   There were no vitals filed for this visit.    Most recent vitals:   Vitals:    09/24/24 1526   BP: 142/78   Pulse: 100   Resp: 16   Temp: 98.4 °F (36.9 °C)   SpO2: 98%       Active LDAs/IV Access:   Lines, Drains & Airways       Active LDAs       None                    Labs (abnormal labs have a star):   Labs Reviewed   SINGLE HS TROPONIN T - Abnormal; Notable for the following components:       Result Value    HS Troponin T 14 (*)     All other components within normal limits    Narrative:     High Sensitive Troponin T Reference Range:  <14.0 ng/L- Negative Female for AMI  <22.0 ng/L- Negative Male for AMI  >=14 - Abnormal Female indicating possible myocardial injury.  >=22 - Abnormal Male indicating possible myocardial injury.   Clinicians  "would have to utilize clinical acumen, EKG, Troponin, and serial changes to determine if it is an Acute Myocardial Infarction or myocardial injury due to an underlying chronic condition.        URINALYSIS W/ MICROSCOPIC IF INDICATED (NO CULTURE) - Abnormal; Notable for the following components:    Appearance, UA Turbid (*)     Ketones, UA 15 mg/dL (1+) (*)     Blood, UA Small (1+) (*)     Protein, UA 30 mg/dL (1+) (*)     Leuk Esterase, UA Moderate (2+) (*)     Nitrite, UA Positive (*)     All other components within normal limits   CBC WITH AUTO DIFFERENTIAL - Abnormal; Notable for the following components:    Hemoglobin 11.5 (*)     Lymphocyte % 13.6 (*)     Immature Grans % 0.6 (*)     All other components within normal limits   PROTIME-INR - Abnormal; Notable for the following components:    Protime 15.6 (*)     INR 1.22 (*)     All other components within normal limits   MAGNESIUM - Normal   APTT - Normal   PROCALCITONIN - Normal    Narrative:     As a Marker for Sepsis (Non-Neonates):    1. <0.5 ng/mL represents a low risk of severe sepsis and/or septic shock.  2. >2 ng/mL represents a high risk of severe sepsis and/or septic shock.    As a Marker for Lower Respiratory Tract Infections that require antibiotic therapy:    PCT on Admission    Antibiotic Therapy       6-12 Hrs later    >0.5                Strongly Recommended  >0.25 - <0.5        Recommended   0.1 - 0.25          Discouraged              Remeasure/reassess PCT  <0.1                Strongly Discouraged     Remeasure/reassess PCT    As 28 day mortality risk marker: \"Change in Procalcitonin Result\" (>80% or <=80%) if Day 0 (or Day 1) and Day 4 values are available. Refer to http://www.[facility]-pct-calculator.com    Change in PCT <=80%  A decrease of PCT levels below or equal to 80% defines a positive change in PCT test result representing a higher risk for 28-day all-cause mortality of patients diagnosed with severe sepsis for septic shock.    Change " in PCT >80%  A decrease of PCT levels of more than 80% defines a negative change in PCT result representing a lower risk for 28-day all-cause mortality of patients diagnosed with severe sepsis or septic shock.      POCT GLUCOSE FINGERSTICK - Normal   RAINBOW DRAW    Narrative:     The following orders were created for panel order [place] Draw.  Procedure                               Abnormality         Status                     ---------                               -----------         ------                     Green Top (Gel)[[id number]]                                  Final result               Lavender Top[[id number]]                                     Final result               Gold Top - SST[[id number]]                                   Final result               Light Blue Top[[id number]]                                   Final result                 Please view results for these tests on the individual orders.   COMPREHENSIVE METABOLIC PANEL    Narrative:     GFR Normal >60  Chronic Kidney Disease <60  Kidney Failure <15    The GFR formula is only valid for adults with stable renal function between ages 18 and 70.   HEMOGLOBIN A1C   LIPID PANEL   APTT   PROTIME-INR   URINALYSIS, MICROSCOPIC ONLY   POCT GLUCOSE FINGERSTICK   POCT GLUCOSE FINGERSTICK   POCT GLUCOSE FINGERSTICK   POCT GLUCOSE FINGERSTICK   POCT GLUCOSE FINGERSTICK   CBC AND DIFFERENTIAL    Narrative:     The following orders were created for panel order CBC & Differential.  Procedure                               Abnormality         Status                     ---------                               -----------         ------                     CBC Auto Differential[[id number]]        Abnormal            Final result                 Please view results for these tests on the individual orders.   GREEN TOP   LAVENDER TOP   GOLD TOP - SST   LIGHT BLUE TOP       EKG:   ECG 12 Lead ED Triage Standing Order; Weak / Dizzy / AMS   Final Result   HEART  RATE=70  bpm   RR Jnfukqul=776  ms   ME Interval=  ms   P Horizontal Axis=246  deg   P Front Axis=  deg   QRSD Nbvjitbi=220  ms   QT Uajcdogb=747  ms   QUiH=922  ms   QRS Axis=-9  deg   T Wave Axis=57  deg   - ABNORMAL ECG -   Afib/flutter and ventricular-paced rhythm   No further analysis attempted due to paced rhythm   No change from previous tracing   Electronically Signed By: Luis Wynn ([facility] 2024-09-24 17:18:25   Date and Time of Study:2024-09-24 16:33:51          Meds given in ED:   Medications   sodium chloride 0.9 % flush 10 mL (has no administration in time range)   sodium chloride 0.9 % flush 10 mL (has no administration in time range)   sodium chloride 0.9 % flush 10 mL (has no administration in time range)   sodium chloride 0.9 % infusion 40 mL (has no administration in time range)   atorvastatin (LIPITOR) tablet 40 mg (has no administration in time range)   ondansetron (ZOFRAN) injection 4 mg (has no administration in time range)   aspirin tablet 325 mg (has no administration in time range)     Or   aspirin suppository 300 mg (has no administration in time range)       Imaging results:  CT Head Without Contrast    Result Date: 9/24/2024  1. No acute intracranial abnormality is identified.  2. There is mild-moderate small vessel disease in the cerebral white matter and there is a 5 mm old lacunar infarct in the genu of the right internal capsule and there is diffuse cerebral atrophy. The remainder of the head CT is within normal limits with no acute skull fracture or intracranial hemorrhage identified.  Cervical spine CT technique: Spiral CT images were obtained from the skull base down to the T2 thoracic level. The images were reformatted and submitted in 2 mm thick axial and sagittal CT sections with soft tissue algorithm and 1 mm thick axial, sagittal and coronal CT sections with high-resolution bone algorithm.  COMPARISON:  This is correlated to cervical spine CT from [facility]  [place] on 07/10/2023.  FINDINGS: Atlantooccipital articulation is normal appearance  At C1-2 there are arthritic changes at the atlantodental interval with narrowing of the interval and marginal spurring off the anterior ring of C1 and the odontoid otherwise, the C1-2 level is normal in appearance.  At C2-3 there is mild bilateral facet overgrowth, minimal posterior spurring. There is no canal or foraminal narrowing.  At C3-4 there is mild left and moderate right facet overgrowth and there is some mild disc space narrowing, degenerative endplate changes, right posterolateral endplate spurs mildly narrow the right side of the canal. There is right uncovertebral joint spurring and combination with facet spurs moderately narrow the right neural foramen at C3-4.  At C4-5 there is mild-moderate bilateral facet overgrowth. There is disc space narrowing and there are degenerative disc and endplate changes and there is mild posterior spurring but no central canal narrowing. There is some uncovertebral joint hypertrophy and there is moderate right and no left foraminal narrowing.  At C5-6 there is mild bilateral facet overgrowth and there is some disc space narrowing and mild degenerative endplate changes. There is mild posterior spurring but there is no canal narrowing. There is mild right and no left foraminal narrowing.  At C6-7 there is mild set overgrowth, 2 mm anterolisthesis of C6 on C7. There is no canal or foraminal narrowing.  At C7-T1 there is mild left and mild-moderate right facet overgrowth and 1 to 2 mm anterolisthesis of C7 on T1. There is no canal narrowing. There is mild right and no left foraminal narrowing.  No acute fractures seen in the cervical spine.  IMPRESSION: 1. No acute fractures seen in the cervical spine. There is cervical spondylosis as described above.  Radiation dose reduction techniques were utilized, including automated exposure control and exposure modulation based on body size.        CT Cervical Spine Without Contrast    Result Date: 9/24/2024  1. No acute intracranial abnormality is identified.  2. There is mild-moderate small vessel disease in the cerebral white matter and there is a 5 mm old lacunar infarct in the genu of the right internal capsule and there is diffuse cerebral atrophy. The remainder of the head CT is within normal limits with no acute skull fracture or intracranial hemorrhage identified.  Cervical spine CT technique: Spiral CT images were obtained from the skull base down to the T2 thoracic level. The images were reformatted and submitted in 2 mm thick axial and sagittal CT sections with soft tissue algorithm and 1 mm thick axial, sagittal and coronal CT sections with high-resolution bone algorithm.  COMPARISON:  This is correlated to cervical spine CT from [facility] on 07/10/2023.  FINDINGS: Atlantooccipital articulation is normal appearance  At C1-2 there are arthritic changes at the atlantodental interval with narrowing of the interval and marginal spurring off the anterior ring of C1 and the odontoid otherwise, the C1-2 level is normal in appearance.  At C2-3 there is mild bilateral facet overgrowth, minimal posterior spurring. There is no canal or foraminal narrowing.  At C3-4 there is mild left and moderate right facet overgrowth and there is some mild disc space narrowing, degenerative endplate changes, right posterolateral endplate spurs mildly narrow the right side of the canal. There is right uncovertebral joint spurring and combination with facet spurs moderately narrow the right neural foramen at C3-4.  At C4-5 there is mild-moderate bilateral facet overgrowth. There is disc space narrowing and there are degenerative disc and endplate changes and there is mild posterior spurring but no central canal narrowing. There is some uncovertebral joint hypertrophy and there is moderate right and no left foraminal narrowing.  At C5-6 there is mild  bilateral facet overgrowth and there is some disc space narrowing and mild degenerative endplate changes. There is mild posterior spurring but there is no canal narrowing. There is mild right and no left foraminal narrowing.  At C6-7 there is mild set overgrowth, 2 mm anterolisthesis of C6 on C7. There is no canal or foraminal narrowing.  At C7-T1 there is mild left and mild-moderate right facet overgrowth and 1 to 2 mm anterolisthesis of C7 on T1. There is no canal narrowing. There is mild right and no left foraminal narrowing.  No acute fractures seen in the cervical spine.  IMPRESSION: 1. No acute fractures seen in the cervical spine. There is cervical spondylosis as described above.  Radiation dose reduction techniques were utilized, including automated exposure control and exposure modulation based on body size.       XR Chest 1 View    Result Date: 9/24/2024  Increased atelectasis or scarring in the left lung base. New nodular density in the left lung base indeterminate. It may reflect a lung nodule or could reflect an area of nodular infiltrate or atelectasis. Follow-up recommended with either a short interval repeat chest x-ray in 4 to 6 weeks or CT evaluation   This report was finalized on 9/24/2024 5:12 PM by Dr. Tan Aranda M.D on Workstation: [id number]       Ambulatory status:   - x1    Social issues:   Social History     Socioeconomic History    Marital status:    Tobacco Use    Smoking status: Never    Smokeless tobacco: Never   Vaping Use    Vaping status: Never Used   Substance and Sexual Activity    Alcohol use: Yes     Alcohol/week: 1.0 standard drink of alcohol     Types: 1 Glasses of wine per week     Comment: glass maybe once a month    Drug use: Never    Sexual activity: Defer       Peripheral Neurovascular  Peripheral Neurovascular (Adult)  Peripheral Neurovascular WDL: WDL    Neuro Cognitive  Neuro Cognitive (Adult)  Cognitive/Neuro/Behavioral WDL: .WDL except,  orientation  Orientation: disoriented to, place    Learning  Learning Assessment (Adult)  Learning Readiness and Ability: cognitive limitation noted    Respiratory  Respiratory (Adult)  Airway WDL: WDL  Respiratory WDL  Respiratory WDL: WDL    Abdominal Pain       Pain Assessments  Pain (Adult)  (0-10) Pain Rating: Rest: 0  (0-10) Pain Rating: Activity: 0    NIH Stroke Scale       Jim Silver RN  09/24/24 19:22 EDT

## 2024-09-24 NOTE — ED TRIAGE NOTES
Patient to ED via EMS from home. Patient had a fall out of her wheelchair today. Patient did hit head, no LOC. Patient's family reports that she has been more weak and altered over the past few weeks. Patient takes Eliquis.

## 2024-09-24 NOTE — H&P
[facility]   HISTORY AND PHYSICAL    Patient Name: Demetra Rush  : 1933  MRN: [id number]  Primary Care Physician:  Melissa Bolivar MD  Date of admission: 2024    Subjective   Subjective     Chief Complaint:   Chief Complaint   Patient presents with    Weakness - Generalized    Fall         HPI:    Demetra Rush is a 89 y.o. female who comes in complaining of strokelike symptoms.  Family member had reported to ER provider that patient has some generalized weakness at baseline walks with a rollator for short distances and also uses a wheelchair.  Patient tried to get out for bed and into the wheelchair and fell and struck her head.  Patient has a history of atrial fibrillation and is on Eliquis.  Patient did report a headache but this had since resolved  As well as neck pain which also resolved.  Family did report that patient did had some slurred speech and difficulty speaking which has now resolved just prior to arrival.  Patient was also somewhat confused today.  From my evaluation, patient reports that she thought that someone was in an argument but is unsure of the details.  Patient is alert and oriented x 2 at this time.  It was also reported by ER staff that patient had bit her tongue and there was some concern for possible seizure.  Patient does report some dysuria the last several days.      In the ED, Initial troponin 14, CBC largely unremarkable for acute findings, hemoglobin A1c of 5.8.  Procalcitonin normal.  Coags obtained.  CBC largely unremarkable for acute findings.  UA shows 3+ bacteria, 21-50 WBCs and nitrite positive.  Chest x-ray shows increased atelectasis or scarring in the left lung base.  New nodule density in the left lung base indeterminant.  Follow-up short-term repeat chest x-ray in 4 to 6 weeks or CT evaluation recommended.  CT head and cervical spine shows no acute findings.  There is cervical spondylosis as described.  EKG shows atrial fibrillation rate 70 bpm,  no ST elevation apparent.  Patient is afebrile, pulse 100, on room air oxygen 98% SpO2 and blood pressure 140s over 70s.  Full dose aspirin ordered.     Review of Systems   All systems were reviewed and negative except for: as per HPI    Personal History     Past Medical History:   Diagnosis Date    Atrial fibrillation     Chronic diastolic (congestive) heart failure     GERD (gastroesophageal reflux disease)     Hyperlipidemia     Hypertension     Hypothyroidism     Mitral regurgitation     Neuropathy of both feet     Osteoporosis 08/29/2022    Pulmonary hypertension     Stroke     UTI (urinary tract infection)        Past Surgical History:   Procedure Laterality Date    ADENOIDECTOMY      BLADDER SURGERY      CARDIAC ELECTROPHYSIOLOGY PROCEDURE N/A 10/20/2022    Procedure: Pacemaker SC new--Medtronic;  Surgeon: Albino Gaston MD;  Location:  [facility] CATH INVASIVE LOCATION;  Service: Cardiology;  Laterality: N/A;    CARDIAC ELECTROPHYSIOLOGY PROCEDURE N/A 11/3/2022    Procedure: AV node ablation---has Medtronic pacemaker;  Surgeon: Albino Gaston MD;  Location:  [facility] CATH INVASIVE LOCATION;  Service: Cardiovascular;  Laterality: N/A;    CHOLECYSTECTOMY      HYSTERECTOMY         Family History: family history includes Cancer in her sister; Heart attack in her father; Heart disease in her father and mother; Other in her mother; Tongue cancer in her sister. Otherwise pertinent FHx was reviewed and not pertinent to current issue.    Social History:  reports that she has never smoked. She has never used smokeless tobacco. She reports current alcohol use of about 1.0 standard drink of alcohol per week. She reports that she does not use drugs.    Home Medications:  ALPRAZolam, apixaban, gabapentin, levothyroxine, and pantoprazole    Allergies:  Allergies   Allergen Reactions    Sulfate Other (See Comments)    Amlodipine Swelling     In feet and legs.    Codeine Itching    Sulfa Antibiotics Hives       Objective    Objective     Vitals:   Temp:  [98.4 °F (36.9 °C)] 98.4 °F (36.9 °C)  Heart Rate:  [100] 100  Resp:  [16] 16  BP: (142)/(78) 142/78  Physical Exam    Constitutional: Awake, alert   Eyes: PERRLA, sclerae anicteric, no conjunctival injection   HENT: NCAT, mucous membranes moist   Neck: Supple, no thyromegaly, no lymphadenopathy, trachea midline   Respiratory: Clear to auscultation bilaterally, nonlabored respirations    Cardiovascular: RRR, no murmurs, rubs, or gallops, palpable pedal pulses bilaterally   Gastrointestinal: Positive bowel sounds, soft, nontender, nondistended   Musculoskeletal: No bilateral ankle edema, no clubbing or cyanosis to extremities   Psychiatric: Appropriate affect, cooperative   Neurologic: Patient is unsure of the events of today and is oriented x 2, strength symmetric in all extremities, Cranial Nerves grossly intact to confrontation, speech clear.  NIH of 1.   Skin: No rashes     Result Review    Result Review:  I have personally reviewed the results from the time of this admission to 9/24/2024 21:55 EDT and agree with these findings:  [x]  Laboratory list / accordion  []  Microbiology  [x]  Radiology  [x]  EKG/Telemetry   []  Cardiology/Vascular   []  Pathology  []  Old records  []  Other:  Most notable findings include: see above      Assessment & Plan   Assessment / Plan     Brief Patient Summary:  Demetra Rush is a 89 y.o. female who comes in complaining of strokelike symptoms.    Active Hospital Problems:  Active Hospital Problems    Diagnosis     **Stroke-like symptoms      Plan:     Strokelike symptoms  Speech difficulty, resolved  Altered mental status  -Patient also bit tongue, possible seizure component versus stroke vs UTI/metabolic encephalopathy  -Initial troponin 14,  -Procalcitonin normal.  Coags obtained.    -CT head and cervical spine shows no acute findings.  There is cervical spondylosis as described.   - EKG shows atrial fibrillation rate 70 bpm, no ST elevation  apparent.    -Patient is afebrile, pulse 100, on room air oxygen 98% SpO2 and blood pressure 140s over 70s.    -Full dose aspirin ordered.   -Neurology consult  -Check MRI brain, echo  -PT OT ST consult  -Check B12, TSH, lipid panel  --hemoglobin A1c of 5.8.    -Neurochecks and continuous cardiac monitoring  -N.p.o. until patient passes swallow screen then heart healthy diet    Acute UTI  History of overflow incontinence  - UA shows 3+ bacteria, 21-50 WBCs and nitrite positive.   -Start Rocephin    Lung nodule  - New nodule density in the left lung base indeterminant.    - Chest x-ray shows increased atelectasis or scarring in the left lung base.  New nodule density in the left lung base indeterminant.  Follow-up short-term repeat chest x-ray in 4 to 6 weeks or CT evaluation recommended.      History of A-fib, history of pacemaker  -Continue home Eliquis    Anxiety/depression  -Hold home Xanax for now, resume when appropriate     Peripheral neuropathy  -Continue home Neurontin    hypothyroidism  -Check TSH, continue home Synthroid    GERD  -Continue home PPI            VTE Prophylaxis:  Pharmacologic & mechanical VTE prophylaxis orders are present.        CODE STATUS:    Level Of Support Discussed With: Patient  Code Status (Patient has no pulse and is not breathing): CPR (Attempt to Resuscitate)  Medical Interventions (Patient has pulse or is breathing): Full Support    Admission Status:  I believe this patient meets observation status.    78 minutes have been spent by [facility] providers in the care of this patient while under observation status.      Appropriate PPE worn during patient encounter.  Hand hygeine performed before and after seeing the patient.      Electronically signed by ADA Berry, 09/24/24, 7:28 PM EDT.

## 2024-09-24 NOTE — ED PROVIDER NOTES
EMERGENCY DEPARTMENT ENCOUNTER  Room Number:  32/32  Date of encounter:  9/24/2024  PCP: Melissa Bolivar MD  Patient Care Team:  Melissa Bolivar MD as PCP - General (Family Medicine)  Ari Hall MD as Referring Physician (Orthopedic Surgery)     HPI:  Context: Demetra Rush is a 89 y.o. female who presents to the ED c/o chief complaint of syncope.  History supplied by patient and by patient's family member.  Patient reports that she has weakness at baseline, walks with a rollator, is only able to walk short distances, also uses a wheelchair.  Patient was attempting to get out of her bed and into the wheelchair, wheelchair was not locked, fell and struck her head.  Patient reports that she was having posterior headache, headache has now resolved.  Patient reports that she was having some neck pain as well, neck is no longer tender.  Patient denies any radicular pain, denies any focal weakness or numbness.  Family reports that they help the patient up, patient was at baseline health, later on the day became drowsy, became confused, was speaking to them and suddenly words were no longer making sense.  Patient was having speech difficulty for approximately 15 minutes, now resolved.  No facial droop, no focal weakness.  Patient has no history of stroke, does have a history of atrial fibrillation, is on anticoagulation.  Family reports that patient had been having problems with confusion for the last week.  Concern for urinary tract infection.    MEDICAL HISTORY REVIEW  Reviewed in EPIC    PAST MEDICAL HISTORY  Active Ambulatory Problems     Diagnosis Date Noted    Osteoporosis 08/29/2022    Acute diastolic CHF (congestive heart failure) 10/18/2022    Pulmonary hypertension 10/19/2022    Mitral regurgitation 10/19/2022    Primary hypertension 10/19/2022    Atrial fibrillation, persistent 10/19/2022    Chronic diastolic (congestive) heart failure 10/31/2022    Primary insomnia 10/31/2022    Acquired  hypothyroidism 10/31/2022    Complete AV block due to AV lakhwinder ablation 01/10/2023    Tingling in extremities 01/18/2023    Acute UTI 07/10/2023    Closed displaced fracture of acromial end of right clavicle 07/11/2023    Medicare annual wellness visit, subsequent 08/30/2023    Presence of cardiac pacemaker 10/18/2022     Resolved Ambulatory Problems     Diagnosis Date Noted    Acute respiratory failure with hypoxia 09/13/2022    Moderate malnutrition 09/17/2022    Presence of cardiac pacemaker 01/10/2023     Past Medical History:   Diagnosis Date    Atrial fibrillation     GERD (gastroesophageal reflux disease)     Hyperlipidemia     Hypertension     Hypothyroidism     Neuropathy of both feet     Stroke     UTI (urinary tract infection)        PAST SURGICAL HISTORY  Past Surgical History:   Procedure Laterality Date    ADENOIDECTOMY      BLADDER SURGERY      CARDIAC ELECTROPHYSIOLOGY PROCEDURE N/A 10/20/2022    Procedure: Pacemaker SC new--Medtronic;  Surgeon: Albino Gaston MD;  Location:  [facility];  Service: Cardiology;  Laterality: N/A;    CARDIAC ELECTROPHYSIOLOGY PROCEDURE N/A 11/3/2022    Procedure: AV node ablation---has Medtronic pacemaker;  Surgeon: Albino Gaston MD;  Location:  [facility];  Service: Cardiovascular;  Laterality: N/A;    CHOLECYSTECTOMY      HYSTERECTOMY         FAMILY HISTORY  Family History   Problem Relation Age of Onset    Heart disease Mother     Other Mother         fluid in lungs    Heart disease Father     Heart attack Father     Cancer Sister     Tongue cancer Sister        SOCIAL HISTORY  Social History     Socioeconomic History    Marital status:    Tobacco Use    Smoking status: Never    Smokeless tobacco: Never   Vaping Use    Vaping status: Never Used   Substance and Sexual Activity    Alcohol use: Yes     Alcohol/week: 1.0 standard drink of alcohol     Types: 1 Glasses of wine per week     Comment: glass maybe once a month     Drug use: Never    Sexual activity: Defer       ALLERGIES  Sulfate, Amlodipine, Codeine, and Sulfa antibiotics    The patient's allergies have been reviewed    REVIEW OF SYSTEMS  All systems reviewed and negative except for those discussed in HPI.     PHYSICAL EXAM  I have reviewed the triage vital signs and nursing notes.  ED Triage Vitals [09/24/24 1526]   Temp Heart Rate Resp BP SpO2   98.4 °F (36.9 °C) 100 16 142/78 98 %      Temp src Heart Rate Source Patient Position BP Location FiO2 (%)   -- -- -- -- --       General: No acute distress.  HENT: NCAT, PERRL, Nares patent.  Bruising on bilateral edges of tongue.  Eyes: no scleral icterus.  Neck: trachea midline, no ROM limitations.  CV: regular rhythm, regular rate.  Respiratory: normal effort, CTAB.  Abdomen: soft, nondistended, NTTP, no rebound tenderness, no guarding or rigidity.  Musculoskeletal: no deformity.  Neuro: alert, moves all extremities, follows commands.  Skin: warm, dry.    NIHSS:  0-->Alert: keenly responsive  0-->Answers both questions correctly  0-->Performs both tasks correctly  0=normal  0=No visual loss  0=Normal symmetric movement  0-->No drift: limb holds 90 (or 45) degrees for full 10 secs  0-->No drift: limb holds 90 (or 45) degrees for full 10 secs  0-->No drift: limb holds 90 (or 45) degrees for full 10 secs  0-->No drift: limb holds 90 (or 45) degrees for full 10 secs  0=Absent  0=Normal; no sensory loss  0=No aphasia, normal  0=Normal  0=No abnormality  Total score: 0      LAB RESULTS  Recent Results (from the past 24 hour(s))   ECG 12 Lead ED Triage Standing Order; Weak / Dizzy / AMS    Collection Time: 09/24/24  4:33 PM   Result Value Ref Range    QT Interval 447 ms    QTC Interval 483 ms   POC Glucose Once    Collection Time: 09/24/24  4:36 PM    Specimen: Blood   Result Value Ref Range    Glucose 100 70 - 130 mg/dL   Comprehensive Metabolic Panel    Collection Time: 09/24/24  4:47 PM    Specimen: Blood   Result Value Ref Range     Glucose 97 65 - 99 mg/dL    BUN 21 8 - 23 mg/dL    Creatinine 0.89 0.57 - 1.00 mg/dL    Sodium 139 136 - 145 mmol/L    Potassium 4.5 3.5 - 5.2 mmol/L    Chloride 104 98 - 107 mmol/L    CO2 25.4 22.0 - 29.0 mmol/L    Calcium 9.2 8.2 - 9.6 mg/dL    Total Protein 6.9 6.0 - 8.5 g/dL    Albumin 3.5 3.5 - 5.2 g/dL    ALT (SGPT) 8 1 - 33 U/L    AST (SGOT) 13 1 - 32 U/L    Alkaline Phosphatase 78 39 - 117 U/L    Total Bilirubin 0.4 0.0 - 1.2 mg/dL    Globulin 3.4 gm/dL    A/G Ratio 1.0 g/dL    BUN/Creatinine Ratio 23.6 7.0 - 25.0    Anion Gap 9.6 5.0 - 15.0 mmol/L    eGFR 61.3 >60.0 mL/min/1.73   Single High Sensitivity Troponin T    Collection Time: 09/24/24  4:47 PM    Specimen: Blood   Result Value Ref Range    HS Troponin T 14 (H) <14 ng/L   Magnesium    Collection Time: 09/24/24  4:47 PM    Specimen: Blood   Result Value Ref Range    Magnesium 2.2 1.7 - 2.3 mg/dL   Green Top (Gel)    Collection Time: 09/24/24  4:47 PM   Result Value Ref Range    Extra Tube Hold for add-ons.    Lavender Top    Collection Time: 09/24/24  4:47 PM   Result Value Ref Range    Extra Tube hold for add-on    Gold Top - SST    Collection Time: 09/24/24  4:47 PM   Result Value Ref Range    Extra Tube Hold for add-ons.    Light Blue Top    Collection Time: 09/24/24  4:47 PM   Result Value Ref Range    Extra Tube Hold for add-ons.    CBC Auto Differential    Collection Time: 09/24/24  4:47 PM    Specimen: Blood   Result Value Ref Range    WBC 7.00 3.40 - 10.80 10*3/mm3    RBC 4.26 3.77 - 5.28 10*6/mm3    Hemoglobin 11.5 (L) 12.0 - 15.9 g/dL    Hematocrit 35.8 34.0 - 46.6 %    MCV 84.0 79.0 - 97.0 fL    MCH 27.0 26.6 - 33.0 pg    MCHC 32.1 31.5 - 35.7 g/dL    RDW 14.3 12.3 - 15.4 %    RDW-SD 43.3 37.0 - 54.0 fl    MPV 9.6 6.0 - 12.0 fL    Platelets 253 140 - 450 10*3/mm3    Neutrophil % 72.9 42.7 - 76.0 %    Lymphocyte % 13.6 (L) 19.6 - 45.3 %    Monocyte % 11.4 5.0 - 12.0 %    Eosinophil % 1.1 0.3 - 6.2 %    Basophil % 0.4 0.0 - 1.5 %     Immature Grans % 0.6 (H) 0.0 - 0.5 %    Neutrophils, Absolute 5.10 1.70 - 7.00 10*3/mm3    Lymphocytes, Absolute 0.95 0.70 - 3.10 10*3/mm3    Monocytes, Absolute 0.80 0.10 - 0.90 10*3/mm3    Eosinophils, Absolute 0.08 0.00 - 0.40 10*3/mm3    Basophils, Absolute 0.03 0.00 - 0.20 10*3/mm3    Immature Grans, Absolute 0.04 0.00 - 0.05 10*3/mm3    nRBC 0.0 0.0 - 0.2 /100 WBC   Protime-INR    Collection Time: 09/24/24  4:47 PM    Specimen: Blood   Result Value Ref Range    Protime 15.6 (H) 11.7 - 14.2 Seconds    INR 1.22 (H) 0.90 - 1.10   aPTT    Collection Time: 09/24/24  4:47 PM    Specimen: Blood   Result Value Ref Range    PTT 32.4 22.7 - 35.4 seconds   Procalcitonin    Collection Time: 09/24/24  4:47 PM    Specimen: Blood   Result Value Ref Range    Procalcitonin 0.05 0.00 - 0.25 ng/mL       I ordered the above labs and reviewed the results.    RADIOLOGY  CT Head Without Contrast, CT Cervical Spine Without Contrast    Result Date: 9/24/2024  EMERGENCY CT SCAN OF THE HEAD AND CERVICAL SPINE WITHOUT CONTRAST ON 09/24/2024  CLINICAL HISTORY: This is a 89-year-old female patient who has a history of syncope. The patient was attempting to get out of her bed into wheelchair, the wheelchair was not locked, and fell and struck her head. Patient has headache and neck pain  HEAT CT TECHNIQUE:  Spiral CT images were obtained from the base of the skull to the vertex without intravenous contrast. The images were reformatted and submitted in 3 mm thick axial, sagittal and coronal CT sections with brain algorithm and 2 mm thick axial CT sections with high-resolution bone algorithm.  COMPARISON: This is correlated to a prior head CT on 07/10/2023.  FINDINGS: There is some patchy low-density extending from the periventricular into the subcortical white matter of the cerebral hemispheres consistent with mild to moderate small vessel disease. There is a 5 mm old lacunar infarct in the genu of the right internal capsule. The remainder  of the brain parenchyma is normal in attenuation. There is mild cerebral atrophy. The ventricles are normal in size. I see no mass effect and no midline shift and no extra-axial fluid collections are identified and there is no evidence of acute intracranial hemorrhage. No acute skull fracture is identified. The calvarium and skull base are normal in appearance. The paranasal sinuses and the mastoid air cells and middle ear cavities are clear. There are osteoarthritic changes in the temporomandibular joint bilaterally with marginal spurring of the mandibular heads bilaterally.      1. No acute intracranial abnormality is identified.  2. There is mild-moderate small vessel disease in the cerebral white matter and there is a 5 mm old lacunar infarct in the genu of the right internal capsule and there is diffuse cerebral atrophy. The remainder of the head CT is within normal limits with no acute skull fracture or intracranial hemorrhage identified.  Cervical spine CT technique: Spiral CT images were obtained from the skull base down to the T2 thoracic level. The images were reformatted and submitted in 2 mm thick axial and sagittal CT sections with soft tissue algorithm and 1 mm thick axial, sagittal and coronal CT sections with high-resolution bone algorithm.  COMPARISON:  This is correlated to cervical spine CT from [facility] on 07/10/2023.  FINDINGS: Atlantooccipital articulation is normal appearance  At C1-2 there are arthritic changes at the atlantodental interval with narrowing of the interval and marginal spurring off the anterior ring of C1 and the odontoid otherwise, the C1-2 level is normal in appearance.  At C2-3 there is mild bilateral facet overgrowth, minimal posterior spurring. There is no canal or foraminal narrowing.  At C3-4 there is mild left and moderate right facet overgrowth and there is some mild disc space narrowing, degenerative endplate changes, right posterolateral  endplate spurs mildly narrow the right side of the canal. There is right uncovertebral joint spurring and combination with facet spurs moderately narrow the right neural foramen at C3-4.  At C4-5 there is mild-moderate bilateral facet overgrowth. There is disc space narrowing and there are degenerative disc and endplate changes and there is mild posterior spurring but no central canal narrowing. There is some uncovertebral joint hypertrophy and there is moderate right and no left foraminal narrowing.  At C5-6 there is mild bilateral facet overgrowth and there is some disc space narrowing and mild degenerative endplate changes. There is mild posterior spurring but there is no canal narrowing. There is mild right and no left foraminal narrowing.  At C6-7 there is mild set overgrowth, 2 mm anterolisthesis of C6 on C7. There is no canal or foraminal narrowing.  At C7-T1 there is mild left and mild-moderate right facet overgrowth and 1 to 2 mm anterolisthesis of C7 on T1. There is no canal narrowing. There is mild right and no left foraminal narrowing.  No acute fractures seen in the cervical spine.  IMPRESSION: 1. No acute fractures seen in the cervical spine. There is cervical spondylosis as described above.  Radiation dose reduction techniques were utilized, including automated exposure control and exposure modulation based on body size.       XR Chest 1 View    Result Date: 9/24/2024  AP CHEST  HISTORY: Weakness and dizziness  COMPARISON: 4/24/2023  FINDINGS: There is increased atelectasis or scarring in the left lung base. There is a nodular density also seen in the left lung base that appears new. Heart size stable. Stable pacemaker. No appreciable pneumothorax.      Increased atelectasis or scarring in the left lung base. New nodular density in the left lung base indeterminate. It may reflect a lung nodule or could reflect an area of nodular infiltrate or atelectasis. Follow-up recommended with either a short  interval repeat chest x-ray in 4 to 6 weeks or CT evaluation   This report was finalized on 9/24/2024 5:12 PM by Dr. Tan Aranda M.D on Workstation: [id number]       I ordered the above noted radiological studies. I reviewed the images and results. I agree with the radiologist interpretation.    PROCEDURES  Procedures    MEDICATIONS GIVEN IN ER  Medications   sodium chloride 0.9 % flush 10 mL (has no administration in time range)       PROGRESS, DATA ANALYSIS, CONSULTS, AND MEDICAL DECISION MAKING  A complete history and physical exam have been performed.  All available laboratory and imaging results have been reviewed by myself prior to disposition.    MDM    After the initial H&P, I discussed pertinent information from history and physical exam with patient/family.  Discussed differential diagnosis.  Discussed plan for ED evaluation/workup/treatment.  All questions answered.  Patient/family is agreeable with plan.  ED Course as of 09/24/24 1840   Tue Sep 24, 2024   1628 My differential diagnosis for altered mental status includes but is not limited to:  Hypoglycemia, hyperglycemia, DKA, overdose, ethanol intoxication, thiamine deficiency, niacin deficiency, hypothymia, hyperviscosity, Walter's disease, hyponatremia, hypernatremia, liver failure, kidney failure, hyper or hypothyroid, no insufficiency, hypoxia, hypercarbia, carbon monoxide poisoning, postanoxic encephalopathy, ischemic stroke, intracranial bleed, subarachnoid hemorrhage, brain tumor, closed head injury, epidural hematoma, epidural hematoma, seizure activity, postictal state, syncopal episode, disseminated encephalomyelitis, central pontine myelinolysis, post cardiac arrest, bacterial meningitis, viral meningitis, fungal meningitis, encephalitis, brain abscess, subdural empyema, hysteria, catatonic state, malingering, hypertensive encephalopathy, vasculitis, TTP, DIC     [JG]   1628 Fall with closed head injury, patient anticoagulated, high risk  of intracranial hemorrhage, obtaining CT head imaging, obtain lab work including coagulation factors.  Patient altered, high risk of occult cervical spine fracture, obtaining CT imaging of cervical spine. [JG]   1649 EKG independently viewed and contemporaneously interpreted by ED physician. Time: 4:33 PM.  Rate 70.  Interpretation: Atrial flutter with ventricular pacing, left axis deviation, nonspecific intraventricular conduction delay, no acute ST changes. [JG]   1658 Patient reporting symptoms concerning for TIA, no neurosymptoms at present, NIH of 0, not a tPA or interventional candidate.  Plan for admission to the hospital for neurology consult for further evaluation. [JG]   1731 I reviewed chest x-ray in PACS, no pulmonary edema per my read. [JG]   1807 I reviewed CT head imaging in PACS, no intracranial hemorrhage per my read. [JG]   1807 Phone call with radiologist.  I had previously reviewed the images. I discussed the patient, imaging, and their interpretation.  CT head and cervical spine imaging are negative for acute pathology.  See dictated report for final interpretation.     [JG]   1838 Phone call with Arturo, LISSA with [facility].  Discussed the patient, relevant history, exam, diagnostics, ED findings/progress, and concerns. They agree to admit the patient to telemetry observation under Dr. Mcneill. Care assumed by the admitting physician at this time.     [JG]   1840 Patient reassessed.  Discussed ED findings, differential diagnosis, and the need for admission for evaluation/treatment.  They are agreeable to admission and all questions were answered.     [JG]      ED Course User Index  [JG] Agustín Amador MD       AS OF 18:40 EDT VITALS:    BP - 142/78  HR - 100  TEMP - 98.4 °F (36.9 °C)  O2 SATS - 98%    DIAGNOSIS  Final diagnoses:   Fall, initial encounter   Closed head injury, initial encounter   Anticoagulated   Speech disturbance, unspecified type   Confusion   Atrial fibrillation, unspecified type      Critical care:  Total critical care time of 40 minutes is exclusive of any other billable procedures and includes time spent with direct patient care and observation, retrospective chart review, management of acute condition, and consultation with other physicians.      DISPOSITION  ADMISSION    Discussed treatment plan and reason for admission with pt/family and admitting physician.  Pt/family voiced understanding of the plan for admission for further testing/treatment as needed.        Agustín Amador MD  09/24/24 1348

## 2024-09-25 ENCOUNTER — APPOINTMENT (OUTPATIENT)
Dept: CARDIOLOGY | Facility: HOSPITAL | Age: 89
End: 2024-09-25
Payer: MEDICARE

## 2024-09-25 ENCOUNTER — TELEPHONE (OUTPATIENT)
Age: 89
End: 2024-09-25
Payer: MEDICARE

## 2024-09-25 PROBLEM — G93.40 ENCEPHALOPATHY ACUTE: Status: ACTIVE | Noted: 2024-09-25

## 2024-09-25 LAB
ALBUMIN SERPL-MCNC: 3.6 G/DL (ref 3.5–5.2)
ALBUMIN/GLOB SERPL: 1.4 G/DL
ALP SERPL-CCNC: 66 U/L (ref 39–117)
ALT SERPL W P-5'-P-CCNC: 9 U/L (ref 1–33)
ANION GAP SERPL CALCULATED.3IONS-SCNC: 10 MMOL/L (ref 5–15)
AORTIC DIMENSIONLESS INDEX: 0.6 (DI)
ASCENDING AORTA: 2.7 CM
AST SERPL-CCNC: 11 U/L (ref 1–32)
BH CV ECHO MEAS - ACS: 1.62 CM
BH CV ECHO MEAS - AI P1/2T: 460.2 MSEC
BH CV ECHO MEAS - AO MAX PG: 5.4 MMHG
BH CV ECHO MEAS - AO MEAN PG: 3 MMHG
BH CV ECHO MEAS - AO ROOT DIAM: 3.1 CM
BH CV ECHO MEAS - AO V2 MAX: 116 CM/SEC
BH CV ECHO MEAS - AO V2 VTI: 22.9 CM
BH CV ECHO MEAS - AVA(I,D): 1.21 CM2
BH CV ECHO MEAS - EDV(CUBED): 82.2 ML
BH CV ECHO MEAS - EDV(MOD-SP2): 110 ML
BH CV ECHO MEAS - EDV(MOD-SP4): 102 ML
BH CV ECHO MEAS - EF(MOD-BP): 42.1 %
BH CV ECHO MEAS - EF(MOD-SP2): 40.9 %
BH CV ECHO MEAS - EF(MOD-SP4): 41.2 %
BH CV ECHO MEAS - ESV(CUBED): 39.2 ML
BH CV ECHO MEAS - ESV(MOD-SP2): 65 ML
BH CV ECHO MEAS - ESV(MOD-SP4): 60 ML
BH CV ECHO MEAS - FS: 21.9 %
BH CV ECHO MEAS - IVS/LVPW: 1.17 CM
BH CV ECHO MEAS - IVSD: 1.25 CM
BH CV ECHO MEAS - LAT PEAK E' VEL: 8.8 CM/SEC
BH CV ECHO MEAS - LV DIASTOLIC VOL/BSA (35-75): 71 CM2
BH CV ECHO MEAS - LV MASS(C)D: 177.9 GRAMS
BH CV ECHO MEAS - LV MAX PG: 1.51 MMHG
BH CV ECHO MEAS - LV MEAN PG: 1 MMHG
BH CV ECHO MEAS - LV SYSTOLIC VOL/BSA (12-30): 41.8 CM2
BH CV ECHO MEAS - LV V1 MAX: 61.4 CM/SEC
BH CV ECHO MEAS - LV V1 VTI: 12.6 CM
BH CV ECHO MEAS - LVIDD: 4.3 CM
BH CV ECHO MEAS - LVIDS: 3.4 CM
BH CV ECHO MEAS - LVOT AREA: 2.2 CM2
BH CV ECHO MEAS - LVOT DIAM: 1.67 CM
BH CV ECHO MEAS - LVPWD: 1.07 CM
BH CV ECHO MEAS - MED PEAK E' VEL: 7.2 CM/SEC
BH CV ECHO MEAS - MR MAX PG: 62.4 MMHG
BH CV ECHO MEAS - MR MAX VEL: 394.9 CM/SEC
BH CV ECHO MEAS - MV A DUR: 0.11 SEC
BH CV ECHO MEAS - MV A MAX VEL: 27.9 CM/SEC
BH CV ECHO MEAS - MV DEC SLOPE: 704.5 CM/SEC2
BH CV ECHO MEAS - MV DEC TIME: 0.13 SEC
BH CV ECHO MEAS - MV E MAX VEL: 82.9 CM/SEC
BH CV ECHO MEAS - MV E/A: 3
BH CV ECHO MEAS - MV MAX PG: 3.4 MMHG
BH CV ECHO MEAS - MV MEAN PG: 1.32 MMHG
BH CV ECHO MEAS - MV P1/2T: 38.9 MSEC
BH CV ECHO MEAS - MV V2 VTI: 21.3 CM
BH CV ECHO MEAS - MVA(P1/2T): 5.6 CM2
BH CV ECHO MEAS - MVA(VTI): 1.3 CM2
BH CV ECHO MEAS - PA ACC TIME: 0.11 SEC
BH CV ECHO MEAS - PA V2 MAX: 66.9 CM/SEC
BH CV ECHO MEAS - PULM A REVS DUR: 0.08 SEC
BH CV ECHO MEAS - PULM A REVS VEL: 17.6 CM/SEC
BH CV ECHO MEAS - PULM DIAS VEL: 43.3 CM/SEC
BH CV ECHO MEAS - PULM S/D: 0.63
BH CV ECHO MEAS - PULM SYS VEL: 27.4 CM/SEC
BH CV ECHO MEAS - RAP SYSTOLE: 8 MMHG
BH CV ECHO MEAS - RV MAX PG: 0.83 MMHG
BH CV ECHO MEAS - RV V1 MAX: 45.4 CM/SEC
BH CV ECHO MEAS - RV V1 VTI: 10.5 CM
BH CV ECHO MEAS - RVSP: 23 MMHG
BH CV ECHO MEAS - SUP REN AO DIAM: 1.7 CM
BH CV ECHO MEAS - SV(LVOT): 27.7 ML
BH CV ECHO MEAS - SV(MOD-SP2): 45 ML
BH CV ECHO MEAS - SV(MOD-SP4): 42 ML
BH CV ECHO MEAS - SVI(LVOT): 19.3 ML/M2
BH CV ECHO MEAS - SVI(MOD-SP2): 31.3 ML/M2
BH CV ECHO MEAS - SVI(MOD-SP4): 29.2 ML/M2
BH CV ECHO MEAS - TAPSE (>1.6): 1.65 CM
BH CV ECHO MEAS - TR MAX PG: 14.9 MMHG
BH CV ECHO MEAS - TR MAX VEL: 193 CM/SEC
BH CV ECHO MEASUREMENTS AVERAGE E/E' RATIO: 10.36
BH CV ECHO SHUNT ASSESSMENT PERFORMED (HIDDEN SCRIPTING): 1
BH CV XLRA - RV BASE: 3.9 CM
BH CV XLRA - RV LENGTH: 5.8 CM
BH CV XLRA - RV MID: 3.2 CM
BH CV XLRA - TDI S': 10 CM/SEC
BILIRUB SERPL-MCNC: 0.3 MG/DL (ref 0–1.2)
BUN SERPL-MCNC: 17 MG/DL (ref 8–23)
BUN/CREAT SERPL: 20.7 (ref 7–25)
CALCIUM SPEC-SCNC: 8.8 MG/DL (ref 8.2–9.6)
CHLORIDE SERPL-SCNC: 102 MMOL/L (ref 98–107)
CO2 SERPL-SCNC: 24 MMOL/L (ref 22–29)
CREAT SERPL-MCNC: 0.82 MG/DL (ref 0.57–1)
DEPRECATED RDW RBC AUTO: 45.6 FL (ref 37–54)
DRUGS UR: NORMAL
EGFRCR SERPLBLD CKD-EPI 2021: 67.6 ML/MIN/1.73
ERYTHROCYTE [DISTWIDTH] IN BLOOD BY AUTOMATED COUNT: 14.3 % (ref 12.3–15.4)
GEN 5 2HR TROPONIN T REFLEX: 19 NG/L
GLOBULIN UR ELPH-MCNC: 2.6 GM/DL
GLUCOSE BLDC GLUCOMTR-MCNC: 79 MG/DL (ref 70–130)
GLUCOSE BLDC GLUCOMTR-MCNC: 81 MG/DL (ref 70–130)
GLUCOSE SERPL-MCNC: 131 MG/DL (ref 65–99)
HCT VFR BLD AUTO: 33.8 % (ref 34–46.6)
HGB BLD-MCNC: 10.6 G/DL (ref 12–15.9)
LEFT ATRIUM VOLUME INDEX: 77.1 ML/M2
MCH RBC QN AUTO: 27.2 PG (ref 26.6–33)
MCHC RBC AUTO-ENTMCNC: 31.4 G/DL (ref 31.5–35.7)
MCV RBC AUTO: 86.7 FL (ref 79–97)
PLATELET # BLD AUTO: 220 10*3/MM3 (ref 140–450)
PMV BLD AUTO: 9.3 FL (ref 6–12)
POTASSIUM SERPL-SCNC: 3.7 MMOL/L (ref 3.5–5.2)
PROT SERPL-MCNC: 6.2 G/DL (ref 6–8.5)
RBC # BLD AUTO: 3.9 10*6/MM3 (ref 3.77–5.28)
SINUS: 2.8 CM
SODIUM SERPL-SCNC: 136 MMOL/L (ref 136–145)
STJ: 2.7 CM
TROPONIN T DELTA: 4 NG/L
TROPONIN T SERPL HS-MCNC: 21 NG/L
TSH SERPL DL<=0.05 MIU/L-ACNC: 1.97 UIU/ML (ref 0.27–4.2)
VIT B12 BLD-MCNC: 313 PG/ML (ref 211–946)
WBC NRBC COR # BLD AUTO: 6.95 10*3/MM3 (ref 3.4–10.8)

## 2024-09-25 PROCEDURE — 93306 TTE W/DOPPLER COMPLETE: CPT

## 2024-09-25 PROCEDURE — 80053 COMPREHEN METABOLIC PANEL: CPT | Performed by: PHYSICIAN ASSISTANT

## 2024-09-25 PROCEDURE — 97110 THERAPEUTIC EXERCISES: CPT

## 2024-09-25 PROCEDURE — 92610 EVALUATE SWALLOWING FUNCTION: CPT | Performed by: SPEECH-LANGUAGE PATHOLOGIST

## 2024-09-25 PROCEDURE — 25510000001 PERFLUTREN 6.52 MG/ML SUSPENSION 2 ML VIAL: Performed by: PHYSICIAN ASSISTANT

## 2024-09-25 PROCEDURE — G0378 HOSPITAL OBSERVATION PER HR: HCPCS

## 2024-09-25 PROCEDURE — 97535 SELF CARE MNGMENT TRAINING: CPT

## 2024-09-25 PROCEDURE — 97162 PT EVAL MOD COMPLEX 30 MIN: CPT

## 2024-09-25 PROCEDURE — 84484 ASSAY OF TROPONIN QUANT: CPT | Performed by: PHYSICIAN ASSISTANT

## 2024-09-25 PROCEDURE — 97166 OT EVAL MOD COMPLEX 45 MIN: CPT

## 2024-09-25 PROCEDURE — 85027 COMPLETE CBC AUTOMATED: CPT | Performed by: PHYSICIAN ASSISTANT

## 2024-09-25 PROCEDURE — 25010000002 CEFTRIAXONE PER 250 MG: Performed by: PHYSICIAN ASSISTANT

## 2024-09-25 PROCEDURE — 82607 VITAMIN B-12: CPT | Performed by: PHYSICIAN ASSISTANT

## 2024-09-25 PROCEDURE — 82948 REAGENT STRIP/BLOOD GLUCOSE: CPT

## 2024-09-25 PROCEDURE — 99213 OFFICE O/P EST LOW 20 MIN: CPT | Performed by: STUDENT IN AN ORGANIZED HEALTH CARE EDUCATION/TRAINING PROGRAM

## 2024-09-25 PROCEDURE — 93306 TTE W/DOPPLER COMPLETE: CPT | Performed by: INTERNAL MEDICINE

## 2024-09-25 PROCEDURE — 84443 ASSAY THYROID STIM HORMONE: CPT | Performed by: PHYSICIAN ASSISTANT

## 2024-09-25 RX ADMIN — APIXABAN 2.5 MG: 2.5 TABLET, FILM COATED ORAL at 22:07

## 2024-09-25 RX ADMIN — CEFTRIAXONE SODIUM 1000 MG: 1 INJECTION, POWDER, FOR SOLUTION INTRAMUSCULAR; INTRAVENOUS at 22:14

## 2024-09-25 RX ADMIN — GABAPENTIN 300 MG: 300 CAPSULE ORAL at 11:19

## 2024-09-25 RX ADMIN — APIXABAN 2.5 MG: 2.5 TABLET, FILM COATED ORAL at 11:19

## 2024-09-25 RX ADMIN — ALPRAZOLAM 0.5 MG: 0.5 TABLET ORAL at 22:25

## 2024-09-25 RX ADMIN — ATORVASTATIN CALCIUM 40 MG: 20 TABLET, FILM COATED ORAL at 22:06

## 2024-09-25 RX ADMIN — GABAPENTIN 300 MG: 300 CAPSULE ORAL at 18:30

## 2024-09-25 RX ADMIN — PERFLUTREN 2 ML: 6.52 INJECTION, SUSPENSION INTRAVENOUS at 07:46

## 2024-09-25 RX ADMIN — GABAPENTIN 300 MG: 300 CAPSULE ORAL at 22:07

## 2024-09-25 RX ADMIN — Medication 10 ML: at 22:07

## 2024-09-25 RX ADMIN — PANTOPRAZOLE SODIUM 40 MG: 40 TABLET, DELAYED RELEASE ORAL at 06:00

## 2024-09-25 RX ADMIN — PANTOPRAZOLE SODIUM 40 MG: 40 TABLET, DELAYED RELEASE ORAL at 18:30

## 2024-09-25 RX ADMIN — Medication 10 ML: at 11:53

## 2024-09-25 RX ADMIN — LEVOTHYROXINE SODIUM 75 MCG: 75 TABLET ORAL at 06:00

## 2024-09-25 NOTE — PLAN OF CARE
Goal Outcome Evaluation:              Outcome Evaluation: Pt. 89 yr old. Pt is being admitted but bed not ready. Pt is staying over night for continuation of care. Pt is process to be trasnferred. Pt does not have any other questions at this time.

## 2024-09-25 NOTE — PLAN OF CARE
Goal Outcome Evaluation:  Plan of Care Reviewed With: patient        Progress: no change  Outcome Evaluation: Pt alert and oriented X4, NIHSS at a 0. Up X1 to stand and pivot. MRI screening complete, awaiting MRI. Vitals WNL.

## 2024-09-25 NOTE — THERAPY EVALUATION
Acute Care - Speech Language Pathology   Swallow Initial Evaluation [facility]     Patient Name: Demetra Rush  : 1933  MRN: [id number]  Today's Date: 2024  Onset of Illness/Injury or Date of Surgery: 24            Admit Date: 2024    Visit Dx:     ICD-10-CM ICD-9-CM   1. Fall, initial encounter  W19.XXXA E888.9   2. Closed head injury, initial encounter  S09.90XA 959.01   3. Anticoagulated  Z79.01 V58.61   4. Speech disturbance, unspecified type  R47.9 784.59   5. Confusion  R41.0 298.9   6. Atrial fibrillation, unspecified type  I48.91 427.31     Patient Active Problem List   Diagnosis    Osteoporosis    Acute diastolic CHF (congestive heart failure)    Pulmonary hypertension    Mitral regurgitation    Primary hypertension    Atrial fibrillation, persistent    Chronic diastolic (congestive) heart failure    Primary insomnia    Acquired hypothyroidism    Complete AV block due to AV lakhwinder ablation    Tingling in extremities    Acute UTI    Closed displaced fracture of acromial end of right clavicle    Medicare annual wellness visit, subsequent    Presence of cardiac pacemaker    Stroke-like symptoms     Past Medical History:   Diagnosis Date    Atrial fibrillation     Chronic diastolic (congestive) heart failure     GERD (gastroesophageal reflux disease)     Hyperlipidemia     Hypertension     Hypothyroidism     Mitral regurgitation     Neuropathy of both feet     Osteoporosis 2022    Pulmonary hypertension     Stroke     UTI (urinary tract infection)      Past Surgical History:   Procedure Laterality Date    ADENOIDECTOMY      BLADDER SURGERY      CARDIAC ELECTROPHYSIOLOGY PROCEDURE N/A 10/20/2022    Procedure: Pacemaker SC new--Medtronic;  Surgeon: Albino Gaston MD;  Location: [facility] INVASIVE LOCATION;  Service: Cardiology;  Laterality: N/A;    CARDIAC ELECTROPHYSIOLOGY PROCEDURE N/A 11/3/2022    Procedure: AV node ablation---has Medtronic pacemaker;  Surgeon: Marilin  MD Albino;  Location: [facility] INVASIVE LOCATION;  Service: Cardiovascular;  Laterality: N/A;    CHOLECYSTECTOMY      HYSTERECTOMY         SLP Recommendation and Plan  SLP Swallowing Diagnosis: swallow WFL/no suspected pharyngeal impairment (09/25/24 1300)  SLP Diet Recommendation: regular textures, thin liquids (09/25/24 1300)  Recommended Precautions and Strategies: upright posture during/after eating, small bites of food and sips of liquid (09/25/24 1300)  SLP Rec. for Method of Medication Administration: meds whole, as tolerated (09/25/24 1300)     Monitor for Signs of Aspiration: yes, notify SLP if any concerns (09/25/24 1300)     Swallow Criteria for Skilled Therapeutic Interventions Met: no problems identified which require skilled intervention (09/25/24 1300)  Anticipated Discharge Disposition (SLP): unknown (09/25/24 1300)     Therapy Frequency (Swallow): evaluation only (09/25/24 1300)     Oral Care Recommendations: Oral Care BID/PRN (09/25/24 1300)                                        Outcome Evaluation: Clinical swallow evaluation completed recommend pt continue with regular solids and thin liquids,meds whole as tolerated and small bites and sips.      SWALLOW EVALUATION (Last 72 Hours)       SLP Adult Swallow Evaluation       Row Name 09/25/24 1300                   Rehab Evaluation    Document Type evaluation  -KA        Subjective Information no complaints  -KA        Patient Observations alert;cooperative  -KA        Patient Effort good  -KA        Symptoms Noted During/After Treatment none  -KA           General Information    Patient Profile Reviewed yes  -KA        Pertinent History Of Current Problem Patient admitted with syncope, AMS and stroke like symptoms. VFSS 9/14/22 revealed mild oropharyngeal dysphagia with mild residue and trace penetration. Esophagram performed same day revealed mild dysmotility  -KA        Current Method of Nutrition regular textures;thin liquids  -KA         Precautions/Limitations, Vision WFL  -KA        Precautions/Limitations, Hearing WFL  -KA        Prior Level of Function-Swallowing no diet consistency restrictions  -        Plans/Goals Discussed with patient and family;agreed upon  -        Barriers to Rehab none identified  -KA        Patient's Goals for Discharge patient did not state  -           Pain Scale: Numbers Pre/Post-Treatment    Pretreatment Pain Rating 0/10 - no pain  -KA        Posttreatment Pain Rating 0/10 - no pain  -KA           Oral Motor Structure and Function    Dentition Assessment upper dentures/partial in place;lower dentures/partial in place  -KA        Secretion Management WNL/WFL  -KA        Mucosal Quality moist, healthy  -KA           Oral Musculature and Cranial Nerve Assessment    Oral Motor General Assessment WFL  -KA           General Eating/Swallowing Observations    Eating/Swallowing Skills self-fed  -        Positioning During Eating upright in bed  -        Utensils Used spoon;cup  -KA        Consistencies Trialed regular textures;soft to chew textures;chopped;mixed consistency;pureed;thin liquids  -KA           Clinical Swallow Eval    Clinical Swallow Evaluation Summary Patient demonstrates no overt s/s of pen/asp with thins via cup, puree, mechanical soft mixed and regular solids. Mastication WNL and laryngeal elevation felt age appropriate. Patient denies dysphagia symptoms.  -KA           SLP Evaluation Clinical Impression    SLP Swallowing Diagnosis swallow WFL/no suspected pharyngeal impairment  -KA        Functional Impact no impact on function  -        Swallow Criteria for Skilled Therapeutic Interventions Met no problems identified which require skilled intervention  -           Recommendations    Therapy Frequency (Swallow) evaluation only  -        SLP Diet Recommendation regular textures;thin liquids  -KA        Recommended Precautions and Strategies upright posture during/after eating;small bites  of food and sips of liquid  -ABI        Oral Care Recommendations Oral Care BID/PRN  -ABI        SLP Rec. for Method of Medication Administration meds whole;as tolerated  -ABI        Monitor for Signs of Aspiration yes;notify SLP if any concerns  -ABI        Anticipated Discharge Disposition (SLP) unknown  -ABI                  User Key  (r) = Recorded By, (t) = Taken By, (c) = Cosigned By      Initials Name Effective Dates    Dereje Kim SLP 01/05/24 -                     EDUCATION  The patient has been educated in the following areas:   Dysphagia (Swallowing Impairment).                Time Calculation:    Time Calculation- SLP       Row Name 09/25/24 1348             Time Calculation- SLP    SLP Start Time 1015  -KA      SLP Received On 09/25/24  -KA         Untimed Charges    15841-IN Eval Oral Pharyng Swallow Minutes 55  -KA         Total Minutes    Untimed Charges Total Minutes 55  -KA       Total Minutes 55  -KA                User Key  (r) = Recorded By, (t) = Taken By, (c) = Cosigned By      Initials Name Provider Type    Dereje Kim SLP Speech and Language Pathologist                    Therapy Charges for Today       Code Description Service Date Service Provider Modifiers Qty    [id number] HC ST EVAL ORAL PHARYNG SWALLOW 4 9/25/2024 Dereje Pearson SLP GN 1                 MARIANELA Garcia  9/25/2024

## 2024-09-25 NOTE — PROGRESS NOTES
MD Attestation Note    SHARED VISIT: This visit was performed by BOTH a physician and an APC. The substantive portion of the medical decision making was performed by this attesting physician who made or approved the management plan and takes responsibility for patient management. All studies in the APC note (if performed) were independently interpreted by me.       My personal findings are:        Subjective:  Patient admitted for further evaluation of altered mental status.  She also reportedly had a brief loss of consciousness yesterday.  It was initially thought that she may have bitten her tongue raising suspicion for seizure, however daughter states that patient has had a lesion on her tongue for some time now that is attributed to irritation from dental bridge.  She does not believe that she bit her tongue.  Daughter states that patient is very prone to having altered mental status and speech disturbance when she has a urinary tract infection.  She is currently at home but she has required admission to rehab previously under the circumstances and [facility].        Objective:  GENERAL: Awake, alert, in no acute distress.  Sitting up in bed.  Hearing impaired.  HENT:  Normocephalic, atraumatic. Nares patent.   EYES: Pupils equal, reactive. Extra-ocular movements normal.   RESPIRATORY: normal effort.   CARDIOVASCULAR: Regular rhythm, normal rate.   ABDOMEN:  Nontender. Abdomen nondistended.   NEUROLOGIC: Cranial nerves II-XII normal. Motor strength 5/5 in extremities.  Normal sensation light touch throughout all extremities.  Speech fluent and clear.  EXTREMITIES: No pedal edema. 2+ pedal pulses bilaterally. No deformity.   SKIN:  no rash, normal to inspection.          Assessment/ Plan:  Acute encephalopathy  Patient more alert today, slurred speech is resolved  Low suspicion for seizure, EEG consistent with mild encephalopathy  Neuro evaluated and recommended discontinuing aspirin  as patient is anticoagulated on Eliquis    Syncope  Patient does have pacemaker which needs to be interrogated  EP consult    Acute UTI  Responding favorably to Rocephin  Culture pending  Continue empiric antibiotics at discharge    Dispo: Would probably benefit from acute rehab, pending PT recs and case management recs

## 2024-09-25 NOTE — PROGRESS NOTES
ED OBSERVATION PROGRESS/DISCHARGE SUMMARY    Date of Admission: 9/24/2024   LOS: 0 days   PCP: Melissa Bolivar MD    Final Diagnosis acute encephalopathy, acute UTI, generalized weakness, high risk of falls      Subjective     Hospital Outcome: Admission for further management    Demetra Rush is a 89 y.o. female who comes in complaining of strokelike symptoms.  Family member had reported to ER provider that patient has some generalized weakness at baseline walks with a rollator for short distances and also uses a wheelchair.  Patient tried to get out for bed and into the wheelchair and fell and struck her head.  Patient has a history of atrial fibrillation and is on Eliquis.  Patient did report a headache but this had since resolved  As well as neck pain which also resolved.  Family did report that patient did had some slurred speech and difficulty speaking which has now resolved just prior to arrival.  Patient was also somewhat confused today.   Patient is alert and oriented x 2 at this time.  It was also reported by ER staff that patient had bit her tongue and there was some concern for possible seizure.  Patient does report some dysuria the last several days.        In the ED, Initial troponin 14, CBC largely unremarkable for acute findings, hemoglobin A1c of 5.8.  Procalcitonin normal.  Coags obtained.  CBC largely unremarkable for acute findings.  UA shows 3+ bacteria, 21-50 WBCs and nitrite positive.  Chest x-ray shows increased atelectasis or scarring in the left lung base.  New nodule density in the left lung base indeterminant.  Follow-up short-term repeat chest x-ray in 4 to 6 weeks or CT evaluation recommended.  CT head and cervical spine shows no acute findings.  There is cervical spondylosis as described.  EKG shows atrial fibrillation rate 70 bpm, no ST elevation apparent.  Patient is afebrile, pulse 100, on room air oxygen 98% SpO2 and blood pressure 140s over 70s.  Full dose aspirin ordered.      ROS:  General: no fevers, chills  Respiratory: no cough, dyspnea  Cardiovascular: no chest pain, palpitations  Abdomen: No abdominal pain, nausea, vomiting, or diarrhea  Neurologic: Transient AMS    9/25/2024.    2:56 PM.  Patient has been evaluated by neurology.  Strokelike symptoms are felt to be more likely due to UTI/encephalopathy.  Patient did have a reported syncopal episode and has a pacemaker.  Interrogation has been been completed but results yet unavailable.  Cardio/electrophysiology has been consulted and consult pending.  CCP is evaluated the patient and it is felt with the patient's 3-week mental decline that is rapidly progressed over the past 48 to 72 hours, her high risk of falls living at home with her daughter who is unable to care for around-the-clock, the patient would be better served by admission and eventual SNF placement.  I will go ahead and place call to the hospitalist to discuss admission at this time.      Objective   Physical Exam:  I have reviewed the vital signs.  Temp:  [97.6 °F (36.4 °C)-98.8 °F (37.1 °C)] 97.6 °F (36.4 °C)  Heart Rate:  [70-78] 70  Resp:  [14-18] 18  BP: (150-170)/(48-84) 155/69  General Appearance:    Alert, cooperative, no distress  Head:    Normocephalic, atraumatic  Eyes:    Sclerae anicteric  Neck:   Supple, no mass  Lungs: Clear to auscultation bilaterally, respirations unlabored  Heart: Regular rate and rhythm, S1 and S2 normal, no murmur, rub or gallop  Abdomen:  Soft, nontender, bowel sounds active, nondistended  Extremities: No clubbing, cyanosis, or edema to lower extremities  Pulses:  2+ and symmetric in distal lower extremities  Skin: No rashes   Neurologic: Oriented x3, Normal strength to extremities    Results Review:    I have reviewed the labs, radiology results and diagnostic studies.    Results from last 7 days   Lab Units 09/25/24  0059   WBC 10*3/mm3 6.95   HEMOGLOBIN g/dL 10.6*   HEMATOCRIT % 33.8*   PLATELETS 10*3/mm3 220     Results  from last 7 days   Lab Units 09/25/24  0059 09/24/24  1647   SODIUM mmol/L 136 139   POTASSIUM mmol/L 3.7 4.5   CHLORIDE mmol/L 102 104   CO2 mmol/L 24.0 25.4   BUN mg/dL 17 21   CREATININE mg/dL 0.82 0.89   CALCIUM mg/dL 8.8 9.2   BILIRUBIN mg/dL 0.3 0.4   ALK PHOS U/L 66 78   ALT (SGPT) U/L 9 8   AST (SGOT) U/L 11 13   GLUCOSE mg/dL 131* 97     Imaging Results (Last 24 Hours)       Procedure Component Value Units Date/Time    CT Head Without Contrast [[id number]] Collected: 09/24/24 1834     Updated: 09/25/24 0657    Narrative:      EMERGENCY CT SCAN OF THE HEAD AND CERVICAL SPINE WITHOUT CONTRAST ON  09/24/2024     CLINICAL HISTORY: This is a 89-year-old female patient who has a history  of syncope. The patient was attempting to get out of her bed into  wheelchair, the wheelchair was not locked, and fell and struck her head.  Patient has headache and neck pain     HEAT CT TECHNIQUE:  Spiral CT images were obtained from the base of the  skull to the vertex without intravenous contrast. The images were  reformatted and submitted in 3 mm thick axial, sagittal and coronal CT  sections with brain algorithm and 2 mm thick axial CT sections with  high-resolution bone algorithm.     COMPARISON: This is correlated to a prior head CT on 07/10/2023.     FINDINGS: There is some patchy low-density extending from the  periventricular into the subcortical white matter of the cerebral  hemispheres consistent with mild to moderate small vessel disease. There  is a 5 mm old lacunar infarct in the genu of the right internal capsule.  The remainder of the brain parenchyma is normal in attenuation. There is  mild cerebral atrophy. The ventricles are normal in size. I see no mass  effect and no midline shift and no extra-axial fluid collections are  identified and there is no evidence of acute intracranial hemorrhage. No  acute skull fracture is identified. The calvarium and skull base are  normal in appearance. The paranasal sinuses  and the mastoid air cells  and middle ear cavities are clear. There are osteoarthritic changes in  the temporomandibular joint bilaterally with marginal spurring of the  mandibular heads bilaterally.       Impression:      1. No acute intracranial abnormality is identified.     2. There is mild-moderate small vessel disease in the cerebral white  matter and there is a 5 mm old lacunar infarct in the genu of the right  internal capsule and there is diffuse cerebral atrophy. The remainder of  the head CT is within normal limits with no acute skull fracture or  intracranial hemorrhage identified.     Cervical spine CT technique: Spiral CT images were obtained from the  skull base down to the T2 thoracic level. The images were reformatted  and submitted in 2 mm thick axial and sagittal CT sections with soft  tissue algorithm and 1 mm thick axial, sagittal and coronal CT sections  with high-resolution bone algorithm.     COMPARISON:  This is correlated to cervical spine CT from [facility] on 07/10/2023.     FINDINGS: Atlantooccipital articulation is normal appearance     At C1-2 there are arthritic changes at the atlantodental interval with  narrowing of the interval and marginal spurring off the anterior ring of  C1 and the odontoid otherwise, the C1-2 level is normal in appearance.     At C2-3 there is mild bilateral facet overgrowth, minimal posterior  spurring. There is no canal or foraminal narrowing.     At C3-4 there is mild left and moderate right facet overgrowth and there  is some mild disc space narrowing, degenerative endplate changes, right  posterolateral endplate spurs mildly narrow the right side of the canal.  There is right uncovertebral joint spurring and combination with facet  spurs moderately narrow the right neural foramen at C3-4.     At C4-5 there is mild-moderate bilateral facet overgrowth. There is disc  space narrowing and there are degenerative disc and endplate changes  and  there is mild posterior spurring but no central canal narrowing. There  is some uncovertebral joint hypertrophy and there is moderate right and  no left foraminal narrowing.     At C5-6 there is mild bilateral facet overgrowth and there is some disc  space narrowing and mild degenerative endplate changes. There is mild  posterior spurring but there is no canal narrowing. There is mild right  and no left foraminal narrowing.     At C6-7 there is mild set overgrowth, 2 mm anterolisthesis of C6 on C7.  There is no canal or foraminal narrowing.     At C7-T1 there is mild left and mild-moderate right facet overgrowth and  1 to 2 mm anterolisthesis of C7 on T1. There is no canal narrowing.  There is mild right and no left foraminal narrowing.     No acute fractures seen in the cervical spine.     IMPRESSION:  1. No acute fracture is seen in the cervical spine. There is cervical  spondylosis as described above.     Radiation dose reduction techniques were utilized, including automated  exposure control and exposure modulation based on body size.        This report was finalized on 9/25/2024 6:54 AM by Dr. Mark Putnam M.D  on Workstation: [id number]       CT Cervical Spine Without Contrast [[id number]] Collected: 09/24/24 1834     Updated: 09/25/24 0657    Narrative:      EMERGENCY CT SCAN OF THE HEAD AND CERVICAL SPINE WITHOUT CONTRAST ON  09/24/2024     CLINICAL HISTORY: This is a 89-year-old female patient who has a history  of syncope. The patient was attempting to get out of her bed into  wheelchair, the wheelchair was not locked, and fell and struck her head.  Patient has headache and neck pain     HEAT CT TECHNIQUE:  Spiral CT images were obtained from the base of the  skull to the vertex without intravenous contrast. The images were  reformatted and submitted in 3 mm thick axial, sagittal and coronal CT  sections with brain algorithm and 2 mm thick axial CT sections with  high-resolution bone algorithm.      COMPARISON: This is correlated to a prior head CT on 07/10/2023.     FINDINGS: There is some patchy low-density extending from the  periventricular into the subcortical white matter of the cerebral  hemispheres consistent with mild to moderate small vessel disease. There  is a 5 mm old lacunar infarct in the genu of the right internal capsule.  The remainder of the brain parenchyma is normal in attenuation. There is  mild cerebral atrophy. The ventricles are normal in size. I see no mass  effect and no midline shift and no extra-axial fluid collections are  identified and there is no evidence of acute intracranial hemorrhage. No  acute skull fracture is identified. The calvarium and skull base are  normal in appearance. The paranasal sinuses and the mastoid air cells  and middle ear cavities are clear. There are osteoarthritic changes in  the temporomandibular joint bilaterally with marginal spurring of the  mandibular heads bilaterally.       Impression:      1. No acute intracranial abnormality is identified.     2. There is mild-moderate small vessel disease in the cerebral white  matter and there is a 5 mm old lacunar infarct in the genu of the right  internal capsule and there is diffuse cerebral atrophy. The remainder of  the head CT is within normal limits with no acute skull fracture or  intracranial hemorrhage identified.     Cervical spine CT technique: Spiral CT images were obtained from the  skull base down to the T2 thoracic level. The images were reformatted  and submitted in 2 mm thick axial and sagittal CT sections with soft  tissue algorithm and 1 mm thick axial, sagittal and coronal CT sections  with high-resolution bone algorithm.     COMPARISON:  This is correlated to cervical spine CT from [facility] on 07/10/2023.     FINDINGS: Atlantooccipital articulation is normal appearance     At C1-2 there are arthritic changes at the atlantodental interval with  narrowing of the  interval and marginal spurring off the anterior ring of  C1 and the odontoid otherwise, the C1-2 level is normal in appearance.     At C2-3 there is mild bilateral facet overgrowth, minimal posterior  spurring. There is no canal or foraminal narrowing.     At C3-4 there is mild left and moderate right facet overgrowth and there  is some mild disc space narrowing, degenerative endplate changes, right  posterolateral endplate spurs mildly narrow the right side of the canal.  There is right uncovertebral joint spurring and combination with facet  spurs moderately narrow the right neural foramen at C3-4.     At C4-5 there is mild-moderate bilateral facet overgrowth. There is disc  space narrowing and there are degenerative disc and endplate changes and  there is mild posterior spurring but no central canal narrowing. There  is some uncovertebral joint hypertrophy and there is moderate right and  no left foraminal narrowing.     At C5-6 there is mild bilateral facet overgrowth and there is some disc  space narrowing and mild degenerative endplate changes. There is mild  posterior spurring but there is no canal narrowing. There is mild right  and no left foraminal narrowing.     At C6-7 there is mild set overgrowth, 2 mm anterolisthesis of C6 on C7.  There is no canal or foraminal narrowing.     At C7-T1 there is mild left and mild-moderate right facet overgrowth and  1 to 2 mm anterolisthesis of C7 on T1. There is no canal narrowing.  There is mild right and no left foraminal narrowing.     No acute fractures seen in the cervical spine.     IMPRESSION:  1. No acute fracture is seen in the cervical spine. There is cervical  spondylosis as described above.     Radiation dose reduction techniques were utilized, including automated  exposure control and exposure modulation based on body size.        This report was finalized on 9/25/2024 6:54 AM by Dr. Mark Putnam M.D  on Workstation: [id number]       XR Chest 1 View  [[id number]] Collected: 09/24/24 1710     Updated: 09/24/24 1715    Narrative:      AP CHEST     HISTORY: Weakness and dizziness     COMPARISON: 4/24/2023     FINDINGS: There is increased atelectasis or scarring in the left lung  base. There is a nodular density also seen in the left lung base that  appears new. Heart size stable. Stable pacemaker. No appreciable  pneumothorax.       Impression:      Increased atelectasis or scarring in the left lung base. New nodular  density in the left lung base indeterminate. It may reflect a lung  nodule or could reflect an area of nodular infiltrate or atelectasis.  Follow-up recommended with either a short interval repeat chest x-ray in  4 to 6 weeks or CT evaluation        This report was finalized on 9/24/2024 5:12 PM by Dr. Tan Aranda M.D on Workstation: [id number]               I have reviewed the medications.  ---------------------------------------------------------------------------------------------  Assessment & Plan   Assessment/Problem List    Stroke-like symptoms      Plan:    Strokelike symptoms  Reported syncope  Speech difficulty, resolved  Altered mental status  -Patient also bit tongue, possible seizure component versus stroke vs UTI/metabolic encephalopathy  -Initial troponin 14,  -Procalcitonin normal.  Coags obtained.    -CT head and cervical spine shows no acute findings.  There is cervical spondylosis as described.   - EKG shows atrial fibrillation rate 70 bpm, no ST elevation apparent.    -Patient is afebrile, pulse 100, on room air oxygen 98% SpO2 and blood pressure 140s over 70s.    -Full dose aspirin ordered.   -Neurology consult  -Unable to MRI Brain, echo  -PT OT ST consult  -Check B12, TSH, lipid panel  --hemoglobin A1c of 5.8.    -Serial neurochecks and continuous cardiac monitoring  -Patient evaluated by neurology, CCP, physical therapy with the plan above     Acute UTI  History of overflow incontinence  - UA shows 3+ bacteria, 21-50 WBCs  and nitrite positive.   -Start Rocephin     Lung nodule  - Chest x-ray shows increased atelectasis or scarring in the left lung base.  New nodule density in the left lung base indeterminant.  Follow-up short-term repeat chest x-ray in 4 to 6 weeks or CT evaluation recommended.       History of A-fib, history of pacemaker  -Continue home Eliquis  -Interrogate pacemaker     Anxiety/depression  -Hold home Xanax for now, resume when appropriate      Peripheral neuropathy  -Continue home Neurontin     hypothyroidism  -Check TSH, continue home Synthroid     GERD  -Continue home PPI       Disposition: Admission    Follow-up after Discharge: Pending    This note will serve as a progress note and transfer note    Luis Tran III, PA 09/25/24 15:42 EDT    I have worn appropriate PPE during this patient encounter, sanitized my hands both with entering and exiting patient's room.      51 minutes has been spent by [facility] providers in the care of this patient while under observation status

## 2024-09-25 NOTE — PLAN OF CARE
Goal Outcome Evaluation:  Plan of Care Reviewed With: patient           Outcome Evaluation: Patient 89-year-old female admitted with altered mental status apparently fell out of her wheelchair (uses her WC to sit at computer desk).  Patient primarily uses rollator for ambulation, has a lift chair, sleeps in adjustable bed, lives w daughter. On PT exam pt ambulated 80ft CGA/min A x 2 rwx, altered mechanics fwd posturing and veers to R. Pt may benefit from skilled PT acutely to address functional deficits, anticipte SNU vs HHC pending progress.      Anticipated Discharge Disposition (PT): home with assist, home with home health, skilled nursing facility

## 2024-09-25 NOTE — CONSULTS
"Neurology Consult Note    Consult Date: 9/25/2024    Referring MD: Agustín Amador MD    Reason for Consult I have been asked to see the patient in neurological consultation to render advice and opinion regarding strokelike symptoms.    Demetra Rush is a 89 y.o. white female with known diagnosis of hypertension, mixed hyperlipidemia, hypothyroidism, atrial fibrillation on renally adjusted dose Eliquis 2.5 mg twice daily, frequent UTIs, presented to the hospital after a fall out of the chair, she was found to be lethargic with gibberish speech by family, workup showed that she has a UTI, CT head nonacute with no bleed, patient received ceftriaxone and currently she is awake alert and moving all extremities to command she is hard of hearing low suspicion for stroke, patient does have a pacemaker and the MRI was delayed until clearance, we will go ahead and cancel the MRI as this would not change my management patient already on Eliquis.  No need for aspirin from stroke standpoint.    Past Medical History:   Diagnosis Date    Atrial fibrillation     Chronic diastolic (congestive) heart failure     GERD (gastroesophageal reflux disease)     Hyperlipidemia     Hypertension     Hypothyroidism     Mitral regurgitation     Neuropathy of both feet     Osteoporosis 08/29/2022    Pulmonary hypertension     Stroke     UTI (urinary tract infection)        Exam  /68 (BP Location: Left arm, Patient Position: Lying)   Pulse 72   Temp 97.9 °F (36.6 °C) (Oral)   Resp 18   Ht 152.4 cm (60\")   Wt 49 kg (108 lb)   SpO2 99%   BMI 21.09 kg/m²   Gen: NAD, vitals reviewed  MS: oriented x3, follows commands appropriately, normal attention/concentration, language intact, no neglect.  CN: visual acuity grossly normal, hard of hearing, PERRL, EOMI, no facial droop, no dysarthria  Motor: Moves all extremities against gravity without drifting, normal tone  Sensory exam: Normal to light touch throughout    DATA:    Lab Results " "  Component Value Date    GLUCOSE 131 (H) 09/25/2024    CALCIUM 8.8 09/25/2024     09/25/2024    K 3.7 09/25/2024    CO2 24.0 09/25/2024     09/25/2024    BUN 17 09/25/2024    CREATININE 0.82 09/25/2024    BCR 20.7 09/25/2024    ANIONGAP 10.0 09/25/2024     Lab Results   Component Value Date    WBC 6.95 09/25/2024    HGB 10.6 (L) 09/25/2024    HCT 33.8 (L) 09/25/2024    MCV 86.7 09/25/2024     09/25/2024       Lab review: Above labs reviewed    Imaging review: CT head personally reviewed, no acute finding.    Diagnoses/discussion:  -Strokelike symptoms likely related to UTI currently improved patient already on Eliquis renal adjusted dose for age and weight      PLAN:   -Cancel brain MRI this would not   -Continue Eliquis 2.5 mg twice daily  -No need for aspirin from stroke standpoint  -Continue to treat UTI  -Falls precautions  -Discussed with the observation unit provider Dr. Vidal  -No further stroke workup needed will sign off and see again as needed.    Medical Decision Making for this neurology consultation consists of the following:  Review of previous chart, including H/P, provider and nursing notes as applicable.  Review of medications and vital signs.  Review of previous labs, neuroimaging, and additional relevant diagnostics.   Interpretation of laboratory, imaging, and other diagnostic results  Discussion with other providers and family   Total face-to-face/floor time: 30-61 minutes.     \"Dictated utilizing Dragon dictation\".       "

## 2024-09-25 NOTE — PLAN OF CARE
Goal Outcome Evaluation:              Outcome Evaluation: Clinical swallow evaluation completed recommend pt continue with regular solids and thin liquids,meds whole as tolerated and small bites and sips.      Anticipated Discharge Disposition (SLP): unknown          SLP Swallowing Diagnosis: swallow WFL/no suspected pharyngeal impairment (09/25/24 1300)

## 2024-09-25 NOTE — CASE MANAGEMENT/SOCIAL WORK
Discharge Planning Assessment  [facility]     Patient Name: Demetra Rush  MRN: [id number]  Today's Date: 9/25/2024    Admit Date: 9/24/2024    Plan: Begin dishcarge plan discussion with the daughter Kiana, pending needs, CCP to place referral to SNF as needed. Were patient to go home, the daughter can transport to home.   Discharge Needs Assessment       Row Name 09/25/24 7767       Living Environment    People in Home child(raphael), adult    Name(s) of People in Home Kiana Pond, daughter and her spouse.    Unique Family Situation patient lives with her daughter in the daughters home, ahs a 1st fllor bedroom and bath. She has an adjustable bed, and lift chair at this home.    Current Living Arrangements home    Potentially Unsafe Housing Conditions none    In the past 12 months has the electric, gas, oil, or water company threatened to shut off services in your home? No    Primary Care Provided by child(raphael)    Provides Primary Care For no one, unable/limited ability to care for self    Caregiving Concerns daughter reports that the patient has increased weakness, concern for falls.    Family Caregiver if Needed child(raphael), adult    Quality of Family Relationships helpful;involved;supportive    Able to Return to Prior Arrangements other (see comments)  daughter expresses desire for SNF if it is determined she needs it.    Living Arrangement Comments Lives with family       Resource/Environmental Concerns    Resource/Environmental Concerns none    Transportation Concerns none       Transportation Needs    In the past 12 months, has lack of transportation kept you from medical appointments or from getting medications? no    In the past 12 months, has lack of transportation kept you from meetings, work, or from getting things needed for daily living? No       Food Insecurity    Within the past 12 months, you worried that your food would run out before you got the money to buy more. Never true    Within  the past 12 months, the food you bought just didn't last and you didn't have money to get more. Never true       Transition Planning    Patient/Family Anticipates Transition to inpatient rehabilitation facility    Patient/Family Anticipated Services at Transition rehabilitation services    Transportation Anticipated family or friend will provide       Discharge Needs Assessment    Readmission Within the Last 30 Days no previous admission in last 30 days    Equipment Currently Used at Home bp cuff;shower chair;grab bar;walker, rolling    Concerns to be Addressed care coordination/care conferences;discharge planning    Concerns Comments Daughter expresses concnern that the patient may benefit form physical therapy at a SNF, but that she is weaker than usual when seh has a UTI. Daughter has questions regarding pacemaker.    Anticipated Changes Related to Illness none    Outpatient/Agency/Support Group Needs skilled nursing facility    Provided Post Acute Provider List? Yes  to daughter    Post Acute Provider List Nursing Home    Provided Post Acute Provider Quality & Resource List? Yes    Delivered To Support Person    Support Person Nettie    Method of Delivery In person    Patient's Choice of Community Agency(s) Kirsten has preference for [facility], or [facility] for possible rehab if needed.    Discharge Coordination/Progress initiated discharge plan with the daughter, pending patient needs, CCP to place referral to SNF as needed.                   Discharge Plan       Row Name 09/25/24 7173       Plan    Plan Begin dishcarge plan discussion with the daughter Nettie, pending needs, CCP to place referral to SNF as needed. Were patient to go home, the daughter can transport to home.    Roadmap to Recovery Yes    Patient/Family in Agreement with Plan yes    Plan Comments Entered room, identified self, and role of CCP nurse. Vrified patient demographics, verified daughter demographics and contact  information. Verified pt uses [facility], ([place]) on [facility].Pt lives in home with family. THere are 4 steps to enter front door, 8 to enter back door.  Patient is dependent on daughter for all ADLs. Pt has DME: adjustable bed, lift chair, rollator, BP cuff, shower chair. Daughter expresses concern/request for specific SNF if needed. She would prefer [facility] or [facility].Pt admitted to [facility] for UTI, weakness, near syncope  Patient no  longer drives.Daughter reports that the patient has scheduled respite care for October 11 to 20th at [facility]. Patient is sleepy upon assessment, and hearing aids are not functioning at this time.  Pending needs, CCP to place referral to SNF as needed.  CCP to follow.  BETTYE Arauz Rn                  Continued Care and Services - Admitted Since 9/24/2024    No active coordination exists for this encounter.          Demographic Summary       Row Name 09/25/24 1318       General Information    Admission Type observation    Arrived From emergency department    Required Notices Provided Observation Status Notice    Expected Length of Stay (LOS) to be determined    Referral Source emergency department    Reason for Consult discharge planning;care coordination/care conference    Preferred Language English    General Information Comments Verified patient name, address with the  Daughter Nettie Pond. Her contact information is [phone].       Contact Information    Permission Granted to Share Info With ;family/designee    Contact Information Comments Daughter is , Nettie Pond [phone]                   Functional Status       Row Name 09/25/24 1322       Functional Status    Usual Activity Tolerance fair    Current Activity Tolerance poor    Functional Status Comments pt ambulated 80 feet with assist of 2 persons per the physical therapist       Physical Activity    On average, how many days per week do you  engage in moderate to strenuous exercise (like a brisk walk)? 0 days    On average, how many minutes do you engage in exercise at this level? 0 min    Number of minutes of exercise per week 0       Assessment of Health Literacy    How often do you have someone help you read hospital materials? Always    How often do you have problems learning about your medical condition because of difficulty understanding written information? Always    How often do you have a problem understanding what is told to you about your medical condition? Always    How confident are you filling out medical forms by yourself? Not at all    Health Literacy Fair       Functional Status, IADL    Medications completely dependent    Meal Preparation completely dependent    Housekeeping completely dependent    Laundry completely dependent    Shopping completely dependent    IADL Comments patients's daughter assists with all ADLs       Mental Status    General Appearance WDL WDL       Mental Status Summary    Recent Changes in Mental Status/Cognitive Functioning hearing  pt's hearing aides are out of battery function at aassessment time    Mental Status Comments Family /daughter reports pt has intermittent confusion at times, that is exacerbated when she has a UTI.       Employment/    Employment Status retired                   Psychosocial    No documentation.                  Abuse/Neglect    No documentation.                  Legal    No documentation.                  Substance Abuse    No documentation.                  Patient Forms    No documentation.                     Charlotte Arauz RN

## 2024-09-25 NOTE — THERAPY EVALUATION
Patient Name: Demetra Rush  : 1933    MRN: [id number]                              Today's Date: 2024       Admit Date: 2024    Visit Dx:     ICD-10-CM ICD-9-CM   1. Fall, initial encounter  W19.XXXA E888.9   2. Closed head injury, initial encounter  S09.90XA 959.01   3. Anticoagulated  Z79.01 V58.61   4. Speech disturbance, unspecified type  R47.9 784.59   5. Confusion  R41.0 298.9   6. Atrial fibrillation, unspecified type  I48.91 427.31     Patient Active Problem List   Diagnosis    Osteoporosis    Acute diastolic CHF (congestive heart failure)    Pulmonary hypertension    Mitral regurgitation    Primary hypertension    Atrial fibrillation, persistent    Chronic diastolic (congestive) heart failure    Primary insomnia    Acquired hypothyroidism    Complete AV block due to AV lakhwinder ablation    Tingling in extremities    Acute UTI    Closed displaced fracture of acromial end of right clavicle    Medicare annual wellness visit, subsequent    Presence of cardiac pacemaker    Stroke-like symptoms     Past Medical History:   Diagnosis Date    Atrial fibrillation     Chronic diastolic (congestive) heart failure     GERD (gastroesophageal reflux disease)     Hyperlipidemia     Hypertension     Hypothyroidism     Mitral regurgitation     Neuropathy of both feet     Osteoporosis 2022    Pulmonary hypertension     Stroke     UTI (urinary tract infection)      Past Surgical History:   Procedure Laterality Date    ADENOIDECTOMY      BLADDER SURGERY      CARDIAC ELECTROPHYSIOLOGY PROCEDURE N/A 10/20/2022    Procedure: Pacemaker SC new--Medtronic;  Surgeon: Albino Gaston MD;  Location:  [facility];  Service: Cardiology;  Laterality: N/A;    CARDIAC ELECTROPHYSIOLOGY PROCEDURE N/A 11/3/2022    Procedure: AV node ablation---has Medtronic pacemaker;  Surgeon: Albino Gaston MD;  Location:  [facility];  Service: Cardiovascular;  Laterality: N/A;    CHOLECYSTECTOMY       HYSTERECTOMY        General Information       Row Name 09/25/24 1203          OT Time and Intention    Document Type evaluation  -KG     Mode of Treatment co-treatment;physical therapy;occupational therapy  -KG       Row Name 09/25/24 1203          General Information    Patient Profile Reviewed yes  -KG     Prior Level of Function --  Requires assistance w/ BADLs, uses rollator for functional mobility.  -KG     Existing Precautions/Restrictions fall  -KG     Barriers to Rehab previous functional deficit  -KG       Row Name 09/25/24 1203          Living Environment    People in Home child(raphael), adult  -KG       Row Name 09/25/24 1203          Cognition    Orientation Status (Cognition) oriented x 3  -KG       Row Name 09/25/24 1203          Safety Issues, Functional Mobility    Safety Issues Affecting Function (Mobility) ability to follow commands;impulsivity;insight into deficits/self-awareness;judgment;positioning of assistive device;problem-solving;sequencing abilities  -KG     Impairments Affecting Function (Mobility) balance;coordination;endurance/activity tolerance;strength  -KG               User Key  (r) = Recorded By, (t) = Taken By, (c) = Cosigned By      Initials Name Provider Type    KG Bj Reid OT Occupational Therapist                     Mobility/ADL's       Row Name 09/25/24 1205          Bed Mobility    Bed Mobility supine-sit;sit-supine  -KG     Supine-Sit [place] (Bed Mobility) minimum assist (75% patient effort)  -KG     Sit-Supine [place] (Bed Mobility) minimum assist (75% patient effort)  -KG       Row Name 09/25/24 1205          Transfers    Transfers sit-stand transfer;stand-sit transfer  -KG       Row Name 09/25/24 1205          Sit-Stand Transfer    Sit-Stand [place] (Transfers) contact guard;minimum assist (75% patient effort);2 person assist  -KG       Row Name 09/25/24 1205          Stand-Sit Transfer    Stand-Sit [place] (Transfers) contact guard;minimum  assist (75% patient effort);2 person assist  -KG     Assistive Device (Stand-Sit Transfers) walker, front-wheeled  -KG       Row Name 09/25/24 1205          Functional Mobility    Functional Mobility- Ind. Level --  Performed functional mobility from EOB to hallway, and then back to EOB (simulating household distance) w/ CGA<>Min A x2 using RW. Pt veered to R side, cueing to correct; dtr reports pt has done that for a while.  -KG       Row Name 09/25/24 1205          Activities of Daily Living    BADL Assessment/Intervention lower body dressing;grooming  -KG       Row Name 09/25/24 1205          Lower Body Dressing Assessment/Training    [place] Level (Lower Body Dressing) don;socks;dependent (less than 25% patient effort)  -KG     Position (Lower Body Dressing) edge of bed sitting  -KG       Row Name 09/25/24 1205          Grooming Assessment/Training    [place] Level (Grooming) oral care regimen;wash face, hands;contact guard assist  -KG     Position (Grooming) sink side  -KG               User Key  (r) = Recorded By, (t) = Taken By, (c) = Cosigned By      Initials Name Provider Type    KG Bj Reid OT Occupational Therapist                   Obj/Interventions       Row Name 09/25/24 1207          Sensory Assessment (Somatosensory)    Sensory Assessment (Somatosensory) sensation intact  -KG       Row Name 09/25/24 1207          Vision Assessment/Intervention    Visual Impairment/Limitations WFL  -KG       Row Name 09/25/24 1207          Range of Motion Comprehensive    General Range of Motion no range of motion deficits identified  -KG       Row Name 09/25/24 1207          Strength Comprehensive (MMT)    Comment, General Manual Muscle Testing (MMT) Assessment BUE strength 3+/5 grossly  -KG       Row Name 09/25/24 1207          Balance    Balance Assessment sitting static balance;sitting dynamic balance;standing static balance;standing dynamic balance  -KG     Static Sitting Balance standby  assist;contact guard  -KG     Dynamic Sitting Balance standby assist;contact guard  -KG     Position, Sitting Balance sitting edge of bed  -KG     Static Standing Balance contact guard;minimal assist;2-person assist  -KG     Dynamic Standing Balance contact guard;minimal assist;2-person assist  -KG     Position/Device Used, Standing Balance supported;walker, front-wheeled  -KG     Balance Interventions sitting;standing;supported;static;dynamic;occupation based/functional task  -KG               User Key  (r) = Recorded By, (t) = Taken By, (c) = Cosigned By      Initials Name Provider Type    KG Bj Reid, OT Occupational Therapist                   Goals/Plan       Row Name 09/25/24 1213          Bed Mobility Goal 1 (OT)    Activity/Assistive Device (Bed Mobility Goal 1, OT) sit to supine;supine to sit  -KG     [place] Level/Cues Needed (Bed Mobility Goal 1, OT) contact guard required  -KG     Time Frame (Bed Mobility Goal 1, OT) short term goal (STG);2 weeks  -KG     Progress/Outcomes (Bed Mobility Goal 1, OT) new goal  -KG       Row Name 09/25/24 1213          Transfer Goal 1 (OT)    Activity/Assistive Device (Transfer Goal 1, OT) sit-to-stand/stand-to-sit;bed-to-chair/chair-to-bed;toilet  -KG     [place] Level/Cues Needed (Transfer Goal 1, OT) standby assist;contact guard required  -KG     Time Frame (Transfer Goal 1, OT) short term goal (STG);2 weeks  -KG     Progress/Outcome (Transfer Goal 1, OT) new goal  -KG       Row Name 09/25/24 1213          Bathing Goal 1 (OT)    Activity/Device (Bathing Goal 1, OT) upper body bathing;lower body bathing  -KG     [place] Level/Cues Needed (Bathing Goal 1, OT) contact guard required;minimum assist (75% or more patient effort)  -KG     Time Frame (Bathing Goal 1, OT) short term goal (STG);2 weeks  -KG     Progress/Outcomes (Bathing Goal 1, OT) new goal  -KG       Row Name 09/25/24 1213          Dressing Goal 1 (OT)    Activity/Device (Dressing Goal 1,  OT) upper body dressing;lower body dressing  -KG     [place]/Cues Needed (Dressing Goal 1, OT) contact guard required;minimum assist (75% or more patient effort)  -KG     Time Frame (Dressing Goal 1, OT) short term goal (STG);2 weeks  -KG     Progress/Outcome (Dressing Goal 1, OT) new goal  -KG       Row Name 09/25/24 1213          Toileting Goal 1 (OT)    Activity/Device (Toileting Goal 1, OT) adjust/manage clothing;perform perineal hygiene  -KG     [place] Level/Cues Needed (Toileting Goal 1, OT) contact guard required;minimum assist (75% or more patient effort)  -KG     Time Frame (Toileting Goal 1, OT) short term goal (STG);2 weeks  -KG     Progress/Outcome (Toileting Goal 1, OT) new goal  -KG       Row Name 09/25/24 1213          Grooming Goal 1 (OT)    Activity/Device (Grooming Goal 1, OT) oral care;wash face, hands  -KG     [place] (Grooming Goal 1, OT) contact guard required;minimum assist (75% or more patient effort)  -KG     Time Frame (Grooming Goal 1, OT) short term goal (STG);2 weeks  -KG     Progress/Outcome (Grooming Goal 1, OT) new goal  -KG       Row Name 09/25/24 1213          Therapy Assessment/Plan (OT)    Planned Therapy Interventions (OT) activity tolerance training;adaptive equipment training;BADL retraining;functional balance retraining;occupation/activity based interventions;patient/caregiver education/training;ROM/therapeutic exercise;transfer/mobility retraining;strengthening exercise  -KG               User Key  (r) = Recorded By, (t) = Taken By, (c) = Cosigned By      Initials Name Provider Type    KG Bj Reid, OT Occupational Therapist                   Clinical Impression       Row Name 09/25/24 1208          Pain Assessment    Pretreatment Pain Rating 0/10 - no pain  -KG     Posttreatment Pain Rating 0/10 - no pain  -KG       Row Name 09/25/24 1208          Plan of Care Review    Plan of Care Reviewed With patient;daughter  -KG     Outcome Evaluation Pt admitted  to [facility] for stroke-like symptoms and UTI. Pt had episode of AMS, lethargy, unclear speech, and slid out of chair at home. Neuro workup ongoing. Pt lives w/ her dtr and requires assistance for BADLs at baseline, uses rollator for functional mobility. Today, pt required Min A x1-2 for bed mobility. Dep for LBD at EOB. Performing STS transfers, and functional mobility w/ CGA<>Min A 2 using RW. Pt veered to R side and would run into objects, requiring assistance to correct (no visual deficits identified w/ assessment). Able to perform sink side grooming/hygiene w/ CGA for several minutes. Pt present w/ strength, balance, and activity tolerance mildly below baseline. OT will f/u to address. Recommend SNF vs HH pending progress.  -KG       Row Name 09/25/24 1208          Therapy Assessment/Plan (OT)    Rehab Potential (OT) fair, will monitor progress closely  -KG     Criteria for Skilled Therapeutic Interventions Met (OT) skilled treatment is necessary  -KG     Therapy Frequency (OT) 5 times/wk  -KG       Row Name 09/25/24 1208          Therapy Plan Review/Discharge Plan (OT)    Anticipated Discharge Disposition (OT) skilled nursing facility;home with home health;home with assist  -KG       Row Name 09/25/24 1208          Vital Signs    Pre Patient Position Supine  -KG     Intra Patient Position Standing  -KG     Post Patient Position Supine  -KG       Row Name 09/25/24 1208          Positioning and Restraints    Pre-Treatment Position in bed  -KG     Post Treatment Position bed  -KG     In Bed notified nsg;supine;call light within reach;encouraged to call for assist;exit alarm on;with family/caregiver  -KG               User Key  (r) = Recorded By, (t) = Taken By, (c) = Cosigned By      Initials Name Provider Type    KG Bj Reid, LIZZY Occupational Therapist                   Outcome Measures       Row Name 09/25/24 1214          How much help from another is currently needed...    Putting on and taking off regular  lower body clothing? 1  -KG     Bathing (including washing, rinsing, and drying) 1  -KG     Toileting (which includes using toilet bed pan or urinal) 1  -KG     Putting on and taking off regular upper body clothing 3  -KG     Taking care of personal grooming (such as brushing teeth) 4  -KG     Eating meals 4  -KG     AM-PAC 6 Clicks Score (OT) 14  -KG       Row Name 09/25/24 1100          How much help from another person do you currently need...    Turning from your back to your side while in flat bed without using bedrails? 3  -LH     Moving from lying on back to sitting on the side of a flat bed without bedrails? 3  -LH     Moving to and from a bed to a chair (including a wheelchair)? 3  -LH     Standing up from a chair using your arms (e.g., wheelchair, bedside chair)? 3  -LH     Climbing 3-5 steps with a railing? 2  -LH     To walk in hospital room? 2  -LH     AM-PAC 6 Clicks Score (PT) 16  -LH     Highest Level of Mobility Goal 5 --> Static standing  -       Row Name 09/25/24 1214          Modified Sampson Scale    Modified Saleem Scale 4 - Moderately severe disability.  Unable to walk without assistance, and unable to attend to own bodily needs without assistance.  -KG       Row Name 09/25/24 1214 09/25/24 1100       Functional Assessment    Outcome Measure Options AM-PAC 6 Clicks Daily Activity (OT);Modified Saleem  -KG AM-PAC 6 Clicks Basic Mobility (PT)  -              User Key  (r) = Recorded By, (t) = Taken By, (c) = Cosigned By      Initials Name Provider Type     Evelin Orourke, PT Physical Therapist    KG Bj Reid OT Occupational Therapist                    Occupational Therapy Education       Title: PT OT SLP Therapies (In Progress)       Topic: Occupational Therapy (Not Started)       Point: ADL training (Not Started)       Description:   Instruct learner(s) on proper safety adaptation and remediation techniques during self care or transfers.   Instruct in proper use of assistive  devices.                  Learner Progress:  Not documented in this visit.              Point: Home exercise program (Not Started)       Description:   Instruct learner(s) on appropriate technique for monitoring, assisting and/or progressing therapeutic exercises/activities.                  Learner Progress:  Not documented in this visit.              Point: Precautions (Not Started)       Description:   Instruct learner(s) on prescribed precautions during self-care and functional transfers.                  Learner Progress:  Not documented in this visit.              Point: Body mechanics (Not Started)       Description:   Instruct learner(s) on proper positioning and spine alignment during self-care, functional mobility activities and/or exercises.                  Learner Progress:  Not documented in this visit.                                  OT Recommendation and Plan  Planned Therapy Interventions (OT): activity tolerance training, adaptive equipment training, BADL retraining, functional balance retraining, occupation/activity based interventions, patient/caregiver education/training, ROM/therapeutic exercise, transfer/mobility retraining, strengthening exercise  Therapy Frequency (OT): 5 times/wk  Plan of Care Review  Plan of Care Reviewed With: patient, daughter  Outcome Evaluation: Pt admitted to [facility] for stroke-like symptoms and UTI. Pt had episode of AMS, lethargy, unclear speech, and slid out of chair at home. Neuro workup ongoing. Pt lives w/ her dtr and requires assistance for BADLs at baseline, uses rollator for functional mobility. Today, pt required Min A x1-2 for bed mobility. Dep for LBD at EOB. Performing STS transfers, and functional mobility w/ CGA<>Min A 2 using RW. Pt veered to R side and would run into objects, requiring assistance to correct (no visual deficits identified w/ assessment). Able to perform sink side grooming/hygiene w/ CGA for several minutes. Pt present w/ strength,  balance, and activity tolerance mildly below baseline. OT will f/u to address. Recommend SNF vs HH pending progress.     Time Calculation:   Evaluation Complexity (OT)  Review Occupational Profile/Medical/Therapy History Complexity: expanded/moderate complexity  Assessment, Occupational Performance/Identification of Deficit Complexity: 3-5 performance deficits  Clinical Decision Making Complexity (OT): detailed assessment/moderate complexity  Overall Complexity of Evaluation (OT): moderate complexity     Time Calculation- OT       Row Name 09/25/24 1215             Time Calculation- OT    OT Start Time 0915  -KG      OT Stop Time 0943  -KG      OT Time Calculation (min) 28 min  -KG      Total Timed Code Minutes- OT 23 minute(s)  -KG      OT Received On 09/25/24  -KG      OT - Next Appointment 09/26/24  -KG      OT Goal Re-Cert Due Date 10/09/24  -KG         Timed Charges    70887 - OT Self Care/Mgmt Minutes 23  -KG         Untimed Charges    OT Eval/Re-eval Minutes 5  -KG         Total Minutes    Timed Charges Total Minutes 23  -KG      Untimed Charges Total Minutes 5  -KG       Total Minutes 28  -KG                User Key  (r) = Recorded By, (t) = Taken By, (c) = Cosigned By      Initials Name Provider Type    KG Bj Reid OT Occupational Therapist                  Therapy Charges for Today       Code Description Service Date Service Provider Modifiers Qty    44257287264 HC OT SELF CARE/MGMT/TRAIN EA 15 MIN 9/25/2024 Bj Reid OT GO 2    [id number] HC OT EVAL MOD COMPLEXITY 2 9/25/2024 Bj Reid OT GO 1                 Bj Reid OT  9/25/2024

## 2024-09-25 NOTE — PLAN OF CARE
Goal Outcome Evaluation:  Plan of Care Reviewed With: patient, daughter           Outcome Evaluation: Pt admitted to [facility] for stroke-like symptoms and UTI. Pt had episode of AMS, lethargy, unclear speech, and slid out of chair at home. Neuro workup ongoing. Pt lives w/ her dtr and requires assistance for BADLs at baseline, uses rollator for functional mobility. Today, pt required Min A x1-2 for bed mobility. Dep for LBD at EOB. Performing STS transfers, and functional mobility w/ CGA<>Min A 2 using RW. Pt veered to R side and would run into objects, requiring assistance to correct (no visual deficits identified w/ assessment). Able to perform sink side grooming/hygiene w/ CGA for several minutes. Pt present w/ strength, balance, and activity tolerance mildly below baseline. OT will f/u to address. Recommend SNF vs HH pending progress.      Anticipated Discharge Disposition (OT): skilled nursing facility, home with home health, home with assist

## 2024-09-25 NOTE — THERAPY EVALUATION
Acute Care - Physical Therapy Initial Evaluation  [facility]     Patient Name: Demetra Rush  : 1933  MRN: [id number]  Today's Date: 2024   Onset of Illness/Injury or Date of Surgery: 24  Visit Dx:     ICD-10-CM ICD-9-CM   1. Fall, initial encounter  W19.XXXA E888.9   2. Closed head injury, initial encounter  S09.90XA 959.01   3. Anticoagulated  Z79.01 V58.61   4. Speech disturbance, unspecified type  R47.9 784.59   5. Confusion  R41.0 298.9   6. Atrial fibrillation, unspecified type  I48.91 427.31     Patient Active Problem List   Diagnosis    Osteoporosis    Acute diastolic CHF (congestive heart failure)    Pulmonary hypertension    Mitral regurgitation    Primary hypertension    Atrial fibrillation, persistent    Chronic diastolic (congestive) heart failure    Primary insomnia    Acquired hypothyroidism    Complete AV block due to AV lakhwinder ablation    Tingling in extremities    Acute UTI    Closed displaced fracture of acromial end of right clavicle    Medicare annual wellness visit, subsequent    Presence of cardiac pacemaker    Stroke-like symptoms     Past Medical History:   Diagnosis Date    Atrial fibrillation     Chronic diastolic (congestive) heart failure     GERD (gastroesophageal reflux disease)     Hyperlipidemia     Hypertension     Hypothyroidism     Mitral regurgitation     Neuropathy of both feet     Osteoporosis 2022    Pulmonary hypertension     Stroke     UTI (urinary tract infection)      Past Surgical History:   Procedure Laterality Date    ADENOIDECTOMY      BLADDER SURGERY      CARDIAC ELECTROPHYSIOLOGY PROCEDURE N/A 10/20/2022    Procedure: Pacemaker SC new--Medtronic;  Surgeon: Albino Gaston MD;  Location: [facility] INVASIVE LOCATION;  Service: Cardiology;  Laterality: N/A;    CARDIAC ELECTROPHYSIOLOGY PROCEDURE N/A 11/3/2022    Procedure: AV node ablation---has Medtronic pacemaker;  Surgeon: Albino Gaston MD;  Location: [facility] CATH INVASIVE LOCATION;   Service: Cardiovascular;  Laterality: N/A;    CHOLECYSTECTOMY      HYSTERECTOMY       PT Assessment (Last 12 Hours)       PT Evaluation and Treatment       Row Name 09/25/24 1000          Physical Therapy Time and Intention    Document Type evaluation  -     Mode of Treatment co-treatment;physical therapy;occupational therapy  -     Comment Co treatment medically appropriate and necessary due to patient activity tolerance and safety of patient and staff. Evaluation established to achieve all goals in POC.  -       Row Name 09/25/24 1000          General Information    Patient Profile Reviewed yes  -     Onset of Illness/Injury or Date of Surgery 09/24/24  -     Patient Observations alert;cooperative;agree to therapy  -     General Observations of Patient Patient supine in bed, daughter bedside and  -     Prior Level of Function gait;min assist:  Patient ambulates with rollator, daughter assist with gait and ADLs  -     Equipment Currently Used at Home rollator  -     Pertinent History of Current Functional Problem AMS, fall  -     Existing Precautions/Restrictions fall  -     Benefits Reviewed patient and family:  -     Barriers to Rehab cognitive status  -       Row Name 09/25/24 1000          Living Environment    Current Living Arrangements home  -     People in Home child(raphael), adult  -       Row Name 09/25/24 1000          Pain    Pretreatment Pain Rating 0/10 - no pain  -     Posttreatment Pain Rating 0/10 - no pain  -       Row Name 09/25/24 1000          Cognition    Affect/Mental Status (Cognition) confused  -     Orientation Status (Cognition) oriented to;person;situation  -     Follows Commands (Cognition) follows one-step commands;75-90% accuracy;initiation impaired;physical/tactile prompts required;repetition of directions required;verbal cues/prompting required  -       Row Name 09/25/24 1000          Range of Motion Comprehensive    General Range of Motion  bilateral lower extremity ROM WFL  -LH       Row Name 09/25/24 1000          Strength (Manual Muscle Testing)    Strength (Manual Muscle Testing) strength is WFL;bilateral lower extremities  -[facility] Name 09/25/24 1000          Strength Comprehensive (MMT)    Comment, General Manual Muscle Testing (MMT) Assessment Gross generalized weakness, at least 3 out of 5 MMT  -LH       Row Name 09/25/24 1000          Bed Mobility    Bed Mobility sit-supine;supine-sit  -     Supine-Sit [place] (Bed Mobility) moderate assist (50% patient effort);1 person to manage equipment  -     Sit-Supine [place] (Bed Mobility) moderate assist (50% patient effort)  -     Bed Mobility, Safety Issues cognitive deficits limit understanding  -     Assistive Device (Bed Mobility) bed rails;draw sheet;head of bed elevated  -LH       Row Name 09/25/24 1000          Transfers    Transfers sit-stand transfer;stand-sit transfer  -LH       Row Name 09/25/24 1000          Sit-Stand Transfer    Sit-Stand [place] (Transfers) minimum assist (75% patient effort);2 person assist  -     Assistive Device (Sit-Stand Transfers) walker, front-wheeled  -LH       Row Name 09/25/24 1000          Stand-Sit Transfer    Stand-Sit [place] (Transfers) minimum assist (75% patient effort);2 person assist  -     Assistive Device (Stand-Sit Transfers) walker, front-wheeled  -LH       Row Name 09/25/24 1000          Gait/Stairs (Locomotion)    [place] Level (Gait) minimum assist (75% patient effort);2 person assist;contact guard  -     Assistive Device (Gait) walker, front-wheeled  -     Distance in Feet (Gait) 80  -LH     Pattern (Gait) step-to;step-through  -     Deviations/Abnormal Patterns (Gait) bilateral deviations;bartolo decreased;weight shifting decreased;stride length decreased  -     Bilateral Gait Deviations forward flexed posture;heel strike decreased;weight shift ability decreased  -     Comment, (Gait/Stairs)  veers to the right and assist required to steer  Two-person assist for safety  -       Row Name 09/25/24 1000          Balance    Balance Assessment sitting static balance;standing static balance  -     Static Sitting Balance minimal assist;contact guard  -     Position, Sitting Balance supported;sitting edge of bed  -     Static Standing Balance contact guard;minimal assist  -     Position/Device Used, Standing Balance supported;walker, front-wheeled  -       Row Name 09/25/24 1000          Plan of Care Review    Plan of Care Reviewed With patient  -     Outcome Evaluation Patient 89-year-old female admitted with altered mental status apparently fell out of her wheelchair (uses her WC to sit at computer desk).  Patient primarily uses rollator for ambulation, has a lift chair, sleeps in adjustable bed, lives w daughter. On PT exam pt ambulated 80ft CGA/min A x 2 rwx, altered mechanics fwd posturing and veers to R. Pt may benefit from skilled PT acutely to address functional deficits, anticipte SNU vs C pending progress.  -[facility] Name 09/25/24 1000          Positioning and Restraints    Pre-Treatment Position in bed  -     Post Treatment Position bed  -     In Bed fowlers;call light within reach;encouraged to call for assist;exit alarm on;with family/caregiver  -[facility] Name 09/25/24 1000          Therapy Assessment/Plan (PT)    Rehab Potential (PT) good, to achieve stated therapy goals  -     Criteria for Skilled Interventions Met (PT) yes  -     Therapy Frequency (PT) 5 times/wk  -       Row Name 09/25/24 1000          PT Evaluation Complexity    History, PT Evaluation Complexity 1-2 personal factors and/or comorbidities  -     Examination of Body Systems (PT Eval Complexity) total of 3 or more elements  -     Clinical Presentation (PT Evaluation Complexity) evolving  -     Clinical Decision Making (PT Evaluation Complexity) moderate complexity  -     Overall Complexity  (PT Evaluation Complexity) moderate complexity  -       Row Name 09/25/24 1000          Physical Therapy Goals    Bed Mobility Goal Selection (PT) bed mobility, PT goal 1  -     Transfer Goal Selection (PT) transfer, PT goal 1  -     Gait Training Goal Selection (PT) gait training, PT goal 1  -       Row Name 09/25/24 1000          Bed Mobility Goal 1 (PT)    Activity/Assistive Device (Bed Mobility Goal 1, PT) bed mobility activities, all  -     [place] Level/Cues Needed (Bed Mobility Goal 1, PT) supervision required  -     Time Frame (Bed Mobility Goal 1, PT) 2 weeks  -       Row Name 09/25/24 1000          Transfer Goal 1 (PT)    Activity/Assistive Device (Transfer Goal 1, PT) bed-to-chair/chair-to-bed;sit-to-stand/stand-to-sit;walker, rolling  -     [place] Level/Cues Needed (Transfer Goal 1, PT) supervision required  -     Time Frame (Transfer Goal 1, PT) 2 weeks  -[facility] Name 09/25/24 1000          Gait Training Goal 1 (PT)    Activity/Assistive Device (Gait Training Goal 1, PT) assistive device use;walker, rolling  -LH     [place] Level (Gait Training Goal 1, PT) supervision required  -     Distance (Gait Training Goal 1, PT) 150  -     Time Frame (Gait Training Goal 1, PT) 2 weeks  -               User Key  (r) = Recorded By, (t) = Taken By, (c) = Cosigned By      Initials Name Provider Type     Evelin Orourke, PT Physical Therapist                    Physical Therapy Education       Title: PT OT SLP Therapies (In Progress)       Topic: Physical Therapy (In Progress)       Point: Mobility training (In Progress)       Learning Progress Summary             Patient Acceptance, E, NR by  at 9/25/2024 1104                         Point: Home exercise program (In Progress)       Learning Progress Summary             Patient Acceptance, E, NR by  at 9/25/2024 1104                         Point: Body mechanics (In Progress)       Learning Progress Summary              Patient Acceptance, E, NR by  at 9/25/2024 1104                         Point: Precautions (In Progress)       Learning Progress Summary             Patient Acceptance, E, NR by  at 9/25/2024 1104                                         User Key       Initials Effective Dates Name Provider Type Discipline     06/16/21 -  Evelin Orourke, PT Physical Therapist PT                  PT Recommendation and Plan  Anticipated Discharge Disposition (PT): home with assist, home with home health, skilled nursing facility  Planned Therapy Interventions (PT): balance training, gait training, bed mobility training, home exercise program, ROM (range of motion), stair training, strengthening, stretching, transfer training  Therapy Frequency (PT): 5 times/wk  Plan of Care Reviewed With: patient  Outcome Evaluation: Patient 89-year-old female admitted with altered mental status apparently fell out of her wheelchair (uses her WC to sit at computer desk).  Patient primarily uses rollator for ambulation, has a lift chair, sleeps in adjustable bed, lives w daughter. On PT exam pt ambulated 80ft CGA/min A x 2 rwx, altered mechanics fwd posturing and veers to R. Pt may benefit from skilled PT acutely to address functional deficits, anticipte SNU vs [facility] pending progress.   Outcome Measures       Row Name 09/25/24 1100             How much help from another person do you currently need...    Turning from your back to your side while in flat bed without using bedrails? 3  -LH      Moving from lying on back to sitting on the side of a flat bed without bedrails? 3  -LH      Moving to and from a bed to a chair (including a wheelchair)? 3  -LH      Standing up from a chair using your arms (e.g., wheelchair, bedside chair)? 3  -LH      Climbing 3-5 steps with a railing? 2  -LH      To walk in hospital room? 2  -LH      AM-PAC 6 Clicks Score (PT) 16  -      Highest Level of Mobility Goal 5 --> Static standing  -         Functional  Assessment    Outcome Measure Options AM-PAC 6 Clicks Basic Mobility (PT)  -                User Key  (r) = Recorded By, (t) = Taken By, (c) = Cosigned By      Initials Name Provider Type     Evelin Orourke, PT Physical Therapist                     Time Calculation:    PT Charges       Row Name 09/25/24 1105             Time Calculation    Start Time 0911  -      Stop Time 0935  -      Time Calculation (min) 24 min  -      PT Received On 09/25/24  -      PT - Next Appointment 09/26/24  -      PT Goal Re-Cert Due Date 10/09/24  -         Time Calculation- PT    Total Timed Code Minutes- PT 10 minute(s)  -                User Key  (r) = Recorded By, (t) = Taken By, (c) = Cosigned By      Initials Name Provider Type     Evelin Orourke, PT Physical Therapist                  Therapy Charges for Today       Code Description Service Date Service Provider Modifiers Qty    04655200932 HC PT EVAL MOD COMPLEXITY 2 9/25/2024 Evelin Orourke, PT GP 1    02381352836 HC PT THER PROC EA 15 MIN 9/25/2024 Evelin Orourke, PT GP 1            PT G-Codes  Outcome Measure Options: AM-PAC 6 Clicks Basic Mobility (PT)  AM-PAC 6 Clicks Score (PT): 16    Evelin Orourke PT  9/25/2024
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
## 2024-09-26 PROBLEM — G93.41 ACUTE METABOLIC ENCEPHALOPATHY: Status: ACTIVE | Noted: 2024-09-26

## 2024-09-26 PROBLEM — R26.89 DECREASED MOBILITY: Status: ACTIVE | Noted: 2024-09-26

## 2024-09-26 PROBLEM — R82.4 KETONURIA: Status: ACTIVE | Noted: 2024-09-26

## 2024-09-26 PROBLEM — R05.3 CHRONIC COUGH: Status: ACTIVE | Noted: 2024-09-26

## 2024-09-26 LAB — BACTERIA SPEC AEROBE CULT: ABNORMAL

## 2024-09-26 PROCEDURE — 25010000002 CEFTRIAXONE PER 250 MG: Performed by: PHYSICIAN ASSISTANT

## 2024-09-26 PROCEDURE — 97110 THERAPEUTIC EXERCISES: CPT

## 2024-09-26 PROCEDURE — 25010000002 CYANOCOBALAMIN PER 1000 MCG: Performed by: HOSPITALIST

## 2024-09-26 PROCEDURE — 97116 GAIT TRAINING THERAPY: CPT

## 2024-09-26 RX ORDER — SACCHAROMYCES BOULARDII 250 MG
250 CAPSULE ORAL 2 TIMES DAILY
Status: DISCONTINUED | OUTPATIENT
Start: 2024-09-26 | End: 2024-09-30 | Stop reason: HOSPADM

## 2024-09-26 RX ORDER — CYANOCOBALAMIN 1000 UG/ML
1000 INJECTION, SOLUTION INTRAMUSCULAR; SUBCUTANEOUS DAILY
Status: COMPLETED | OUTPATIENT
Start: 2024-09-26 | End: 2024-09-28

## 2024-09-26 RX ORDER — GUAIFENESIN 600 MG/1
600 TABLET, EXTENDED RELEASE ORAL EVERY 12 HOURS SCHEDULED
Status: DISCONTINUED | OUTPATIENT
Start: 2024-09-26 | End: 2024-09-30 | Stop reason: HOSPADM

## 2024-09-26 RX ADMIN — PANTOPRAZOLE SODIUM 40 MG: 40 TABLET, DELAYED RELEASE ORAL at 16:29

## 2024-09-26 RX ADMIN — CEFTRIAXONE SODIUM 1000 MG: 1 INJECTION, POWDER, FOR SOLUTION INTRAMUSCULAR; INTRAVENOUS at 20:30

## 2024-09-26 RX ADMIN — GUAIFENESIN 600 MG: 600 TABLET, EXTENDED RELEASE ORAL at 20:29

## 2024-09-26 RX ADMIN — Medication 250 MG: at 20:29

## 2024-09-26 RX ADMIN — Medication 10 ML: at 20:30

## 2024-09-26 RX ADMIN — GABAPENTIN 300 MG: 300 CAPSULE ORAL at 16:28

## 2024-09-26 RX ADMIN — ALPRAZOLAM 0.5 MG: 0.5 TABLET ORAL at 22:59

## 2024-09-26 RX ADMIN — GUAIFENESIN 600 MG: 600 TABLET, EXTENDED RELEASE ORAL at 09:45

## 2024-09-26 RX ADMIN — CYANOCOBALAMIN 1000 MCG: 1000 INJECTION INTRAMUSCULAR; SUBCUTANEOUS at 16:28

## 2024-09-26 RX ADMIN — APIXABAN 2.5 MG: 2.5 TABLET, FILM COATED ORAL at 20:29

## 2024-09-26 RX ADMIN — GABAPENTIN 300 MG: 300 CAPSULE ORAL at 20:29

## 2024-09-26 RX ADMIN — PANTOPRAZOLE SODIUM 40 MG: 40 TABLET, DELAYED RELEASE ORAL at 09:48

## 2024-09-26 RX ADMIN — GABAPENTIN 300 MG: 300 CAPSULE ORAL at 09:45

## 2024-09-26 RX ADMIN — Medication 250 MG: at 09:45

## 2024-09-26 RX ADMIN — APIXABAN 2.5 MG: 2.5 TABLET, FILM COATED ORAL at 09:45

## 2024-09-26 RX ADMIN — LEVOTHYROXINE SODIUM 75 MCG: 75 TABLET ORAL at 09:45

## 2024-09-26 RX ADMIN — ATORVASTATIN CALCIUM 40 MG: 20 TABLET, FILM COATED ORAL at 20:29

## 2024-09-26 RX ADMIN — Medication 10 ML: at 09:45

## 2024-09-26 NOTE — THERAPY TREATMENT NOTE
Patient Name: Demetra Rush  : 1933    MRN: [id number]                              Today's Date: 2024       Admit Date: 2024    Visit Dx:     ICD-10-CM ICD-9-CM   1. Fall, initial encounter  W19.XXXA E888.9   2. Closed head injury, initial encounter  S09.90XA 959.01   3. Anticoagulated  Z79.01 V58.61   4. Speech disturbance, unspecified type  R47.9 784.59   5. Confusion  R41.0 298.9   6. Atrial fibrillation, unspecified type  I48.91 427.31     Patient Active Problem List   Diagnosis    Osteoporosis    Acute diastolic CHF (congestive heart failure)    Pulmonary hypertension    Mitral regurgitation    Primary hypertension    Atrial fibrillation, persistent    Chronic diastolic (congestive) heart failure    Primary insomnia    Acquired hypothyroidism    Complete AV block due to AV lakhwinder ablation    Tingling in extremities    Acute UTI    Closed displaced fracture of acromial end of right clavicle    Medicare annual wellness visit, subsequent    Presence of cardiac pacemaker    Stroke-like symptoms    Encephalopathy acute    Acute metabolic encephalopathy    Chronic cough    Decreased mobility    Fall    Ketonuria     Past Medical History:   Diagnosis Date    Atrial fibrillation     Chronic diastolic (congestive) heart failure     GERD (gastroesophageal reflux disease)     Hyperlipidemia     Hypertension     Hypothyroidism     Mitral regurgitation     Neuropathy of both feet     Osteoporosis 2022    Pulmonary hypertension     Stroke     UTI (urinary tract infection)      Past Surgical History:   Procedure Laterality Date    ADENOIDECTOMY      BLADDER SURGERY      CARDIAC ELECTROPHYSIOLOGY PROCEDURE N/A 10/20/2022    Procedure: Pacemaker SC new--Medtronic;  Surgeon: Albino Gaston MD;  Location: [facility] INVASIVE LOCATION;  Service: Cardiology;  Laterality: N/A;    CARDIAC ELECTROPHYSIOLOGY PROCEDURE N/A 11/3/2022    Procedure: AV node ablation---has Medtronic pacemaker;  Surgeon: Marilin  MD Albino;  Location: [facility] INVASIVE LOCATION;  Service: Cardiovascular;  Laterality: N/A;    CHOLECYSTECTOMY      HYSTERECTOMY        General Information       Row Name 09/26/24 1529          Physical Therapy Time and Intention    Document Type therapy note (daily note)  -     Mode of Treatment physical therapy  -       Row Name 09/26/24 1529          General Information    Existing Precautions/Restrictions fall  -       Row Name 09/26/24 1529          Cognition    Orientation Status (Cognition) oriented x 3  -               User Key  (r) = Recorded By, (t) = Taken By, (c) = Cosigned By      Initials Name Provider Type     Molly Alston PTA Physical Therapist Assistant                   Mobility       Row Name 09/26/24 1530          Bed Mobility    Bed Mobility supine-sit;sit-supine  -     Supine-Sit [place] (Bed Mobility) standby assist  -     Sit-Supine [place] (Bed Mobility) minimum assist (75% patient effort)  -     Assistive Device (Bed Mobility) bed rails;head of bed elevated  -       Row Name 09/26/24 1530          Sit-Stand Transfer    Sit-Stand [place] (Transfers) minimum assist (75% patient effort)  -     Assistive Device (Sit-Stand Transfers) walker, front-wheeled  -       Row Name 09/26/24 1530          Gait/Stairs (Locomotion)    [place] Level (Gait) contact guard  -     Assistive Device (Gait) walker, front-wheeled  -     Patient was able to Ambulate yes  -     Distance in Feet (Gait) 200  -SM     Deviations/Abnormal Patterns (Gait) bartolo decreased;stride length decreased  -     Bilateral Gait Deviations forward flexed posture  -     Comment, (Gait/Stairs) slight R veer  -               User Key  (r) = Recorded By, (t) = Taken By, (c) = Cosigned By      Initials Name Provider Type     Molly Alston PTA Physical Therapist Assistant                   Obj/Interventions       Row Name 09/26/24 1532          Motor Skills     Therapeutic Exercise --  seated AP, LAQ x10 reps  -               User Key  (r) = Recorded By, (t) = Taken By, (c) = Cosigned By      Initials Name Provider Type    Molly Poole PTA Physical Therapist Assistant                   Goals/Plan    No documentation.                  Clinical Impression       Row Name 09/26/24 1532          Pain    Pretreatment Pain Rating 0/10 - no pain  -SM     Posttreatment Pain Rating 0/10 - no pain  -SM       Row Name 09/26/24 1532          Positioning and Restraints    Pre-Treatment Position in bed  -     Post Treatment Position bed  -SM     In Bed supine;call light within reach;encouraged to call for assist;exit alarm on  -               User Key  (r) = Recorded By, (t) = Taken By, (c) = Cosigned By      Initials Name Provider Type    Molly Poole PTA Physical Therapist Assistant                   Outcome Measures       Row Name 09/26/24 1533          How much help from another person do you currently need...    Turning from your back to your side while in flat bed without using bedrails? 3  -SM     Moving from lying on back to sitting on the side of a flat bed without bedrails? 3  -SM     Moving to and from a bed to a chair (including a wheelchair)? 3  -SM     Standing up from a chair using your arms (e.g., wheelchair, bedside chair)? 3  -SM     Climbing 3-5 steps with a railing? 2  -SM     To walk in hospital room? 3  -SM     AM-PAC 6 Clicks Score (PT) 17  -SM     Highest Level of Mobility Goal 5 --> Static standing  -       Row Name 09/26/24 1533          Functional Assessment    Outcome Measure Options AM-PAC 6 Clicks Basic Mobility (PT)  -               User Key  (r) = Recorded By, (t) = Taken By, (c) = Cosigned By      Initials Name Provider Type    Molly Poole PTA Physical Therapist Assistant                                 Physical Therapy Education       Title: PT OT SLP Therapies (In Progress)       Topic: Physical Therapy (In  Progress)       Point: Mobility training (In Progress)       Learning Progress Summary             Patient Acceptance, E,D, NR by  at 9/26/2024 1534    Acceptance, E, NR by  at 9/25/2024 1104                         Point: Home exercise program (In Progress)       Learning Progress Summary             Patient Acceptance, E,D, NR by  at 9/26/2024 1534    Acceptance, E, NR by  at 9/25/2024 1104                         Point: Body mechanics (In Progress)       Learning Progress Summary             Patient Acceptance, E,D, NR by  at 9/26/2024 1534    Acceptance, E, NR by  at 9/25/2024 1104                         Point: Precautions (In Progress)       Learning Progress Summary             Patient Acceptance, E,D, NR by  at 9/26/2024 1534    Acceptance, E, NR by  at 9/25/2024 1104                                         User Key       Initials Effective Dates Name Provider Type Discipline     06/16/21 -  Evelin Orourke, PT Physical Therapist PT     03/07/18 -  Molly Alston PTA Physical Therapist Assistant PT                  PT Recommendation and Plan     Plan of Care Reviewed With: patient  Progress: improving  Outcome Evaluation: Pt tolerated treatment well this date. Required SBA-min A for bed mobility, then min A to stand. Pt ambulated 200ft w/ Rw and CGA, w/ slight R veering noted throughout. No LOBs noted, and no pain reported. Encouraged pt to ambulate w/ nsg during the day.     Time Calculation:         PT Charges       Row Name 09/26/24 1547             Time Calculation    Start Time 1433  -      Stop Time 1500  -      Time Calculation (min) 27 min  -      PT Received On 09/26/24  -      PT - Next Appointment 09/27/24  -                User Key  (r) = Recorded By, (t) = Taken By, (c) = Cosigned By      Initials Name Provider Type     Molly Alston, MITZI Physical Therapist Assistant                  Therapy Charges for Today       Code Description Service Date Service  Provider Modifiers Qty    97965598631  GAIT TRAINING EA 15 MIN 9/26/2024 Molly Alston, MITZI GP 1    11258655119 HC PT THER PROC EA 15 MIN 9/26/2024 Molly Alston, MITZI GP 1            PT G-Codes  Outcome Measure Options: AM-PAC 6 Clicks Basic Mobility (PT)  AM-PAC 6 Clicks Score (PT): 17  AM-PAC 6 Clicks Score (OT): 14  Modified Sasser Scale: 4 - Moderately severe disability.  Unable to walk without assistance, and unable to attend to own bodily needs without assistance.  PT Discharge Summary  Anticipated Discharge Disposition (PT): home with home health, home with assist    Molly Alston PTA  9/26/2024

## 2024-09-26 NOTE — PROGRESS NOTES
89 year old female who presented to the ER with worsening lethargy and confusion after she had a fall out of chair at home. Family found her and took her to ER for evaluation. She is also noted to have a bite on her tongue.     We received an alert from her device on 9/25 that showed higher RV thresholds that have been up trending the past few checks.     [employer] evaluated patient on floor and updated device settings and testing. RV I lead is still working she has a single chamber 3830 lead. She still has capture and no new events or arrhythmias correspond to her AMS episode.     Device threshold safety margins with increased to 5.5 V in the RV lead.     On telemetry overnight, she has been in a  rhythm with no other events. She has underlying AF. She remains on eliquis 2.5 mg BID.     She is receiving treatment for a UTI and is going to be getting a PT/OT eval for skilled facility placement.     Blood pressure has been in the 150s systolic, given her age and fall risk we are ok with a higher blood pressure in the 130-50s systolic range to help protect her from any orthostatic drops in BP.     She is stable from a cardiology perspective, please do not hesitate to contact us if any new concerns during her admission.

## 2024-09-26 NOTE — PROGRESS NOTES
Name: Demetra Rush ADMIT: 2024   : 1933  PCP: Melissa Bolivar MD    MRN: [id number] LOS: 0 days   AGE/SEX: 89 y.o. female  ROOM: [facility]     Subjective   Subjective   Feeling better today. No LUTS. No N/V/D/abd pain. Tolerating diet. No F/C/NS. No SOA or CP or palp.        Objective   Objective   Vital Signs  Temp:  [97.5 °F (36.4 °C)-98.4 °F (36.9 °C)] 97.8 °F (36.6 °C)  Heart Rate:  [70-76] 70  Resp:  [18] 18  BP: (140-155)/(54-76) 151/54  SpO2:  [96 %-98 %] 98 %  on   ;   Device (Oxygen Therapy): room air  Body mass index is 20.32 kg/m².  Physical Exam  Vitals and nursing note reviewed. Exam conducted with a chaperone present (PT).   Constitutional:       General: She is not in acute distress.     Appearance: She is ill-appearing (chronically). She is not toxic-appearing or diaphoretic.   HENT:      Head: Normocephalic.      Nose: Nose normal.      Mouth/Throat:      Mouth: Mucous membranes are moist.      Pharynx: Oropharynx is clear.   Eyes:      General: No scleral icterus.        Right eye: No discharge.         Left eye: No discharge.      Conjunctiva/sclera: Conjunctivae normal.   Cardiovascular:      Rate and Rhythm: Normal rate and regular rhythm.      Pulses: Normal pulses.   Pulmonary:      Effort: Pulmonary effort is normal. No respiratory distress.      Breath sounds: Normal breath sounds. No wheezing or rales.      Comments: Anteriorly   Abdominal:      General: Bowel sounds are normal. There is no distension.      Palpations: Abdomen is soft.      Tenderness: There is no abdominal tenderness.   Musculoskeletal:         General: No swelling.      Cervical back: Neck supple.   Skin:     General: Skin is warm and dry.      Capillary Refill: Capillary refill takes less than 2 seconds.      Coloration: Skin is not jaundiced.   Neurological:      Mental Status: She is alert and oriented to person, place, and time. Mental status is at baseline.      Cranial Nerves: No cranial nerve  deficit.      Coordination: Coordination normal.   Psychiatric:         Mood and Affect: Mood normal.         Behavior: Behavior normal.         Thought Content: Thought content normal.       Results Review     I reviewed the patient's new clinical results.  Results from last 7 days   Lab Units 09/25/24  0059 09/24/24  1647   WBC 10*3/mm3 6.95 7.00   HEMOGLOBIN g/dL 10.6* 11.5*   PLATELETS 10*3/mm3 220 253     Results from last 7 days   Lab Units 09/25/24  0059 09/24/24  1647   SODIUM mmol/L 136 139   POTASSIUM mmol/L 3.7 4.5   CHLORIDE mmol/L 102 104   CO2 mmol/L 24.0 25.4   BUN mg/dL 17 21   CREATININE mg/dL 0.82 0.89   GLUCOSE mg/dL 131* 97   EGFR mL/min/1.73 67.6 61.3     Results from last 7 days   Lab Units 09/25/24  0059 09/24/24  1647   ALBUMIN g/dL 3.6 3.5   BILIRUBIN mg/dL 0.3 0.4   ALK PHOS U/L 66 78   AST (SGOT) U/L 11 13   ALT (SGPT) U/L 9 8     Results from last 7 days   Lab Units 09/25/24  0059 09/24/24  1647   CALCIUM mg/dL 8.8 9.2   ALBUMIN g/dL 3.6 3.5   MAGNESIUM mg/dL  --  2.2     Results from last 7 days   Lab Units 09/24/24  2252 09/24/24  1647   PROCALCITONIN ng/mL  --  0.05   LACTATE mmol/L 0.8  --      Hemoglobin A1C   Date/Time Value Ref Range Status   09/24/2024 1647 5.80 (H) 4.80 - 5.60 % Final     Glucose   Date/Time Value Ref Range Status   09/25/2024 0831 81 70 - 130 mg/dL Final   09/25/2024 0359 79 70 - 130 mg/dL Final   09/24/2024 2315 103 70 - 130 mg/dL Final   09/24/2024 1636 100 70 - 130 mg/dL Final       CT Head Without Contrast    Result Date: 9/25/2024  1. No acute intracranial abnormality is identified.  2. There is mild-moderate small vessel disease in the cerebral white matter and there is a 5 mm old lacunar infarct in the genu of the right internal capsule and there is diffuse cerebral atrophy. The remainder of the head CT is within normal limits with no acute skull fracture or intracranial hemorrhage identified.  Cervical spine CT technique: Spiral CT images were obtained  from the skull base down to the T2 thoracic level. The images were reformatted and submitted in 2 mm thick axial and sagittal CT sections with soft tissue algorithm and 1 mm thick axial, sagittal and coronal CT sections with high-resolution bone algorithm.  COMPARISON:  This is correlated to cervical spine CT from [facility] on 07/10/2023.  FINDINGS: Atlantooccipital articulation is normal appearance  At C1-2 there are arthritic changes at the atlantodental interval with narrowing of the interval and marginal spurring off the anterior ring of C1 and the odontoid otherwise, the C1-2 level is normal in appearance.  At C2-3 there is mild bilateral facet overgrowth, minimal posterior spurring. There is no canal or foraminal narrowing.  At C3-4 there is mild left and moderate right facet overgrowth and there is some mild disc space narrowing, degenerative endplate changes, right posterolateral endplate spurs mildly narrow the right side of the canal. There is right uncovertebral joint spurring and combination with facet spurs moderately narrow the right neural foramen at C3-4.  At C4-5 there is mild-moderate bilateral facet overgrowth. There is disc space narrowing and there are degenerative disc and endplate changes and there is mild posterior spurring but no central canal narrowing. There is some uncovertebral joint hypertrophy and there is moderate right and no left foraminal narrowing.  At C5-6 there is mild bilateral facet overgrowth and there is some disc space narrowing and mild degenerative endplate changes. There is mild posterior spurring but there is no canal narrowing. There is mild right and no left foraminal narrowing.  At C6-7 there is mild set overgrowth, 2 mm anterolisthesis of C6 on C7. There is no canal or foraminal narrowing.  At C7-T1 there is mild left and mild-moderate right facet overgrowth and 1 to 2 mm anterolisthesis of C7 on T1. There is no canal narrowing. There is mild  right and no left foraminal narrowing.  No acute fractures seen in the cervical spine.  IMPRESSION: 1. No acute fracture is seen in the cervical spine. There is cervical spondylosis as described above.  Radiation dose reduction techniques were utilized, including automated exposure control and exposure modulation based on body size.   This report was finalized on 9/25/2024 6:54 AM by Dr. Mark Putnam M.D on Workstation: [id number]      CT Cervical Spine Without Contrast    Result Date: 9/25/2024  1. No acute intracranial abnormality is identified.  2. There is mild-moderate small vessel disease in the cerebral white matter and there is a 5 mm old lacunar infarct in the genu of the right internal capsule and there is diffuse cerebral atrophy. The remainder of the head CT is within normal limits with no acute skull fracture or intracranial hemorrhage identified.  Cervical spine CT technique: Spiral CT images were obtained from the skull base down to the T2 thoracic level. The images were reformatted and submitted in 2 mm thick axial and sagittal CT sections with soft tissue algorithm and 1 mm thick axial, sagittal and coronal CT sections with high-resolution bone algorithm.  COMPARISON:  This is correlated to cervical spine CT from [facility] on 07/10/2023.  FINDINGS: Atlantooccipital articulation is normal appearance  At C1-2 there are arthritic changes at the atlantodental interval with narrowing of the interval and marginal spurring off the anterior ring of C1 and the odontoid otherwise, the C1-2 level is normal in appearance.  At C2-3 there is mild bilateral facet overgrowth, minimal posterior spurring. There is no canal or foraminal narrowing.  At C3-4 there is mild left and moderate right facet overgrowth and there is some mild disc space narrowing, degenerative endplate changes, right posterolateral endplate spurs mildly narrow the right side of the canal. There is right uncovertebral  joint spurring and combination with facet spurs moderately narrow the right neural foramen at C3-4.  At C4-5 there is mild-moderate bilateral facet overgrowth. There is disc space narrowing and there are degenerative disc and endplate changes and there is mild posterior spurring but no central canal narrowing. There is some uncovertebral joint hypertrophy and there is moderate right and no left foraminal narrowing.  At C5-6 there is mild bilateral facet overgrowth and there is some disc space narrowing and mild degenerative endplate changes. There is mild posterior spurring but there is no canal narrowing. There is mild right and no left foraminal narrowing.  At C6-7 there is mild set overgrowth, 2 mm anterolisthesis of C6 on C7. There is no canal or foraminal narrowing.  At C7-T1 there is mild left and mild-moderate right facet overgrowth and 1 to 2 mm anterolisthesis of C7 on T1. There is no canal narrowing. There is mild right and no left foraminal narrowing.  No acute fractures seen in the cervical spine.  IMPRESSION: 1. No acute fracture is seen in the cervical spine. There is cervical spondylosis as described above.  Radiation dose reduction techniques were utilized, including automated exposure control and exposure modulation based on body size.   This report was finalized on 9/25/2024 6:54 AM by Dr. Mark Putnam M.D on Workstation: [id number]      XR Chest 1 View    Result Date: 9/24/2024  Increased atelectasis or scarring in the left lung base. New nodular density in the left lung base indeterminate. It may reflect a lung nodule or could reflect an area of nodular infiltrate or atelectasis. Follow-up recommended with either a short interval repeat chest x-ray in 4 to 6 weeks or CT evaluation   This report was finalized on 9/24/2024 5:12 PM by Dr. Tan Aranda M.D on Workstation: [id number]       I have personally reviewed all medications:  Scheduled Medications  apixaban, 2.5 mg, Oral,  BID  atorvastatin, 40 mg, Oral, Nightly  cefTRIAXone, 1,000 mg, Intravenous, Q24H  gabapentin, 300 mg, Oral, TID  guaiFENesin, 600 mg, Oral, Q12H  levothyroxine, 75 mcg, Oral, Daily  pantoprazole, 40 mg, Oral, BID AC  saccharomyces boulardii, 250 mg, Oral, BID  sodium chloride, 10 mL, Intravenous, Q12H    Infusions   Diet  Diet: Cardiac; Healthy Heart (2-3 Na+); Fluid Consistency: Thin (IDDSI 0)    I have personally reviewed:  [x]  Laboratory   [x]  Microbiology   [x]  Radiology   [x]  EKG/Telemetry  [x]  Cardiology/Vascular   []  Pathology    [x]  Records       Assessment/Plan     Active Hospital Problems    Diagnosis  POA    **Stroke-like symptoms [R29.90]  Yes    Acute metabolic encephalopathy [G93.41]  Yes    Chronic cough [R05.3]  Yes    Decreased mobility [R26.89]  Yes    Fall [W19.XXXA]  Yes    Ketonuria [R82.4]  Yes    Encephalopathy acute [G93.40]  Yes    Acute UTI [N39.0]  Yes    Chronic diastolic (congestive) heart failure [I50.32]  Yes    Atrial fibrillation, persistent [I48.19]  Yes    Pulmonary hypertension [I27.20]  Yes    Presence of cardiac pacemaker [Z95.0]  Yes      Resolved Hospital Problems   No resolved problems to display.     89yo woman with AFib (Eliquis), PPM, HFpEF, pulm HTN, chronic cough, hypoT4, benzo dependence, and HTN, who was admitted to ER OBS Unit after presenting with confusion and speech difficulties. Neuro felt symptoms due to UTI and did not feel she needed to have an MRI. We were asked to assume care when pt transferred to full hospital admission.    Acute UTI  Continue Rocephin for pan-sensitive E.coli on urine culture  Blood cultures NGTD    Metabolic encephalopathy  Speech changes  EEG fine  CT Head fine  Neuro has seen, MRI not needed--changes most likely due to UTI and even if there were some CVD the results of MRI would not change mgmt  Symptoms resolved    AFib  PPM  HRs fine, not on RC meds PTA  Continue Eliquis  Card has seen and interrogated PPM    HFpEF  Pulm  HTN  Echo here shows EF of 42%--it was 665% in November of '22  Have asked Card to address  Not on diuretic PTA    HTN  BPs a bit robust but Card is okay with this  Not on meds currently    HypoT4  TSH wnl  Continue L-T4    B12 deficiency  Levels low-normal here  Will give IM B12 and dc on oral B12  F/u with PCP regarding this issue    Chronic cough  She did not cough while I was speaking to her today  Continue Mucinex, consider Zyrtec    Benzo dependence  Continue PRN Xanax as she takes at home    Generalized weakness  Fall  PT working with pt  May need RENEE at dc    HLD  Continue statin  Check CK level given her weakness      Eliquis (home med) for DVT prophylaxis.  Full code.  Discussed with patient.  Anticipate discharge home with HH vs SNU facility, timing yet to be determined.  Expected Discharge Date: 9/28/2024; Expected Discharge Time:       Albino Castañeda MD  [facility]  09/26/24  14:22 EDT

## 2024-09-26 NOTE — PLAN OF CARE
Goal Outcome Evaluation:  Plan of Care Reviewed With: patient        Progress: improving  Outcome Evaluation: Pt tolerated treatment well this date. Required SBA-min A for bed mobility, then min A to stand. Pt ambulated 200ft w/ Rw and CGA, w/ slight R veering noted throughout. No LOBs noted, and no pain reported. Encouraged pt to ambulate w/ nsg during the day.      Anticipated Discharge Disposition (PT): home with home health, home with assist

## 2024-09-26 NOTE — CONSULTS
CONSULT NOTE    INTERNAL MEDICINE   [facility]       Referring Provider: Young  Reason for Consultation: To evaluate chronic medical conditions that may be exacerbated postoperatively  PCP: Melissa Bolivar MD      HPI  Patient is a 89 y.o. female presents with history of A-fib, history of stroke, diastolic CHF, pulmonary hypertension who presented to the ER due to altered mental status and speech difficulty and was admitted to the ER observation unit for strokelike symptoms.  Patient supposedly had a bite on her tongue that they thought might be possible secondary to seizure versus stroke but further workup showed that patient has denture difficulties.  Unable to do an MRI due to pacemaker.  Neurology evaluated the patient and felt to be more likely secondary to frequent UTIs.  Patient was found by her family after she had fallen out of a chair and was lethargic and gibberish.  Currently at bedside she is hard of hearing but awake and alert and moving all extremities.  Neurology did not feel like patient needed an MRI.  Continue the Eliquis, no need for aspirin at this point.  Treat the UTI.    PAST MEDICAL HISTORY  Past Medical History:   Diagnosis Date    Atrial fibrillation     Chronic diastolic (congestive) heart failure     GERD (gastroesophageal reflux disease)     Hyperlipidemia     Hypertension     Hypothyroidism     Mitral regurgitation     Neuropathy of both feet     Osteoporosis 08/29/2022    Pulmonary hypertension     Stroke     UTI (urinary tract infection)        PAST SURGICAL HISTORY  Past Surgical History:   Procedure Laterality Date    ADENOIDECTOMY      BLADDER SURGERY      CARDIAC ELECTROPHYSIOLOGY PROCEDURE N/A 10/20/2022    Procedure: Pacemaker SC new--Medtronic;  Surgeon: Albino Gaston MD;  Location: [facility] INVASIVE LOCATION;  Service: Cardiology;  Laterality: N/A;    CARDIAC ELECTROPHYSIOLOGY PROCEDURE N/A 11/3/2022    Procedure: AV node ablation---has Medtronic  "pacemaker;  Surgeon: Albino Gaston MD;  Location: [facility] INVASIVE LOCATION;  Service: Cardiovascular;  Laterality: N/A;    CHOLECYSTECTOMY      HYSTERECTOMY         FAMILY HISTORY  Family History   Problem Relation Age of Onset    Heart disease Mother     Other Mother         fluid in lungs    Heart disease Father     Heart attack Father     Cancer Sister     Tongue cancer Sister        SOCIAL HISTORY  Social History     Tobacco Use    Smoking status: Never    Smokeless tobacco: Never   Vaping Use    Vaping status: Never Used   Substance Use Topics    Alcohol use: Yes     Alcohol/week: 1.0 standard drink of alcohol     Types: 1 Glasses of wine per week     Comment: glass maybe once a month    Drug use: Never       MEDICATIONS:  Medications Prior to Admission   Medication Sig Dispense Refill Last Dose    ALPRAZolam (XANAX) 0.5 MG tablet Take 1 tablet by mouth At Night As Needed for Sleep. 90 tablet 0     apixaban (Eliquis) 2.5 MG tablet tablet Take 1 tablet by mouth 2 (Two) Times a Day. 180 tablet 3     gabapentin (NEURONTIN) 300 MG capsule Take 1 capsule by mouth 3 (Three) Times a Day. 270 capsule 1     levothyroxine (SYNTHROID, LEVOTHROID) 75 MCG tablet Take 1 tablet by mouth Daily. 90 tablet 2     pantoprazole (PROTONIX) 40 MG EC tablet TAKE ONE TABLET BY MOUTH TWICE DAILY 180 tablet 3        Allergies:  Sulfate, Amlodipine, Codeine, and Sulfa antibiotics    Review of Systems:  Unable to obtain due to mental status changes    Vital Signs  Temp:  [97.5 °F (36.4 °C)-98.4 °F (36.9 °C)] 98.1 °F (36.7 °C)  Heart Rate:  [70-78] 70  Resp:  [14-18] 18  BP: (140-170)/(48-84) 153/64  Flowsheet Rows      Flowsheet Row First Filed Value   Admission Height 152.4 cm (60\") Documented at 09/25/2024 0804   Admission Weight 49 kg (108 lb) Documented at 09/25/2024 0804             Physical Exam:  General Appearance:    Alert, cooperative, in no acute distress   Head:    Normocephalic, without obvious abnormality, atraumatic "   Eyes:         conjunctivae and sclerae normal, no icterus, PERRLA   ENT:    Ears grossly intact, oral mucosa moist, dentures   Neck:   No adenopathy, supple, trachea midline,        Lungs:     Clear to auscultation,respirations regular, even and                   unlabored; breast augmentation    Heart:    Regular rhythm and normal rate,  no murmur, normal S1 and S2,   Abdomen:     Normal bowel sounds, no masses,  soft non-tender, non-distended,    Extremities:   Moves all extremities well, no cyanosis, no edema,             Pulses:   Pulses palpable and equal bilaterally   Skin:   No bleeding, rash, bruising    Neurologic:    Psych:   Cranial nerves 2 - 12 grossly intact, sensation intact,     Moves all extremities well, equal bilateral strength    Alert and Oriented x 3 (knew it was September 2024), Normal Affect     I washed my hands before entering the room and immediately upon leaving the room    LABS:  No results displayed because visit has over 200 results.        Results from last 7 days   Lab Units 09/25/24  0059 09/24/24  1647   WBC 10*3/mm3 6.95 7.00   HEMOGLOBIN g/dL 10.6* 11.5*   HEMATOCRIT % 33.8* 35.8   PLATELETS 10*3/mm3 220 253     Results from last 7 days   Lab Units 09/25/24  0059 09/24/24  1647   SODIUM mmol/L 136 139   POTASSIUM mmol/L 3.7 4.5   CHLORIDE mmol/L 102 104   CO2 mmol/L 24.0 25.4   BUN mg/dL 17 21   CREATININE mg/dL 0.82 0.89   CALCIUM mg/dL 8.8 9.2   BILIRUBIN mg/dL 0.3 0.4   ALK PHOS U/L 66 78   ALT (SGPT) U/L 9 8   AST (SGOT) U/L 11 13   GLUCOSE mg/dL 131* 97         DIAGNOSTICS:  CT Head Without Contrast    Result Date: 9/25/2024  1. No acute intracranial abnormality is identified.  2. There is mild-moderate small vessel disease in the cerebral white matter and there is a 5 mm old lacunar infarct in the genu of the right internal capsule and there is diffuse cerebral atrophy. The remainder of the head CT is within normal limits with no acute skull fracture or intracranial  hemorrhage identified.  Cervical spine CT technique: Spiral CT images were obtained from the skull base down to the T2 thoracic level. The images were reformatted and submitted in 2 mm thick axial and sagittal CT sections with soft tissue algorithm and 1 mm thick axial, sagittal and coronal CT sections with high-resolution bone algorithm.  COMPARISON:  This is correlated to cervical spine CT from [facility] on 07/10/2023.  FINDINGS: Atlantooccipital articulation is normal appearance  At C1-2 there are arthritic changes at the atlantodental interval with narrowing of the interval and marginal spurring off the anterior ring of C1 and the odontoid otherwise, the C1-2 level is normal in appearance.  At C2-3 there is mild bilateral facet overgrowth, minimal posterior spurring. There is no canal or foraminal narrowing.  At C3-4 there is mild left and moderate right facet overgrowth and there is some mild disc space narrowing, degenerative endplate changes, right posterolateral endplate spurs mildly narrow the right side of the canal. There is right uncovertebral joint spurring and combination with facet spurs moderately narrow the right neural foramen at C3-4.  At C4-5 there is mild-moderate bilateral facet overgrowth. There is disc space narrowing and there are degenerative disc and endplate changes and there is mild posterior spurring but no central canal narrowing. There is some uncovertebral joint hypertrophy and there is moderate right and no left foraminal narrowing.  At C5-6 there is mild bilateral facet overgrowth and there is some disc space narrowing and mild degenerative endplate changes. There is mild posterior spurring but there is no canal narrowing. There is mild right and no left foraminal narrowing.  At C6-7 there is mild set overgrowth, 2 mm anterolisthesis of C6 on C7. There is no canal or foraminal narrowing.  At C7-T1 there is mild left and mild-moderate right facet overgrowth and  1 to 2 mm anterolisthesis of C7 on T1. There is no canal narrowing. There is mild right and no left foraminal narrowing.  No acute fractures seen in the cervical spine.  IMPRESSION: 1. No acute fracture is seen in the cervical spine. There is cervical spondylosis as described above.  Radiation dose reduction techniques were utilized, including automated exposure control and exposure modulation based on body size.   This report was finalized on 9/25/2024 6:54 AM by Dr. Mark Putnam M.D on Workstation: [id number]      CT Cervical Spine Without Contrast    Result Date: 9/25/2024  1. No acute intracranial abnormality is identified.  2. There is mild-moderate small vessel disease in the cerebral white matter and there is a 5 mm old lacunar infarct in the genu of the right internal capsule and there is diffuse cerebral atrophy. The remainder of the head CT is within normal limits with no acute skull fracture or intracranial hemorrhage identified.  Cervical spine CT technique: Spiral CT images were obtained from the skull base down to the T2 thoracic level. The images were reformatted and submitted in 2 mm thick axial and sagittal CT sections with soft tissue algorithm and 1 mm thick axial, sagittal and coronal CT sections with high-resolution bone algorithm.  COMPARISON:  This is correlated to cervical spine CT from [facility] on 07/10/2023.  FINDINGS: Atlantooccipital articulation is normal appearance  At C1-2 there are arthritic changes at the atlantodental interval with narrowing of the interval and marginal spurring off the anterior ring of C1 and the odontoid otherwise, the C1-2 level is normal in appearance.  At C2-3 there is mild bilateral facet overgrowth, minimal posterior spurring. There is no canal or foraminal narrowing.  At C3-4 there is mild left and moderate right facet overgrowth and there is some mild disc space narrowing, degenerative endplate changes, right posterolateral endplate  spurs mildly narrow the right side of the canal. There is right uncovertebral joint spurring and combination with facet spurs moderately narrow the right neural foramen at C3-4.  At C4-5 there is mild-moderate bilateral facet overgrowth. There is disc space narrowing and there are degenerative disc and endplate changes and there is mild posterior spurring but no central canal narrowing. There is some uncovertebral joint hypertrophy and there is moderate right and no left foraminal narrowing.  At C5-6 there is mild bilateral facet overgrowth and there is some disc space narrowing and mild degenerative endplate changes. There is mild posterior spurring but there is no canal narrowing. There is mild right and no left foraminal narrowing.  At C6-7 there is mild set overgrowth, 2 mm anterolisthesis of C6 on C7. There is no canal or foraminal narrowing.  At C7-T1 there is mild left and mild-moderate right facet overgrowth and 1 to 2 mm anterolisthesis of C7 on T1. There is no canal narrowing. There is mild right and no left foraminal narrowing.  No acute fractures seen in the cervical spine.  IMPRESSION: 1. No acute fracture is seen in the cervical spine. There is cervical spondylosis as described above.  Radiation dose reduction techniques were utilized, including automated exposure control and exposure modulation based on body size.   This report was finalized on 9/25/2024 6:54 AM by Dr. Mark Putnam M.D on Workstation: [id number]      XR Chest 1 View    Result Date: 9/24/2024  Increased atelectasis or scarring in the left lung base. New nodular density in the left lung base indeterminate. It may reflect a lung nodule or could reflect an area of nodular infiltrate or atelectasis. Follow-up recommended with either a short interval repeat chest x-ray in 4 to 6 weeks or CT evaluation   This report was finalized on 9/24/2024 5:12 PM by Dr. Tan Aranda M.D on Workstation: [id number]          Results Review:   I  reviewed the patient's new clinical results.  I personally viewed and interpreted the patient's EKG/Telemetry data  Old records reviewed    ASSESSMENT AND PLAN    Stroke-like symptoms    Pulmonary hypertension    Atrial fibrillation, persistent    Chronic diastolic (congestive) heart failure    Acute UTI    Presence of cardiac pacemaker    Encephalopathy acute    Acute metabolic encephalopathy    Chronic cough    Decreased mobility    Fall    Ketonuria    Altered mental status with slurred speech and fall with possible biting of tongue  -It is doubtful that the patient had a stroke or seizure.  Likely denture issues with a metabolic encephalopathy secondary to UTI with dehydration  -Treating the underlying UTI,  -Await cultures and sensitivities on Rocephin IV  -Given the fall patient being on chronic anticoagulation she is need to be monitored carefully    Decreased mobility and ambulation, with fall  -Fall precautions  -Get PT and OT to evaluate and treat  -Possible need for skilled nursing facility    Chronic cough for 2 months  -The allergy mediated.  Not on an ACE inhibitor  -Will add some Mucinex.  Consider adding Zyrtec if no improvement    Per family patient has been declining for 3 weeks or more and it was only worse over the last couple days.  - Pt lives w/ her dtr and requires assistance for BADLs at baseline, uses rollator for functional mobility.   - OT will f/u to address. Recommend SNF vs HH pending progress.   skilled PT acutely to address functional deficits, anticipte SNU vs HHC pending progress.   Care management looking for possible skilled nursing facility  Family unsure if can care for patient given continued decline     Chronic medical conditions: stable continue medical management    Pulmonary hypertension    Atrial fibrillation, persistent    Chronic diastolic (congestive) heart failure  -PPM    +DVT proph: Eliquis  +Full code    I discussed the patients findings and my recommendations with  the patient and/or family.  Please reference all orders placed.    Kaleigh Gomez MD  09/26/24  00:49 EDT      This document is intended for medical expert use only. Reading of this document by patients and/or patient's family without participating in medical staff guidance may result in misinterpretation and unintended morbidity. Any interpretation of such data is the responsibility of the patient and/or family member responsible for the patient in concert with their primary or specialist providers, and NOT to be left for sources of online searches such as [employer], [employer] or similar queries. Relying on these approaches to knowledge may result in misinterpretation, misguided goals of care and even death should patients or family members try recommendations outside of the realm of professional medical care in a supervised way.    Dictated utilizing Dragon dictation:  Parts of this note may be an electronic transcription/translation of spoke language to printed text using Dragon dictation system.

## 2024-09-27 LAB
ALBUMIN SERPL-MCNC: 3.6 G/DL (ref 3.5–5.2)
ALBUMIN/GLOB SERPL: 1.2 G/DL
ALP SERPL-CCNC: 79 U/L (ref 39–117)
ALT SERPL W P-5'-P-CCNC: 8 U/L (ref 1–33)
ANION GAP SERPL CALCULATED.3IONS-SCNC: 8 MMOL/L (ref 5–15)
AST SERPL-CCNC: 12 U/L (ref 1–32)
BILIRUB SERPL-MCNC: 0.4 MG/DL (ref 0–1.2)
BUN SERPL-MCNC: 12 MG/DL (ref 8–23)
BUN/CREAT SERPL: 17.1 (ref 7–25)
CALCIUM SPEC-SCNC: 8.7 MG/DL (ref 8.2–9.6)
CHLORIDE SERPL-SCNC: 103 MMOL/L (ref 98–107)
CK SERPL-CCNC: 48 U/L (ref 20–180)
CO2 SERPL-SCNC: 25 MMOL/L (ref 22–29)
CREAT SERPL-MCNC: 0.7 MG/DL (ref 0.57–1)
DEPRECATED RDW RBC AUTO: 42.6 FL (ref 37–54)
EGFRCR SERPLBLD CKD-EPI 2021: 81.8 ML/MIN/1.73
ERYTHROCYTE [DISTWIDTH] IN BLOOD BY AUTOMATED COUNT: 13.8 % (ref 12.3–15.4)
GLOBULIN UR ELPH-MCNC: 3.1 GM/DL
GLUCOSE SERPL-MCNC: 85 MG/DL (ref 65–99)
HCT VFR BLD AUTO: 37.2 % (ref 34–46.6)
HGB BLD-MCNC: 11.8 G/DL (ref 12–15.9)
MAGNESIUM SERPL-MCNC: 2.2 MG/DL (ref 1.7–2.3)
MCH RBC QN AUTO: 27.1 PG (ref 26.6–33)
MCHC RBC AUTO-ENTMCNC: 31.7 G/DL (ref 31.5–35.7)
MCV RBC AUTO: 85.3 FL (ref 79–97)
NT-PROBNP SERPL-MCNC: 2037 PG/ML (ref 0–1800)
PHOSPHATE SERPL-MCNC: 3 MG/DL (ref 2.5–4.5)
PLATELET # BLD AUTO: 271 10*3/MM3 (ref 140–450)
PMV BLD AUTO: 9.3 FL (ref 6–12)
POTASSIUM SERPL-SCNC: 3.5 MMOL/L (ref 3.5–5.2)
PROT SERPL-MCNC: 6.7 G/DL (ref 6–8.5)
RBC # BLD AUTO: 4.36 10*6/MM3 (ref 3.77–5.28)
SODIUM SERPL-SCNC: 136 MMOL/L (ref 136–145)
WBC NRBC COR # BLD AUTO: 6.59 10*3/MM3 (ref 3.4–10.8)

## 2024-09-27 PROCEDURE — 83735 ASSAY OF MAGNESIUM: CPT | Performed by: HOSPITALIST

## 2024-09-27 PROCEDURE — 82550 ASSAY OF CK (CPK): CPT | Performed by: HOSPITALIST

## 2024-09-27 PROCEDURE — 25010000002 CYANOCOBALAMIN PER 1000 MCG: Performed by: HOSPITALIST

## 2024-09-27 PROCEDURE — 25010000002 CEFTRIAXONE PER 250 MG: Performed by: PHYSICIAN ASSISTANT

## 2024-09-27 PROCEDURE — 99232 SBSQ HOSP IP/OBS MODERATE 35: CPT | Performed by: INTERNAL MEDICINE

## 2024-09-27 PROCEDURE — 84100 ASSAY OF PHOSPHORUS: CPT | Performed by: HOSPITALIST

## 2024-09-27 PROCEDURE — 97110 THERAPEUTIC EXERCISES: CPT

## 2024-09-27 PROCEDURE — 97116 GAIT TRAINING THERAPY: CPT

## 2024-09-27 PROCEDURE — 83880 ASSAY OF NATRIURETIC PEPTIDE: CPT | Performed by: INTERNAL MEDICINE

## 2024-09-27 PROCEDURE — 80053 COMPREHEN METABOLIC PANEL: CPT | Performed by: HOSPITALIST

## 2024-09-27 PROCEDURE — 85027 COMPLETE CBC AUTOMATED: CPT | Performed by: HOSPITALIST

## 2024-09-27 RX ORDER — LOSARTAN POTASSIUM 25 MG/1
25 TABLET ORAL
Status: DISCONTINUED | OUTPATIENT
Start: 2024-09-27 | End: 2024-09-28

## 2024-09-27 RX ORDER — METOPROLOL SUCCINATE 25 MG/1
12.5 TABLET, EXTENDED RELEASE ORAL
Status: DISCONTINUED | OUTPATIENT
Start: 2024-09-27 | End: 2024-09-27

## 2024-09-27 RX ADMIN — ALPRAZOLAM 0.5 MG: 0.5 TABLET ORAL at 22:18

## 2024-09-27 RX ADMIN — PANTOPRAZOLE SODIUM 40 MG: 40 TABLET, DELAYED RELEASE ORAL at 18:14

## 2024-09-27 RX ADMIN — CYANOCOBALAMIN 1000 MCG: 1000 INJECTION INTRAMUSCULAR; SUBCUTANEOUS at 09:16

## 2024-09-27 RX ADMIN — GUAIFENESIN 600 MG: 600 TABLET, EXTENDED RELEASE ORAL at 22:10

## 2024-09-27 RX ADMIN — LOSARTAN POTASSIUM 25 MG: 25 TABLET ORAL at 22:18

## 2024-09-27 RX ADMIN — LEVOTHYROXINE SODIUM 75 MCG: 75 TABLET ORAL at 05:57

## 2024-09-27 RX ADMIN — APIXABAN 2.5 MG: 2.5 TABLET, FILM COATED ORAL at 09:17

## 2024-09-27 RX ADMIN — PANTOPRAZOLE SODIUM 40 MG: 40 TABLET, DELAYED RELEASE ORAL at 05:57

## 2024-09-27 RX ADMIN — GABAPENTIN 300 MG: 300 CAPSULE ORAL at 22:10

## 2024-09-27 RX ADMIN — Medication 250 MG: at 09:17

## 2024-09-27 RX ADMIN — APIXABAN 2.5 MG: 2.5 TABLET, FILM COATED ORAL at 22:10

## 2024-09-27 RX ADMIN — Medication 250 MG: at 22:10

## 2024-09-27 RX ADMIN — GABAPENTIN 300 MG: 300 CAPSULE ORAL at 18:15

## 2024-09-27 RX ADMIN — Medication 10 ML: at 09:17

## 2024-09-27 RX ADMIN — CEFTRIAXONE SODIUM 1000 MG: 1 INJECTION, POWDER, FOR SOLUTION INTRAMUSCULAR; INTRAVENOUS at 22:10

## 2024-09-27 RX ADMIN — GUAIFENESIN 600 MG: 600 TABLET, EXTENDED RELEASE ORAL at 09:16

## 2024-09-27 RX ADMIN — ATORVASTATIN CALCIUM 40 MG: 20 TABLET, FILM COATED ORAL at 22:09

## 2024-09-27 RX ADMIN — Medication 10 ML: at 22:11

## 2024-09-27 RX ADMIN — GABAPENTIN 300 MG: 300 CAPSULE ORAL at 09:17

## 2024-09-27 NOTE — PROGRESS NOTES
Name: Demetra Rush ADMIT: 2024   : 1933  PCP: Melissa Bolivar MD    MRN: [id number] LOS: 1 days   AGE/SEX: 89 y.o. female  ROOM: [facility]     Subjective   Subjective   Feeling better again today. No LUTS. No N/V/D/abd pain. Tolerating diet. No F/C/NS. No SOA or CP or palp.        Objective   Objective   Vital Signs  Temp:  [97.8 °F (36.6 °C)-98.4 °F (36.9 °C)] 98.4 °F (36.9 °C)  Heart Rate:  [] 70  Resp:  [14-18] 16  BP: (151-168)/(54-77) 154/77  SpO2:  [69 %-100 %] 97 %  on   ;   Device (Oxygen Therapy): room air  Body mass index is 22.86 kg/m².    (No change in exam today)    Physical Exam  Vitals and nursing note reviewed.   Constitutional:       General: She is not in acute distress.     Appearance: She is ill-appearing (chronically). She is not toxic-appearing or diaphoretic.   HENT:      Head: Normocephalic.      Nose: Nose normal.      Mouth/Throat:      Mouth: Mucous membranes are moist.      Pharynx: Oropharynx is clear.   Eyes:      General: No scleral icterus.        Right eye: No discharge.         Left eye: No discharge.      Conjunctiva/sclera: Conjunctivae normal.   Cardiovascular:      Rate and Rhythm: Normal rate and regular rhythm.      Pulses: Normal pulses.   Pulmonary:      Effort: Pulmonary effort is normal. No respiratory distress.      Breath sounds: Normal breath sounds. No wheezing or rales.      Comments: Anteriorly   Abdominal:      General: Bowel sounds are normal. There is no distension.      Palpations: Abdomen is soft.      Tenderness: There is no abdominal tenderness.   Musculoskeletal:         General: No swelling.      Cervical back: Neck supple.   Skin:     General: Skin is warm and dry.      Capillary Refill: Capillary refill takes less than 2 seconds.      Coloration: Skin is not jaundiced.   Neurological:      Mental Status: She is alert and oriented to person, place, and time. Mental status is at baseline.      Cranial Nerves: No cranial nerve  deficit.      Coordination: Coordination normal.   Psychiatric:         Mood and Affect: Mood normal.         Behavior: Behavior normal.         Thought Content: Thought content normal.       Results Review     I reviewed the patient's new clinical results.  Results from last 7 days   Lab Units 09/27/24  0811 09/25/24  0059 09/24/24  1647   WBC 10*3/mm3 6.59 6.95 7.00   HEMOGLOBIN g/dL 11.8* 10.6* 11.5*   PLATELETS 10*3/mm3 271 220 253     Results from last 7 days   Lab Units 09/27/24  0811 09/25/24  0059 09/24/24  1647   SODIUM mmol/L 136 136 139   POTASSIUM mmol/L 3.5 3.7 4.5   CHLORIDE mmol/L 103 102 104   CO2 mmol/L 25.0 24.0 25.4   BUN mg/dL 12 17 21   CREATININE mg/dL 0.70 0.82 0.89   GLUCOSE mg/dL 85 131* 97   EGFR mL/min/1.73 81.8 67.6 61.3     Results from last 7 days   Lab Units 09/27/24  0811 09/25/24  0059 09/24/24  1647   ALBUMIN g/dL 3.6 3.6 3.5   BILIRUBIN mg/dL 0.4 0.3 0.4   ALK PHOS U/L 79 66 78   AST (SGOT) U/L 12 11 13   ALT (SGPT) U/L 8 9 8     Results from last 7 days   Lab Units 09/27/24  0811 09/25/24 0059 09/24/24  1647   CALCIUM mg/dL 8.7 8.8 9.2   ALBUMIN g/dL 3.6 3.6 3.5   MAGNESIUM mg/dL 2.2  --  2.2   PHOSPHORUS mg/dL 3.0  --   --      Results from last 7 days   Lab Units 09/24/24 2252 09/24/24  1647   PROCALCITONIN ng/mL  --  0.05   LACTATE mmol/L 0.8  --      Hemoglobin A1C   Date/Time Value Ref Range Status   09/24/2024 1647 5.80 (H) 4.80 - 5.60 % Final     Glucose   Date/Time Value Ref Range Status   09/25/2024 0831 81 70 - 130 mg/dL Final   09/25/2024 0359 79 70 - 130 mg/dL Final   09/24/2024 2315 103 70 - 130 mg/dL Final   09/24/2024 1636 100 70 - 130 mg/dL Final       No radiology results for the last day    I have personally reviewed all medications:  Scheduled Medications  apixaban, 2.5 mg, Oral, BID  atorvastatin, 40 mg, Oral, Nightly  cefTRIAXone, 1,000 mg, Intravenous, Q24H  cyanocobalamin, 1,000 mcg, Intramuscular, Daily  gabapentin, 300 mg, Oral, TID  guaiFENesin, 600 mg,  Oral, Q12H  levothyroxine, 75 mcg, Oral, Daily  pantoprazole, 40 mg, Oral, BID AC  saccharomyces boulardii, 250 mg, Oral, BID  sodium chloride, 10 mL, Intravenous, Q12H    Infusions   Diet  Diet: Cardiac; Healthy Heart (2-3 Na+); Fluid Consistency: Thin (IDDSI 0)    I have personally reviewed:  [x]  Laboratory   [x]  Microbiology   []  Radiology   [x]  EKG/Telemetry  [x]  Cardiology/Vascular   []  Pathology    []  Records       Assessment/Plan     Active Hospital Problems    Diagnosis  POA    **Stroke-like symptoms [R29.90]  Yes    Acute metabolic encephalopathy [G93.41]  Yes    Chronic cough [R05.3]  Yes    Decreased mobility [R26.89]  Yes    Fall [W19.XXXA]  Yes    Ketonuria [R82.4]  Yes    Encephalopathy acute [G93.40]  Yes    Acute UTI [N39.0]  Yes    Chronic diastolic (congestive) heart failure [I50.32]  Yes    Atrial fibrillation, persistent [I48.19]  Yes    Pulmonary hypertension [I27.20]  Yes    Presence of cardiac pacemaker [Z95.0]  Yes      Resolved Hospital Problems   No resolved problems to display.     89yo woman with AFib (Eliquis), PPM, HFpEF, pulm HTN, chronic cough, hypoT4, benzo dependence, and HTN, who was admitted to ER OBS Unit after presenting with confusion and speech difficulties. Neuro felt symptoms due to UTI and did not feel she needed to have an MRI. We were asked to assume care when pt transferred to full hospital admission.    Acute UTI  Continue Rocephin for pan-sensitive E.coli on urine culture  Blood cultures NGTD    Metabolic encephalopathy  Speech changes  EEG fine  CT Head fine  Neuro has seen, MRI not needed--changes most likely due to UTI and even if there were some CVD the results of MRI would not change mgmt  Symptoms resolved    AFib  PPM  HRs fine, not on RC meds PTA  Continue Eliquis  Card has seen and interrogated PPM    HFpEF  Pulm HTN  Echo here shows EF of 42%--it was 65% in November of '22  Asked Card to address yesterday but have not had an answer and they signed off,  will re-consult them today  Not on diuretic PTA    HTN  BPs a bit robust but Card was okay with this  Not on meds currently    HypoT4  TSH wnl  Continue L-T4    B12 deficiency  Levels low-normal here  Have ordered three doses of IM B12 and plan to dc on oral B12  F/u with PCP regarding this issue    Chronic cough  She did not cough while I was speaking to her today  Continue Mucinex, consider Zyrtec    Benzo dependence  Continue PRN Xanax as she takes at home    Generalized weakness  Fall  PT working with pt and feel she is appropriate for home with HH    HLD  Continue statin  Checked CK level given her weakness and it was fine      Eliquis (home med) for DVT prophylaxis.  Full code.  Discussed with patient and RN.  Anticipate discharge home with HH vs SNU facility, timing yet to be determined.  Expected Discharge Date: 9/28/2024; Expected Discharge Time:       Albino Castañeda MD  [facility]  09/27/24  10:26 EDT

## 2024-09-27 NOTE — THERAPY TREATMENT NOTE
Patient Name: Demetra Rush  : 1933    MRN: [id number]                              Today's Date: 2024       Admit Date: 2024    Visit Dx:     ICD-10-CM ICD-9-CM   1. Fall, initial encounter  W19.XXXA E888.9   2. Closed head injury, initial encounter  S09.90XA 959.01   3. Anticoagulated  Z79.01 V58.61   4. Speech disturbance, unspecified type  R47.9 784.59   5. Confusion  R41.0 298.9   6. Atrial fibrillation, unspecified type  I48.91 427.31     Patient Active Problem List   Diagnosis    Osteoporosis    Acute diastolic CHF (congestive heart failure)    Pulmonary hypertension    Mitral regurgitation    Primary hypertension    Atrial fibrillation, persistent    Chronic diastolic (congestive) heart failure    Primary insomnia    Acquired hypothyroidism    Complete AV block due to AV lakhwinder ablation    Tingling in extremities    Acute UTI    Closed displaced fracture of acromial end of right clavicle    Medicare annual wellness visit, subsequent    Presence of cardiac pacemaker    Stroke-like symptoms    Encephalopathy acute    Acute metabolic encephalopathy    Chronic cough    Decreased mobility    Fall    Ketonuria     Past Medical History:   Diagnosis Date    Atrial fibrillation     Chronic diastolic (congestive) heart failure     GERD (gastroesophageal reflux disease)     Hyperlipidemia     Hypertension     Hypothyroidism     Mitral regurgitation     Neuropathy of both feet     Osteoporosis 2022    Pulmonary hypertension     Stroke     UTI (urinary tract infection)      Past Surgical History:   Procedure Laterality Date    ADENOIDECTOMY      BLADDER SURGERY      CARDIAC ELECTROPHYSIOLOGY PROCEDURE N/A 10/20/2022    Procedure: Pacemaker SC new--Medtronic;  Surgeon: Albino Gaston MD;  Location: [facility] INVASIVE LOCATION;  Service: Cardiology;  Laterality: N/A;    CARDIAC ELECTROPHYSIOLOGY PROCEDURE N/A 11/3/2022    Procedure: AV node ablation---has Medtronic pacemaker;  Surgeon: Marilin  MD Albino;  Location: [facility] INVASIVE LOCATION;  Service: Cardiovascular;  Laterality: N/A;    CHOLECYSTECTOMY      HYSTERECTOMY        General Information       Row Name 09/27/24 1323          Physical Therapy Time and Intention    Document Type therapy note (daily note)  -     Mode of Treatment physical therapy  -       Row Name 09/27/24 1323          General Information    Existing Precautions/Restrictions fall  -       Row Name 09/27/24 1323          Cognition    Orientation Status (Cognition) oriented x 3  -               User Key  (r) = Recorded By, (t) = Taken By, (c) = Cosigned By      Initials Name Provider Type     Molly Alston PTA Physical Therapist Assistant                   Mobility       Row Name 09/27/24 1326          Bed Mobility    Bed Mobility supine-sit;sit-supine  -     Supine-Sit Clements (Bed Mobility) standby assist;minimum assist (75% patient effort)  -     Sit-Supine Clements (Bed Mobility) minimum assist (75% patient effort)  -     Assistive Device (Bed Mobility) bed rails;head of bed elevated  -       Row Name 09/27/24 1326          Sit-Stand Transfer    Sit-Stand Clements (Transfers) minimum assist (75% patient effort)  -     Assistive Device (Sit-Stand Transfers) walker, front-wheeled  -       Row Name 09/27/24 1326          Gait/Stairs (Locomotion)    Clements Level (Gait) contact guard  -     Assistive Device (Gait) walker, front-wheeled  -     Patient was able to Ambulate yes  -     Distance in Feet (Gait) 120  -     Deviations/Abnormal Patterns (Gait) bartolo decreased;stride length decreased  -     Bilateral Gait Deviations forward flexed posture  -     Comment, (Gait/Stairs) veered towards the R side often, requiring some assist to steer walker  -               User Key  (r) = Recorded By, (t) = Taken By, (c) = Cosigned By      Initials Name Provider Type    Molly Poole PTA Physical Therapist Assistant                    Obj/Interventions       Row Name 09/27/24 1327          Motor Skills    Therapeutic Exercise --  seated AP, LAQ, marches, pillow squeeze x10 reps  -               User Key  (r) = Recorded By, (t) = Taken By, (c) = Cosigned By      Initials Name Provider Type    Molly Poole PTA Physical Therapist Assistant                   Goals/Plan    No documentation.                  Clinical Impression       Row Name 09/27/24 1328          Pain    Pretreatment Pain Rating 0/10 - no pain  -     Posttreatment Pain Rating 0/10 - no pain  -SM       Row Name 09/27/24 1328          Positioning and Restraints    Pre-Treatment Position in bed  -     Post Treatment Position bed  -SM     In Bed supine;call light within reach;encouraged to call for assist;exit alarm on;with family/caregiver  -               User Key  (r) = Recorded By, (t) = Taken By, (c) = Cosigned By      Initials Name Provider Type    Molly Poole PTA Physical Therapist Assistant                   Outcome Measures       Row Name 09/27/24 1328          How much help from another person do you currently need...    Turning from your back to your side while in flat bed without using bedrails? 3  -SM     Moving from lying on back to sitting on the side of a flat bed without bedrails? 3  -SM     Moving to and from a bed to a chair (including a wheelchair)? 3  -SM     Standing up from a chair using your arms (e.g., wheelchair, bedside chair)? 3  -SM     Climbing 3-5 steps with a railing? 2  -SM     To walk in hospital room? 3  -SM     AM-PAC 6 Clicks Score (PT) 17  -SM     Highest Level of Mobility Goal 5 --> Static standing  -SM       Row Name 09/27/24 1328          Functional Assessment    Outcome Measure Options AM-PAC 6 Clicks Basic Mobility (PT)  -               User Key  (r) = Recorded By, (t) = Taken By, (c) = Cosigned By      Initials Name Provider Type    Molly Poole PTA Physical Therapist Assistant                                  Physical Therapy Education       Title: PT OT SLP Therapies (In Progress)       Topic: Physical Therapy (In Progress)       Point: Mobility training (In Progress)       Learning Progress Summary             Patient Acceptance, E,D, NR by  at 9/27/2024 1328    Acceptance, E,D, NR by  at 9/26/2024 1534    Acceptance, E, NR by  at 9/25/2024 1104                         Point: Home exercise program (In Progress)       Learning Progress Summary             Patient Acceptance, E,D, NR by  at 9/27/2024 1328    Acceptance, E,D, NR by  at 9/26/2024 1534    Acceptance, E, NR by  at 9/25/2024 1104                         Point: Body mechanics (In Progress)       Learning Progress Summary             Patient Acceptance, E,D, NR by  at 9/27/2024 1328    Acceptance, E,D, NR by  at 9/26/2024 1534    Acceptance, E, NR by  at 9/25/2024 1104                         Point: Precautions (In Progress)       Learning Progress Summary             Patient Acceptance, E,D, NR by  at 9/27/2024 1328    Acceptance, E,D, NR by  at 9/26/2024 1534    Acceptance, E, NR by  at 9/25/2024 1104                                         User Key       Initials Effective Dates Name Provider Type Discipline     06/16/21 -  Evelin Orourke, PT Physical Therapist PT     03/07/18 -  Molly Alston, MITZI Physical Therapist Assistant PT                  PT Recommendation and Plan     Plan of Care Reviewed With: patient  Progress: improving  Outcome Evaluation: Pt tolerated treatment well this date. Required SBA-min A for bed mobility and to stand. Pt ambulated 120ft w/ Rw and CGA, though required assist several times to steer the walker d/t pt veering towards the R side. Limited overall d/t fatigue. Discussed w/ pt's daughter after session about d/c plans. Pt's daughter agreed to d/c to SNF for continued skilled PT for strengthening and balance.     Time Calculation:         PT Charges       Row Name  09/27/24 1331             Time Calculation    Start Time 1100  -      Stop Time 1134  -      Time Calculation (min) 34 min  -      PT Received On 09/27/24  -      PT - Next Appointment 09/30/24  -                User Key  (r) = Recorded By, (t) = Taken By, (c) = Cosigned By      Initials Name Provider Type     Molly Alston, MITZI Physical Therapist Assistant                  Therapy Charges for Today       Code Description Service Date Service Provider Modifiers Qty    68866799418 HC GAIT TRAINING EA 15 MIN 9/26/2024 Molly Alston, MITZI GP 1    34782010986 HC PT THER PROC EA 15 MIN 9/26/2024 Molly Alston, PTA GP 1    26521836267 HC GAIT TRAINING EA 15 MIN 9/27/2024 Molly Alston, PTA GP 1    35455289458 HC PT THER PROC EA 15 MIN 9/27/2024 Molly Alston, MITZI GP 1            PT G-Codes  Outcome Measure Options: AM-PAC 6 Clicks Basic Mobility (PT)  AM-PAC 6 Clicks Score (PT): 17  AM-PAC 6 Clicks Score (OT): 14  Modified Saleem Scale: 4 - Moderately severe disability.  Unable to walk without assistance, and unable to attend to own bodily needs without assistance.  PT Discharge Summary  Anticipated Discharge Disposition (PT): skilled nursing facility    Molly Alston PTA  9/27/2024

## 2024-09-27 NOTE — PLAN OF CARE
Goal Outcome Evaluation:  Plan of Care Reviewed With: patient        Progress: improving  Outcome Evaluation: Pt tolerated treatment well this date. Required SBA-min A for bed mobility and to stand. Pt ambulated 120ft w/ Rw and CGA, though required assist several times to steer the walker d/t pt veering towards the R side. Limited overall d/t fatigue. Discussed w/ pt's daughter after session about d/c plans. Pt's daughter agreed to d/c to SNF for continued skilled PT for strengthening and balance.      Anticipated Discharge Disposition (PT): skilled nursing facility

## 2024-09-27 NOTE — PROGRESS NOTES
[facility]  Progress note: 2024    Patient Identification:  Name:Demetra Rush  Age:89 y.o.  Sex: female  :  1933  MRN: [id number]           CC:      Interval history:  Patient is a 89-year-old female who is [place] with persistent atrial fibrillation, s/p AV node ablation on chronic anticoagulation.  She also has chronic diastolic heart failure, severe mitral regurgitation (echo dated 2022) with frailty and frequent falls.  2022 than admission for hypoxia hypotension dyspnea with probable sepsis she was noted to have atrial fibrillation with rapid ventricular rates.  On 10/2023 she was admitted for congestive heart failure again with persistent atrial fibrillation.  Pacemaker was implanted and she then underwent AV node ablation.  She was continued on Eliquis. She had recently considered watchman's device and had visited with Dr. Jarrett regarding this via telehealth.  While this would decrease risk of bleeding family was quite concerned about prior troubles with anesthesia.  They wish to discuss this further prior to making a final decision.    She is now admitted on 2024 with strokelike symptoms versus seizure there was some concern that after a fall she was talking gibberish and was lethargic.  Unable to do MRI due to pacemaker.  Neurology felt this is more due to encephalopathy in the setting of UTI and did not feel additional MRI imaging was actually needed.  Her pacemaker device had alerted that she had high ventricular rate device threshold margins were changed.  She was treated for UTI and plans were to go to rehab.  Echo with ejection 42% with global hypokinesis, grade 2 diastolic dysfunction, now mild mitral regurgitation and normal RV systolic pressure.  Troponin has been flat this admission.  Blood pressure has been slightly elevated in the 140 to 160 bpm. Pacer check with normal lead function    The patient is resting comfortably.  She is alert enough to  recall prior discussions regarding watchman's device.  She denies any chest pain or shortness of breath.  She states she is still trying to be active at home and no one really walks with her due to time constraints      Vital Signs:   Temp:  [98.2 °F (36.8 °C)-98.6 °F (37 °C)] 98.6 °F (37 °C)  Heart Rate:  [69-91] 70  Resp:  [14-16] 16  BP: (127-168)/(56-77) 127/56    Intake/Output Summary (Last 24 hours) at 9/27/2024 1831  Last data filed at 9/27/2024 1300  Gross per 24 hour   Intake 240 ml   Output 500 ml   Net -260 ml       Physical Examination:    General Appearance No acute distress   Neck No adenopathy, supple, trachea midline, no thyromegaly, no carotid bruit, no JVD   Lungs Few bibasilar rales noted, respirations regular, even and unlabored   Heart Regular rhythm and normal rate, normal S1 and S2, no murmur, no gallop, no rub, no click   Chest wall No abnormalities observed   Abdomen Normal bowel sounds, no masses, no hepatomegaly, soft   Extremities Moves all extremities well, no edema, no cyanosis, no redness   Neurological Alert and oriented x 3     Lab Review:  Personally reviewed the labs, radiology imaging and other cardiac procedures.   Results from last 7 days   Lab Units 09/27/24  0811   SODIUM mmol/L 136   POTASSIUM mmol/L 3.5   CHLORIDE mmol/L 103   CO2 mmol/L 25.0   BUN mg/dL 12   CREATININE mg/dL 0.70   CALCIUM mg/dL 8.7   BILIRUBIN mg/dL 0.4   ALK PHOS U/L 79   ALT (SGPT) U/L 8   AST (SGOT) U/L 12   GLUCOSE mg/dL 85     Results from last 7 days   Lab Units 09/27/24  0811 09/25/24  1022 09/25/24  0059 09/24/24  2251   CK TOTAL U/L 48  --   --   --    HSTROP T ng/L  --  21* 19* 15*     Results from last 7 days   Lab Units 09/27/24  0811 09/25/24  0059 09/24/24  1647   WBC 10*3/mm3 6.59 6.95 7.00   HEMOGLOBIN g/dL 11.8* 10.6* 11.5*   HEMATOCRIT % 37.2 33.8* 35.8   PLATELETS 10*3/mm3 271 220 253     Results from last 7 days   Lab Units 09/24/24  2251 09/24/24  1647   INR  1.26* 1.22*   APTT seconds  33.5 32.4     Medication Review:   Meds reviewed  Scheduled Meds:apixaban, 2.5 mg, Oral, BID  atorvastatin, 40 mg, Oral, Nightly  cefTRIAXone, 1,000 mg, Intravenous, Q24H  cyanocobalamin, 1,000 mcg, Intramuscular, Daily  gabapentin, 300 mg, Oral, TID  guaiFENesin, 600 mg, Oral, Q12H  levothyroxine, 75 mcg, Oral, Daily  pantoprazole, 40 mg, Oral, BID AC  saccharomyces boulardii, 250 mg, Oral, BID  sodium chloride, 10 mL, Intravenous, Q12H      I personally viewed and interpreted the patient's EKG/Telemetry data    Assessment and Plan  1.  Cardiomyopathy.  New LV dysfunction likely due to related stress troponin slightly elevated an likely due to UTI.  Denies true anginal symptoms prior to this admission.  On exam she has a few bibasilar rales.  Will check proBNP and add low-dose losartan.  May need a low-dose diuretic.  Patient and family previously wish to avoid additional testing therefore maximize medical therapy.  3.  UTI with encephalopathy  4.  Chronic atrial fibrillation, s/p AV node ablation.  Previously had been recommended she consider watchman's device but family had declined  5.  Hypertension.  Would add very low-dose losartan.  6.  Generalized weakness, improved with treatment of UTI  7.  Recurrent falls. Once again discussed she should not ambulate on her own  8.. Mitral regurgitation. It was severe previously and now mild.    Mini Garza  9/27/202418:31 EDT  55min spent in reviewing records, discussion and examination of the patient and discussion with other members of the patient's medical team.     Dictated utilizing Dragon dictation

## 2024-09-28 LAB
ANION GAP SERPL CALCULATED.3IONS-SCNC: 8.1 MMOL/L (ref 5–15)
BUN SERPL-MCNC: 12 MG/DL (ref 8–23)
BUN/CREAT SERPL: 20.3 (ref 7–25)
CALCIUM SPEC-SCNC: 8.5 MG/DL (ref 8.2–9.6)
CHLORIDE SERPL-SCNC: 104 MMOL/L (ref 98–107)
CO2 SERPL-SCNC: 23.9 MMOL/L (ref 22–29)
CREAT SERPL-MCNC: 0.59 MG/DL (ref 0.57–1)
DEPRECATED RDW RBC AUTO: 45.5 FL (ref 37–54)
EGFRCR SERPLBLD CKD-EPI 2021: 85.2 ML/MIN/1.73
ERYTHROCYTE [DISTWIDTH] IN BLOOD BY AUTOMATED COUNT: 14.3 % (ref 12.3–15.4)
GLUCOSE SERPL-MCNC: 91 MG/DL (ref 65–99)
HCT VFR BLD AUTO: 34.9 % (ref 34–46.6)
HGB BLD-MCNC: 10.8 G/DL (ref 12–15.9)
MAGNESIUM SERPL-MCNC: 2.1 MG/DL (ref 1.7–2.3)
MCH RBC QN AUTO: 26.7 PG (ref 26.6–33)
MCHC RBC AUTO-ENTMCNC: 30.9 G/DL (ref 31.5–35.7)
MCV RBC AUTO: 86.4 FL (ref 79–97)
PHOSPHATE SERPL-MCNC: 2.9 MG/DL (ref 2.5–4.5)
PLATELET # BLD AUTO: 245 10*3/MM3 (ref 140–450)
PMV BLD AUTO: 9.6 FL (ref 6–12)
POTASSIUM SERPL-SCNC: 3.4 MMOL/L (ref 3.5–5.2)
RBC # BLD AUTO: 4.04 10*6/MM3 (ref 3.77–5.28)
SODIUM SERPL-SCNC: 136 MMOL/L (ref 136–145)
WBC NRBC COR # BLD AUTO: 5.96 10*3/MM3 (ref 3.4–10.8)

## 2024-09-28 PROCEDURE — 80048 BASIC METABOLIC PNL TOTAL CA: CPT | Performed by: HOSPITALIST

## 2024-09-28 PROCEDURE — 83735 ASSAY OF MAGNESIUM: CPT | Performed by: HOSPITALIST

## 2024-09-28 PROCEDURE — 99232 SBSQ HOSP IP/OBS MODERATE 35: CPT

## 2024-09-28 PROCEDURE — 85027 COMPLETE CBC AUTOMATED: CPT | Performed by: HOSPITALIST

## 2024-09-28 PROCEDURE — 25010000002 CEFTRIAXONE PER 250 MG: Performed by: PHYSICIAN ASSISTANT

## 2024-09-28 PROCEDURE — 25010000002 CYANOCOBALAMIN PER 1000 MCG: Performed by: HOSPITALIST

## 2024-09-28 PROCEDURE — 84100 ASSAY OF PHOSPHORUS: CPT | Performed by: HOSPITALIST

## 2024-09-28 RX ORDER — LOSARTAN POTASSIUM 50 MG/1
50 TABLET ORAL
Status: DISCONTINUED | OUTPATIENT
Start: 2024-09-29 | End: 2024-09-30 | Stop reason: HOSPADM

## 2024-09-28 RX ADMIN — CYANOCOBALAMIN 1000 MCG: 1000 INJECTION INTRAMUSCULAR; SUBCUTANEOUS at 08:17

## 2024-09-28 RX ADMIN — Medication 250 MG: at 20:34

## 2024-09-28 RX ADMIN — PANTOPRAZOLE SODIUM 40 MG: 40 TABLET, DELAYED RELEASE ORAL at 05:58

## 2024-09-28 RX ADMIN — Medication 250 MG: at 08:16

## 2024-09-28 RX ADMIN — GABAPENTIN 300 MG: 300 CAPSULE ORAL at 08:17

## 2024-09-28 RX ADMIN — ATORVASTATIN CALCIUM 40 MG: 20 TABLET, FILM COATED ORAL at 20:34

## 2024-09-28 RX ADMIN — GABAPENTIN 300 MG: 300 CAPSULE ORAL at 16:20

## 2024-09-28 RX ADMIN — ALPRAZOLAM 0.5 MG: 0.5 TABLET ORAL at 23:16

## 2024-09-28 RX ADMIN — Medication 10 ML: at 20:34

## 2024-09-28 RX ADMIN — GABAPENTIN 300 MG: 300 CAPSULE ORAL at 20:33

## 2024-09-28 RX ADMIN — APIXABAN 2.5 MG: 2.5 TABLET, FILM COATED ORAL at 20:33

## 2024-09-28 RX ADMIN — APIXABAN 2.5 MG: 2.5 TABLET, FILM COATED ORAL at 08:17

## 2024-09-28 RX ADMIN — GUAIFENESIN 600 MG: 600 TABLET, EXTENDED RELEASE ORAL at 08:17

## 2024-09-28 RX ADMIN — GUAIFENESIN 600 MG: 600 TABLET, EXTENDED RELEASE ORAL at 20:33

## 2024-09-28 RX ADMIN — Medication 10 ML: at 08:17

## 2024-09-28 RX ADMIN — LEVOTHYROXINE SODIUM 75 MCG: 75 TABLET ORAL at 05:58

## 2024-09-28 RX ADMIN — PANTOPRAZOLE SODIUM 40 MG: 40 TABLET, DELAYED RELEASE ORAL at 16:20

## 2024-09-28 RX ADMIN — CEFTRIAXONE SODIUM 1000 MG: 1 INJECTION, POWDER, FOR SOLUTION INTRAMUSCULAR; INTRAVENOUS at 20:34

## 2024-09-28 NOTE — PROGRESS NOTES
Cardiology Follow-Up Note      Patient Name: Demetra Rush  Age/Sex: 89 y.o. female  : 1933  MRN: [id number]      Day of Service: 24       Chief Complaint/Follow-up: syncope, new decline in LVEF, pacemaker        Interval History: says she feels fine this morning. No new complaints.       Temp:  [97.5 °F (36.4 °C)-99.2 °F (37.3 °C)] 97.5 °F (36.4 °C)  Heart Rate:  [70-79] 76  Resp:  [16-18] 18  BP: (127-172)/(56-83) 142/56     PHYSICAL EXAM:    General Appearance: No acute distress, well developed and well nourished.   Eyes: Conjunctiva and lids: No erythema, swelling, or discharge. Sclera non-icteric.   HENT: Atraumatic, normocephalic. External eyes, ears, and nose normal.   Respiratory: No signs of respiratory distress. Respiration rhythm and depth normal.   Clear to auscultation. No rales, crackles, rhonchi, or wheezing auscultated.   Cardiovascular:  Heart Rate and Rhythm: Normal, Heart Sounds: Normal S1 and S2. No S3 or S4 noted.  Murmurs: No murmurs noted. No rubs, thrills, or gallops.   Arterial Pulses: Posterior tibialis and dorsalis pedis pulses normal.   Lower Extremities: No edema noted.  Gastrointestinal:  Abdomen soft, non-distended, non-tender.  Musculoskeletal: Normal movement of extremities  Skin: Warm and dry.   Psychiatric: Patient alert and oriented to person, place, and time. Speech and behavior appropriate. Normal mood and affect.    Results from last 7 days   Lab Units 24  0811 24  0059   SODIUM mmol/L 136 136 136   POTASSIUM mmol/L 3.4* 3.5 3.7   CHLORIDE mmol/L 104 103 102   CO2 mmol/L 23.9 25.0 24.0   BUN mg/dL 12 12 17   CREATININE mg/dL 0.59 0.70 0.82   GLUCOSE mg/dL 91 85 131*   CALCIUM mg/dL 8.5 8.7 8.8     Results from last 7 days   Lab Units 24  04424  0811 24  0059   WBC 10*3/mm3 5.96 6.59 6.95   HEMOGLOBIN g/dL 10.8* 11.8* 10.6*   HEMATOCRIT % 34.9 37.2 33.8*   PLATELETS 10*3/mm3 245 271 220     Results from  last 7 days   Lab Units 09/24/24 2251 09/24/24  1647   INR  1.26* 1.22*     Results from last 7 days   Lab Units 09/27/24  0811 09/25/24  1022 09/25/24  0059 09/24/24  2251 09/24/24  1647   CK TOTAL U/L 48  --   --   --   --    HSTROP T ng/L  --  21* 19* 15* 14*     Results from last 7 days   Lab Units 09/25/24  0059   TSH uIU/mL 1.970           Current Medications:   Scheduled Meds:apixaban, 2.5 mg, Oral, BID  atorvastatin, 40 mg, Oral, Nightly  cefTRIAXone, 1,000 mg, Intravenous, Q24H  gabapentin, 300 mg, Oral, TID  guaiFENesin, 600 mg, Oral, Q12H  levothyroxine, 75 mcg, Oral, Daily  losartan, 25 mg, Oral, Q24H  pantoprazole, 40 mg, Oral, BID AC  saccharomyces boulardii, 250 mg, Oral, BID  sodium chloride, 10 mL, Intravenous, Q12H            Stroke-like symptoms    Pulmonary hypertension    Atrial fibrillation, persistent    Chronic diastolic (congestive) heart failure    Acute UTI    Presence of cardiac pacemaker    Encephalopathy acute    Acute metabolic encephalopathy    Chronic cough    Decreased mobility    Fall    Ketonuria       Plan:   Newly diagnosed cardiomyopathy--- LVEF about 45%. No symptoms. BNP very sligtly elevated, no edema. Continue losartan, will defer diuretics for now.     2. UTI, enchephlopathy    3. Chronic AF, s/p PPM and AV node--- declined watchman, on low dose AC.     Continue medical therapy for cardiomyopathy. Will keep losartan at current dose.    ---- no opposition to discharge. Maximize GDMT as outpatient, consider repeat echo to assess LVEF as outpatient.     Nasir Diamond, APRN  09/28/24  11:08 EDT

## 2024-09-28 NOTE — DISCHARGE PLACEMENT REQUEST
"Lidia Rush (89 y.o. Female)       Date of Birth   07/22/1933    Social Security Number       Address   [address] [place] [address]    Home Phone   [phone]    MRN   [id number]       Catholic   Non-Synagogue    Marital Status                               Admission Date   9/24/24    Admission Type   Emergency    Admitting Provider   Kaleigh Gomez MD    Attending Provider   Toni Robles MD    Department, Room/Bed   [address], [address]       Discharge Date       Discharge Disposition       Discharge Destination                                 Attending Provider: Toni Robles MD    Allergies: Sulfate, Amlodipine, Codeine, Sulfa Antibiotics    Isolation: None   Infection: None   Code Status: CPR    Ht: 152.4 cm (60\")   Wt: 49.5 kg (109 lb 2 oz)    Admission Cmt: None   Principal Problem: Stroke-like symptoms [R29.90]                   Active Insurance as of 9/24/2024       Primary Coverage       Payor Plan Insurance Group Employer/Plan Group    MEDICARE MEDICARE A & B        Payor Plan Address Payor Plan Phone Number Payor Plan Fax Number Effective Dates    [address] [phone]  7/1/1998 - None Entered    [place] [address]         Subscriber Name Subscriber Birth Date Member ID       LIDIA RUSH 7/22/1933 [id number]                     Emergency Contacts        (Rel.) Home Phone Work Phone Mobile Phone    KRIS VASQUEZ (Daughter) [phone] -- [phone]                "

## 2024-09-28 NOTE — PLAN OF CARE
Goal Outcome Evaluation:              Outcome Evaluation: Pt A&Ox4. VSS. no c/o pain. Pt remains on RA. Daughter at bedside. Pt resting in bed with call light within reach. Bed alarm on.

## 2024-09-28 NOTE — DISCHARGE PLACEMENT REQUEST
"Lidia Rush (89 y.o. Female)       Date of Birth   07/22/1933    Social Security Number       Address   [address] [place] [address]    Home Phone   [phone]    MRN   [id number]       [employer]ian   Non-Congregational    Marital Status                               Admission Date   9/24/24    Admission Type   Emergency    Admitting Provider   Kaleigh Gomez MD    Attending Provider   Toni Robles MD    Department, Room/Bed   [address], [address]       Discharge Date       Discharge Disposition       Discharge Destination                                 Attending Provider: Toni Robles MD    Allergies: Sulfate, Amlodipine, Codeine, Sulfa Antibiotics    Isolation: None   Infection: None   Code Status: CPR    Ht: 152.4 cm (60\")   Wt: 49.5 kg (109 lb 2 oz)    Admission Cmt: None   Principal Problem: Stroke-like symptoms [R29.90]                   Active Insurance as of 9/24/2024       Primary Coverage       Payor Plan Insurance Group Employer/Plan Group    MEDICARE MEDICARE A & B        Payor Plan Address Payor Plan Phone Number Payor Plan Fax Number Effective Dates    [address] [phone]  7/1/1998 - None Entered    [place] [address]         Subscriber Name Subscriber Birth Date Member ID       LIDIA RUSH 7/22/1933 [id number]                     Emergency Contacts        (Rel.) Home Phone Work Phone Mobile Phone    KRIS VASQUEZ (Daughter) [phone] -- [phone]                "

## 2024-09-28 NOTE — CASE MANAGEMENT/SOCIAL WORK
Continued Stay Note  [facility]     Patient Name: Demetra Rush  MRN: [id number]  Today's Date: 9/28/2024    Admit Date: 9/24/2024    Plan: SNF referral pending for [facility]   Discharge Plan       Row Name 09/28/24 1413       Plan    Plan SNF referral pending for [facility]    Patient/Family in Agreement with Plan yes    Plan Comments Inbound call from staff RN who stated pt's family feels pt would benefit from SNF before returning home with family. The family wants a referral to [facility]. In basket referral in [facility]. Called and spoke with Freida/Goldie and she will review clinicals and check bed availability and let CCP know. CCP to follow.........JW                   Discharge Codes    No documentation.                 Expected Discharge Date and Time       Expected Discharge Date Expected Discharge Time    Sep 28, 2024               Yakelin Powell, RN

## 2024-09-28 NOTE — PLAN OF CARE
Goal Outcome Evaluation:  Plan of Care Reviewed With: patient        Progress: improving  Outcome Evaluation: vss, no complaints of pain. pt remains on room air, NIH 0, IV abx given, pt forgetful @ times, bp improved with losartan. labs in am.

## 2024-09-28 NOTE — PROGRESS NOTES
Name: Demetra Rush ADMIT: 2024   : 1933  PCP: Melissa Bolivar MD    MRN: [id number] LOS: 2 days   AGE/SEX: 89 y.o. female  ROOM: [facility]     Subjective   Subjective     No new complaints today. Examined at bedside.         Objective   Objective   Vital Signs  Temp:  [97.5 °F (36.4 °C)-99.2 °F (37.3 °C)] 97.5 °F (36.4 °C)  Heart Rate:  [70-79] 76  Resp:  [16-18] 18  BP: (127-172)/(56-83) 142/56  SpO2:  [95 %-100 %] 97 %  on   ;   Device (Oxygen Therapy): room air  Body mass index is 21.31 kg/m².    (No change in exam today)    Physical Exam  Constitutional:       General: She is not in acute distress.  HENT:      Head: Normocephalic and atraumatic.   Cardiovascular:      Rate and Rhythm: Normal rate and regular rhythm.   Pulmonary:      Effort: Pulmonary effort is normal. No respiratory distress.   Abdominal:      General: There is no distension.      Palpations: Abdomen is soft.      Tenderness: There is no abdominal tenderness.   Skin:     General: Skin is warm and dry.   Neurological:      Mental Status: She is alert and oriented to person, place, and time.       Results Review     I reviewed the patient's new clinical results.  Results from last 7 days   Lab Units 24  0442 24  0811 24  00524  1647   WBC 10*3/mm3 5.96 6.59 6.95 7.00   HEMOGLOBIN g/dL 10.8* 11.8* 10.6* 11.5*   PLATELETS 10*3/mm3 245 271 220 253     Results from last 7 days   Lab Units 24  0442 24  0811 24  0059 24  1647   SODIUM mmol/L 136 136 136 139   POTASSIUM mmol/L 3.4* 3.5 3.7 4.5   CHLORIDE mmol/L 104 103 102 104   CO2 mmol/L 23.9 25.0 24.0 25.4   BUN mg/dL 12 12 17 21   CREATININE mg/dL 0.59 0.70 0.82 0.89   GLUCOSE mg/dL 91 85 131* 97   EGFR mL/min/1.73 85.2 81.8 67.6 61.3     Results from last 7 days   Lab Units 24  0811 24  0059 24  1647   ALBUMIN g/dL 3.6 3.6 3.5   BILIRUBIN mg/dL 0.4 0.3 0.4   ALK PHOS U/L 79 66 78   AST (SGOT) U/L 12 11 13   ALT  "(SGPT) U/L 8 9 8     Results from last 7 days   Lab Units 09/28/24  0442 09/27/24  0811 09/25/24  0059 09/24/24  1647   CALCIUM mg/dL 8.5 8.7 8.8 9.2   ALBUMIN g/dL  --  3.6 3.6 3.5   MAGNESIUM mg/dL 2.1 2.2  --  2.2   PHOSPHORUS mg/dL 2.9 3.0  --   --      Results from last 7 days   Lab Units 09/24/24  2252 09/24/24  1647   PROCALCITONIN ng/mL  --  0.05   LACTATE mmol/L 0.8  --      No results found for: \"HGBA1C\", \"POCGLU\"      No radiology results for the last day    I have personally reviewed all medications:  Scheduled Medications  apixaban, 2.5 mg, Oral, BID  atorvastatin, 40 mg, Oral, Nightly  cefTRIAXone, 1,000 mg, Intravenous, Q24H  gabapentin, 300 mg, Oral, TID  guaiFENesin, 600 mg, Oral, Q12H  levothyroxine, 75 mcg, Oral, Daily  [START ON 9/29/2024] losartan, 50 mg, Oral, Q24H  pantoprazole, 40 mg, Oral, BID AC  saccharomyces boulardii, 250 mg, Oral, BID  sodium chloride, 10 mL, Intravenous, Q12H    Infusions   Diet  Diet: Cardiac; Healthy Heart (2-3 Na+); Fluid Consistency: Thin (IDDSI 0)    I have personally reviewed:  [x]  Laboratory   [x]  Microbiology   []  Radiology   [x]  EKG/Telemetry  [x]  Cardiology/Vascular   []  Pathology    []  Records       Assessment/Plan     Active Hospital Problems    Diagnosis  POA    **Stroke-like symptoms [R29.90]  Yes    Acute metabolic encephalopathy [G93.41]  Yes    Chronic cough [R05.3]  Yes    Decreased mobility [R26.89]  Yes    Fall [W19.XXXA]  Yes    Ketonuria [R82.4]  Yes    Encephalopathy acute [G93.40]  Yes    Acute UTI [N39.0]  Yes    Chronic diastolic (congestive) heart failure [I50.32]  Yes    Atrial fibrillation, persistent [I48.19]  Yes    Pulmonary hypertension [I27.20]  Yes    Presence of cardiac pacemaker [Z95.0]  Yes      Resolved Hospital Problems   No resolved problems to display.     89yo woman with AFib (Eliquis), PPM, HFpEF, pulm HTN, chronic cough, hypoT4, benzo dependence, and HTN, who was admitted to ER OBS Unit after presenting with confusion " and speech difficulties. Neuro felt symptoms due to UTI and did not feel she needed to have an MRI. We were asked to assume care when pt transferred to full hospital admission.    Acute UTI  Continue Rocephin for pan-sensitive E.coli on urine culture  Blood cultures NGTD    Metabolic encephalopathy  Speech changes  EEG fine  CT Head fine  Neuro has seen, MRI not needed--changes most likely due to UTI and even if there were some CVD the results of MRI would not change mgmt  Symptoms resolved    AFib  PPM  HRs fine, not on RC meds PTA  Continue Eliquis  Card has seen and interrogated PPM    HFpEF  Pulm HTN  Echo here shows EF of 42%--it was 65% in November of '22  Not on diuretic PTA- holding off on diuretics for now     HTN  BPs a bit robust but Card was okay with this  Not on meds currently    HypoT4  TSH wnl  Continue L-T4    B12 deficiency  Levels low-normal here  Have ordered three doses of IM B12 and plan to dc on oral B12  F/u with PCP regarding this issue    Chronic cough  She did not cough while I was speaking to her today  Continue Mucinex, consider Zyrtec    Benzo dependence  Continue PRN Xanax as she takes at home    Generalized weakness  Fall  PT working with pt and feel she is appropriate for home with HH    HLD  Continue statin        Eliquis (home med) for DVT prophylaxis.  Full code.  Discussed with patient and RN.  Anticipate discharge home with HH vs SNU facility, timing yet to be determined.  Expected Discharge Date: 9/28/2024; Expected Discharge Time:       Toni Robles MD  [facility]  09/28/24  13:20 EDT

## 2024-09-29 LAB
ANION GAP SERPL CALCULATED.3IONS-SCNC: 10.2 MMOL/L (ref 5–15)
BACTERIA SPEC AEROBE CULT: NORMAL
BACTERIA SPEC AEROBE CULT: NORMAL
BUN SERPL-MCNC: 12 MG/DL (ref 8–23)
BUN/CREAT SERPL: 16 (ref 7–25)
CALCIUM SPEC-SCNC: 8.7 MG/DL (ref 8.2–9.6)
CHLORIDE SERPL-SCNC: 102 MMOL/L (ref 98–107)
CO2 SERPL-SCNC: 25.8 MMOL/L (ref 22–29)
CREAT SERPL-MCNC: 0.75 MG/DL (ref 0.57–1)
DEPRECATED RDW RBC AUTO: 44 FL (ref 37–54)
EGFRCR SERPLBLD CKD-EPI 2021: 75.3 ML/MIN/1.73
ERYTHROCYTE [DISTWIDTH] IN BLOOD BY AUTOMATED COUNT: 14.2 % (ref 12.3–15.4)
GLUCOSE SERPL-MCNC: 89 MG/DL (ref 65–99)
HCT VFR BLD AUTO: 38.6 % (ref 34–46.6)
HGB BLD-MCNC: 12.3 G/DL (ref 12–15.9)
MCH RBC QN AUTO: 27.3 PG (ref 26.6–33)
MCHC RBC AUTO-ENTMCNC: 31.9 G/DL (ref 31.5–35.7)
MCV RBC AUTO: 85.8 FL (ref 79–97)
PLATELET # BLD AUTO: 297 10*3/MM3 (ref 140–450)
PMV BLD AUTO: 9.7 FL (ref 6–12)
POTASSIUM SERPL-SCNC: 3.3 MMOL/L (ref 3.5–5.2)
POTASSIUM SERPL-SCNC: 4.2 MMOL/L (ref 3.5–5.2)
RBC # BLD AUTO: 4.5 10*6/MM3 (ref 3.77–5.28)
SODIUM SERPL-SCNC: 138 MMOL/L (ref 136–145)
WBC NRBC COR # BLD AUTO: 6.22 10*3/MM3 (ref 3.4–10.8)

## 2024-09-29 PROCEDURE — 85027 COMPLETE CBC AUTOMATED: CPT | Performed by: STUDENT IN AN ORGANIZED HEALTH CARE EDUCATION/TRAINING PROGRAM

## 2024-09-29 PROCEDURE — 25010000002 CEFTRIAXONE PER 250 MG: Performed by: PHYSICIAN ASSISTANT

## 2024-09-29 PROCEDURE — 84132 ASSAY OF SERUM POTASSIUM: CPT | Performed by: STUDENT IN AN ORGANIZED HEALTH CARE EDUCATION/TRAINING PROGRAM

## 2024-09-29 PROCEDURE — 80048 BASIC METABOLIC PNL TOTAL CA: CPT | Performed by: STUDENT IN AN ORGANIZED HEALTH CARE EDUCATION/TRAINING PROGRAM

## 2024-09-29 RX ORDER — ACETAMINOPHEN 325 MG/1
650 TABLET ORAL EVERY 6 HOURS PRN
Status: DISCONTINUED | OUTPATIENT
Start: 2024-09-29 | End: 2024-09-30 | Stop reason: HOSPADM

## 2024-09-29 RX ORDER — POTASSIUM CHLORIDE 1.5 G/1.58G
40 POWDER, FOR SOLUTION ORAL EVERY 4 HOURS
Status: COMPLETED | OUTPATIENT
Start: 2024-09-29 | End: 2024-09-29

## 2024-09-29 RX ADMIN — GABAPENTIN 300 MG: 300 CAPSULE ORAL at 10:27

## 2024-09-29 RX ADMIN — LOSARTAN POTASSIUM 50 MG: 50 TABLET, FILM COATED ORAL at 10:27

## 2024-09-29 RX ADMIN — Medication 10 ML: at 10:27

## 2024-09-29 RX ADMIN — POTASSIUM CHLORIDE 40 MEQ: 1.5 FOR SOLUTION ORAL at 10:27

## 2024-09-29 RX ADMIN — GABAPENTIN 300 MG: 300 CAPSULE ORAL at 20:30

## 2024-09-29 RX ADMIN — Medication 10 ML: at 20:31

## 2024-09-29 RX ADMIN — APIXABAN 2.5 MG: 2.5 TABLET, FILM COATED ORAL at 10:27

## 2024-09-29 RX ADMIN — PANTOPRAZOLE SODIUM 40 MG: 40 TABLET, DELAYED RELEASE ORAL at 16:03

## 2024-09-29 RX ADMIN — ATORVASTATIN CALCIUM 40 MG: 20 TABLET, FILM COATED ORAL at 20:30

## 2024-09-29 RX ADMIN — LEVOTHYROXINE SODIUM 75 MCG: 75 TABLET ORAL at 06:39

## 2024-09-29 RX ADMIN — GUAIFENESIN 600 MG: 600 TABLET, EXTENDED RELEASE ORAL at 20:30

## 2024-09-29 RX ADMIN — Medication 250 MG: at 10:27

## 2024-09-29 RX ADMIN — APIXABAN 2.5 MG: 2.5 TABLET, FILM COATED ORAL at 20:32

## 2024-09-29 RX ADMIN — PANTOPRAZOLE SODIUM 40 MG: 40 TABLET, DELAYED RELEASE ORAL at 06:39

## 2024-09-29 RX ADMIN — ALPRAZOLAM 0.5 MG: 0.5 TABLET ORAL at 23:27

## 2024-09-29 RX ADMIN — CEFTRIAXONE SODIUM 1000 MG: 1 INJECTION, POWDER, FOR SOLUTION INTRAMUSCULAR; INTRAVENOUS at 20:29

## 2024-09-29 RX ADMIN — GUAIFENESIN 600 MG: 600 TABLET, EXTENDED RELEASE ORAL at 10:27

## 2024-09-29 RX ADMIN — Medication 250 MG: at 20:30

## 2024-09-29 RX ADMIN — ACETAMINOPHEN 325MG 650 MG: 325 TABLET ORAL at 10:27

## 2024-09-29 RX ADMIN — POTASSIUM CHLORIDE 40 MEQ: 1.5 FOR SOLUTION ORAL at 16:03

## 2024-09-29 RX ADMIN — GABAPENTIN 300 MG: 300 CAPSULE ORAL at 16:03

## 2024-09-29 NOTE — PLAN OF CARE
Problem: Adult Inpatient Plan of Care  Goal: Plan of Care Review  Outcome: Progressing  Flowsheets (Taken 9/29/2024 0432)  Progress: improving  Plan of Care Reviewed With: patient  Outcome Evaluation:   No s/sx of distress noted at this time. Rested some throughout night. Vital signs WDL. On room air   tolerated. Fall precautions maintained. Will cont with current plan of care.  Goal: Patient-Specific Goal (Individualized)  Outcome: Progressing  Goal: Absence of Hospital-Acquired Illness or Injury  Outcome: Progressing  Intervention: Identify and Manage Fall Risk  Recent Flowsheet Documentation  Taken 9/29/2024 0423 by Chacha Garcia, RN  Safety Promotion/Fall Prevention:   activity supervised   assistive device/personal items within reach   clutter free environment maintained   fall prevention program maintained   lighting adjusted   nonskid shoes/slippers when out of bed   room organization consistent   safety round/check completed  Taken 9/29/2024 0214 by Chacha Garcia, [place]  Safety Promotion/Fall Prevention:   activity supervised   assistive device/personal items within reach   clutter free environment maintained   fall prevention program maintained   lighting adjusted   nonskid shoes/slippers when out of bed   room organization consistent   safety round/check completed  Taken 9/29/2024 0011 by Chacha Garcia, [place]  Safety Promotion/Fall Prevention:   activity supervised   assistive device/personal items within reach   clutter free environment maintained   fall prevention program maintained   lighting adjusted   nonskid shoes/slippers when out of bed   room organization consistent   safety round/check completed  Taken 9/28/2024 2230 by Chacha Garcia, RN  Safety Promotion/Fall Prevention:   activity supervised   assistive device/personal items within reach   clutter free environment maintained   fall prevention program maintained   lighting adjusted   nonskid shoes/slippers when out of bed   room organization  consistent   safety round/check completed  Taken 9/28/2024 2014 by Chacha Garcia RN  Safety Promotion/Fall Prevention:   activity supervised   assistive device/personal items within reach   clutter free environment maintained   fall prevention program maintained   lighting adjusted   nonskid shoes/slippers when out of bed   safety round/check completed   room organization consistent  Intervention: Prevent Skin Injury  Recent Flowsheet Documentation  Taken 9/29/2024 0423 by Chacha Garcia RN  Body Position: supine  Taken 9/29/2024 0214 by Chacha Garcia RN  Body Position:   right   side-lying  Taken 9/29/2024 0011 by Chacha Garcia RN  Body Position: supine, legs elevated  Taken 9/28/2024 2014 by Chacha Garcia RN  Body Position: supine  Skin Protection:   adhesive use limited   incontinence pads utilized   transparent dressing maintained   tubing/devices free from skin contact  Intervention: Prevent and Manage VTE (Venous Thromboembolism) Risk  Recent Flowsheet Documentation  Taken 9/29/2024 0214 by Chacha Garcia RN  Activity Management: activity encouraged  Taken 9/29/2024 0011 by Chacha Garcia RN  Activity Management: activity encouraged  Taken 9/28/2024 2230 by Chacha Garcia RN  Activity Management: activity encouraged  Taken 9/28/2024 2014 by Chacha Garcia RN  Activity Management: activity encouraged  VTE Prevention/Management: (Eliquis) other (see comments)  Intervention: Prevent Infection  Recent Flowsheet Documentation  Taken 9/29/2024 0423 by Chacha Garcia RN  Infection Prevention:   hand hygiene promoted   rest/sleep promoted   single patient room provided  Taken 9/29/2024 0214 by Chacha Garcia RN  Infection Prevention:   hand hygiene promoted   rest/sleep promoted   single patient room provided  Taken 9/29/2024 0011 by Chacha Garcia RN  Infection Prevention:   hand hygiene promoted   single patient room provided   rest/sleep promoted  Taken 9/28/2024 2230 by Chacha Garcia RN  Infection Prevention: hand hygiene  promoted  Taken 9/28/2024 2014 by Chacha Garcia RN  Infection Prevention:   hand hygiene promoted   single patient room provided   rest/sleep promoted  Goal: Optimal Comfort and Wellbeing  Outcome: Progressing  Intervention: Provide Person-Centered Care  Recent Flowsheet Documentation  Taken 9/28/2024 2014 by Chacha Garcia RN  Trust Relationship/Rapport:   care explained   reassurance provided   thoughts/feelings acknowledged  Goal: Readiness for Transition of Care  Outcome: Progressing     Problem: Heart Failure Comorbidity  Goal: Maintenance of Heart Failure Symptom Control  Outcome: Progressing  Intervention: Maintain Heart Failure-Management  Recent Flowsheet Documentation  Taken 9/29/2024 0214 by Chacha Garcia RN  Medication Review/Management: medications reviewed  Taken 9/29/2024 0011 by Chacha Garcia RN  Medication Review/Management: medications reviewed  Taken 9/28/2024 2230 by Chacha Garcia RN  Medication Review/Management: medications reviewed  Taken 9/28/2024 2014 by Chacha Garcia RN  Medication Review/Management: medications reviewed     Problem: Hypertension Comorbidity  Goal: Blood Pressure in Desired Range  Outcome: Progressing  Intervention: Maintain Blood Pressure Management  Recent Flowsheet Documentation  Taken 9/29/2024 0214 by Chacha Garcia RN  Medication Review/Management: medications reviewed  Taken 9/29/2024 0011 by Chacha Garcia RN  Medication Review/Management: medications reviewed  Taken 9/28/2024 2230 by Chacha Garcia RN  Medication Review/Management: medications reviewed  Taken 9/28/2024 2014 by Chacha Garcia RN  Medication Review/Management: medications reviewed     Problem: Skin Injury Risk Increased  Goal: Skin Health and Integrity  Outcome: Progressing  Intervention: Optimize Skin Protection  Recent Flowsheet Documentation  Taken 9/29/2024 0214 by Chacha Garcia RN  Head of Bed (HOB) Positioning: HOB at 20-30 degrees  Taken 9/28/2024 2014 by Chacha Garcia RN  Pressure Reduction  Techniques:   frequent weight shift encouraged   weight shift assistance provided  Pressure Reduction Devices: alternating pressure pump (ADD)  Skin Protection:   adhesive use limited   incontinence pads utilized   transparent dressing maintained   tubing/devices free from skin contact     Problem: Fall Injury Risk  Goal: Absence of Fall and Fall-Related Injury  Outcome: Progressing  Intervention: Identify and Manage Contributors  Recent Flowsheet Documentation  Taken 9/29/2024 0214 by Chacha Garcia RN  Medication Review/Management: medications reviewed  Taken 9/29/2024 0011 by Chacha Garcia RN  Medication Review/Management: medications reviewed  Taken 9/28/2024 2230 by Chacha Garcia RN  Medication Review/Management: medications reviewed  Taken 9/28/2024 2014 by Chacha Garcia RN  Medication Review/Management: medications reviewed  Intervention: Promote Injury-Free Environment  Recent Flowsheet Documentation  Taken 9/29/2024 0423 by Chacha Garcia RN  Safety Promotion/Fall Prevention:   activity supervised   assistive device/personal items within reach   clutter free environment maintained   fall prevention program maintained   lighting adjusted   nonskid shoes/slippers when out of bed   room organization consistent   safety round/check completed  Taken 9/29/2024 0214 by Chacha Garcia, [place]  Safety Promotion/Fall Prevention:   activity supervised   assistive device/personal items within reach   clutter free environment maintained   fall prevention program maintained   lighting adjusted   nonskid shoes/slippers when out of bed   room organization consistent   safety round/check completed  Taken 9/29/2024 0011 by Chacha Garcia, [place]  Safety Promotion/Fall Prevention:   activity supervised   assistive device/personal items within reach   clutter free environment maintained   fall prevention program maintained   lighting adjusted   nonskid shoes/slippers when out of bed   room organization consistent   safety round/check  completed  Taken 9/28/2024 2230 by Chacha Garcia, RN  Safety Promotion/Fall Prevention:   activity supervised   assistive device/personal items within reach   clutter free environment maintained   fall prevention program maintained   lighting adjusted   nonskid shoes/slippers when out of bed   room organization consistent   safety round/check completed  Taken 9/28/2024 2014 by Chacha Garcia, RN  Safety Promotion/Fall Prevention:   activity supervised   assistive device/personal items within reach   clutter free environment maintained   fall prevention program maintained   lighting adjusted   nonskid shoes/slippers when out of bed   safety round/check completed   room organization consistent     Problem: UTI (Urinary Tract Infection)  Goal: Improved Infection Symptoms  Outcome: Progressing   Goal Outcome Evaluation:  Plan of Care Reviewed With: patient        Progress: improving  Outcome Evaluation: No s/sx of distress noted at this time. Rested some throughout night. Vital signs WDL. On room air; tolerated. Fall precautions maintained. Will cont with current plan of care.

## 2024-09-29 NOTE — PLAN OF CARE
Problem: Adult Inpatient Plan of Care  Goal: Plan of Care Review  Flowsheets (Taken 9/29/2024 8856)  Outcome Evaluation: AOX3-4, forgetful and intermittent confusion. Room air. Q2 turns encouraged. Mepilexes to coccyx and back for skin breakdown prevention, placed on pro plus bed. Family at bedside, assist x1 to bathroom with walker, bed alarm on, call light within reach.   Goal Outcome Evaluation:              Outcome Evaluation: AOX3-4, forgetful and intermittent confusion. Room air. Q2 turns encouraged. Mepilexes to coccyx and back for skin breakdown prevention, placed on pro plus bed. Family at bedside, assist x1 to bathroom with walker, bed alarm on, call light within reach.

## 2024-09-29 NOTE — PROGRESS NOTES
Name: Demetra Rush ADMIT: 2024   : 1933  PCP: Melissa Bolivar MD    MRN: [id number] LOS: 3 days   AGE/SEX: 89 y.o. female  ROOM: [facility]     Subjective   Subjective     No new complaints today. Examined at bedside.         Objective   Objective   Vital Signs  Temp:  [97.9 °F (36.6 °C)-98.4 °F (36.9 °C)] 97.9 °F (36.6 °C)  Heart Rate:  [69-79] 72  Resp:  [17-18] 17  BP: (147-166)/(56-71) 163/71  SpO2:  [81 %-100 %] 100 %  on   ;   Device (Oxygen Therapy): room air  Body mass index is 20.11 kg/m².    (No change in exam today)    Physical Exam  Constitutional:       General: She is not in acute distress.  HENT:      Head: Normocephalic and atraumatic.   Cardiovascular:      Rate and Rhythm: Normal rate and regular rhythm.   Pulmonary:      Effort: Pulmonary effort is normal. No respiratory distress.   Abdominal:      General: There is no distension.      Palpations: Abdomen is soft.      Tenderness: There is no abdominal tenderness.   Skin:     General: Skin is warm and dry.   Neurological:      Mental Status: She is alert and oriented to person, place, and time.       Results Review     I reviewed the patient's new clinical results.  Results from last 7 days   Lab Units 24  0647 24  0442 24  0811 24  0059   WBC 10*3/mm3 6.22 5.96 6.59 6.95   HEMOGLOBIN g/dL 12.3 10.8* 11.8* 10.6*   PLATELETS 10*3/mm3 297 245 271 220     Results from last 7 days   Lab Units 24  0647 24  0442 24  0811 24  0059   SODIUM mmol/L 138 136 136 136   POTASSIUM mmol/L 3.3* 3.4* 3.5 3.7   CHLORIDE mmol/L 102 104 103 102   CO2 mmol/L 25.8 23.9 25.0 24.0   BUN mg/dL 12 12 12 17   CREATININE mg/dL 0.75 0.59 0.70 0.82   GLUCOSE mg/dL 89 91 85 131*   EGFR mL/min/1.73 75.3 85.2 81.8 67.6     Results from last 7 days   Lab Units 24  0811 24  0059 24  1647   ALBUMIN g/dL 3.6 3.6 3.5   BILIRUBIN mg/dL 0.4 0.3 0.4   ALK PHOS U/L 79 66 78   AST (SGOT) U/L 12 11 13   ALT  "(SGPT) U/L 8 9 8     Results from last 7 days   Lab Units 09/29/24  0647 09/28/24  0442 09/27/24  0811 09/25/24  0059 09/24/24  1647   CALCIUM mg/dL 8.7 8.5 8.7 8.8 9.2   ALBUMIN g/dL  --   --  3.6 3.6 3.5   MAGNESIUM mg/dL  --  2.1 2.2  --  2.2   PHOSPHORUS mg/dL  --  2.9 3.0  --   --      Results from last 7 days   Lab Units 09/24/24  2252 09/24/24  1647   PROCALCITONIN ng/mL  --  0.05   LACTATE mmol/L 0.8  --      No results found for: \"HGBA1C\", \"POCGLU\"      No radiology results for the last day    I have personally reviewed all medications:  Scheduled Medications  apixaban, 2.5 mg, Oral, BID  atorvastatin, 40 mg, Oral, Nightly  cefTRIAXone, 1,000 mg, Intravenous, Q24H  gabapentin, 300 mg, Oral, TID  guaiFENesin, 600 mg, Oral, Q12H  levothyroxine, 75 mcg, Oral, Daily  losartan, 50 mg, Oral, Q24H  pantoprazole, 40 mg, Oral, BID AC  potassium chloride, 40 mEq, Oral, Q4H  saccharomyces boulardii, 250 mg, Oral, BID  sodium chloride, 10 mL, Intravenous, Q12H    Infusions   Diet  Diet: Cardiac; Healthy Heart (2-3 Na+); Fluid Consistency: Thin (IDDSI 0)    I have personally reviewed:  [x]  Laboratory   [x]  Microbiology   []  Radiology   [x]  EKG/Telemetry  [x]  Cardiology/Vascular   []  Pathology    []  Records       Assessment/Plan     Active Hospital Problems    Diagnosis  POA    **Stroke-like symptoms [R29.90]  Yes    Acute metabolic encephalopathy [G93.41]  Yes    Chronic cough [R05.3]  Yes    Decreased mobility [R26.89]  Yes    Fall [W19.XXXA]  Yes    Ketonuria [R82.4]  Yes    Encephalopathy acute [G93.40]  Yes    Acute UTI [N39.0]  Yes    Chronic diastolic (congestive) heart failure [I50.32]  Yes    Atrial fibrillation, persistent [I48.19]  Yes    Pulmonary hypertension [I27.20]  Yes    Presence of cardiac pacemaker [Z95.0]  Yes      Resolved Hospital Problems   No resolved problems to display.     89yo woman with AFib (Eliquis), PPM, HFpEF, pulm HTN, chronic cough, hypoT4, benzo dependence, and HTN, who was " admitted to ER OBS Unit after presenting with confusion and speech difficulties. Neuro felt symptoms due to UTI and did not feel she needed to have an MRI. We were asked to assume care when pt transferred to full hospital admission.    Acute UTI  Continue Rocephin for pan-sensitive proteus on Ucx  Blood cultures NGTD    Metabolic encephalopathy  Speech changes  EEG fine  CT Head fine  Neuro has seen, MRI not needed--changes most likely due to UTI and even if there were some CVD the results of MRI would not change mgmt  Symptoms resolved    AFib  PPM  HRs fine, not on RC meds PTA  Continue Eliquis  Card has seen and interrogated PPM    HFpEF  Pulm HTN  Echo here shows EF of 42%--it was 65% in November of '22  Not on diuretic PTA- holding off on diuretics for now    HTN  BPs a bit robust but Card was okay with this  Not on meds currently    HypoT4  TSH wnl  Continue L-T4    B12 deficiency  Levels low-normal here  Have ordered three doses of IM B12 and plan to dc on oral B12  F/u with PCP regarding this issue    Benzo dependence  Continue PRN Xanax as she takes at home    Generalized weakness  Fall  PT/OT    HLD  Continue statin        Eliquis (home med) for DVT prophylaxis.  Full code.  Discussed with patient and RN.  Anticipate discharge to SNU when arrangements have been made  Expected Discharge Date: 9/28/2024; Expected Discharge Time:       Toni Robles MD  [facility]  09/29/24  11:49 EDT

## 2024-09-30 VITALS
BODY MASS INDEX: 22.25 KG/M2 | SYSTOLIC BLOOD PRESSURE: 134 MMHG | HEART RATE: 70 BPM | HEIGHT: 60 IN | TEMPERATURE: 97.7 F | OXYGEN SATURATION: 100 % | RESPIRATION RATE: 19 BRPM | WEIGHT: 113.32 LBS | DIASTOLIC BLOOD PRESSURE: 59 MMHG

## 2024-09-30 LAB
ANION GAP SERPL CALCULATED.3IONS-SCNC: 6.6 MMOL/L (ref 5–15)
BUN SERPL-MCNC: 10 MG/DL (ref 8–23)
BUN/CREAT SERPL: 14.1 (ref 7–25)
CALCIUM SPEC-SCNC: 9 MG/DL (ref 8.2–9.6)
CHLORIDE SERPL-SCNC: 106 MMOL/L (ref 98–107)
CO2 SERPL-SCNC: 24.4 MMOL/L (ref 22–29)
CREAT SERPL-MCNC: 0.71 MG/DL (ref 0.57–1)
EGFRCR SERPLBLD CKD-EPI 2021: 80.4 ML/MIN/1.73
GLUCOSE SERPL-MCNC: 88 MG/DL (ref 65–99)
POTASSIUM SERPL-SCNC: 3.7 MMOL/L (ref 3.5–5.2)
SODIUM SERPL-SCNC: 137 MMOL/L (ref 136–145)

## 2024-09-30 PROCEDURE — 97530 THERAPEUTIC ACTIVITIES: CPT

## 2024-09-30 PROCEDURE — 97110 THERAPEUTIC EXERCISES: CPT

## 2024-09-30 PROCEDURE — 97116 GAIT TRAINING THERAPY: CPT

## 2024-09-30 PROCEDURE — 80048 BASIC METABOLIC PNL TOTAL CA: CPT | Performed by: STUDENT IN AN ORGANIZED HEALTH CARE EDUCATION/TRAINING PROGRAM

## 2024-09-30 RX ORDER — ALPRAZOLAM 0.5 MG
0.5 TABLET ORAL NIGHTLY PRN
Qty: 5 TABLET | Refills: 0 | Status: SHIPPED | OUTPATIENT
Start: 2024-09-30 | End: 2024-09-30

## 2024-09-30 RX ORDER — GUAIFENESIN 600 MG/1
600 TABLET, EXTENDED RELEASE ORAL EVERY 12 HOURS SCHEDULED
Qty: 30 TABLET | Refills: 0 | Status: SHIPPED | OUTPATIENT
Start: 2024-09-30

## 2024-09-30 RX ORDER — ALPRAZOLAM 0.5 MG
0.5 TABLET ORAL NIGHTLY PRN
Qty: 5 TABLET | Refills: 0 | Status: SHIPPED | OUTPATIENT
Start: 2024-09-30 | End: 2024-10-14

## 2024-09-30 RX ORDER — LOSARTAN POTASSIUM 50 MG/1
50 TABLET ORAL
Qty: 30 TABLET | Refills: 0 | Status: SHIPPED | OUTPATIENT
Start: 2024-10-01

## 2024-09-30 RX ORDER — SACCHAROMYCES BOULARDII 250 MG
250 CAPSULE ORAL 2 TIMES DAILY
Qty: 14 CAPSULE | Refills: 0 | Status: SHIPPED | OUTPATIENT
Start: 2024-09-30

## 2024-09-30 RX ORDER — GABAPENTIN 300 MG/1
300 CAPSULE ORAL 3 TIMES DAILY
Qty: 12 CAPSULE | Refills: 1 | Status: SHIPPED | OUTPATIENT
Start: 2024-09-30

## 2024-09-30 RX ORDER — ATORVASTATIN CALCIUM 40 MG/1
40 TABLET, FILM COATED ORAL NIGHTLY
Qty: 90 TABLET | Refills: 0 | Status: SHIPPED | OUTPATIENT
Start: 2024-09-30

## 2024-09-30 RX ADMIN — GABAPENTIN 300 MG: 300 CAPSULE ORAL at 09:15

## 2024-09-30 RX ADMIN — Medication 250 MG: at 09:15

## 2024-09-30 RX ADMIN — Medication 10 ML: at 09:17

## 2024-09-30 RX ADMIN — PANTOPRAZOLE SODIUM 40 MG: 40 TABLET, DELAYED RELEASE ORAL at 09:15

## 2024-09-30 RX ADMIN — GUAIFENESIN 600 MG: 600 TABLET, EXTENDED RELEASE ORAL at 09:15

## 2024-09-30 RX ADMIN — LEVOTHYROXINE SODIUM 75 MCG: 75 TABLET ORAL at 05:40

## 2024-09-30 RX ADMIN — APIXABAN 2.5 MG: 2.5 TABLET, FILM COATED ORAL at 09:15

## 2024-09-30 RX ADMIN — LOSARTAN POTASSIUM 50 MG: 50 TABLET, FILM COATED ORAL at 09:15

## 2024-09-30 NOTE — PLAN OF CARE
Goal Outcome Evaluation:  Plan of Care Reviewed With: patient         Alert and Oriented x 4, very [place] with hearing aids not present. Unable to do heel slide down shin, patient states due to hip issues and chronic stiffness in legs. No leg drift and sensation equal. Pt awaiting approval for placement. Remains free from falls with bed alarm on and room near Nurses station, Mepilex to bony prominences to prevent skin breakdown, remains free of skin injury, Eliquis is in use to prevent VTE. Blood pressure monitored and fluid intake, lung assessment and patient showing adequate Heart failure symptom control and blood pressure control.  Pt receiving IV antibiotics and having improved cognition showing urinary tract infection symptoms decreasing.

## 2024-09-30 NOTE — PLAN OF CARE
Goal Outcome Evaluation:  Plan of Care Reviewed With: patient        Progress: improving  Outcome Evaluation: Pt tolerated treatment well this date. Required SBA for bed mobility and to stand. Pt ambulated 90ft w/ Rw and CGA, though assist required to steer walker occasionally d/t R sided veering. Pt was instructed on a few seated LE exercises as well, and encouraged pt to ambulate w/ nsg.      Anticipated Discharge Disposition (PT): skilled nursing facility

## 2024-09-30 NOTE — NURSING NOTE
Upon beginning of shift Report with TRENTON Collins. Hearing Aids were not present, nor the . The daughter had been in earlier and believed to have taken them with her.

## 2024-09-30 NOTE — CASE MANAGEMENT/SOCIAL WORK
Continued Stay Note  [facility]     Patient Name: Demetra Rush  MRN: [id number]  Today's Date: 9/30/2024    Admit Date: 9/24/2024    Plan: [facility]; wc van arranged at 3:00pm.   Discharge Plan       Row Name 09/30/24 1120       Plan    Plan [facility]; wc van arranged at 3:00pm.    Patient/Family in Agreement with Plan yes    Plan Comments Spoke with pt and pt's daughter Nettie via phone who is in agreeable to d/c along with MD. Pt has bed at [facility] today. Daughter requested wc van, arranged [employer] at 3:00pm. packet in [email or web address].      Row Name 09/30/24 1000       Plan    Plan [facility]; bed availale today    Plan Comments Spoke with [facility] bed is available today at [facility]. Messaged MD. Packet in office.                   Discharge Codes    No documentation.                 Expected Discharge Date and Time       Expected Discharge Date Expected Discharge Time    Sep 30, 2024               FRANCISCO J Pearce

## 2024-09-30 NOTE — DISCHARGE SUMMARY
Patient Name: Demetra Rush  : 1933  MRN: [id number]    Date of Admission: 2024  Date of Discharge:  2024  Primary Care Physician: Melissa Bolivar MD      Chief Complaint:   Weakness - Generalized and Fall      Discharge Diagnoses     Active Hospital Problems    Diagnosis  POA    **Stroke-like symptoms [R29.90]  Yes    Acute metabolic encephalopathy [G93.41]  Yes    Chronic cough [R05.3]  Yes    Decreased mobility [R26.89]  Yes    Fall [W19.XXXA]  Yes    Ketonuria [R82.4]  Yes    Encephalopathy acute [G93.40]  Yes    Acute UTI [N39.0]  Yes    Chronic diastolic (congestive) heart failure [I50.32]  Yes    Atrial fibrillation, persistent [I48.19]  Yes    Pulmonary hypertension [I27.20]  Yes    Presence of cardiac pacemaker [Z95.0]  Yes      Resolved Hospital Problems   No resolved problems to display.        Hospital Course         This is a 89-year-old woman with atrial fibrillation on Eliquis, status post PPM, heart failure with preserved ejection fraction, hypothyroidism, benzodiazepine dependence who was admitted to the observation unit with confusion and speech difficulties.  She was noted to have urinary tract infection that was thought to be the cause of her findings.      An MRI was not obtained per neurology recommendations as it would not , and she remained on Eliquis and atorvastatin.    She completed a course of Rocephin for the urinary tract infection due to Proteus.  Blood cultures were negative at 5 days on the day of discharge.    Echocardiogram was also obtained that showed a slightly reduced ejection fraction, around 45%, down from 65% about two years prior.  Cardiology reported that there was no need to start diuretic at this time.    Physical Exam:  Temp:  [97.7 °F (36.5 °C)-98.6 °F (37 °C)] 98.6 °F (37 °C)  Heart Rate:  [70] 70  Resp:  [19-20] 20  BP: (123-167)/(48-64) 153/60  Body mass index is 22.13 kg/m².  Physical Exam  Constitutional:       General:  She is not in acute distress.  HENT:      Head: Normocephalic and atraumatic.   Cardiovascular:      Rate and Rhythm: Normal rate and regular rhythm.   Pulmonary:      Effort: Pulmonary effort is normal. No respiratory distress.   Abdominal:      General: There is no distension.      Palpations: Abdomen is soft.      Tenderness: There is no abdominal tenderness.   Neurological:      General: No focal deficit present.      Mental Status: She is alert and oriented to person, place, and time.         Consultants     Consult Orders (all) (From admission, onward)       Start     Ordered    09/27/24 1028  Inpatient Cardiology Consult  Once        Specialty:  Cardiology  Provider:  Triston Patel MD    09/27/24 1027    09/26/24 0046  Inpatient Case Management  Consult  Once        Provider:  (Not yet assigned)    09/26/24 0046    09/25/24 1456  Inpatient Hospitalist Consult  Once        Specialty:  Hospitalist  Provider:  Kaleigh Gomez MD    09/25/24 1455    09/25/24 0911  Inpatient Cardiology Consult  Once        Specialty:  Cardiology  Provider:  Albino Gaston MD    09/25/24 0911    09/24/24 2057  Inpatient Case Management  Consult  Once        Provider:  (Not yet assigned)    09/24/24 2057 09/24/24 1851  Inpatient Neurology Consult Stroke  Once        Specialty:  Neurology  Provider:  Gunner Gonzales MD    09/24/24 1853                  Procedures     Imaging Results (All)       Procedure Component Value Units Date/Time    CT Head Without Contrast [[id number]] Collected: 09/24/24 1834     Updated: 09/25/24 0657    Narrative:      EMERGENCY CT SCAN OF THE HEAD AND CERVICAL SPINE WITHOUT CONTRAST ON  09/24/2024     CLINICAL HISTORY: This is a 89-year-old female patient who has a history  of syncope. The patient was attempting to get out of her bed into  wheelchair, the wheelchair was not locked, and fell and struck her head.  Patient has headache and neck  pain     HEAT CT TECHNIQUE:  Spiral CT images were obtained from the base of the  skull to the vertex without intravenous contrast. The images were  reformatted and submitted in 3 mm thick axial, sagittal and coronal CT  sections with brain algorithm and 2 mm thick axial CT sections with  high-resolution bone algorithm.     COMPARISON: This is correlated to a prior head CT on 07/10/2023.     FINDINGS: There is some patchy low-density extending from the  periventricular into the subcortical white matter of the cerebral  hemispheres consistent with mild to moderate small vessel disease. There  is a 5 mm old lacunar infarct in the genu of the right internal capsule.  The remainder of the brain parenchyma is normal in attenuation. There is  mild cerebral atrophy. The ventricles are normal in size. I see no mass  effect and no midline shift and no extra-axial fluid collections are  identified and there is no evidence of acute intracranial hemorrhage. No  acute skull fracture is identified. The calvarium and skull base are  normal in appearance. The paranasal sinuses and the mastoid air cells  and middle ear cavities are clear. There are osteoarthritic changes in  the temporomandibular joint bilaterally with marginal spurring of the  mandibular heads bilaterally.       Impression:      1. No acute intracranial abnormality is identified.     2. There is mild-moderate small vessel disease in the cerebral white  matter and there is a 5 mm old lacunar infarct in the genu of the right  internal capsule and there is diffuse cerebral atrophy. The remainder of  the head CT is within normal limits with no acute skull fracture or  intracranial hemorrhage identified.     Cervical spine CT technique: Spiral CT images were obtained from the  skull base down to the T2 thoracic level. The images were reformatted  and submitted in 2 mm thick axial and sagittal CT sections with soft  tissue algorithm and 1 mm thick axial, sagittal and  coronal CT sections  with high-resolution bone algorithm.     COMPARISON:  This is correlated to cervical spine CT from [facility] on 07/10/2023.     FINDINGS: Atlantooccipital articulation is normal appearance     At C1-2 there are arthritic changes at the atlantodental interval with  narrowing of the interval and marginal spurring off the anterior ring of  C1 and the odontoid otherwise, the C1-2 level is normal in appearance.     At C2-3 there is mild bilateral facet overgrowth, minimal posterior  spurring. There is no canal or foraminal narrowing.     At C3-4 there is mild left and moderate right facet overgrowth and there  is some mild disc space narrowing, degenerative endplate changes, right  posterolateral endplate spurs mildly narrow the right side of the canal.  There is right uncovertebral joint spurring and combination with facet  spurs moderately narrow the right neural foramen at C3-4.     At C4-5 there is mild-moderate bilateral facet overgrowth. There is disc  space narrowing and there are degenerative disc and endplate changes and  there is mild posterior spurring but no central canal narrowing. There  is some uncovertebral joint hypertrophy and there is moderate right and  no left foraminal narrowing.     At C5-6 there is mild bilateral facet overgrowth and there is some disc  space narrowing and mild degenerative endplate changes. There is mild  posterior spurring but there is no canal narrowing. There is mild right  and no left foraminal narrowing.     At C6-7 there is mild set overgrowth, 2 mm anterolisthesis of C6 on C7.  There is no canal or foraminal narrowing.     At C7-T1 there is mild left and mild-moderate right facet overgrowth and  1 to 2 mm anterolisthesis of C7 on T1. There is no canal narrowing.  There is mild right and no left foraminal narrowing.     No acute fractures seen in the cervical spine.     IMPRESSION:  1. No acute fracture is seen in the cervical  spine. There is cervical  spondylosis as described above.     Radiation dose reduction techniques were utilized, including automated  exposure control and exposure modulation based on body size.        This report was finalized on 9/25/2024 6:54 AM by Dr. Mark Putnam M.D  on Workstation: [id number]       CT Cervical Spine Without Contrast [[id number]] Collected: 09/24/24 1834     Updated: 09/25/24 0657    Narrative:      EMERGENCY CT SCAN OF THE HEAD AND CERVICAL SPINE WITHOUT CONTRAST ON  09/24/2024     CLINICAL HISTORY: This is a 89-year-old female patient who has a history  of syncope. The patient was attempting to get out of her bed into  wheelchair, the wheelchair was not locked, and fell and struck her head.  Patient has headache and neck pain     HEAT CT TECHNIQUE:  Spiral CT images were obtained from the base of the  skull to the vertex without intravenous contrast. The images were  reformatted and submitted in 3 mm thick axial, sagittal and coronal CT  sections with brain algorithm and 2 mm thick axial CT sections with  high-resolution bone algorithm.     COMPARISON: This is correlated to a prior head CT on 07/10/2023.     FINDINGS: There is some patchy low-density extending from the  periventricular into the subcortical white matter of the cerebral  hemispheres consistent with mild to moderate small vessel disease. There  is a 5 mm old lacunar infarct in the genu of the right internal capsule.  The remainder of the brain parenchyma is normal in attenuation. There is  mild cerebral atrophy. The ventricles are normal in size. I see no mass  effect and no midline shift and no extra-axial fluid collections are  identified and there is no evidence of acute intracranial hemorrhage. No  acute skull fracture is identified. The calvarium and skull base are  normal in appearance. The paranasal sinuses and the mastoid air cells  and middle ear cavities are clear. There are osteoarthritic changes in  the  temporomandibular joint bilaterally with marginal spurring of the  mandibular heads bilaterally.       Impression:      1. No acute intracranial abnormality is identified.     2. There is mild-moderate small vessel disease in the cerebral white  matter and there is a 5 mm old lacunar infarct in the genu of the right  internal capsule and there is diffuse cerebral atrophy. The remainder of  the head CT is within normal limits with no acute skull fracture or  intracranial hemorrhage identified.     Cervical spine CT technique: Spiral CT images were obtained from the  skull base down to the T2 thoracic level. The images were reformatted  and submitted in 2 mm thick axial and sagittal CT sections with soft  tissue algorithm and 1 mm thick axial, sagittal and coronal CT sections  with high-resolution bone algorithm.     COMPARISON:  This is correlated to cervical spine CT from [facility] on 07/10/2023.     FINDINGS: Atlantooccipital articulation is normal appearance     At C1-2 there are arthritic changes at the atlantodental interval with  narrowing of the interval and marginal spurring off the anterior ring of  C1 and the odontoid otherwise, the C1-2 level is normal in appearance.     At C2-3 there is mild bilateral facet overgrowth, minimal posterior  spurring. There is no canal or foraminal narrowing.     At C3-4 there is mild left and moderate right facet overgrowth and there  is some mild disc space narrowing, degenerative endplate changes, right  posterolateral endplate spurs mildly narrow the right side of the canal.  There is right uncovertebral joint spurring and combination with facet  spurs moderately narrow the right neural foramen at C3-4.     At C4-5 there is mild-moderate bilateral facet overgrowth. There is disc  space narrowing and there are degenerative disc and endplate changes and  there is mild posterior spurring but no central canal narrowing. There  is some uncovertebral joint  hypertrophy and there is moderate right and  no left foraminal narrowing.     At C5-6 there is mild bilateral facet overgrowth and there is some disc  space narrowing and mild degenerative endplate changes. There is mild  posterior spurring but there is no canal narrowing. There is mild right  and no left foraminal narrowing.     At C6-7 there is mild set overgrowth, 2 mm anterolisthesis of C6 on C7.  There is no canal or foraminal narrowing.     At C7-T1 there is mild left and mild-moderate right facet overgrowth and  1 to 2 mm anterolisthesis of C7 on T1. There is no canal narrowing.  There is mild right and no left foraminal narrowing.     No acute fractures seen in the cervical spine.     IMPRESSION:  1. No acute fracture is seen in the cervical spine. There is cervical  spondylosis as described above.     Radiation dose reduction techniques were utilized, including automated  exposure control and exposure modulation based on body size.        This report was finalized on 9/25/2024 6:54 AM by Dr. Mark Putnam M.D  on Workstation: [id number]       XR Chest 1 View [[id number]] Collected: 09/24/24 1710     Updated: 09/24/24 1715    Narrative:      AP CHEST     HISTORY: Weakness and dizziness     COMPARISON: 4/24/2023     FINDINGS: There is increased atelectasis or scarring in the left lung  base. There is a nodular density also seen in the left lung base that  appears new. Heart size stable. Stable pacemaker. No appreciable  pneumothorax.       Impression:      Increased atelectasis or scarring in the left lung base. New nodular  density in the left lung base indeterminate. It may reflect a lung  nodule or could reflect an area of nodular infiltrate or atelectasis.  Follow-up recommended with either a short interval repeat chest x-ray in  4 to 6 weeks or CT evaluation        This report was finalized on 9/24/2024 5:12 PM by Dr. Tan Aranda M.D on Workstation: [id number]               Pertinent Labs      Results from last 7 days   Lab Units 09/29/24  0647 09/28/24 0442 09/27/24 0811 09/25/24  0059   WBC 10*3/mm3 6.22 5.96 6.59 6.95   HEMOGLOBIN g/dL 12.3 10.8* 11.8* 10.6*   PLATELETS 10*3/mm3 297 245 271 220     Results from last 7 days   Lab Units 09/30/24 0623 09/29/24 1928 09/29/24 0647 09/28/24 0442 09/27/24  0811   SODIUM mmol/L 137  --  138 136 136   POTASSIUM mmol/L 3.7 4.2 3.3* 3.4* 3.5   CHLORIDE mmol/L 106  --  102 104 103   CO2 mmol/L 24.4  --  25.8 23.9 25.0   BUN mg/dL 10  --  12 12 12   CREATININE mg/dL 0.71  --  0.75 0.59 0.70   GLUCOSE mg/dL 88  --  89 91 85   Estimated Creatinine Clearance: 41.9 mL/min (by C-G formula based on SCr of 0.71 mg/dL).  Results from last 7 days   Lab Units 09/27/24 0811 09/25/24 0059 09/24/24  1647   ALBUMIN g/dL 3.6 3.6 3.5   BILIRUBIN mg/dL 0.4 0.3 0.4   ALK PHOS U/L 79 66 78   AST (SGOT) U/L 12 11 13   ALT (SGPT) U/L 8 9 8     Results from last 7 days   Lab Units 09/30/24 0623 09/29/24 0647 09/28/24 0442 09/27/24  0811 09/25/24  0059 09/24/24  1647   CALCIUM mg/dL 9.0 8.7 8.5 8.7 8.8 9.2   ALBUMIN g/dL  --   --   --  3.6 3.6 3.5   MAGNESIUM mg/dL  --   --  2.1 2.2  --  2.2   PHOSPHORUS mg/dL  --   --  2.9 3.0  --   --        Results from last 7 days   Lab Units 09/27/24 2124 09/27/24 0811 09/25/24 1022 09/25/24 0059 09/24/24 2251 09/24/24  1647   CK TOTAL U/L  --  48  --   --   --   --    HSTROP T ng/L  --   --  21* 19* 15* 14*   PROBNP pg/mL 2,037.0*  --   --   --   --   --        Results from last 7 days   Lab Units 09/24/24  1647   CHOLESTEROL mg/dL 190   TRIGLYCERIDES mg/dL 68   HDL CHOL mg/dL 75*   LDL CHOL mg/dL 102*     Results from last 7 days   Lab Units 09/24/24 2252 09/24/24  1837   BLOODCX  No growth at 5 days  No growth at 5 days  --    URINECX   --  >100,000 CFU/mL Proteus mirabilis*       Test Results Pending at Discharge       Discharge Details        Discharge Medications        New Medications        Instructions Start Date    atorvastatin 40 MG tablet  Commonly known as: LIPITOR   40 mg, Oral, Nightly      guaiFENesin 600 MG 12 hr tablet  Commonly known as: MUCINEX   600 mg, Oral, Every 12 Hours Scheduled      losartan 50 MG tablet  Commonly known as: COZAAR   50 mg, Oral, Every 24 Hours Scheduled   Start Date: October 1, 2024     saccharomyces boulardii 250 MG capsule  Commonly known as: FLORASTOR   250 mg, Oral, 2 Times Daily             Continue These Medications        Instructions Start Date   ALPRAZolam 0.5 MG tablet  Commonly known as: XANAX   0.5 mg, Oral, Nightly PRN      apixaban 2.5 MG tablet tablet  Commonly known as: Eliquis   2.5 mg, Oral, 2 Times Daily      gabapentin 300 MG capsule  Commonly known as: NEURONTIN   300 mg, Oral, 3 Times Daily      levothyroxine 75 MCG tablet  Commonly known as: SYNTHROID, LEVOTHROID   75 mcg, Oral, Daily      pantoprazole 40 MG EC tablet  Commonly known as: PROTONIX   TAKE ONE TABLET BY MOUTH TWICE DAILY               Allergies   Allergen Reactions    Sulfate Other (See Comments)    Amlodipine Swelling     In feet and legs.    Codeine Itching    Sulfa Antibiotics Hives         Discharge Disposition:  Skilled Nursing Facility (DC - External)    Discharge Diet:  Diet Order   Procedures    Diet: Cardiac; Healthy Heart (2-3 Na+); Fluid Consistency: Thin (IDDSI 0)       Discharge Activity: as tolerated       CODE STATUS:    Code Status and Medical Interventions: CPR (Attempt to Resuscitate); Full Support   Ordered at: 09/24/24 0295     Level Of Support Discussed With:    Patient     Code Status (Patient has no pulse and is not breathing):    CPR (Attempt to Resuscitate)     Medical Interventions (Patient has pulse or is breathing):    Full Support       Future Appointments   Date Time Provider Department Center   10/21/2024  1:00 PM ODALIS SONI [place] DEVICE CHECK [facility] CD [facility] None   11/27/2024  2:00 PM Naz Mena PA-C [facility]   12/18/2024  1:45 PM Melissa Bolivar MD [facility]  PC [id number]   1/24/2025 11:00 AM [facility] [place] DEVICE CHECK [facility] None   1/24/2025 11:40 AM Albino Gaston MD [facility] None      Contact information for follow-up providers       Melissa Bolivar MD .    Specialty: Family Medicine  Contact information:  [address] [facility] [address]  [facility] [id number] [phone]                       Contact information for after-discharge care       Destination       [facility] .    Service: Skilled Nursing  Contact information:  [address]   [facility] [id number] [phone]                                   Time Spent on Discharge:  Greater than 30 minutes      Toni Robles MD  [facility]  09/30/24  12:14 EDT

## 2024-09-30 NOTE — THERAPY TREATMENT NOTE
Patient Name: Demetra Rush  : 1933    MRN: [id number]                              Today's Date: 2024       Admit Date: 2024    Visit Dx:     ICD-10-CM ICD-9-CM   1. Fall, initial encounter  W19.XXXA E888.9   2. Closed head injury, initial encounter  S09.90XA 959.01   3. Anticoagulated  Z79.01 V58.61   4. Speech disturbance, unspecified type  R47.9 784.59   5. Confusion  R41.0 298.9   6. Atrial fibrillation, unspecified type  I48.91 427.31     Patient Active Problem List   Diagnosis    Osteoporosis    Acute diastolic CHF (congestive heart failure)    Pulmonary hypertension    Mitral regurgitation    Primary hypertension    Atrial fibrillation, persistent    Chronic diastolic (congestive) heart failure    Primary insomnia    Acquired hypothyroidism    Complete AV block due to AV lakhwinder ablation    Tingling in extremities    Acute UTI    Closed displaced fracture of acromial end of right clavicle    Medicare annual wellness visit, subsequent    Presence of cardiac pacemaker    Stroke-like symptoms    Encephalopathy acute    Acute metabolic encephalopathy    Chronic cough    Decreased mobility    Fall    Ketonuria     Past Medical History:   Diagnosis Date    Atrial fibrillation     Chronic diastolic (congestive) heart failure     GERD (gastroesophageal reflux disease)     Hyperlipidemia     Hypertension     Hypothyroidism     Mitral regurgitation     Neuropathy of both feet     Osteoporosis 2022    Pulmonary hypertension     Stroke     UTI (urinary tract infection)      Past Surgical History:   Procedure Laterality Date    ADENOIDECTOMY      BLADDER SURGERY      CARDIAC ELECTROPHYSIOLOGY PROCEDURE N/A 10/20/2022    Procedure: Pacemaker SC new--Medtronic;  Surgeon: Albino Gaston MD;  Location: [facility] INVASIVE LOCATION;  Service: Cardiology;  Laterality: N/A;    CARDIAC ELECTROPHYSIOLOGY PROCEDURE N/A 11/3/2022    Procedure: AV node ablation---has Medtronic pacemaker;  Surgeon: Marilin  MD Albino;  Location: [facility] INV[facility] LOC[facility];  Service: Cardiovascular;  Laterality: N/A;    CHOLECYSTECTOMY      HYSTERECTOMY        General Information       Row Name 09/30/24 1318          Physical Therapy Time and Intention    Document Type therapy note (daily note)  -     Mode of Treatment physical therapy  -       Row Name 09/30/24 1318          General Information    Existing Precautions/Restrictions fall  -       Row Name 09/30/24 1318          Cognition    Orientation Status (Cognition) oriented to;person;place  -               User Key  (r) = Recorded By, (t) = Taken By, (c) = Cosigned By      Initials Name Provider Type     Molly Alston PTA Physical Therapist Assistant                   Mobility       Row Name 09/30/24 1318          Bed Mobility    Bed Mobility supine-sit  -     Supine-Sit Creek (Bed Mobility) standby assist  -     Assistive Device (Bed Mobility) bed rails;head of bed elevated  -       Row Name 09/30/24 1318          Sit-Stand Transfer    Sit-Stand Creek (Transfers) standby assist  -     Assistive Device (Sit-Stand Transfers) walker, front-wheeled  -       Row Name 09/30/24 1318          Gait/Stairs (Locomotion)    Creek Level (Gait) contact guard  -     Assistive Device (Gait) walker, front-wheeled  -     Patient was able to Ambulate yes  -     Distance in Feet (Gait) 90  -SM     Deviations/Abnormal Patterns (Gait) bartolo decreased;stride length decreased  -     Bilateral Gait Deviations forward flexed posture  -     Comment, (Gait/Stairs) R veering w/ assist to steer walker  -               User Key  (r) = Recorded By, (t) = Taken By, (c) = Cosigned By      Initials Name Provider Type     Molly Alston PTA Physical Therapist Assistant                   Obj/Interventions       Row Name 09/30/24 1319          Motor Skills    Therapeutic Exercise --  seated AP and LAQ x10 reps  -               User Key  (r) =  Recorded By, (t) = Taken By, (c) = Cosigned By      Initials Name Provider Type    Molly Poole PTA Physical Therapist Assistant                   Goals/Plan    No documentation.                  Clinical Impression       Row Name 09/30/24 1320          Pain    Pretreatment Pain Rating 0/10 - no pain  -SM     Posttreatment Pain Rating 0/10 - no pain  -       Row Name 09/30/24 1320          Positioning and Restraints    Pre-Treatment Position in bed  -SM     Post Treatment Position chair  -SM     In Chair reclined;call light within reach;encouraged to call for assist;exit alarm on  -SM               User Key  (r) = Recorded By, (t) = Taken By, (c) = Cosigned By      Initials Name Provider Type    Molly Poole PTA Physical Therapist Assistant                   Outcome Measures       Row Name 09/30/24 1321          How much help from another person do you currently need...    Turning from your back to your side while in flat bed without using bedrails? 3  -SM     Moving from lying on back to sitting on the side of a flat bed without bedrails? 3  -SM     Moving to and from a bed to a chair (including a wheelchair)? 3  -SM     Standing up from a chair using your arms (e.g., wheelchair, bedside chair)? 3  -SM     Climbing 3-5 steps with a railing? 2  -SM     To walk in hospital room? 3  -SM     AM-PAC 6 Clicks Score (PT) 17  -SM     Highest Level of Mobility Goal 5 --> Static standing  -SM       Row Name 09/30/24 1321          Functional Assessment    Outcome Measure Options AM-PAC 6 Clicks Basic Mobility (PT)  -SM               User Key  (r) = Recorded By, (t) = Taken By, (c) = Cosigned By      Initials Name Provider Type    Molly Poole PTA Physical Therapist Assistant                                 Physical Therapy Education       Title: PT OT SLP Therapies (In Progress)       Topic: Physical Therapy (In Progress)       Point: Mobility training (In Progress)       Learning Progress  Summary             Patient Acceptance, E,D, NR by  at 9/30/2024 1321    Acceptance, E,D, NR by  at 9/27/2024 1328    Acceptance, E,D, NR by  at 9/26/2024 1534    Acceptance, E, NR by  at 9/25/2024 1104                         Point: Home exercise program (In Progress)       Learning Progress Summary             Patient Acceptance, E,D, NR by  at 9/30/2024 1321    Acceptance, E,D, NR by  at 9/27/2024 1328    Acceptance, E,D, NR by  at 9/26/2024 1534    Acceptance, E, NR by  at 9/25/2024 1104                         Point: Body mechanics (In Progress)       Learning Progress Summary             Patient Acceptance, E,D, NR by  at 9/30/2024 1321    Acceptance, E,D, NR by  at 9/27/2024 1328    Acceptance, E,D, NR by  at 9/26/2024 1534    Acceptance, E, NR by  at 9/25/2024 1104                         Point: Precautions (In Progress)       Learning Progress Summary             Patient Acceptance, E,D, NR by  at 9/30/2024 1321    Acceptance, E,D, NR by  at 9/27/2024 1328    Acceptance, E,D, NR by  at 9/26/2024 1534    Acceptance, E, NR by  at 9/25/2024 1104                                         User Key       Initials Effective Dates Name Provider Type Discipline     06/16/21 -  Evelin Orourke, PT Physical Therapist PT     03/07/18 -  Molly Alston, PTA Physical Therapist Assistant PT                  PT Recommendation and Plan     Plan of Care Reviewed With: patient  Progress: improving  Outcome Evaluation: Pt tolerated treatment well this date. Required SBA for bed mobility and to stand. Pt ambulated 90ft w/ Rw and CGA, though assist required to steer walker occasionally d/t R sided veering. Pt was instructed on a few seated LE exercises as well, and encouraged pt to ambulate w/ nsg.     Time Calculation:         PT Charges       Row Name 09/30/24 1323             Time Calculation    Start Time 0954  -      Stop Time 1032  -      Time Calculation (min) 38 min  -      PT  Received On 09/30/24  -      PT - Next Appointment 10/01/24  -                User Key  (r) = Recorded By, (t) = Taken By, (c) = Cosigned By      Initials Name Provider Type    Molly Poole PTA Physical Therapist Assistant                  Therapy Charges for Today       Code Description Service Date Service Provider Modifiers Qty    82440114692 HC GAIT TRAINING EA 15 MIN 9/30/2024 Molly Alston, MITZI GP 1    [id number]  PT THER PROC EA 15 MIN 9/30/2024 Molly Alston PTA GP 1    [id number]  PT THERAPEUTIC ACT EA 15 MIN 9/30/2024 Molly Alston, MITZI GP 1            PT G-Codes  Outcome Measure Options: AM-PAC 6 Clicks Basic Mobility (PT)  AM-PAC 6 Clicks Score (PT): 17  AM-PAC 6 Clicks Score (OT): 14  Modified Saleem Scale: 4 - Moderately severe disability.  Unable to walk without assistance, and unable to attend to own bodily needs without assistance.  PT Discharge Summary  Anticipated Discharge Disposition (PT): skilled nursing facility    Molly Alston PTA  9/30/2024

## 2024-09-30 NOTE — CASE MANAGEMENT/SOCIAL WORK
Continued Stay Note  [facility]     Patient Name: Demetra Rush  MRN: [id number]  Today's Date: 9/30/2024    Admit Date: 9/24/2024    Plan: [facility] SNF; bed availale today   Discharge Plan       Row Name 09/30/24 1000       Plan    Plan [facility]; bed availale today    Plan Comments Spoke with Hansa/[facility] bed is available today at [facility]. Malika GUTIERREZ. Packet in office.                   Discharge Codes    No documentation.                 Expected Discharge Date and Time       Expected Discharge Date Expected Discharge Time    Sep 30, 2024               FRANCISCO J Pearce

## 2024-09-30 NOTE — NURSING NOTE
Nursing report called to [facility] at [facility]. Spoke to Gabriela. Wheelchair van scheduled to  patient at 1500.

## 2024-10-01 NOTE — CASE MANAGEMENT/SOCIAL WORK
Case Management Discharge Note      Final Note: SNF at [facility] via [facility]    Provided Post Acute Provider List?: Yes (to daughter)  Post Acute Provider List: Nursing Home  Provided Post Acute Provider Quality & Resource List?: Yes  Delivered To: Support Person  Support Person: Nettie  Method of Delivery: In person    Selected Continued Care - Discharged on 9/30/2024 Admission date: 9/24/2024 - Discharge disposition: Skilled Nursing Facility (DC - External)      Destination Coordination complete.      Service Provider Selected Services Address Phone Fax Patient Preferred    [facility] [place] [facility] [address] , [facility] [address] [phone] --              Durable Medical Equipment    No services have been selected for the patient.                Dialysis/Infusion    No services have been selected for the patient.                Home Medical Care    No services have been selected for the patient.                Therapy    No services have been selected for the patient.                Community Resources    No services have been selected for the patient.                Community & DME    No services have been selected for the patient.                    Transportation Services  W/C Van: [facility]    Final Discharge Disposition Code: 03 - skilled nursing facility (SNF)

## 2024-10-09 ENCOUNTER — TELEPHONE (OUTPATIENT)
Dept: CARDIOLOGY | Facility: CLINIC | Age: 89
End: 2024-10-09

## 2024-10-09 NOTE — TELEPHONE ENCOUNTER
Caller: KRIS VASQUEZ    Relationship to patient: Emergency Contact    Best call back number:  [phone]    Patient is needing: PATIENTS DAUGHTER  RETURNING CALL TO SCHEDULE APPOINTMENT WITH [facility]

## 2024-10-11 ENCOUNTER — HOSPITAL ENCOUNTER (OUTPATIENT)
Dept: GENERAL RADIOLOGY | Facility: HOSPITAL | Age: 89
Discharge: HOME | End: 2024-10-11
Payer: MEDICARE

## 2024-10-11 ENCOUNTER — OFFICE VISIT (OUTPATIENT)
Dept: CARDIOLOGY | Facility: CLINIC | Age: 89
End: 2024-10-11
Payer: MEDICARE

## 2024-10-11 VITALS
BODY MASS INDEX: 20.81 KG/M2 | OXYGEN SATURATION: 97 % | RESPIRATION RATE: 18 BRPM | HEIGHT: 60 IN | SYSTOLIC BLOOD PRESSURE: 124 MMHG | WEIGHT: 106 LBS | HEART RATE: 79 BPM | DIASTOLIC BLOOD PRESSURE: 84 MMHG

## 2024-10-11 DIAGNOSIS — I50.32 CHRONIC DIASTOLIC (CONGESTIVE) HEART FAILURE: Primary | ICD-10-CM

## 2024-10-11 DIAGNOSIS — I50.32 CHRONIC DIASTOLIC (CONGESTIVE) HEART FAILURE: ICD-10-CM

## 2024-10-11 PROCEDURE — 1160F RVW MEDS BY RX/DR IN RCRD: CPT | Performed by: INTERNAL MEDICINE

## 2024-10-11 PROCEDURE — 71046 X-RAY EXAM CHEST 2 VIEWS: CPT

## 2024-10-11 PROCEDURE — 99214 OFFICE O/P EST MOD 30 MIN: CPT | Performed by: INTERNAL MEDICINE

## 2024-10-11 PROCEDURE — 1159F MED LIST DOCD IN RCRD: CPT | Performed by: INTERNAL MEDICINE

## 2024-10-11 RX ORDER — TORSEMIDE 20 MG/1
20 TABLET ORAL DAILY
Qty: 30 TABLET | Refills: 1 | Status: SHIPPED | OUTPATIENT
Start: 2024-10-11

## 2024-10-11 RX ORDER — LIDOCAINE 4 G/G
1 PATCH TOPICAL EVERY 24 HOURS
COMMUNITY

## 2024-10-11 RX ORDER — POTASSIUM CHLORIDE 750 MG/1
10 TABLET, EXTENDED RELEASE ORAL DAILY
Qty: 30 TABLET | Refills: 11 | Status: SHIPPED | OUTPATIENT
Start: 2024-10-11

## 2024-10-11 RX ORDER — POTASSIUM CHLORIDE 750 MG/1
10 TABLET, EXTENDED RELEASE ORAL 2 TIMES DAILY
COMMUNITY
Start: 2024-10-08 | End: 2024-10-11

## 2024-10-11 RX ORDER — TORSEMIDE 20 MG/1
20 TABLET ORAL DAILY
COMMUNITY
Start: 2024-09-25 | End: 2024-10-11

## 2024-10-11 NOTE — PROGRESS NOTES
[facility]      Patient Name: Demetra Rush  :1933  Age: 89 y.o.  Encounter Provider:  Albino Ji Jr, MD      Chief Complaint: Follow-up atrial fibrillation and nonrheumatic mitral regurgitation      HPI  Demetra Rush is a 89 y.o. female who previously followed with [facility] cardiology but is recently moved to [facility] with a history of moderate mitral regurgitation, chronic diastolic heart failure, HLD and HTN. An ECHO on 2022 showed LVEF >55%,Mitral regurgitation, mild to moderate.      Pt was seen in follow up on 22 by [facility] cardiology. Pt denied any worsening cardiac symptoms and mild bilateral lower extremity swelling that is unchanged. Pt reported she has been gradually losing weight over a period of years. Pt denied any chest pain. No medication changes were made.      Pt presented to ER on  22 with complaints of increased breathing difficulties that started this evening. Pt tried to drink some pickle juice to help with reflux and it came back up. She then immediately had increased shortness of breath and was found to be hypoxic and hypotensive per EMS. Pt was given 300 cc NS per EMS. Pt had increased abd discomfort and reflux of the past several days with increasing weakness, sore throat and cough. Pt also reported chest discomfort from vomiting. In ER, BP 76/41, Glucose 261,  CO2 16.9,BUN/CR 18/0.93, troponin negative, Lactate 7.5, procal 0.03, MG 2.2, WBC 13.25, HGB 12.3, respiratory panel negative, CXR showed Cardiomegaly is present. Extensive bilateral alveolar and interstitial infiltrates are seen. Small right pleural effusion is suspected. No pneumothorax is identified. Pt was intubated in ER. EKG showed SR 66, Nonspecific IVCD with a QRS of 130, borderline prolonged QTC of 490 Nonspecific repolarization abnormalities with T wave inversion noted in lead V3,  Pt was strted on Levophed for hypotension post intubation. Pt was  admitted to CCU. GI was consulted for GERD. CT scan in ER showed mild distention of the stomach.   On that admission patient was found to be in atrial fibrillation with RVR and is asymptomatic.  She denies chest pain, shortness of air or palpitations.  No dizziness or syncope.  No orthopnea, PND or edema.      In October she was readmitted with heart failure and persistent atrial fibrillation with rates that were difficult to control.  Pacemaker was implanted and she was scheduled for future AV lakhwinder ablation which took place in November.  She had follow-up in EP clinic with Yumiko Teran at which time her device was interrogated and functioning well.  They turned on rate response to see if activity would be better tolerated.  She remained on low-dose Eliquis and presents today for routine follow-up.    She notes productive cough but much improved volume status.  She has been able to wean torsemide and has stable weight at home.  She is mostly complaining of a bluish discoloration to her feet.  She has noted temperature changes with her extremities since starting on Eliquis.  She denies any leg pain.  Limited functional capacity secondary to musculoskeletal issues but no chest pain or shortness of air with activity.  No orthopnea, PND or edema.    Patient was hospitalized in the end of September for confusion and found to have recurrent UTI.  This was treated and diuretics were held.  Echocardiogram done during that hospitalization showed EF of 40 to 45% which was thought to be stress-induced related to systemic illness.  She was sent home off of diuretic and her daughter noticed increased swelling while at skilled nursing facility and contacted me after which we decided on this appointment today.  She denies orthopnea or PND.  She is fatigued but denies shortness of breath with activity.  She states she is participating in physical therapy well other than fatigue.  Bibasilar crackles are noted today on exam.    The  "following portions of the patient's history were reviewed and updated as appropriate: allergies, current medications, past family history, past medical history, past social history, past surgical history and problem list.      Review of Systems   Constitutional: Negative for chills and fever.   HENT:  Negative for hoarse voice and sore throat.    Eyes:  Negative for double vision and photophobia.   Cardiovascular:  Negative for chest pain, leg swelling, near-syncope, orthopnea, palpitations, paroxysmal nocturnal dyspnea and syncope.   Respiratory:  Negative for cough and wheezing.    Skin:  Positive for color change. Negative for poor wound healing and rash.   Musculoskeletal:  Negative for arthritis and joint swelling.   Gastrointestinal:  Negative for bloating, abdominal pain, hematemesis and hematochezia.   Neurological:  Negative for dizziness and focal weakness.   Psychiatric/Behavioral:  Negative for depression and suicidal ideas.        OBJECTIVE:   Vital Signs  Vitals:    10/11/24 1048   BP: 124/84   Pulse: 79   Resp: 18   SpO2: 97%     Estimated body mass index is 20.7 kg/m² as calculated from the following:    Height as of this encounter: 152.4 cm (60\").    Weight as of this encounter: 48.1 kg (106 lb).    Vitals reviewed.   Constitutional:       Appearance: Not in distress. Chronically ill-appearing.   Neck:      Vascular: No JVR. JVD normal.   Pulmonary:      Effort: Pulmonary effort is normal.      Breath sounds: No wheezing. No rhonchi. No rales.      Comments: Decreased air entry bibasilar zones  Chest:      Chest wall: Not tender to palpatation.   Cardiovascular:      PMI at left midclavicular line. Normal rate. Regular rhythm. Normal S1. Normal S2.       Murmurs: There is no murmur.      No gallop.  No click. No rub.   Pulses:     Intact distal pulses.   Edema:     Peripheral edema absent.   Abdominal:      General: Bowel sounds are normal.      Palpations: Abdomen is soft.      Tenderness: There is " no abdominal tenderness.   Musculoskeletal: Normal range of motion.         General: No tenderness. Skin:     General: Skin is warm and dry.      Coloration: Skin is plethoric.   Neurological:      General: No focal deficit present.      Mental Status: Alert and oriented to person, place and time.         Procedures    Lipid Panel          9/18/2024    14:16 9/24/2024    16:47   Lipid Panel   Total Cholesterol  190    Total Cholesterol 222     Triglycerides 132  68    HDL Cholesterol 83  75    VLDL Cholesterol 23  13    LDL Cholesterol  116  102    LDL/HDL Ratio  1.35         BUN   Date Value Ref Range Status   09/30/2024 10 8 - 23 mg/dL Final     Creatinine   Date Value Ref Range Status   09/30/2024 0.71 0.57 - 1.00 mg/dL Final     Potassium   Date Value Ref Range Status   09/30/2024 3.7 3.5 - 5.2 mmol/L Final     ALT (SGPT)   Date Value Ref Range Status   09/27/2024 8 1 - 33 U/L Final     AST (SGOT)   Date Value Ref Range Status   09/27/2024 12 1 - 32 U/L Final           ASSESSMENT:     89-year-old female with history of chronic diastolic heart failure, atrial fibrillation and nonrheumatic mitral regurgitation who presents for routine follow-up    PLAN OF CARE:     Chronic diastolic heart failure -initially improved after AV lakhwinder ablation but I am concerned she is developing pulmonary edema.  Chest x-ray today.  I am going to restart torsemide 20 mg and potassium supplementation today.  I will see her back in clinic in 1 week.  Longstanding persistent atrial fibrillation -status post AV lakhwinder ablation and pacemaker implantation.  Normal device function on last interrogation.  I am concerned about her ongoing balance issues and we had a long and involved discussion regarding potential for evaluation of Watchman device.  They met with Dr. Wynn via telehealth visit.  They are concerned about the potential complications of anesthesia and procedure.  Ongoing evaluation.  Lower extremity plethora -bluish-purple  discoloration to bilateral feet with good pulses and no significant edema.  Normal WILIAN in November.    Nonrheumatic mitral regurgitation -severe mitral regurgitation on previous echocardiogram.  May have been underappreciated on echo during hospital stay.  If we cannot get volume under better control may need further testing.  No significant murmur noted on exam today but she is clearly volume overloaded.    Return to clinic 1 week           Discharge Medications            Accurate as of October 11, 2024 12:29 PM. If you have any questions, ask your nurse or doctor.                Changes to Medications        Instructions Start Date   potassium chloride 10 MEQ CR tablet  What changed: when to take this  Changed by: Albino Ji   10 mEq, Oral, Daily             Continue These Medications        Instructions Start Date   ALPRAZolam 0.5 MG tablet  Commonly known as: XANAX   0.5 mg, Oral, Nightly PRN      apixaban 2.5 MG tablet tablet  Commonly known as: Eliquis   2.5 mg, Oral, 2 Times Daily      atorvastatin 40 MG tablet  Commonly known as: LIPITOR   40 mg, Oral, Nightly      gabapentin 300 MG capsule  Commonly known as: NEURONTIN   300 mg, Oral, 3 Times Daily      guaiFENesin 600 MG 12 hr tablet  Commonly known as: MUCINEX   600 mg, Oral, Every 12 Hours Scheduled      levothyroxine 75 MCG tablet  Commonly known as: SYNTHROID, LEVOTHROID   75 mcg, Oral, Daily      Lidocaine 4 %   1 patch, Transdermal, Every 24 Hours, Apply one patch twice daily to the lower back 12 hours AM 12 hours PM       losartan 50 MG tablet  Commonly known as: COZAAR   50 mg, Oral, Every 24 Hours Scheduled      pantoprazole 40 MG EC tablet  Commonly known as: PROTONIX   TAKE ONE TABLET BY MOUTH TWICE DAILY      saccharomyces boulardii 250 MG capsule  Commonly known as: FLORASTOR   250 mg, Oral, 2 Times Daily      torsemide 20 MG tablet  Commonly known as: DEMADEX   20 mg, Oral, Daily               Thank you for allowing me to participate in  the care of your patient,      Sincerely,   Albino Ji MD  [facility]  10/11/24  12:29 EDT

## 2024-10-14 ENCOUNTER — TELEPHONE (OUTPATIENT)
Dept: CARDIOLOGY | Facility: CLINIC | Age: 89
End: 2024-10-14
Payer: MEDICARE

## 2024-10-14 DIAGNOSIS — S09.90XA CLOSED HEAD INJURY, INITIAL ENCOUNTER: ICD-10-CM

## 2024-10-14 RX ORDER — ALPRAZOLAM 0.5 MG
0.5 TABLET ORAL NIGHTLY PRN
Qty: 90 TABLET | Refills: 0 | Status: SHIPPED | OUTPATIENT
Start: 2024-10-14

## 2024-10-14 NOTE — TELEPHONE ENCOUNTER
Spoke to daughter Nettie about chest x-ray results.  She did get her torsemide and is currently on water pill.  I will see her Friday in 1 week follow-up to determine need for ongoing diuretic therapy.

## 2024-10-14 NOTE — TELEPHONE ENCOUNTER
LOV                   9/18/2024  NOV                   12/18/2024  Last RF               9/30/24  #5      PROTOCOL       not met  Signed 2 weeks ago (9/30/2024):   ALPRAZolam (XANAX) 0.5 MG tablet     JOEL KenneyA/LMR

## 2024-10-18 ENCOUNTER — TELEPHONE (OUTPATIENT)
Dept: CARDIOLOGY | Facility: CLINIC | Age: 89
End: 2024-10-18

## 2024-10-18 NOTE — TELEPHONE ENCOUNTER
Caller: HARLEY    Relationship to patient: Nursing Home    Best call back number: [phone]    Chief complaint: PT WAS ADMITTED AT [facility]. HAD TO CANCEL APPT THAT WAS SCHEDULED FOR 10.18.2024. HUB RESCHEDULED FOR 02.07.2024. PLEASE CALL TO SCHEDULE SOONER APPT     Type of visit: FOLLOW UP    Requested date: ASAP     If rescheduling, when is the original appointment: 10.18.2024     Additional notes:

## 2024-10-23 ENCOUNTER — TELEPHONE (OUTPATIENT)
Dept: CARDIOLOGY | Facility: CLINIC | Age: 89
End: 2024-10-23

## 2024-10-23 ENCOUNTER — HOSPITAL ENCOUNTER (OUTPATIENT)
Dept: CARDIOLOGY | Facility: HOSPITAL | Age: 89
Discharge: HOME | End: 2024-10-23
Admitting: INTERNAL MEDICINE
Payer: MEDICARE

## 2024-10-23 ENCOUNTER — OFFICE VISIT (OUTPATIENT)
Dept: CARDIOLOGY | Facility: CLINIC | Age: 89
End: 2024-10-23
Payer: MEDICARE

## 2024-10-23 VITALS
HEIGHT: 60 IN | SYSTOLIC BLOOD PRESSURE: 130 MMHG | DIASTOLIC BLOOD PRESSURE: 80 MMHG | WEIGHT: 108 LBS | HEART RATE: 74 BPM | BODY MASS INDEX: 21.2 KG/M2

## 2024-10-23 DIAGNOSIS — I50.32 CHRONIC DIASTOLIC (CONGESTIVE) HEART FAILURE: Primary | ICD-10-CM

## 2024-10-23 DIAGNOSIS — I50.32 CHRONIC DIASTOLIC (CONGESTIVE) HEART FAILURE: ICD-10-CM

## 2024-10-23 LAB
ANION GAP SERPL CALCULATED.3IONS-SCNC: 13.6 MMOL/L (ref 5–15)
BUN SERPL-MCNC: 22 MG/DL (ref 8–23)
BUN/CREAT SERPL: 26.2 (ref 7–25)
CALCIUM SPEC-SCNC: 9.2 MG/DL (ref 8.2–9.6)
CHLORIDE SERPL-SCNC: 98 MMOL/L (ref 98–107)
CO2 SERPL-SCNC: 29.4 MMOL/L (ref 22–29)
CREAT SERPL-MCNC: 0.84 MG/DL (ref 0.57–1)
EGFRCR SERPLBLD CKD-EPI 2021: 65.7 ML/MIN/1.73
GLUCOSE SERPL-MCNC: 83 MG/DL (ref 65–99)
POTASSIUM SERPL-SCNC: 4.2 MMOL/L (ref 3.5–5.2)
SODIUM SERPL-SCNC: 141 MMOL/L (ref 136–145)

## 2024-10-23 PROCEDURE — 36415 COLL VENOUS BLD VENIPUNCTURE: CPT

## 2024-10-23 PROCEDURE — 1160F RVW MEDS BY RX/DR IN RCRD: CPT | Performed by: INTERNAL MEDICINE

## 2024-10-23 PROCEDURE — 99214 OFFICE O/P EST MOD 30 MIN: CPT | Performed by: INTERNAL MEDICINE

## 2024-10-23 PROCEDURE — 1159F MED LIST DOCD IN RCRD: CPT | Performed by: INTERNAL MEDICINE

## 2024-10-23 PROCEDURE — 80048 BASIC METABOLIC PNL TOTAL CA: CPT | Performed by: INTERNAL MEDICINE

## 2024-10-23 RX ORDER — ATORVASTATIN CALCIUM 40 MG/1
40 TABLET, FILM COATED ORAL NIGHTLY
Qty: 90 TABLET | Refills: 3 | Status: SHIPPED | OUTPATIENT
Start: 2024-10-23

## 2024-10-23 RX ORDER — TORSEMIDE 20 MG/1
20 TABLET ORAL DAILY
Qty: 30 TABLET | Refills: 11 | Status: SHIPPED | OUTPATIENT
Start: 2024-10-23

## 2024-10-23 RX ORDER — POTASSIUM CHLORIDE 750 MG/1
10 TABLET, EXTENDED RELEASE ORAL DAILY
Qty: 30 TABLET | Refills: 11 | Status: SHIPPED | OUTPATIENT
Start: 2024-10-23

## 2024-10-23 RX ORDER — LOSARTAN POTASSIUM 50 MG/1
50 TABLET ORAL
Qty: 90 TABLET | Refills: 3 | Status: SHIPPED | OUTPATIENT
Start: 2024-10-23

## 2024-10-23 NOTE — TELEPHONE ENCOUNTER
Spoke to daughter about medication refills and labs performed today.  For now we will going to continue daily torsemide.

## 2024-10-23 NOTE — PROGRESS NOTES
[facility]      Patient Name: Demetra Rush  :1933  Age: 89 y.o.  Encounter Provider:  Albino Ji Jr, MD      Chief Complaint: Follow-up atrial fibrillation and nonrheumatic mitral regurgitation      HPI  Demetra Rush is a 89 y.o. female who previously followed with [facility] cardiology but is recently moved to [facility] with a history of moderate mitral regurgitation, chronic diastolic heart failure, HLD and HTN. An ECHO on 2022 showed LVEF >55%,Mitral regurgitation, mild to moderate.      Pt was seen in follow up on 22 by [facility] cardiology. Pt denied any worsening cardiac symptoms and mild bilateral lower extremity swelling that is unchanged. Pt reported she has been gradually losing weight over a period of years. Pt denied any chest pain. No medication changes were made.      Pt presented to ER on  22 with complaints of increased breathing difficulties that started this evening. Pt tried to drink some pickle juice to help with reflux and it came back up. She then immediately had increased shortness of breath and was found to be hypoxic and hypotensive per EMS. Pt was given 300 cc NS per EMS. Pt had increased abd discomfort and reflux of the past several days with increasing weakness, sore throat and cough. Pt also reported chest discomfort from vomiting. In ER, BP 76/41, Glucose 261,  CO2 16.9,BUN/CR 18/0.93, troponin negative, Lactate 7.5, procal 0.03, MG 2.2, WBC 13.25, HGB 12.3, respiratory panel negative, CXR showed Cardiomegaly is present. Extensive bilateral alveolar and interstitial infiltrates are seen. Small right pleural effusion is suspected. No pneumothorax is identified. Pt was intubated in ER. EKG showed SR 66, Nonspecific IVCD with a QRS of 130, borderline prolonged QTC of 490 Nonspecific repolarization abnormalities with T wave inversion noted in lead V3,  Pt was strted on Levophed for hypotension post intubation. Pt was  admitted to CCU. GI was consulted for GERD. CT scan in ER showed mild distention of the stomach.   On that admission patient was found to be in atrial fibrillation with RVR and is asymptomatic.  She denies chest pain, shortness of air or palpitations.  No dizziness or syncope.  No orthopnea, PND or edema.      In October she was readmitted with heart failure and persistent atrial fibrillation with rates that were difficult to control.  Pacemaker was implanted and she was scheduled for future AV lakhwinder ablation which took place in November.  She had follow-up in EP clinic with Yumiko Teran at which time her device was interrogated and functioning well.  They turned on rate response to see if activity would be better tolerated.  She remained on low-dose Eliquis and presents today for routine follow-up.    She notes productive cough but much improved volume status.  She has been able to wean torsemide and has stable weight at home.  She is mostly complaining of a bluish discoloration to her feet.  She has noted temperature changes with her extremities since starting on Eliquis.  She denies any leg pain.  Limited functional capacity secondary to musculoskeletal issues but no chest pain or shortness of air with activity.  No orthopnea, PND or edema.    Patient was hospitalized in the end of September for confusion and found to have recurrent UTI.  This was treated and diuretics were held.  Echocardiogram done during that hospitalization showed EF of 40 to 45% which was thought to be stress-induced related to systemic illness.  She was sent home off of diuretic and her daughter noticed increased swelling while at skilled nursing facility and contacted me after which we decided on this appointment today.  She denies orthopnea or PND.  She is fatigued but denies shortness of breath with activity.  She states she is participating in physical therapy well other than fatigue.  Bibasilar crackles are noted today on exam.    Lower  "extremity edema is decreased.  We did a two-view chest x-ray last week that did not show significant pulmonary edema.  She feels much better today.  She is having difficulty taking the medication every single day due to constantly being tied to the bathroom and incontinence.  She was just discharged home from rehab yesterday.  Blood pressure and heart rate are well-controlled today in clinic.    The following portions of the patient's history were reviewed and updated as appropriate: allergies, current medications, past family history, past medical history, past social history, past surgical history and problem list.      Review of Systems   Constitutional: Negative for chills and fever.   HENT:  Negative for hoarse voice and sore throat.    Eyes:  Negative for double vision and photophobia.   Cardiovascular:  Negative for chest pain, leg swelling, near-syncope, orthopnea, palpitations, paroxysmal nocturnal dyspnea and syncope.   Respiratory:  Negative for cough and wheezing.    Skin:  Positive for color change. Negative for poor wound healing and rash.   Musculoskeletal:  Negative for arthritis and joint swelling.   Gastrointestinal:  Negative for bloating, abdominal pain, hematemesis and hematochezia.   Neurological:  Negative for dizziness and focal weakness.   Psychiatric/Behavioral:  Negative for depression and suicidal ideas.        OBJECTIVE:   Vital Signs  Vitals:    10/23/24 1409   BP: 130/80   Pulse: 74     Estimated body mass index is 21.09 kg/m² as calculated from the following:    Height as of this encounter: 152.4 cm (60\").    Weight as of this encounter: 49 kg (108 lb).    Vitals reviewed.   Constitutional:       Appearance: Not in distress. Chronically ill-appearing.   Neck:      Vascular: No JVR. JVD normal.   Pulmonary:      Effort: Pulmonary effort is normal.      Breath sounds: No wheezing. No rhonchi. No rales.      Comments: Decreased air entry bibasilar zones  Chest:      Chest wall: Not " tender to palpatation.   Cardiovascular:      PMI at left midclavicular line. Normal rate. Regular rhythm. Normal S1. Normal S2.       Murmurs: There is no murmur.      No gallop.  No click. No rub.   Pulses:     Intact distal pulses.   Edema:     Peripheral edema absent.   Abdominal:      General: Bowel sounds are normal.      Palpations: Abdomen is soft.      Tenderness: There is no abdominal tenderness.   Musculoskeletal: Normal range of motion.         General: No tenderness. Skin:     General: Skin is warm and dry.      Coloration: Skin is plethoric.   Neurological:      General: No focal deficit present.      Mental Status: Alert and oriented to person, place and time.       Procedures    Lipid Panel          9/18/2024    14:16 9/24/2024    16:47   Lipid Panel   Total Cholesterol  190    Total Cholesterol 222     Triglycerides 132  68    HDL Cholesterol 83  75    VLDL Cholesterol 23  13    LDL Cholesterol  116  102    LDL/HDL Ratio  1.35         BUN   Date Value Ref Range Status   09/30/2024 10 8 - 23 mg/dL Final     Creatinine   Date Value Ref Range Status   09/30/2024 0.71 0.57 - 1.00 mg/dL Final     Potassium   Date Value Ref Range Status   09/30/2024 3.7 3.5 - 5.2 mmol/L Final     ALT (SGPT)   Date Value Ref Range Status   09/27/2024 8 1 - 33 U/L Final     AST (SGOT)   Date Value Ref Range Status   09/27/2024 12 1 - 32 U/L Final           ASSESSMENT:     89-year-old female with history of chronic diastolic heart failure, atrial fibrillation and nonrheumatic mitral regurgitation who presents for routine follow-up    PLAN OF CARE:     Acute on chronic chronic diastolic heart failure -initially improved after AV lakhwinder ablation but I am concerned she may be suffering from exacerbations due to previously known severe mitral regurgitation.  Interestingly I cannot hear a murmur today on exam and it was of lower severity on previous echocardiogram.  I am going to check labs today and see what the optimal diuretic  scheduling should be.  I will bring her back to clinic in 1 month and decide on timing for repeat imaging.  Longstanding persistent atrial fibrillation -status post AV lakhwinder ablation and pacemaker implantation.  Normal device function on last interrogation.  I am concerned about her ongoing balance issues and we had a long and involved discussion regarding potential for evaluation of Watchman device.  They met with Dr. Wynn via telehealth visit.  They are concerned about the potential complications of anesthesia and procedure.  Ongoing evaluation.  Lower extremity plethora -bluish-purple discoloration to bilateral feet with good pulses and no significant edema.  Normal WILIAN in November.    Nonrheumatic mitral regurgitation -severe mitral regurgitation on previous echocardiogram.  May have been underappreciated on echo during hospital stay.  If we cannot get volume under better control may need further testing.  No significant murmur noted on exam today but volume status is tenuous however improved today.    Return to clinic 1 month           Discharge Medications            Accurate as of October 23, 2024  2:10 PM. If you have any questions, ask your nurse or doctor.                Continue These Medications        Instructions Start Date   ALPRAZolam 0.5 MG tablet  Commonly known as: XANAX   0.5 mg, Oral, Nightly PRN, for sleep      apixaban 2.5 MG tablet tablet  Commonly known as: Eliquis   2.5 mg, Oral, 2 Times Daily      atorvastatin 40 MG tablet  Commonly known as: LIPITOR   40 mg, Oral, Nightly      gabapentin 300 MG capsule  Commonly known as: NEURONTIN   300 mg, Oral, 3 Times Daily      guaiFENesin 600 MG 12 hr tablet  Commonly known as: MUCINEX   600 mg, Oral, Every 12 Hours Scheduled      levothyroxine 75 MCG tablet  Commonly known as: SYNTHROID, LEVOTHROID   75 mcg, Oral, Daily      Lidocaine 4 %   1 patch, Every 24 Hours      losartan 50 MG tablet  Commonly known as: COZAAR   50 mg, Oral, Every 24  Hours Scheduled      pantoprazole 40 MG EC tablet  Commonly known as: PROTONIX   TAKE ONE TABLET BY MOUTH TWICE DAILY      potassium chloride 10 MEQ CR tablet   10 mEq, Oral, Daily      saccharomyces boulardii 250 MG capsule  Commonly known as: FLORASTOR   250 mg, Oral, 2 Times Daily      torsemide 20 MG tablet  Commonly known as: DEMADEX   20 mg, Oral, Daily               Thank you for allowing me to participate in the care of your patient,      Sincerely,   Albino Ji MD  [facility]  10/23/24  14:10 EDT

## 2024-10-29 ENCOUNTER — TELEPHONE (OUTPATIENT)
Dept: FAMILY MEDICINE CLINIC | Facility: CLINIC | Age: 89
End: 2024-10-29

## 2024-10-29 NOTE — TELEPHONE ENCOUNTER
Caller: DELMY PALMER    Relationship: Other    Best call back number: [phone]    What was the call regarding: REQUESTING VERBAL ORDERS FOR  PHYSICAL THERAPY FOR ONE TIME A WEEK FOR FIVE WEEKS FOR PATIENT AT HOME.

## 2024-10-30 NOTE — TELEPHONE ENCOUNTER
I called and left a detailed voicemail with Rashad giving verbal orders as requested and advised for a call back if these did not suffice

## 2024-11-04 ENCOUNTER — OFFICE VISIT (OUTPATIENT)
Dept: FAMILY MEDICINE CLINIC | Facility: CLINIC | Age: 89
End: 2024-11-04
Payer: MEDICARE

## 2024-11-04 VITALS
DIASTOLIC BLOOD PRESSURE: 68 MMHG | HEART RATE: 71 BPM | HEIGHT: 60 IN | WEIGHT: 105.4 LBS | SYSTOLIC BLOOD PRESSURE: 128 MMHG | BODY MASS INDEX: 20.69 KG/M2 | TEMPERATURE: 98 F | OXYGEN SATURATION: 100 %

## 2024-11-04 DIAGNOSIS — Z09 HOSPITAL DISCHARGE FOLLOW-UP: Primary | ICD-10-CM

## 2024-11-04 DIAGNOSIS — R82.4 KETONURIA: ICD-10-CM

## 2024-11-04 DIAGNOSIS — G93.41 ACUTE METABOLIC ENCEPHALOPATHY: ICD-10-CM

## 2024-11-04 DIAGNOSIS — I48.19 ATRIAL FIBRILLATION, PERSISTENT: ICD-10-CM

## 2024-11-04 DIAGNOSIS — R26.89 DECREASED MOBILITY: ICD-10-CM
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
## 2024-11-04 DIAGNOSIS — I50.32 CHRONIC DIASTOLIC (CONGESTIVE) HEART FAILURE: ICD-10-CM

## 2024-11-04 DIAGNOSIS — N39.0 ACUTE UTI: ICD-10-CM

## 2024-11-04 DIAGNOSIS — R29.90 STROKE-LIKE SYMPTOMS: ICD-10-CM

## 2024-11-04 DIAGNOSIS — R05.3 CHRONIC COUGH: ICD-10-CM

## 2024-11-04 LAB
BILIRUB BLD-MCNC: ABNORMAL MG/DL
CLARITY, POC: CLEAR
COLOR UR: ABNORMAL
EXPIRATION DATE: ABNORMAL
GLUCOSE UR STRIP-MCNC: NEGATIVE MG/DL
KETONES UR QL: NEGATIVE
LEUKOCYTE EST, POC: ABNORMAL
Lab: ABNORMAL
NITRITE UR-MCNC: NEGATIVE MG/ML
PH UR: 6 [PH] (ref 5–8)
PROT UR STRIP-MCNC: ABNORMAL MG/DL
RBC # UR STRIP: ABNORMAL /UL
SP GR UR: 1.01 (ref 1–1.03)
UROBILINOGEN UR QL: ABNORMAL

## 2024-11-04 NOTE — PROGRESS NOTES
"Chief Complaint  Hospital Follow Up Visit (UTI )    Subjective          History of Present Illness    09/24/24-09/30/24  This is a 89-year-old woman with atrial fibrillation on Eliquis, status post PPM, heart failure with preserved ejection fraction, hypothyroidism, benzodiazepine dependence who was admitted to the observation unit with confusion and speech difficulties.  She was noted to have urinary tract infection that was thought to be the cause of her findings.   An MRI was not obtained per neurology recommendations as it would not , and she remained on Eliquis and atorvastatin.  She completed a course of Rocephin for the urinary tract infection due to Proteus.  Blood cultures were negative at 5 days on the day of discharge.  Echocardiogram was also obtained that showed a slightly reduced ejection fraction, around 45%, down from 65% about two years prior.  Cardiology reported that there was no need to start diuretic at this time.      Went to Skilled nursing at [facility] after d/c.  Came home on Tuesday; 2 weeks ago.  Feeling pretty good since getting home.  Difficulty with the swelling in her legs in rehab but has much improved since she has been home.  Saw cardiology  10/26/24.   Uses a bed at home that raises her feet up and she sleeps like that at night, during day keeps legs elevated.  Taking water pill; goes to bathroom every half hour.  Lives with daughter.   Sometimes has burning with urination, wakes her up in the middle of the night, and that typically means she is getting UTI again.    Brief Urine Lab Results  (Last result in the past 365 days)        Color   Clarity   Blood   Leuk Est   Nitrite   Protein   CREAT   Urine HCG        11/04/24 1409 Dark Yellow   Clear   1+   Small (1+)   Negative   30 mg/dL                   Objective   Vital Signs:  /68   Pulse 71   Temp 98 °F (36.7 °C) (Temporal)   Ht 152.4 cm (60\")   Wt 47.8 kg (105 lb 6.4 oz)   SpO2 100%   " "BMI 20.58 kg/m²   Estimated body mass index is 20.58 kg/m² as calculated from the following:    Height as of this encounter: 152.4 cm (60\").    Weight as of this encounter: 47.8 kg (105 lb 6.4 oz).    BMI is within normal parameters. No other follow-up for BMI required.      Physical Exam  Vitals reviewed.   Constitutional:       General: She is not in acute distress.     Comments: Frail, using walker   HENT:      Head: Normocephalic and atraumatic.      Nose:      Right Sinus: No maxillary sinus tenderness or frontal sinus tenderness.      Left Sinus: No maxillary sinus tenderness or frontal sinus tenderness.      Mouth/Throat:      Mouth: Mucous membranes are moist.   Eyes:      Conjunctiva/sclera: Conjunctivae normal.      Pupils: Pupils are equal, round, and reactive to light.   Cardiovascular:      Rate and Rhythm: Normal rate and regular rhythm.      Pulses: Normal pulses.      Heart sounds: No murmur heard.     No gallop.   Pulmonary:      Effort: Pulmonary effort is normal. No respiratory distress.      Breath sounds: Normal breath sounds. No wheezing.   Abdominal:      General: Bowel sounds are normal. There is no distension.      Palpations: Abdomen is soft.      Tenderness: There is no abdominal tenderness. There is no right CVA tenderness or left CVA tenderness.   Musculoskeletal:         General: Normal range of motion.      Cervical back: Normal range of motion and neck supple. No tenderness.   Skin:     General: Skin is warm and dry.   Neurological:      Mental Status: She is alert and oriented to person, place, and time. Mental status is at baseline.   Psychiatric:         Mood and Affect: Mood normal.        Result Review :                Assessment and Plan   Diagnoses and all orders for this visit:    1. Hospital discharge follow-up (Primary)    2. Acute UTI  -     POC Urinalysis Dipstick, Automated  -     Urine Culture - Urine, Urine, Clean Catch  -     amoxicillin-clavulanate (AUGMENTIN) 224-125 " MG per tablet; Take 1 tablet by mouth 2 (Two) Times a Day for 10 days.  Dispense: 20 tablet; Refill: 0    3. Stroke-like symptoms    4. Acute metabolic encephalopathy    5. Chronic cough    6. Decreased mobility    7. Fall, subsequent encounter    8. Ketonuria    9. Chronic diastolic (congestive) heart failure    10. Atrial fibrillation, persistent    UA + blood, leukocytes, protein. Urine culture sent off, will follow up with results. Will start antibiotics today.  Follow up with cardiology as scheduled.     I spent 40 minutes caring for Demetra on this date of service. This time includes time spent by me in the following activities: preparing for the visit, reviewing tests, performing a medically appropriate examination and/or evaluation, counseling and educating the patient/family/caregiver, documenting information in the medical record, independently interpreting results and communicating that information with the patient/family/caregiver, ordering medications, and ordering test(s)      Follow Up   Return if symptoms worsen or fail to improve.  Patient was given instructions and counseling regarding her condition or for health maintenance advice. Please see specific information pulled into the AVS if appropriate.

## 2024-11-05 ENCOUNTER — TELEPHONE (OUTPATIENT)
Dept: GASTROENTEROLOGY | Facility: CLINIC | Age: 89
End: 2024-11-05
Payer: MEDICARE

## 2024-11-07 LAB
BACTERIA UR CULT: ABNORMAL
BACTERIA UR CULT: ABNORMAL
OTHER ANTIBIOTIC SUSC ISLT: ABNORMAL

## 2024-11-26 ENCOUNTER — TELEPHONE (OUTPATIENT)
Dept: FAMILY MEDICINE CLINIC | Facility: CLINIC | Age: 89
End: 2024-11-26

## 2024-11-26 NOTE — TELEPHONE ENCOUNTER
Gave verbal approval to dai and asked him per patients daughter request to let patient know what day next week they will be at patients home prior to coming out.

## 2024-11-26 NOTE — TELEPHONE ENCOUNTER
Caller: DELMY    Relationship: [facility]    Best call back number:     [phone]       What orders are you requesting (i.e. lab or imaging):      ORDERS TO MOVE PHYSICAL THERAPY APPOINTMENT FROM THIS WEEK TO NEXT WEEK DUE TO THE HOLIDAY.  PATIENT REQUESTED.    PLEASE ADVISE

## 2024-12-16 DIAGNOSIS — H91.90 HEARING LOSS, UNSPECIFIED HEARING LOSS TYPE, UNSPECIFIED LATERALITY: Primary | ICD-10-CM

## 2024-12-18 ENCOUNTER — OFFICE VISIT (OUTPATIENT)
Dept: FAMILY MEDICINE CLINIC | Facility: CLINIC | Age: 89
End: 2024-12-18
Payer: MEDICARE

## 2024-12-18 VITALS
BODY MASS INDEX: 21.37 KG/M2 | DIASTOLIC BLOOD PRESSURE: 60 MMHG | WEIGHT: 109.4 LBS | TEMPERATURE: 97.1 F | SYSTOLIC BLOOD PRESSURE: 110 MMHG

## 2024-12-18 DIAGNOSIS — S09.90XA CLOSED HEAD INJURY, INITIAL ENCOUNTER: ICD-10-CM

## 2024-12-18 DIAGNOSIS — R30.0 DYSURIA: ICD-10-CM

## 2024-12-18 DIAGNOSIS — F51.01 PRIMARY INSOMNIA: Primary | ICD-10-CM

## 2024-12-18 LAB
BILIRUB BLD-MCNC: NEGATIVE MG/DL
CLARITY, POC: CLEAR
COLOR UR: YELLOW
EXPIRATION DATE: ABNORMAL
GLUCOSE UR STRIP-MCNC: NEGATIVE MG/DL
KETONES UR QL: NEGATIVE
LEUKOCYTE EST, POC: NEGATIVE
Lab: ABNORMAL
NITRITE UR-MCNC: NEGATIVE MG/ML
PH UR: 6 [PH] (ref 5–8)
PROT UR STRIP-MCNC: ABNORMAL MG/DL
RBC # UR STRIP: ABNORMAL /UL
SP GR UR: 1.02 (ref 1–1.03)
UROBILINOGEN UR QL: NORMAL

## 2024-12-18 PROCEDURE — 1126F AMNT PAIN NOTED NONE PRSNT: CPT | Performed by: FAMILY MEDICINE

## 2024-12-18 PROCEDURE — 99213 OFFICE O/P EST LOW 20 MIN: CPT | Performed by: FAMILY MEDICINE

## 2024-12-18 PROCEDURE — 1159F MED LIST DOCD IN RCRD: CPT | Performed by: FAMILY MEDICINE

## 2024-12-18 PROCEDURE — 81003 URINALYSIS AUTO W/O SCOPE: CPT | Performed by: FAMILY MEDICINE

## 2024-12-18 PROCEDURE — G2211 COMPLEX E/M VISIT ADD ON: HCPCS | Performed by: FAMILY MEDICINE

## 2024-12-18 PROCEDURE — 1160F RVW MEDS BY RX/DR IN RCRD: CPT | Performed by: FAMILY MEDICINE

## 2024-12-18 RX ORDER — ALPRAZOLAM 0.5 MG
0.5 TABLET ORAL NIGHTLY PRN
Qty: 90 TABLET | Refills: 0 | Status: SHIPPED | OUTPATIENT
Start: 2024-12-18

## 2024-12-18 RX ORDER — CIPROFLOXACIN 250 MG/1
250 TABLET, FILM COATED ORAL 2 TIMES DAILY
Qty: 6 TABLET | Refills: 0 | Status: SHIPPED | OUTPATIENT
Start: 2024-12-18

## 2024-12-18 NOTE — PROGRESS NOTES
Subjective   Demetra Rush is a 89 y.o. female.     Chief Complaint   Patient presents with    Dysuria     Insomnia- has had for years and is stable on xanax. Has tried to come off this and can't. Understands risk and benefits. It is a quality of life issue for her. Only sleeps 3 hours at night without this.  Has been fill early.     Having dysuria and suprapubic pain. Feels like a UTI and there is some blood in her urine today. For several days.       The following portions of the patient's history were reviewed and updated as appropriate: allergies, current medications, past family history, past medical history, past social history, past surgical history and problem list.    Past Medical History:   Diagnosis Date    Arthritis     Atrial fibrillation     Chronic diastolic (congestive) heart failure     GERD (gastroesophageal reflux disease)     Hyperlipidemia     Hypertension     Hypothyroidism     Mitral regurgitation     Neuropathy of both feet     Osteoporosis 08/29/2022    Pulmonary hypertension     Stroke     UTI (urinary tract infection)        Past Surgical History:   Procedure Laterality Date    ADENOIDECTOMY      BLADDER SURGERY      CARDIAC ELECTROPHYSIOLOGY PROCEDURE N/A 10/20/2022    Procedure: Pacemaker SC new--Medtronic;  Surgeon: Albino Gaston MD;  Location: [facility] INVASIVE LOCATION;  Service: Cardiology;  Laterality: N/A;    CARDIAC ELECTROPHYSIOLOGY PROCEDURE N/A 11/3/2022    Procedure: AV node ablation---has Medtronic pacemaker;  Surgeon: Albino Gaston MD;  Location: [facility] CATH INVASIVE LOC[facility];  Service: Cardiovascular;  Laterality: N/A;    CHOLECYSTECTOMY      HYSTERECTOMY         Family History   Problem Relation Age of Onset    Heart disease Mother     Heart disease Father     Heart attack Father     Cancer Sister     Tongue cancer Sister        Social History     Socioeconomic History    Marital status:    Tobacco Use    Smoking status: Never     Passive exposure: Never     Smokeless tobacco: Never   Vaping Use    Vaping status: Never Used   Substance and Sexual Activity    Alcohol use: Yes     Alcohol/week: 1.0 standard drink of alcohol     Types: 1 Glasses of wine per week     Comment: glass maybe once a month    Drug use: Never    Sexual activity: Not Currently       Review of Systems   Constitutional:  Negative for fever.       Objective   Visit Vitals  /60 (BP Location: Left arm, Patient Position: Sitting, Cuff Size: Adult)   Temp 97.1 °F (36.2 °C) (Temporal)   Wt 49.6 kg (109 lb 6.4 oz)   BMI 21.37 kg/m²     Body mass index is 21.37 kg/m².  Physical Exam  Constitutional:       General: She is not in acute distress.     Appearance: Normal appearance.   Abdominal:      General: Bowel sounds are normal. There is no distension.      Palpations: Abdomen is soft.      Tenderness: There is no abdominal tenderness. There is no guarding.   Neurological:      General: No focal deficit present.      Mental Status: She is alert and oriented to person, place, and time.   Psychiatric:         Mood and Affect: Mood normal.         Behavior: Behavior normal.           Assessment & Plan   Diagnoses and all orders for this visit:    1. Primary insomnia (Primary)    2. Dysuria  -     POCT urinalysis dipstick, automated  -     Urine Culture - Urine, Urine, Clean Catch  -     ciprofloxacin (Cipro) 250 MG tablet; Take 1 tablet by mouth 2 (Two) Times a Day.  Dispense: 6 tablet; Refill: 0    3. Closed head injury, initial encounter  -     ALPRAZolam (XANAX) 0.5 MG tablet; Take 1 tablet by mouth At Night As Needed for Anxiety or Sleep. for sleep  Dispense: 90 tablet; Refill: 0          Brendan, julian meds, f/u if worse or no better and in 3 months.

## 2024-12-21 LAB
BACTERIA UR CULT: ABNORMAL
BACTERIA UR CULT: ABNORMAL
OTHER ANTIBIOTIC SUSC ISLT: ABNORMAL

## 2024-12-26 DIAGNOSIS — S09.90XA CLOSED HEAD INJURY, INITIAL ENCOUNTER: ICD-10-CM

## 2024-12-27 RX ORDER — ALPRAZOLAM 0.5 MG
0.5 TABLET ORAL NIGHTLY PRN
Qty: 90 TABLET | Refills: 0 | OUTPATIENT
Start: 2024-12-27

## 2024-12-30 DIAGNOSIS — S09.90XA CLOSED HEAD INJURY, INITIAL ENCOUNTER: ICD-10-CM

## 2024-12-30 RX ORDER — ALPRAZOLAM 0.5 MG
0.5 TABLET ORAL NIGHTLY PRN
Qty: 90 TABLET | Refills: 0 | OUTPATIENT
Start: 2024-12-30

## 2024-12-31 DIAGNOSIS — S09.90XA CLOSED HEAD INJURY, INITIAL ENCOUNTER: ICD-10-CM

## 2024-12-31 RX ORDER — ALPRAZOLAM 0.5 MG
0.5 TABLET ORAL NIGHTLY PRN
Qty: 30 TABLET | Refills: 0 | OUTPATIENT
Start: 2024-12-31

## 2024-12-31 NOTE — TELEPHONE ENCOUNTER
Spoke sonia Vaughn at pharmacy.  She will cancel this request.  Already mailed out on 12/23/24.    [facility]

## 2025-01-02 DIAGNOSIS — S09.90XA CLOSED HEAD INJURY, INITIAL ENCOUNTER: ICD-10-CM

## 2025-01-02 RX ORDER — ALPRAZOLAM 0.5 MG
0.5 TABLET ORAL NIGHTLY PRN
Qty: 90 TABLET | Refills: 0 | OUTPATIENT
Start: 2025-01-02

## 2025-01-03 DIAGNOSIS — S09.90XA CLOSED HEAD INJURY, INITIAL ENCOUNTER: ICD-10-CM

## 2025-01-03 RX ORDER — ALPRAZOLAM 0.5 MG
0.5 TABLET ORAL NIGHTLY PRN
Qty: 90 TABLET | Refills: 0 | OUTPATIENT
Start: 2025-01-03

## 2025-01-06 DIAGNOSIS — S09.90XA CLOSED HEAD INJURY, INITIAL ENCOUNTER: ICD-10-CM

## 2025-01-08 RX ORDER — ALPRAZOLAM 0.5 MG
0.5 TABLET ORAL NIGHTLY PRN
Qty: 90 TABLET | Refills: 0 | Status: SHIPPED | OUTPATIENT
Start: 2025-01-08

## 2025-01-24 ENCOUNTER — OFFICE VISIT (OUTPATIENT)
Age: OVER 89
End: 2025-01-24
Payer: MEDICARE

## 2025-01-24 ENCOUNTER — CLINICAL SUPPORT NO REQUIREMENTS (OUTPATIENT)
Age: OVER 89
End: 2025-01-24
Payer: MEDICARE

## 2025-01-24 VITALS
DIASTOLIC BLOOD PRESSURE: 80 MMHG | SYSTOLIC BLOOD PRESSURE: 124 MMHG | HEIGHT: 60 IN | HEART RATE: 73 BPM | BODY MASS INDEX: 20.62 KG/M2 | WEIGHT: 105 LBS

## 2025-01-24 DIAGNOSIS — I97.190 COMPLETE AV BLOCK DUE TO AV NODAL ABLATION: Primary | ICD-10-CM

## 2025-01-24 DIAGNOSIS — Z95.0 PRESENCE OF CARDIAC PACEMAKER: ICD-10-CM

## 2025-01-24 DIAGNOSIS — I44.2 COMPLETE AV BLOCK DUE TO AV NODAL ABLATION: Primary | ICD-10-CM

## 2025-01-24 DIAGNOSIS — I48.19 ATRIAL FIBRILLATION, PERSISTENT: ICD-10-CM

## 2025-01-24 PROCEDURE — 99214 OFFICE O/P EST MOD 30 MIN: CPT | Performed by: INTERNAL MEDICINE

## 2025-01-24 PROCEDURE — 93000 ELECTROCARDIOGRAM COMPLETE: CPT | Performed by: INTERNAL MEDICINE

## 2025-01-24 NOTE — PROGRESS NOTES
Date of Office Visit: 2025  Encounter Provider: Albino Gaston MD  Place of Service: [facility] CARDIOLOGY  Patient Name: Demetra Rush  : 1933    Subjective:     Encounter Date:2025      Patient ID: Demetra Rush is a 89 y.o. female who has a cc of  perm af and I have done AVN abl and LBB area pacer.     She SEES DR TORRES     She is doing ok.     She walks with  cane.     She lives in assisted living.         There have been no bleeding events.       Past Medical History:   Diagnosis Date    Arthritis     Atrial fibrillation     Chronic diastolic (congestive) heart failure     GERD (gastroesophageal reflux disease)     Hyperlipidemia     Hypertension     Hypothyroidism     Mitral regurgitation     Neuropathy of both feet     Osteoporosis 2022    Pulmonary hypertension     Stroke     UTI (urinary tract infection)        Social History     Socioeconomic History    Marital status:    Tobacco Use    Smoking status: Never     Passive exposure: Never    Smokeless tobacco: Never   Vaping Use    Vaping status: Never Used   Substance and Sexual Activity    Alcohol use: Yes     Alcohol/week: 1.0 standard drink of alcohol     Types: 1 Glasses of wine per week     Comment: glass maybe once a month    Drug use: Never    Sexual activity: Not Currently       Family History   Problem Relation Age of Onset    Heart disease Mother     Heart disease Father     Heart attack Father     Cancer Sister     Tongue cancer Sister        Review of Systems   Constitutional: Negative for fever and night sweats.   HENT:  Negative for ear pain and stridor.    Eyes:  Negative for discharge and visual halos.   Cardiovascular:  Negative for cyanosis.   Respiratory:  Negative for hemoptysis and sputum production.    Hematologic/Lymphatic: Negative for adenopathy.   Skin:  Negative for nail changes and unusual hair distribution.   Musculoskeletal:  Positive for arthritis and joint pain. Negative  "for gout and joint swelling.   Gastrointestinal:  Negative for bowel incontinence and flatus.   Genitourinary:  Negative for dysuria and flank pain.   Neurological:  Positive for loss of balance. Negative for seizures and tremors.   Psychiatric/Behavioral:  Negative for altered mental status. The patient is not nervous/anxious.             Objective:     Vitals:    01/24/25 1114   BP: 124/80   BP Location: Right arm   Patient Position: Sitting   Cuff Size: Adult   Pulse: 73   Weight: 47.6 kg (105 lb)   Height: 152.4 cm (60\")         Eyes:      General:         Right eye: No discharge.         Left eye: No discharge.   HENT:      Head: Normocephalic and atraumatic.   Neck:      Thyroid: No thyromegaly.      Vascular: No JVD.   Pulmonary:      Effort: Pulmonary effort is normal.      Breath sounds: Normal breath sounds. No rales.   Cardiovascular:      Normal rate. Regular rhythm.      No gallop.    Edema:     Peripheral edema absent.   Abdominal:      General: Bowel sounds are normal.      Palpations: Abdomen is soft.      Tenderness: There is no abdominal tenderness.   Musculoskeletal: Normal range of motion.         General: No deformity. Skin:     General: Skin is warm and dry.      Findings: No erythema.   Neurological:      Mental Status: Alert and oriented to person, place, and time.      Motor: Normal muscle tone.   Psychiatric:         Behavior: Behavior normal.         Thought Content: Thought content normal.           ECG 12 Lead    Date/Time: 1/24/2025 11:24 AM  Performed by: Albino Gaston MD    Authorized by: Albino Gaston MD  Comparison: compared with previous ECG   Similar to previous ECG  Rhythm: atrial fibrillation and paced          Lab Review:       Assessment:          Diagnosis Plan   1. 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 Presence of cardiac pacemaker        3. Atrial fibrillation, persistent               Plan:     I reviewed the pacemaker/ICD tracings and the pacing and " sensing parameters are mildly abnormal as RV pacing threshold has been slightly high.     Today the RV threshold is 1.5v at 0.4 msec which is better.     We  will follow along with dr chiu.     I wonder if it is not time to stop her statin and losartan and leave that up to Dr Chiu

## 2025-03-12 DIAGNOSIS — S09.90XA CLOSED HEAD INJURY, INITIAL ENCOUNTER: ICD-10-CM

## 2025-03-12 RX ORDER — ALPRAZOLAM 0.5 MG
TABLET ORAL
Qty: 90 TABLET | Refills: 0 | Status: SHIPPED | OUTPATIENT
Start: 2025-03-12

## 2025-03-13 RX ORDER — APIXABAN 2.5 MG/1
2.5 TABLET, FILM COATED ORAL 2 TIMES DAILY
Qty: 180 TABLET | Refills: 1 | Status: SHIPPED | OUTPATIENT
Start: 2025-03-13

## 2025-03-26 ENCOUNTER — OFFICE VISIT (OUTPATIENT)
Dept: FAMILY MEDICINE CLINIC | Facility: CLINIC | Age: OVER 89
End: 2025-03-26
Payer: MEDICARE

## 2025-03-26 VITALS
HEART RATE: 71 BPM | OXYGEN SATURATION: 98 % | DIASTOLIC BLOOD PRESSURE: 70 MMHG | HEIGHT: 60 IN | WEIGHT: 104 LBS | BODY MASS INDEX: 20.42 KG/M2 | TEMPERATURE: 97.4 F | SYSTOLIC BLOOD PRESSURE: 120 MMHG

## 2025-03-26 DIAGNOSIS — E03.9 ACQUIRED HYPOTHYROIDISM: ICD-10-CM

## 2025-03-26 DIAGNOSIS — S09.90XA CLOSED HEAD INJURY, INITIAL ENCOUNTER: ICD-10-CM

## 2025-03-26 DIAGNOSIS — I48.19 ATRIAL FIBRILLATION, PERSISTENT: ICD-10-CM

## 2025-03-26 DIAGNOSIS — N39.0 RECURRENT UTI: ICD-10-CM

## 2025-03-26 DIAGNOSIS — Z23 IMMUNIZATION DUE: ICD-10-CM

## 2025-03-26 DIAGNOSIS — Z78.0 POST-MENOPAUSAL: ICD-10-CM

## 2025-03-26 DIAGNOSIS — M81.0 OSTEOPOROSIS, UNSPECIFIED OSTEOPOROSIS TYPE, UNSPECIFIED PATHOLOGICAL FRACTURE PRESENCE: ICD-10-CM

## 2025-03-26 DIAGNOSIS — I50.32 CHRONIC DIASTOLIC (CONGESTIVE) HEART FAILURE: ICD-10-CM

## 2025-03-26 DIAGNOSIS — I27.20 PULMONARY HYPERTENSION: ICD-10-CM

## 2025-03-26 DIAGNOSIS — I10 PRIMARY HYPERTENSION: Primary | ICD-10-CM

## 2025-03-26 DIAGNOSIS — Z00.00 MEDICARE ANNUAL WELLNESS VISIT, SUBSEQUENT: ICD-10-CM

## 2025-03-26 DIAGNOSIS — R20.2 TINGLING IN EXTREMITIES: ICD-10-CM

## 2025-03-26 DIAGNOSIS — F51.01 PRIMARY INSOMNIA: ICD-10-CM

## 2025-03-26 DIAGNOSIS — N39.0 ACUTE UTI: ICD-10-CM

## 2025-03-26 RX ORDER — GABAPENTIN 300 MG/1
300 CAPSULE ORAL 3 TIMES DAILY
Qty: 90 CAPSULE | Refills: 5 | Status: SHIPPED | OUTPATIENT
Start: 2025-03-26

## 2025-03-26 RX ORDER — IPRATROPIUM BROMIDE 21 UG/1
2 SPRAY, METERED NASAL EVERY 12 HOURS
Qty: 30 ML | Refills: 5 | Status: SHIPPED | OUTPATIENT
Start: 2025-03-26

## 2025-03-26 RX ORDER — CIPROFLOXACIN 250 MG/1
250 TABLET, FILM COATED ORAL 2 TIMES DAILY
Qty: 10 TABLET | Refills: 0 | Status: SHIPPED | OUTPATIENT
Start: 2025-03-26

## 2025-03-26 RX ORDER — ALPRAZOLAM 0.5 MG
0.5 TABLET ORAL NIGHTLY PRN
Qty: 30 TABLET | Refills: 2 | Status: SHIPPED | OUTPATIENT
Start: 2025-03-26

## 2025-03-26 NOTE — ASSESSMENT & PLAN NOTE
Orders:    ciprofloxacin (Cipro) 250 MG tablet; Take 1 tablet by mouth 2 (Two) Times a Day.    Urinalysis With Microscopic - Urine, Clean Catch    Urine Culture - Urine, Urine, Clean Catch

## 2025-03-26 NOTE — PROGRESS NOTES
Subjective   The ABCs of the Annual Wellness Visit  Medicare Wellness Visit      Demetra Rush is a 89 y.o. patient who presents for a Medicare Wellness Visit.    The following portions of the patient's history were reviewed and   updated as appropriate: allergies, current medications, past family history, past medical history, past social history, past surgical history, and problem list.    Compared to one year ago, the patient's physical   health is the same.  Compared to one year ago, the patient's mental   health is the same.    Recent Hospitalizations:  This patient has had a [facility] admission record on file within the last 365 days.  Current Medical Providers:  Patient Care Team:  Melissa Bolivar MD as PCP - General (Family Medicine)  Ari Hall MD as Referring Physician (Orthopedic Surgery)    Outpatient Medications Prior to Visit   Medication Sig Dispense Refill    apixaban (Eliquis) 2.5 MG tablet tablet TAKE ONE TABLET BY MOUTH TWICE DAILY 180 tablet 1    atorvastatin (LIPITOR) 40 MG tablet Take 1 tablet by mouth Every Night. 90 tablet 3    guaiFENesin (MUCINEX) 600 MG 12 hr tablet Take 1 tablet by mouth Every 12 (Twelve) Hours. 30 tablet 0    levothyroxine (SYNTHROID, LEVOTHROID) 75 MCG tablet Take 1 tablet by mouth Daily. 90 tablet 2    losartan (COZAAR) 50 MG tablet Take 1 tablet by mouth Daily. 90 tablet 3    pantoprazole (PROTONIX) 40 MG EC tablet TAKE ONE TABLET BY MOUTH TWICE DAILY 180 tablet 3    potassium chloride 10 MEQ CR tablet Take 1 tablet by mouth Daily. 30 tablet 11    saccharomyces boulardii (FLORASTOR) 250 MG capsule Take 1 capsule by mouth 2 (Two) Times a Day. 14 capsule 0    torsemide (DEMADEX) 20 MG tablet Take 1 tablet by mouth Daily. 30 tablet 11    ALPRAZolam (XANAX) 0.5 MG tablet TAKE ONE TABLET BY MOUTH AT BEDTIME AS NEEDED FOR ANXIETY OR SLEEP 90 tablet 0    ciprofloxacin (Cipro) 250 MG tablet Take 1 tablet by mouth 2 (Two) Times a Day. 6 tablet 0     "gabapentin (NEURONTIN) 300 MG capsule Take 1 capsule by mouth 3 (Three) Times a Day. 12 capsule 1     No facility-administered medications prior to visit.     No opioid medication identified on active medication list. I have reviewed chart for other potential  high risk medication/s and harmful drug interactions in the elderly.      Aspirin is not on active medication list.  Aspirin use is contraindicated for this patient due to: current use of Eliquis.  .    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  ACP discussion was held with the patient during this visit. Patient has an advance directive in EMR which is still valid.             Objective   Vitals:    03/26/25 1501   BP: 120/70   BP Location: Right arm   Patient Position: Sitting   Cuff Size: Adult   Pulse: 71   Temp: 97.4 °F (36.3 °C)   SpO2: 98%   Weight: 47.2 kg (104 lb)   Height: 152.4 cm (60\")   PainSc: 0-No pain       Estimated body mass index is 20.31 kg/m² as calculated from the following:    Height as of this encounter: 152.4 cm (60\").    Weight as of this encounter: 47.2 kg (104 lb).    BMI is within normal parameters. No other follow-up for BMI required.           Does the patient have evidence of cognitive impairment? " No                                                                                                Health  Risk Assessment    Smoking Status:  Social History     Tobacco Use   Smoking Status Never    Passive exposure: Never   Smokeless Tobacco Never     Alcohol Consumption:  Social History     Substance and Sexual Activity   Alcohol Use Yes    Alcohol/week: 1.0 standard drink of alcohol    Types: 1 Glasses of wine per week    Comment: glass maybe once a month       Fall Risk Screen  YISSEL Fall Risk Assessment was completed, and patient is at LOW risk for falls.Assessment completed on:3/26/2025    Depression Screening   Little interest or pleasure in doing things? Not at all   Feeling down, depressed, or hopeless? Not at all   PHQ-2 Total Score 0      Health Habits and Functional and Cognitive Screening:      3/26/2025     2:55 PM   Functional & Cognitive Status   Do you have difficulty preparing food and eating? No   Do you have difficulty bathing yourself, getting dressed or grooming yourself? No   Do you have difficulty using the toilet? No   Do you have difficulty moving around from place to place? No   Do you have trouble with steps or getting out of a bed or a chair? No   Current Diet Well Balanced Diet   Dental Exam Not up to date   Eye Exam Not up to date   Exercise (times per week) 7 times per week   Current Exercises Include Walking   Do you need help using the phone?  No   Are you deaf or do you have serious difficulty hearing?  No   Do you need help to go to places out of walking distance? No   Do you need help shopping? No   Do you need help preparing meals?  No   Do you need help with housework?  Yes   Do you need help with laundry? Yes   Do you need help taking your medications? No   Do you need help managing money? No   Do you ever drive or ride in a car without wearing a seat belt? No   Have you felt unusual stress, anger or loneliness in the last month? No   Who do you live with? Child   If you need  help, do you have trouble finding someone available to you? No   Have you been bothered in the last four weeks by sexual problems? No   Do you have difficulty concentrating, remembering or making decisions? No           Age-appropriate Screening Schedule:  Refer to the list below for future screening recommendations based on patient's age, sex and/or medical conditions. Orders for these recommended tests are listed in the plan section. The patient has been provided with a written plan.    Health Maintenance List  Health Maintenance   Topic Date Due    RSV Vaccine - Adults (1 - 1-dose 75+ series) Never done    DXA SCAN  07/16/2024    COVID-19 Vaccine (8 - 2024-25 season) 03/18/2025    TDAP/TD VACCINES (2 - Td or Tdap) 04/02/2025    ANNUAL WELLNESS VISIT  03/26/2026    INFLUENZA VACCINE  Completed    Pneumococcal Vaccine 50+  Completed    ZOSTER VACCINE  Completed                                                                                                                                                CMS Preventative Services Quick Reference  Risk Factors Identified During Encounter  None Identified    The above risks/problems have been discussed with the patient.  Pertinent information has been shared with the patient in the After Visit Summary.  An After Visit Summary and PPPS were made available to the patient.    Follow Up:   Next Medicare Wellness visit to be scheduled in 1 year.         Additional E&M Note during same encounter follows:  Patient has additional, significant, and separately identifiable condition(s)/problem(s) that require work above and beyond the Medicare Wellness Visit     Chief Complaint  Medicare Wellness-subsequent (Neuropathy)    Subjective   HPI  Demetra is also being seen today for additional medical problem/s.    Review of Systems   Constitutional:  Negative for fever.   Respiratory:  Negative for shortness of breath.       htn- doing well on meds     Hypothyroidism- due labs, taking  "meds daily and due recheck labs. Takes daily now.      Chf/afib/pulm htn- following with cardiology, does have some fatigue and has to take breaks when doing chores and sit down. Is on a blood thinner and rate controlled.  Has a pacemaker and has had an ablation.      Insomnia- has had for years and is stable on xanax. Has tried to come off this and can't. Understands risk and benefits. It is a quality of life issue for her. Only sleeps 3 hours at night without this.      Having some neuropathy symptoms. For years. Tingling. Has had nerve testing a year ago per pt. Has idiopathic neuropathy. Has tried gabapentin and amitriptyline and could not tolerate 2/2 nausea. Has now gotten back on gabapentin and this helps.      Osteoporosis- on prolia, due BD but following with ortho but she quit going.     Feels like she has a UTI, symptoms for 2 weeks. Has been having them once a month.         Objective   Vital Signs:  /70 (BP Location: Right arm, Patient Position: Sitting, Cuff Size: Adult)   Pulse 71   Temp 97.4 °F (36.3 °C)   Ht 152.4 cm (60\")   Wt 47.2 kg (104 lb)   SpO2 98%   BMI 20.31 kg/m²   Physical Exam  Constitutional:       Appearance: Normal appearance. She is well-developed.   Cardiovascular:      Rate and Rhythm: Normal rate and regular rhythm.      Heart sounds: Normal heart sounds.   Pulmonary:      Effort: Pulmonary effort is normal.      Breath sounds: Normal breath sounds.   Musculoskeletal:         General: No swelling. Normal range of motion.   Skin:     General: Skin is warm and dry.      Findings: No rash.   Neurological:      General: No focal deficit present.      Mental Status: She is alert and oriented to person, place, and time.   Psychiatric:         Mood and Affect: Mood normal.         Behavior: Behavior normal.                    Assessment and Plan      Primary hypertension      Orders:    Comprehensive Metabolic Panel    Lipid Panel    Atrial fibrillation, persistent     "     Chronic diastolic (congestive) heart failure               Acquired hypothyroidism    Orders:    TSH Rfx On Abnormal To Free T4    Medicare annual wellness visit, subsequent         Osteoporosis, unspecified osteoporosis type, unspecified pathological fracture presence         Pulmonary hypertension         Primary insomnia         Tingling in extremities         Post-menopausal    Orders:    DEXA Bone Density Axial; Future    Closed head injury, initial encounter    Orders:    ALPRAZolam (XANAX) 0.5 MG tablet; Take 1 tablet by mouth At Night As Needed for Anxiety.    gabapentin (NEURONTIN) 300 MG capsule; Take 1 capsule by mouth 3 (Three) Times a Day.    Acute UTI    Orders:    ciprofloxacin (Cipro) 250 MG tablet; Take 1 tablet by mouth 2 (Two) Times a Day.    Urinalysis With Microscopic - Urine, Clean Catch    Urine Culture - Urine, Urine, Clean Catch    Recurrent UTI    Orders:    Ambulatory Referral to Urology    Immunization due    Orders:    COVID-19 (Pfizer) 12yrs+ (COMIRNATY)            Follow Up   Return in about 3 months (around 6/26/2025) for Recheck.  Patient was given instructions and counseling regarding her condition or for health maintenance advice. Please see specific information pulled into the AVS if appropriate.    Discussed risk factors, jania, continue medicines, U tox up-to-date and follow-up in 3 months.

## 2025-03-30 LAB
ALBUMIN SERPL-MCNC: 4.3 G/DL (ref 3.5–5.2)
ALBUMIN/GLOB SERPL: 1.6 G/DL
ALP SERPL-CCNC: 97 U/L (ref 39–117)
ALT SERPL-CCNC: 14 U/L (ref 1–33)
APPEARANCE UR: ABNORMAL
AST SERPL-CCNC: 17 U/L (ref 1–32)
BACTERIA #/AREA URNS HPF: ABNORMAL /HPF
BACTERIA UR CULT: ABNORMAL
BACTERIA UR CULT: ABNORMAL
BILIRUB SERPL-MCNC: 0.4 MG/DL (ref 0–1.2)
BILIRUB UR QL STRIP: NEGATIVE
BUN SERPL-MCNC: 29 MG/DL (ref 8–23)
BUN/CREAT SERPL: 31.9 (ref 7–25)
CALCIUM SERPL-MCNC: 9.7 MG/DL (ref 8.2–9.6)
CASTS URNS MICRO: ABNORMAL
CHLORIDE SERPL-SCNC: 98 MMOL/L (ref 98–107)
CHOLEST SERPL-MCNC: 154 MG/DL (ref 0–200)
CO2 SERPL-SCNC: 30.3 MMOL/L (ref 22–29)
COLOR UR: YELLOW
CREAT SERPL-MCNC: 0.91 MG/DL (ref 0.57–1)
EGFRCR SERPLBLD CKD-EPI 2021: 59.7 ML/MIN/1.73
EPI CELLS #/AREA URNS HPF: ABNORMAL /HPF
GLOBULIN SER CALC-MCNC: 2.7 GM/DL
GLUCOSE SERPL-MCNC: 87 MG/DL (ref 65–99)
GLUCOSE UR QL STRIP: NEGATIVE
HDLC SERPL-MCNC: 81 MG/DL (ref 40–60)
HGB UR QL STRIP: NEGATIVE
KETONES UR QL STRIP: ABNORMAL
LDLC SERPL CALC-MCNC: 61 MG/DL (ref 0–100)
LEUKOCYTE ESTERASE UR QL STRIP: ABNORMAL
NITRITE UR QL STRIP: NEGATIVE
OTHER ANTIBIOTIC SUSC ISLT: ABNORMAL
PH UR STRIP: 6 [PH] (ref 5–8)
POTASSIUM SERPL-SCNC: 3.9 MMOL/L (ref 3.5–5.2)
PROT SERPL-MCNC: 7 G/DL (ref 6–8.5)
PROT UR QL STRIP: ABNORMAL
RBC #/AREA URNS HPF: ABNORMAL /HPF
SODIUM SERPL-SCNC: 139 MMOL/L (ref 136–145)
SP GR UR STRIP: 1.03 (ref 1–1.03)
TRIGL SERPL-MCNC: 55 MG/DL (ref 0–150)
TSH SERPL DL<=0.005 MIU/L-ACNC: 1.5 UIU/ML (ref 0.27–4.2)
UROBILINOGEN UR STRIP-MCNC: ABNORMAL MG/DL
VLDLC SERPL CALC-MCNC: 12 MG/DL (ref 5–40)
WBC #/AREA URNS HPF: ABNORMAL /HPF

## 2025-05-07 ENCOUNTER — TELEPHONE (OUTPATIENT)
Dept: GASTROENTEROLOGY | Facility: CLINIC | Age: OVER 89
End: 2025-05-07
Payer: MEDICARE

## 2025-05-07 NOTE — TELEPHONE ENCOUNTER
Ok for the hub to relay and schedule.  Left vm for the pts daughter to call back and schedule a yearly follow up appt with Naz Mena.  Next available.  Pt is needing a refill on her Budesonide EC 3mg

## 2025-05-07 NOTE — TELEPHONE ENCOUNTER
I queued budesonide 3 mg prescription.  Can you please call her and find out how many pills she is taking per day and to what pharmacy she wants this to go to?

## 2025-05-09 RX ORDER — BUDESONIDE 3 MG/1
CAPSULE, COATED PELLETS ORAL
Qty: 180 CAPSULE | Refills: 0 | Status: SHIPPED | OUTPATIENT
Start: 2025-05-09 | End: 2025-08-07

## 2025-05-09 NOTE — TELEPHONE ENCOUNTER
Max dose of budesonide is 3 capsules daily.  She should be taking all capsules at the same time.  Please have her take the budesonide 3 capsules every morning.  Do not split them up, they do not work as well that way.  Please reinforce this with her as I want her to get maximum benefit.  I did increase the dose to 3 capsules as it sounds like she is not totally controlled on the 2 capsules daily but she cannot stay on that dose long-term.

## 2025-05-09 NOTE — TELEPHONE ENCOUNTER
Called pt and spoke w/daughter and advised of Naz Mena PA-C's note.  She verbalized understanding.    Daughter is asking what the plan will be going forward since pt cannot take Budesonide long-term.  Advised will send a msg to [facility].

## 2025-05-09 NOTE — TELEPHONE ENCOUNTER
We can discuss this further at her office visit in July.  Because it will depend on how she does on the budesonide.  What happens as she tapers down on the budesonide.  The questions I will be asking are how much did diarrhea improve on the 3 capsules daily?  Did the diarrhea worsen as she went down in dose?  If it did worsen, at what dose did it get worse?  She can write how she does down on paper so that way she can give me a good account of how she did each step of the way.  There is not another treatment for microscopic colitis other than budesonide and covering up the diarrhea with antidiarrheals such as Imodium and colestipol.

## 2025-05-09 NOTE — TELEPHONE ENCOUNTER
Patient's daughter Nettie(listed on [facility]) called. She states the current dose of Budesonide is one tablet in the morning and one tablet in the evening. This dose doesn't completely stop the diarrhea but makes is manageable according to Nettie.   She states the patient is wanting to take 2 tablets in the morning and 2 tablets in the evening. She states she will discuss medication dosage with Naz when the patient sees Naz on 7/23.

## 2025-05-12 NOTE — TELEPHONE ENCOUNTER
Called patient's daughter Nettie.   Advised as per Naz's note and also sent it via my chart so she may review the questions.     She states she would like Naz to discuss with the patient stopping the Budesonide completely. She states she is unable to be at the office visit and is concerned about the patient's long term use of Budesonide.     Update to Naz.

## 2025-05-15 NOTE — TELEPHONE ENCOUNTER
I definitely understand.  If she okay with waiting until July to talk about this?  This is her mom's next appointment.    Only about.  20% of the full dosage is absorbed which at 9 mg would be less than 2 mg total.  The original plan was for her to take 3/day for 30 days then 2/day for 30 days and 1/day for 30 days to wean off.  But I am a little concerned patient may not do this.  Is there any way daughter can ensure she is weaning down appropriately?

## 2025-05-16 NOTE — TELEPHONE ENCOUNTER
Phone call to daughter Nettie. She states the patient has been tapering her Budesonide as prescribed. She states [facility] delivers her mediation in daily packages and the appropriate dose of Budesonide is within the package.     She states she is ok with July to have the discussion regarding the Budesonide usage.     Update to Naz.

## 2025-06-25 ENCOUNTER — OFFICE VISIT (OUTPATIENT)
Dept: FAMILY MEDICINE CLINIC | Facility: CLINIC | Age: OVER 89
End: 2025-06-25
Payer: MEDICARE

## 2025-06-25 VITALS
HEIGHT: 60 IN | OXYGEN SATURATION: 94 % | WEIGHT: 102.8 LBS | DIASTOLIC BLOOD PRESSURE: 70 MMHG | BODY MASS INDEX: 20.18 KG/M2 | HEART RATE: 70 BPM | SYSTOLIC BLOOD PRESSURE: 120 MMHG

## 2025-06-25 DIAGNOSIS — N39.0 URINARY TRACT INFECTION WITHOUT HEMATURIA, SITE UNSPECIFIED: Primary | ICD-10-CM

## 2025-06-25 DIAGNOSIS — F51.01 PRIMARY INSOMNIA: ICD-10-CM

## 2025-06-25 DIAGNOSIS — I10 PRIMARY HYPERTENSION: ICD-10-CM

## 2025-06-25 DIAGNOSIS — M81.0 OSTEOPOROSIS, UNSPECIFIED OSTEOPOROSIS TYPE, UNSPECIFIED PATHOLOGICAL FRACTURE PRESENCE: ICD-10-CM

## 2025-06-25 DIAGNOSIS — N39.0 RECURRENT UTI: ICD-10-CM

## 2025-06-25 PROBLEM — I50.31 ACUTE DIASTOLIC CHF (CONGESTIVE HEART FAILURE): Status: RESOLVED | Noted: 2022-10-18 | Resolved: 2025-06-25
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
## 2025-06-25 PROBLEM — R29.90 STROKE-LIKE SYMPTOMS: Status: RESOLVED | Noted: 2024-09-24 | Resolved: 2025-06-25

## 2025-06-25 PROBLEM — Z09 HOSPITAL DISCHARGE FOLLOW-UP: Status: RESOLVED | Noted: 2024-11-04 | Resolved: 2025-06-25

## 2025-06-25 PROBLEM — R82.4 KETONURIA: Status: RESOLVED | Noted: 2024-09-26 | Resolved: 2025-06-25

## 2025-06-25 PROBLEM — I44.2 COMPLETE AV BLOCK DUE TO AV NODAL ABLATION: Status: RESOLVED | Noted: 2023-01-10 | Resolved: 2025-06-25

## 2025-06-25 PROBLEM — G93.40 ENCEPHALOPATHY ACUTE: Status: RESOLVED | Noted: 2024-09-25 | Resolved: 2025-06-25

## 2025-06-25 PROBLEM — G93.41 ACUTE METABOLIC ENCEPHALOPATHY: Status: RESOLVED | Noted: 2024-09-26 | Resolved: 2025-06-25

## 2025-06-25 PROBLEM — I97.190 COMPLETE AV BLOCK DUE TO AV NODAL ABLATION: Status: RESOLVED | Noted: 2023-01-10 | Resolved: 2025-06-25

## 2025-06-25 LAB
BILIRUB BLD-MCNC: NEGATIVE MG/DL
CLARITY, POC: CLEAR
COLOR UR: YELLOW
EXPIRATION DATE: ABNORMAL
GLUCOSE UR STRIP-MCNC: NEGATIVE MG/DL
KETONES UR QL: NEGATIVE
LEUKOCYTE EST, POC: ABNORMAL
Lab: ABNORMAL
NITRITE UR-MCNC: POSITIVE MG/ML
PH UR: 6 [PH] (ref 5–8)
PROT UR STRIP-MCNC: ABNORMAL MG/DL
RBC # UR STRIP: ABNORMAL /UL
SP GR UR: 1.01 (ref 1–1.03)
UROBILINOGEN UR QL: NORMAL

## 2025-06-25 RX ORDER — NITROFURANTOIN 25; 75 MG/1; MG/1
100 CAPSULE ORAL 2 TIMES DAILY
Qty: 10 CAPSULE | Refills: 0 | Status: SHIPPED | OUTPATIENT
Start: 2025-06-25

## 2025-06-25 RX ORDER — CYCLOSPORINE 0.5 MG/ML
1 EMULSION OPHTHALMIC EVERY 12 HOURS
COMMUNITY
Start: 2025-02-28

## 2025-06-25 NOTE — PROGRESS NOTES
Chief Complaint  Establish Care    Subjective        Demetra Rush presents to [facility]    History of Present Illness  The patient presents for evaluation of a urinary tract infection.    She was previously prescribed ciprofloxacin by Dr. Phan for a bladder infection, but it proved ineffective in alleviating her symptoms. She experiences a burning sensation in her urethra, which disrupts her sleep nightly. She also reports a change in urine color, an unpleasant odor, and the presence of sediment in her urine. Her fluid intake is primarily composed of tea and coffee, with recent attempts to increase water consumption. She has a history of recurrent urinary tract infections, dating back to her childbearing years, with monthly occurrences. She recalls an instance of bleeding associated with a urinary tract infection.    She has a diagnosis of osteoporosis but is not currently on any medication for this condition. She has not engaged in physical therapy within the past year.    She has been on Xanax for approximately 25 years, initially prescribed at a dose of 1 mg twice daily, which she later reduced to 0.5 mg nightly. She supplements this with two melatonin gummies. She typically goes to bed between 10:30 PM and 12:30 AM and wakes up at 6:30 AM without any awakenings during the night.    She has a history of high blood pressure, pacemaker, mitral regurgitation, atrial fibrillation, diastolic heart failure, hypothyroidism, acute metabolic encephalopathy, and GERD. She takes Mucinex every day for cough and sputum. She uses ipratropium nasal spray for postnasal drip, which she has had for 20 years. She has a hernia that occasionally protrudes and causes pain during strenuous activity.    PAST SURGICAL HISTORY:  - Hysterectomy  - Hip fracture repair in 03/1971       Objective   Vital Signs:  /70 (BP Location: Left arm, Patient Position: Sitting, Cuff Size: Adult)   Pulse 70    "Ht 152.4 cm (60\")   Wt 46.6 kg (102 lb 12.8 oz)   SpO2 94%   BMI 20.08 kg/m²   Estimated body mass index is 20.08 kg/m² as calculated from the following:    Height as of this encounter: 152.4 cm (60\").    Weight as of this encounter: 46.6 kg (102 lb 12.8 oz).    BMI is within normal parameters. No other follow-up for BMI required.      Physical Exam     Physical Exam  Respiratory: Clear to auscultation, no wheezing, rales or rhonchi  Cardiovascular: Regular rate and rhythm, no murmurs, rubs, or gallops  Gastrointestinal: Soft, no tenderness, no distention       Result Review :               Results  Labs   - Urine test: Presence of Klebsiella bacteria and protein          Assessment and Plan   Diagnoses and all orders for this visit:    1. Urinary tract infection without hematuria, site unspecified (Primary)  -     nitrofurantoin, macrocrystal-monohydrate, (Macrobid) 100 MG capsule; Take 1 capsule by mouth 2 (Two) Times a Day.  Dispense: 10 capsule; Refill: 0  -     POCT urinalysis dipstick, automated  -     Urine Culture - Urine, Urine, Clean Catch    2. Primary hypertension    3. Recurrent UTI    4. Primary insomnia    5. Osteoporosis, unspecified osteoporosis type, unspecified pathological fracture presence        Assessment & Plan  1. Urinary Tract Infection.  - Persistent symptoms include burning sensations and nocturnal discomfort.  - Previous treatment with ciprofloxacin was ineffective.  - Urine sample will be collected today for further analysis.  - Alternative antibiotic will be prescribed; advised to increase water intake.    2. Osteoporosis.  - Last bone density scan in 2022 indicated osteoporosis.  - No current medication for osteoporosis.Patient previously declined prescription treatment.  - Recommendations include increasing calcium and vitamin D intake and engaging in resistance training exercises.    3. Sleep Disorder.  - Long-term use of Xanax for sleep issues, currently at 0.5 mg each night.  - " Continuation of Xanax will be reviewed.  - Alternative treatments may be considered if necessary.    4. Hernia.  - Reports occasional pain and protrusion with strenuous activity.  - Advised to seek immediate medical attention if hernia becomes stuck or pain persists.  - Monitoring for any changes in symptoms.            Follow Up   Return in about 3 months (around 9/25/2025) for Chronic care.  Patient was given instructions and counseling regarding her condition or for health maintenance advice. Please see specific information pulled into the AVS if appropriate.           Patient or patient representative verbalized consent for the use of Ambient Listening during the visit with  Ladi Jones DO for chart documentation. 6/26/2025  15:58 EDT

## 2025-07-01 ENCOUNTER — TELEPHONE (OUTPATIENT)
Dept: GASTROENTEROLOGY | Facility: CLINIC | Age: OVER 89
End: 2025-07-01
Payer: MEDICARE

## 2025-07-01 LAB
BACTERIA UR CULT: ABNORMAL
BACTERIA UR CULT: ABNORMAL
OTHER ANTIBIOTIC SUSC ISLT: ABNORMAL

## 2025-07-25 ENCOUNTER — OFFICE VISIT (OUTPATIENT)
Age: OVER 89
End: 2025-07-25
Payer: MEDICARE

## 2025-07-25 VITALS
HEART RATE: 72 BPM | BODY MASS INDEX: 19.83 KG/M2 | SYSTOLIC BLOOD PRESSURE: 120 MMHG | HEIGHT: 60 IN | WEIGHT: 101 LBS | DIASTOLIC BLOOD PRESSURE: 56 MMHG

## 2025-07-25 DIAGNOSIS — I10 PRIMARY HYPERTENSION: ICD-10-CM

## 2025-07-25 DIAGNOSIS — I50.32 CHRONIC DIASTOLIC (CONGESTIVE) HEART FAILURE: ICD-10-CM

## 2025-07-25 DIAGNOSIS — I48.19 ATRIAL FIBRILLATION, PERSISTENT: Primary | ICD-10-CM

## 2025-07-25 DIAGNOSIS — Z95.0 PRESENCE OF CARDIAC PACEMAKER: ICD-10-CM

## 2025-07-25 NOTE — PROGRESS NOTES
"      ELECTROPHYSIOLOGY   Date of Office Visit: 2025  Patient Name: Demetra Rush  : 1933  Encounter Provider: Golden Do PA-C  Primary Cardiologist: Albino Ji MD  Electrophysiologist: Dr. Gaston  CHIEF COMPLAINT / REASON FOR OFFICE VISIT     Follow-up (6 months), Atrial Fibrillation (Persistent/), and Complete AV Block  6 month follow up    HISTORY OF PRESENT ILLNESS     This is a 89 y.o. year old female who presents to [facility]ARD[facility]OLOG[facility] for a for a 6 month follow up.     She has a history of permanent atrial fibrillation with previous AV node ablation in .    She has a single-chamber left bundle pacemaker in place.    Device interrogation today shows 100% RV paced beats. Elevated RV thresholds set to an increased RV amplitude to 4 V to 4.5 V.     Today the patient reports overall she is doing really well.  She lives with her daughter but is still trying to be independent in all of her ADLs.  She has not felt any dizziness or lightheadedness.  Sometimes she will get a light tinge in her chest that is worse with movement.  Nothing sustained.    She has not felt any palpitations and her breathing has been at baseline.  She does have some difficulty with frequent urination when she does take her torsemide.  She ends up taking this every other day.    She is a [employer].     PMHx: HTN, HL, heart failure with preserved ejection fraction, severe mitral gravitation, permanent atrial fibrillation s/p AV node ablation, single-chamber pacemaker, recurrent UTI    PHYSICAL EXAMINATION     Vital Signs:  /56 (BP Location: Left arm, Patient Position: Sitting, Cuff Size: Adult)   Pulse 72   Ht 152.4 cm (60\")   Wt 45.8 kg (101 lb)   BMI 19.73 kg/m²   Estimated body mass index is 19.73 kg/m² as calculated from the following:    Height as of this encounter: 152.4 cm (60\").    Weight as of this encounter: 45.8 kg (101 lb).       BMI is within normal parameters. " No other follow-up for BMI required.      Physical Exam  Constitutional:       Appearance: Normal appearance.   HENT:      Head: Normocephalic and atraumatic.   Cardiovascular:      Rate and Rhythm: Normal rate and regular rhythm.      Pulses: Normal pulses.      Heart sounds: Normal heart sounds.   Pulmonary:      Effort: Pulmonary effort is normal.      Breath sounds: Normal breath sounds.   Musculoskeletal:      Right lower leg: No edema.      Left lower leg: No edema.   Skin:     General: Skin is warm and dry.   Neurological:      General: No focal deficit present.      Mental Status: She is alert and oriented to person, place, and time.          Cardiac Testing/Results     Cardiac Testing:   - Echo 2022: EF of 65.5% with mildly reduced RV systolic function.  Severely increased left atrial volume.  Severe MR with moderate to severe TR.  RVSP 48 mmHg.  Moderate pulmonary hypertension.  -Stress Test    Result Review :  The following data was reviewed by: Golden Do PA-C on 07/25/2025:    Lipid Panel          9/18/2024    14:16 9/24/2024    16:47 3/26/2025    15:57   Lipid Panel   Total Cholesterol  190     Total Cholesterol 222   154    Triglycerides 132  68  55    HDL Cholesterol 83  75  81    VLDL Cholesterol 23  13  12    LDL Cholesterol  116  102  61    LDL/HDL Ratio  1.35        Lab Results   Component Value Date     03/26/2025     10/23/2024    K 3.9 03/26/2025    K 4.2 10/23/2024    CL 98 03/26/2025    CL 98 10/23/2024    CO2 30.3 (H) 03/26/2025    CO2 29.4 (H) 10/23/2024    BUN 29 (H) 03/26/2025    BUN 22 10/23/2024    CREATININE 0.91 03/26/2025    CREATININE 0.84 10/23/2024    GLUCOSE 87 03/26/2025    GLUCOSE 83 10/23/2024    CALCIUM 9.7 (H) 03/26/2025    CALCIUM 9.2 10/23/2024    ALBUMIN 4.3 03/26/2025    ALBUMIN 3.6 09/27/2024    AST 17 03/26/2025    AST 12 09/27/2024    ALT 14 03/26/2025    ALT 8 09/27/2024     Lab Results   Component Value Date    WBC 6.22 09/29/2024    WBC 5.96  09/28/2024    HGB 12.3 09/29/2024    HGB 10.8 (L) 09/28/2024    HCT 38.6 09/29/2024    HCT 34.9 09/28/2024    MCV 85.8 09/29/2024    MCV 86.4 09/28/2024     09/29/2024     09/28/2024     Lab Results   Component Value Date    PROBNP 2,037.0 (H) 09/27/2024    PROBNP 2,760.0 (H) 10/07/2022     Lab Results   Component Value Date    CKTOTAL 48 09/27/2024    TROPONINT 21 (H) 09/25/2024     Lab Results   Component Value Date    TSH 1.500 03/26/2025    TSH 1.970 09/25/2024                 ECG 12 Lead    Date/Time: 7/25/2025 1:30 PM  Performed by: Golden Sood PA-C    Authorized by: Goledn Sood PA-C  Comparison: compared with previous ECG from 1/24/2025  Rhythm: atrial fibrillation and paced  BPM: 72  QRS axis: normal  Comments: , underlying AF noted                ASSESSMENT & PLAN       Diagnoses and all orders for this visit:    1-2. Atrial fibrillation, persistent (Primary), Presence of cardiac pacemaker  She doing well  She has not had any new symptoms  She has a prior AV node ablation and is on long tem eliquis 2.5 mg twice daily   If any new symptoms she will contact our office- she has home monitoring in place and has 100% RV paced beats today on EKG  3. Chronic diastolic (congestive) heart failure  Euvolemic- she is only taking her diuretic every other day at this point.  She has been doing well and will continue with this regimen.    Continue losartan  4. Primary hypertension  Blood pressures been well-controlled at other visits.  She will continue current medications.  She has not had any episodes of dizziness or lightheadedness        Follow Up:  Return in about 6 months (around 1/25/2026) for Dr. Gaston- Routine, Device check.  Patient was given instructions and counseling regarding her condition or for health maintenance advice. Please contact office if worsening symptoms or proceed to ER when appropriate.      Golden Sood PA-C  07/25/25  13:32 EDT    MEDICATIONS          Discharge Medications            Accurate as of July 25, 2025  1:32 PM. If you have any questions, ask your nurse or doctor.                Continue These Medications        Instructions Start Date   ALPRAZolam 0.5 MG tablet  Commonly known as: XANAX   0.5 mg, Oral, Nightly PRN      atorvastatin 40 MG tablet  Commonly known as: LIPITOR   40 mg, Oral, Nightly      Eliquis 2.5 MG tablet tablet  Generic drug: apixaban   2.5 mg, Oral, 2 Times Daily      gabapentin 300 MG capsule  Commonly known as: NEURONTIN   300 mg, Oral, 3 Times Daily      guaiFENesin 600 MG 12 hr tablet  Commonly known as: MUCINEX   600 mg, Oral, Every 12 Hours Scheduled      ipratropium 0.03 % nasal spray  Commonly known as: ATROVENT   2 sprays, Nasal, Every 12 Hours      levothyroxine 75 MCG tablet  Commonly known as: SYNTHROID, LEVOTHROID   75 mcg, Oral, Daily      losartan 50 MG tablet  Commonly known as: COZAAR   50 mg, Oral, Every 24 Hours Scheduled      nitrofurantoin (macrocrystal-monohydrate) 100 MG capsule  Commonly known as: Macrobid   100 mg, Oral, 2 Times Daily      pantoprazole 40 MG EC tablet  Commonly known as: PROTONIX   TAKE ONE TABLET BY MOUTH TWICE DAILY      potassium chloride 10 MEQ CR tablet   10 mEq, Oral, Daily      Restasis 0.05 % ophthalmic emulsion  Generic drug: cycloSPORINE   1 drop, Every 12 Hours      saccharomyces boulardii 250 MG capsule  Commonly known as: FLORASTOR   250 mg, Oral, 2 Times Daily      torsemide 20 MG tablet  Commonly known as: DEMADEX   20 mg, Oral, Daily                   **Amy Disclaimer: This note was dictated using an electronic transcription. The electronic translation of spoken language may permit erroneous, or at times, nonsensical words or phrases to be inadvertently transcribed. Although I have reviewed the note for such errors, some may still exist.

## 2025-08-14 ENCOUNTER — OFFICE VISIT (OUTPATIENT)
Dept: FAMILY MEDICINE CLINIC | Facility: CLINIC | Age: OVER 89
End: 2025-08-14
Payer: MEDICARE

## 2025-08-14 VITALS
DIASTOLIC BLOOD PRESSURE: 64 MMHG | BODY MASS INDEX: 19.52 KG/M2 | WEIGHT: 99.4 LBS | OXYGEN SATURATION: 96 % | SYSTOLIC BLOOD PRESSURE: 108 MMHG | HEART RATE: 76 BPM | HEIGHT: 60 IN

## 2025-08-14 DIAGNOSIS — R30.0 DYSURIA: Primary | ICD-10-CM

## 2025-08-14 DIAGNOSIS — N39.0 RECURRENT UTI: ICD-10-CM

## 2025-08-14 PROCEDURE — 1126F AMNT PAIN NOTED NONE PRSNT: CPT | Performed by: STUDENT IN AN ORGANIZED HEALTH CARE EDUCATION/TRAINING PROGRAM

## 2025-08-14 PROCEDURE — G2211 COMPLEX E/M VISIT ADD ON: HCPCS | Performed by: STUDENT IN AN ORGANIZED HEALTH CARE EDUCATION/TRAINING PROGRAM

## 2025-08-14 PROCEDURE — 99214 OFFICE O/P EST MOD 30 MIN: CPT | Performed by: STUDENT IN AN ORGANIZED HEALTH CARE EDUCATION/TRAINING PROGRAM

## 2025-08-15 LAB
ALBUMIN SERPL-MCNC: 4.3 G/DL (ref 3.6–4.6)
ALP SERPL-CCNC: 81 IU/L (ref 44–121)
ALT SERPL-CCNC: 12 IU/L (ref 0–32)
AST SERPL-CCNC: 16 IU/L (ref 0–40)
BILIRUB SERPL-MCNC: 0.6 MG/DL (ref 0–1.2)
BUN SERPL-MCNC: 19 MG/DL (ref 10–36)
BUN/CREAT SERPL: 21 (ref 12–28)
CALCIUM SERPL-MCNC: 9.4 MG/DL (ref 8.7–10.3)
CHLORIDE SERPL-SCNC: 96 MMOL/L (ref 96–106)
CO2 SERPL-SCNC: 25 MMOL/L (ref 20–29)
CREAT SERPL-MCNC: 0.92 MG/DL (ref 0.57–1)
EGFRCR SERPLBLD CKD-EPI 2021: 58 ML/MIN/1.73
GLOBULIN SER CALC-MCNC: 2.9 G/DL (ref 1.5–4.5)
GLUCOSE SERPL-MCNC: 94 MG/DL (ref 70–99)
POTASSIUM SERPL-SCNC: 3.3 MMOL/L (ref 3.5–5.2)
PROT SERPL-MCNC: 7.2 G/DL (ref 6–8.5)
SODIUM SERPL-SCNC: 137 MMOL/L (ref 134–144)

## 2025-08-18 RX ORDER — POTASSIUM CHLORIDE 750 MG/1
10 TABLET, EXTENDED RELEASE ORAL DAILY
Qty: 30 TABLET | Refills: 11 | Status: SHIPPED | OUTPATIENT
Start: 2025-08-18 | End: 2025-08-18 | Stop reason: SDUPTHER

## 2025-08-18 RX ORDER — POTASSIUM CHLORIDE 750 MG/1
20 TABLET, EXTENDED RELEASE ORAL DAILY
Qty: 60 TABLET | Refills: 11 | Status: SHIPPED | OUTPATIENT
Start: 2025-08-18

## 2025-08-27 RX ORDER — APIXABAN 2.5 MG/1
2.5 TABLET, FILM COATED ORAL 2 TIMES DAILY
Qty: 180 TABLET | Refills: 1 | Status: SHIPPED | OUTPATIENT
Start: 2025-08-27

## (undated) DEVICE — Device: Brand: REFERENCE PATCH CARTO 3

## (undated) DEVICE — INTRO SHEATH PRELUDE SNAP .038 7F 13CM W/SDPRT

## (undated) DEVICE — CATH GUIDE RIGHTSITE C315HIS-02

## (undated) DEVICE — INTRO SHEATH PRELUDE SNAP .038 9F 13CM W/SDPRT BLK

## (undated) DEVICE — LOU PACE DEFIB: Brand: MEDLINE INDUSTRIES, INC.

## (undated) DEVICE — GW INQWIRE FC PTFE J/3MM .035 180

## (undated) DEVICE — TBG PENCL TELESCP MEGADYNE SMOKE EVAC 10FT

## (undated) DEVICE — PREF.GUIDING SHEATH W/MULT.CRV: Brand: PREFACE

## (undated) DEVICE — SLITTER CATH GUIDE ATTAIN ADJ

## (undated) DEVICE — Device: Brand: EZ STEER NAV

## (undated) DEVICE — LOU EP: Brand: MEDLINE INDUSTRIES, INC.

## (undated) DEVICE — INTRO SHEATH PRELUDE SNAP .038 8F 13CM W/SDPRT